# Patient Record
Sex: MALE | Race: WHITE | NOT HISPANIC OR LATINO | URBAN - METROPOLITAN AREA
[De-identification: names, ages, dates, MRNs, and addresses within clinical notes are randomized per-mention and may not be internally consistent; named-entity substitution may affect disease eponyms.]

---

## 2017-08-20 ENCOUNTER — INPATIENT (INPATIENT)
Facility: HOSPITAL | Age: 82
LOS: 11 days | Discharge: HOME | DRG: 813 | End: 2017-09-01
Attending: STUDENT IN AN ORGANIZED HEALTH CARE EDUCATION/TRAINING PROGRAM | Admitting: INTERNAL MEDICINE
Payer: MEDICARE

## 2017-08-20 ENCOUNTER — EMERGENCY (EMERGENCY)
Facility: HOSPITAL | Age: 82
LOS: 1 days | Discharge: ANOTHER HOSPITAL | End: 2017-08-20
Attending: EMERGENCY MEDICINE
Payer: MEDICARE

## 2017-08-20 VITALS
HEIGHT: 64 IN | SYSTOLIC BLOOD PRESSURE: 149 MMHG | HEART RATE: 122 BPM | WEIGHT: 139.99 LBS | RESPIRATION RATE: 18 BRPM | OXYGEN SATURATION: 97 % | TEMPERATURE: 98 F | DIASTOLIC BLOOD PRESSURE: 87 MMHG

## 2017-08-20 VITALS
HEART RATE: 112 BPM | SYSTOLIC BLOOD PRESSURE: 145 MMHG | RESPIRATION RATE: 19 BRPM | TEMPERATURE: 98 F | DIASTOLIC BLOOD PRESSURE: 69 MMHG | OXYGEN SATURATION: 97 %

## 2017-08-20 VITALS
DIASTOLIC BLOOD PRESSURE: 96 MMHG | TEMPERATURE: 99 F | OXYGEN SATURATION: 100 % | RESPIRATION RATE: 18 BRPM | HEART RATE: 89 BPM | SYSTOLIC BLOOD PRESSURE: 148 MMHG

## 2017-08-20 DIAGNOSIS — I61.9 NONTRAUMATIC INTRACEREBRAL HEMORRHAGE, UNSPECIFIED: ICD-10-CM
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
## 2017-08-20 DIAGNOSIS — I48.91 UNSPECIFIED ATRIAL FIBRILLATION: ICD-10-CM

## 2017-08-20 DIAGNOSIS — S05.31XA OCULAR LACERATION WITHOUT PROLAPSE OR LOSS OF INTRAOCULAR TISSUE, RIGHT EYE, INITIAL ENCOUNTER: ICD-10-CM

## 2017-08-20 DIAGNOSIS — S06.6X9A TRAUMATIC SUBARACHNOID HEMORRHAGE WITH LOSS OF CONSCIOUSNESS OF UNSPECIFIED DURATION, INITIAL ENCOUNTER: ICD-10-CM
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
## 2017-08-20 DIAGNOSIS — I10 ESSENTIAL (PRIMARY) HYPERTENSION: ICD-10-CM

## 2017-08-20 DIAGNOSIS — E78.5 HYPERLIPIDEMIA, UNSPECIFIED: ICD-10-CM
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
## 2017-08-20 DIAGNOSIS — E11.9 TYPE 2 DIABETES MELLITUS WITHOUT COMPLICATIONS: ICD-10-CM

## 2017-08-20 LAB
ALBUMIN SERPL ELPH-MCNC: 3.8 G/DL — SIGNIFICANT CHANGE UP (ref 3.5–5)
ALP SERPL-CCNC: 58 U/L — SIGNIFICANT CHANGE UP (ref 40–120)
ALT FLD-CCNC: 51 U/L DA — SIGNIFICANT CHANGE UP (ref 10–60)
ANION GAP SERPL CALC-SCNC: 14 MMOL/L — SIGNIFICANT CHANGE UP (ref 5–17)
ANISOCYTOSIS BLD QL: SLIGHT — SIGNIFICANT CHANGE UP
APTT BLD: 30.6 SEC — SIGNIFICANT CHANGE UP (ref 27.5–37.4)
APTT BLD: 32.1 SEC — SIGNIFICANT CHANGE UP (ref 27.5–37.4)
AST SERPL-CCNC: 75 U/L — HIGH (ref 10–40)
BASOPHILS # BLD AUTO: 0.1 K/UL — SIGNIFICANT CHANGE UP (ref 0–0.2)
BASOPHILS # BLD AUTO: 0.1 K/UL — SIGNIFICANT CHANGE UP (ref 0–0.2)
BASOPHILS NFR BLD AUTO: 0.6 % — SIGNIFICANT CHANGE UP (ref 0–2)
BASOPHILS NFR BLD AUTO: 1.2 % — SIGNIFICANT CHANGE UP (ref 0–2)
BILIRUB SERPL-MCNC: 1.9 MG/DL — HIGH (ref 0.2–1.2)
BLD GP AB SCN SERPL QL: NEGATIVE — SIGNIFICANT CHANGE UP
BUN SERPL-MCNC: 19 MG/DL — HIGH (ref 7–18)
CALCIUM SERPL-MCNC: 8.8 MG/DL — SIGNIFICANT CHANGE UP (ref 8.4–10.5)
CHLORIDE SERPL-SCNC: 110 MMOL/L — HIGH (ref 96–108)
CK MB BLD-MCNC: 1 % — SIGNIFICANT CHANGE UP (ref 0–3.5)
CK MB BLD-MCNC: 2.6 % — SIGNIFICANT CHANGE UP (ref 0–3.5)
CK MB CFR SERPL CALC: 1.2 NG/ML — SIGNIFICANT CHANGE UP (ref 0–3.6)
CK MB CFR SERPL CALC: 2.4 NG/ML — SIGNIFICANT CHANGE UP (ref 0–6.7)
CK SERPL-CCNC: 117 U/L — SIGNIFICANT CHANGE UP (ref 35–232)
CK SERPL-CCNC: 94 U/L — SIGNIFICANT CHANGE UP (ref 30–200)
CO2 SERPL-SCNC: 21 MMOL/L — LOW (ref 22–31)
CREAT SERPL-MCNC: 1 MG/DL — SIGNIFICANT CHANGE UP (ref 0.5–1.3)
EOSINOPHIL # BLD AUTO: 0.1 K/UL — SIGNIFICANT CHANGE UP (ref 0–0.5)
EOSINOPHIL # BLD AUTO: 0.3 K/UL — SIGNIFICANT CHANGE UP (ref 0–0.5)
EOSINOPHIL NFR BLD AUTO: 0.7 % — SIGNIFICANT CHANGE UP (ref 0–6)
EOSINOPHIL NFR BLD AUTO: 3.9 % — SIGNIFICANT CHANGE UP (ref 0–6)
GAS PNL BLDV: SIGNIFICANT CHANGE UP
GLUCOSE SERPL-MCNC: 207 MG/DL — HIGH (ref 70–99)
HCT VFR BLD CALC: 40.5 % — SIGNIFICANT CHANGE UP (ref 39–50)
HCT VFR BLD CALC: 42 % — SIGNIFICANT CHANGE UP (ref 39–50)
HGB BLD-MCNC: 13.9 G/DL — SIGNIFICANT CHANGE UP (ref 13–17)
HGB BLD-MCNC: 14.4 G/DL — SIGNIFICANT CHANGE UP (ref 13–17)
INR BLD: 1.42 RATIO — HIGH (ref 0.88–1.16)
INR BLD: 1.84 RATIO — HIGH (ref 0.88–1.16)
INR BLD: 1.98 RATIO — HIGH (ref 0.88–1.16)
LYMPHOCYTES # BLD AUTO: 1.2 K/UL — SIGNIFICANT CHANGE UP (ref 1–3.3)
LYMPHOCYTES # BLD AUTO: 13 % — SIGNIFICANT CHANGE UP (ref 13–44)
LYMPHOCYTES # BLD AUTO: 2.3 K/UL — SIGNIFICANT CHANGE UP (ref 1–3.3)
LYMPHOCYTES # BLD AUTO: 29.4 % — SIGNIFICANT CHANGE UP (ref 13–44)
MACROCYTES BLD QL: SLIGHT — SIGNIFICANT CHANGE UP
MCHC RBC-ENTMCNC: 34.1 GM/DL — SIGNIFICANT CHANGE UP (ref 32–36)
MCHC RBC-ENTMCNC: 34.3 GM/DL — SIGNIFICANT CHANGE UP (ref 32–36)
MCHC RBC-ENTMCNC: 36 PG — HIGH (ref 27–34)
MCHC RBC-ENTMCNC: 36.4 PG — HIGH (ref 27–34)
MCV RBC AUTO: 105.4 FL — HIGH (ref 80–100)
MCV RBC AUTO: 106 FL — HIGH (ref 80–100)
MONOCYTES # BLD AUTO: 0.6 K/UL — SIGNIFICANT CHANGE UP (ref 0–0.9)
MONOCYTES # BLD AUTO: 0.8 K/UL — SIGNIFICANT CHANGE UP (ref 0–0.9)
MONOCYTES NFR BLD AUTO: 7.3 % — SIGNIFICANT CHANGE UP (ref 2–14)
MONOCYTES NFR BLD AUTO: 8.5 % — SIGNIFICANT CHANGE UP (ref 2–14)
NEUTROPHILS # BLD AUTO: 4.5 K/UL — SIGNIFICANT CHANGE UP (ref 1.8–7.4)
NEUTROPHILS # BLD AUTO: 7.4 K/UL — SIGNIFICANT CHANGE UP (ref 1.8–7.4)
NEUTROPHILS NFR BLD AUTO: 58.2 % — SIGNIFICANT CHANGE UP (ref 43–77)
NEUTROPHILS NFR BLD AUTO: 77.2 % — HIGH (ref 43–77)
NT-PROBNP SERPL-SCNC: 1152 PG/ML — HIGH (ref 0–450)
PLAT MORPH BLD: NORMAL — SIGNIFICANT CHANGE UP
PLATELET # BLD AUTO: 148 K/UL — LOW (ref 150–400)
PLATELET # BLD AUTO: 156 K/UL — SIGNIFICANT CHANGE UP (ref 150–400)
POTASSIUM SERPL-MCNC: 4 MMOL/L — SIGNIFICANT CHANGE UP (ref 3.5–5.3)
POTASSIUM SERPL-SCNC: 4 MMOL/L — SIGNIFICANT CHANGE UP (ref 3.5–5.3)
PROT SERPL-MCNC: 7.1 G/DL — SIGNIFICANT CHANGE UP (ref 6–8.3)
PROTHROM AB SERPL-ACNC: 15.6 SEC — HIGH (ref 9.8–12.7)
PROTHROM AB SERPL-ACNC: 20.3 SEC — HIGH (ref 9.8–12.7)
PROTHROM AB SERPL-ACNC: 21.9 SEC — HIGH (ref 9.8–12.7)
RBC # BLD: 3.81 M/UL — LOW (ref 4.2–5.8)
RBC # BLD: 3.99 M/UL — LOW (ref 4.2–5.8)
RBC # FLD: 12.8 % — SIGNIFICANT CHANGE UP (ref 10.3–14.5)
RBC # FLD: 13 % — SIGNIFICANT CHANGE UP (ref 10.3–14.5)
RBC BLD AUTO: ABNORMAL
RH IG SCN BLD-IMP: POSITIVE — SIGNIFICANT CHANGE UP
SODIUM SERPL-SCNC: 145 MMOL/L — SIGNIFICANT CHANGE UP (ref 135–145)
TROPONIN I SERPL-MCNC: 0.02 NG/ML — SIGNIFICANT CHANGE UP (ref 0–0.04)
TROPONIN T SERPL-MCNC: <0.01 NG/ML — SIGNIFICANT CHANGE UP (ref 0–0.06)
WBC # BLD: 7.7 K/UL — SIGNIFICANT CHANGE UP (ref 3.8–10.5)
WBC # BLD: 9.6 K/UL — SIGNIFICANT CHANGE UP (ref 3.8–10.5)
WBC # FLD AUTO: 7.7 K/UL — SIGNIFICANT CHANGE UP (ref 3.8–10.5)
WBC # FLD AUTO: 9.6 K/UL — SIGNIFICANT CHANGE UP (ref 3.8–10.5)

## 2017-08-20 PROCEDURE — 70450 CT HEAD/BRAIN W/O DYE: CPT | Mod: 26

## 2017-08-20 PROCEDURE — 72125 CT NECK SPINE W/O DYE: CPT

## 2017-08-20 PROCEDURE — 85027 COMPLETE CBC AUTOMATED: CPT

## 2017-08-20 PROCEDURE — 70486 CT MAXILLOFACIAL W/O DYE: CPT | Mod: 26

## 2017-08-20 PROCEDURE — 90714 TD VACC NO PRESV 7 YRS+ IM: CPT

## 2017-08-20 PROCEDURE — 85730 THROMBOPLASTIN TIME PARTIAL: CPT

## 2017-08-20 PROCEDURE — 82553 CREATINE MB FRACTION: CPT

## 2017-08-20 PROCEDURE — 99291 CRITICAL CARE FIRST HOUR: CPT

## 2017-08-20 PROCEDURE — 99284 EMERGENCY DEPT VISIT MOD MDM: CPT | Mod: 25,GC

## 2017-08-20 PROCEDURE — 71250 CT THORAX DX C-: CPT

## 2017-08-20 PROCEDURE — 80053 COMPREHEN METABOLIC PANEL: CPT

## 2017-08-20 PROCEDURE — 70450 CT HEAD/BRAIN W/O DYE: CPT | Mod: 26,77

## 2017-08-20 PROCEDURE — 96374 THER/PROPH/DIAG INJ IV PUSH: CPT

## 2017-08-20 PROCEDURE — 36415 COLL VENOUS BLD VENIPUNCTURE: CPT

## 2017-08-20 PROCEDURE — 71010: CPT | Mod: 26

## 2017-08-20 PROCEDURE — 71250 CT THORAX DX C-: CPT | Mod: 26

## 2017-08-20 PROCEDURE — 83880 ASSAY OF NATRIURETIC PEPTIDE: CPT

## 2017-08-20 PROCEDURE — 99223 1ST HOSP IP/OBS HIGH 75: CPT

## 2017-08-20 PROCEDURE — 72125 CT NECK SPINE W/O DYE: CPT | Mod: 26

## 2017-08-20 PROCEDURE — 93010 ELECTROCARDIOGRAM REPORT: CPT

## 2017-08-20 PROCEDURE — 82550 ASSAY OF CK (CPK): CPT

## 2017-08-20 PROCEDURE — 85610 PROTHROMBIN TIME: CPT

## 2017-08-20 PROCEDURE — 70450 CT HEAD/BRAIN W/O DYE: CPT

## 2017-08-20 PROCEDURE — 84484 ASSAY OF TROPONIN QUANT: CPT

## 2017-08-20 PROCEDURE — 90471 IMMUNIZATION ADMIN: CPT

## 2017-08-20 PROCEDURE — 93005 ELECTROCARDIOGRAM TRACING: CPT

## 2017-08-20 RX ORDER — SODIUM CHLORIDE 9 MG/ML
1000 INJECTION, SOLUTION INTRAVENOUS
Qty: 0 | Refills: 0 | Status: DISCONTINUED | OUTPATIENT
Start: 2017-08-20 | End: 2017-08-21

## 2017-08-20 RX ORDER — TETANUS AND DIPHTHERIA TOXOIDS ADSORBED 2; 2 [LF]/.5ML; [LF]/.5ML
0.5 INJECTION INTRAMUSCULAR ONCE
Qty: 0 | Refills: 0 | Status: COMPLETED | OUTPATIENT
Start: 2017-08-20 | End: 2017-08-20

## 2017-08-20 RX ORDER — INSULIN LISPRO 100/ML
VIAL (ML) SUBCUTANEOUS EVERY 6 HOURS
Qty: 0 | Refills: 0 | Status: DISCONTINUED | OUTPATIENT
Start: 2017-08-20 | End: 2017-08-21

## 2017-08-20 RX ORDER — PROTHROMBIN COMPLEX CONCENTRATE (HUMAN) 25.5; 16.5; 24; 22; 22; 26 [IU]/ML; [IU]/ML; [IU]/ML; [IU]/ML; [IU]/ML; [IU]/ML
1500 POWDER, FOR SOLUTION INTRAVENOUS ONCE
Qty: 1500 | Refills: 0 | Status: COMPLETED | OUTPATIENT
Start: 2017-08-20 | End: 2017-08-20

## 2017-08-20 RX ORDER — DEXTROSE 50 % IN WATER 50 %
25 SYRINGE (ML) INTRAVENOUS ONCE
Qty: 0 | Refills: 0 | Status: DISCONTINUED | OUTPATIENT
Start: 2017-08-20 | End: 2017-09-01

## 2017-08-20 RX ORDER — SODIUM CHLORIDE 9 MG/ML
1000 INJECTION, SOLUTION INTRAVENOUS
Qty: 0 | Refills: 0 | Status: DISCONTINUED | OUTPATIENT
Start: 2017-08-20 | End: 2017-09-01

## 2017-08-20 RX ORDER — ACETAMINOPHEN 500 MG
1000 TABLET ORAL ONCE
Qty: 0 | Refills: 0 | Status: COMPLETED | OUTPATIENT
Start: 2017-08-20 | End: 2017-08-20

## 2017-08-20 RX ORDER — DILTIAZEM HCL 120 MG
180 CAPSULE, EXT RELEASE 24 HR ORAL DAILY
Qty: 0 | Refills: 0 | Status: DISCONTINUED | OUTPATIENT
Start: 2017-08-20 | End: 2017-09-01

## 2017-08-20 RX ORDER — ACETAMINOPHEN 500 MG
650 TABLET ORAL ONCE
Qty: 0 | Refills: 0 | Status: COMPLETED | OUTPATIENT
Start: 2017-08-20 | End: 2017-08-20

## 2017-08-20 RX ORDER — DEXTROSE 50 % IN WATER 50 %
1 SYRINGE (ML) INTRAVENOUS ONCE
Qty: 0 | Refills: 0 | Status: DISCONTINUED | OUTPATIENT
Start: 2017-08-20 | End: 2017-09-01

## 2017-08-20 RX ORDER — PHYTONADIONE (VIT K1) 5 MG
5 TABLET ORAL ONCE
Qty: 0 | Refills: 0 | Status: DISCONTINUED | OUTPATIENT
Start: 2017-08-20 | End: 2017-08-20

## 2017-08-20 RX ORDER — GLUCAGON INJECTION, SOLUTION 0.5 MG/.1ML
1 INJECTION, SOLUTION SUBCUTANEOUS ONCE
Qty: 0 | Refills: 0 | Status: DISCONTINUED | OUTPATIENT
Start: 2017-08-20 | End: 2017-09-01

## 2017-08-20 RX ORDER — DEXTROSE 50 % IN WATER 50 %
12.5 SYRINGE (ML) INTRAVENOUS ONCE
Qty: 0 | Refills: 0 | Status: DISCONTINUED | OUTPATIENT
Start: 2017-08-20 | End: 2017-09-01

## 2017-08-20 RX ORDER — PHYTONADIONE (VIT K1) 5 MG
5 TABLET ORAL ONCE
Qty: 0 | Refills: 0 | Status: COMPLETED | OUTPATIENT
Start: 2017-08-20 | End: 2017-08-20

## 2017-08-20 RX ORDER — SODIUM CHLORIDE 9 MG/ML
3 INJECTION INTRAMUSCULAR; INTRAVENOUS; SUBCUTANEOUS ONCE
Qty: 0 | Refills: 0 | Status: COMPLETED | OUTPATIENT
Start: 2017-08-20 | End: 2017-08-20

## 2017-08-20 RX ORDER — METOPROLOL TARTRATE 50 MG
100 TABLET ORAL
Qty: 0 | Refills: 0 | Status: DISCONTINUED | OUTPATIENT
Start: 2017-08-20 | End: 2017-09-01

## 2017-08-20 RX ORDER — SODIUM CHLORIDE 9 MG/ML
500 INJECTION INTRAMUSCULAR; INTRAVENOUS; SUBCUTANEOUS ONCE
Qty: 0 | Refills: 0 | Status: COMPLETED | OUTPATIENT
Start: 2017-08-20 | End: 2017-08-20

## 2017-08-20 RX ORDER — PROTHROMBIN COMPLEX CONCENTRATE (HUMAN) 25.5; 16.5; 24; 22; 22; 26 [IU]/ML; [IU]/ML; [IU]/ML; [IU]/ML; [IU]/ML; [IU]/ML
1500 POWDER, FOR SOLUTION INTRAVENOUS ONCE
Qty: 1500 | Refills: 0 | Status: DISCONTINUED | OUTPATIENT
Start: 2017-08-20 | End: 2017-08-20

## 2017-08-20 RX ORDER — DIGOXIN 250 MCG
0.12 TABLET ORAL DAILY
Qty: 0 | Refills: 0 | Status: DISCONTINUED | OUTPATIENT
Start: 2017-08-20 | End: 2017-09-01

## 2017-08-20 RX ADMIN — Medication 650 MILLIGRAM(S): at 10:34

## 2017-08-20 RX ADMIN — SODIUM CHLORIDE 3 MILLILITER(S): 9 INJECTION INTRAMUSCULAR; INTRAVENOUS; SUBCUTANEOUS at 10:05

## 2017-08-20 RX ADMIN — Medication 1000 MILLIGRAM(S): at 19:41

## 2017-08-20 RX ADMIN — PROTHROMBIN COMPLEX CONCENTRATE (HUMAN) 400 INTERNATIONAL UNIT(S): 25.5; 16.5; 24; 22; 22; 26 POWDER, FOR SOLUTION INTRAVENOUS at 20:14

## 2017-08-20 RX ADMIN — Medication 101 MILLIGRAM(S): at 20:13

## 2017-08-20 RX ADMIN — TETANUS AND DIPHTHERIA TOXOIDS ADSORBED 0.5 MILLILITER(S): 2; 2 INJECTION INTRAMUSCULAR at 10:30

## 2017-08-20 RX ADMIN — SODIUM CHLORIDE 500 MILLILITER(S): 9 INJECTION INTRAMUSCULAR; INTRAVENOUS; SUBCUTANEOUS at 17:10

## 2017-08-20 RX ADMIN — Medication 400 MILLIGRAM(S): at 18:15

## 2017-08-20 NOTE — ED PROVIDER NOTE - CARE PLAN
Principal Discharge DX:	Fall Principal Discharge DX:	Intracerebral bleed due to trauma, right, with LOC of 30 min or less, initial encounter  Secondary Diagnosis:	Chronic atrial fibrillation  Secondary Diagnosis:	Coagulopathy

## 2017-08-20 NOTE — ED ADULT NURSE NOTE - OBJECTIVE STATEMENT
BIB EMS alert and verbally responsive S/P mech fall this morning  LOC noted sustained  abrasion and bruises to R eye face. Pt  with PMH Afib {taking Coumadin} Arthritis. BIB EMS alert and verbally responsive S/P unwitnessed fall this morning  LOC noted sustained  abrasion and bruises to R eye face. Pt  with PMH Afib {taking Coumadin} Arthritis.

## 2017-08-20 NOTE — ED PROVIDER NOTE - CARE PLAN
Principal Discharge DX:	Rapid atrial fibrillation  Secondary Diagnosis:	Cerebral hemorrhage  Secondary Diagnosis:	Laceration of eye

## 2017-08-20 NOTE — ED PROVIDER NOTE - PMH
Arthritis, shoulder region  Right  Atrial fibrillation    BPH (benign prostatic hypertrophy)    Congenital heart defect    Diabetes mellitus    HTN - Hypertension    Hyperlipidemia    Spinal stenosis of lumbar region    Torn rotator cuff  Right

## 2017-08-20 NOTE — ED ADULT NURSE REASSESSMENT NOTE - NS ED NURSE REASSESS COMMENT FT1
Remains alert &oriented x3 NAD, CT completed. stiches applied to laceration over Rt lateral eye Pt candidate to transfer to [facility] hand off report given to Hussein avalos RN

## 2017-08-20 NOTE — ED ADULT TRIAGE NOTE - CHIEF COMPLAINT QUOTE
Trip and fall in am, loss of consciousness per spouse per ems, on coumadin, lac to scalp, noted with dressing bleeding actively noted.

## 2017-08-20 NOTE — ED PROVIDER NOTE - CRITICAL CARE PROVIDED
additional history taking/telephone consultation with the patient's family/consultation with other physicians/documentation/interpretation of diagnostic studies/consult w/ pt's family directly relating to pts condition/direct patient care (not related to procedure)

## 2017-08-20 NOTE — H&P ADULT - PMH
Arthritis, shoulder region  Right  Atrial fibrillation    BPH (benign prostatic hypertrophy)    Congenital heart defect    Diabetes mellitus    HTN - Hypertension    Hyperlipidemia    Spinal stenosis of lumbar region    Torn rotator cuff  Right Arthritis, shoulder region  Right  Atrial fibrillation    BPH (benign prostatic hypertrophy)    Congenital heart defect    Diabetes mellitus    HTN - Hypertension    Hyperlipidemia    Nonrheumatic aortic valve stenosis    Spinal stenosis of lumbar region    Torn rotator cuff  Right

## 2017-08-20 NOTE — CONSULT NOTE ADULT - SUBJECTIVE AND OBJECTIVE BOX
LEVEL   TRAUMA ACTIVATION    85y Male who fell while walking out the door to get some mashed potatoes.  He had a syncopal episode after feeling dizzy.  He woke up on the floor.  He is currently on Coumadin for a fib.  he states that he has a balance problem and walks with a cane.  He is complaining of a headache now and nothing else.         Primary Survey  A - intact   B - clear to auscultation and equal b/l   C -  148/96  HR 89   D - GCS 15  E - Exposure deferred.  Patient in hallway      Secondary survey  GEN: NAD, alert and oriented to person place and time, responses appropriate  HEENT: normocephalic, ecchymosis to the R orbit and R temple with edema at the sup/lat orbit, laceration at lateral eye, sutured  CV: s1, s2, irregularly regular  PULM/CHEST: CTA B/L, no crepitus, no obvious signs of trauma, tender to R chest   ABD: soft, nontender, nondistended, no hematomas, well healed appendectomy scar  : deferred  EXT:warm, no gross deformities, soft compartments,motor 5/5 in all extremities        PMH  Atrial fibrillation  Spinal stenosis of lumbar region  Torn rotator cuff  Arthritis, shoulder region  Arthropathy associated with bacterial disease, shoulder region  Diabetes mellitus  Congenital heart defect  Hyperlipidemia  HTN - Hypertension  BPH (benign prostatic hypertrophy)      PSH  Hx of appendectomy  S/P TURP (status post transurethral resection of prostate)      MEDS  MEDICATIONS  (STANDING):      MEDICATIONS  (PRN):      ALLERGIES  Allergies    No Known Allergies    Intolerances        SOCIAL Hx    LABS                        13.9   9.6   )-----------( 156      ( 20 Aug 2017 15:10 )             40.5     08-20    144  |  106  |  18  ----------------------------<  162<H>  4.1   |  23  |  0.82    Ca    9.1      20 Aug 2017 15:10    TPro  6.5  /  Alb  4.1  /  TBili  2.0<H>  /  DBili  x   /  AST  58<H>  /  ALT  38  /  AlkPhos  51  08-20    PT/INR - ( 20 Aug 2017 15:10 )   PT: 21.9 sec;   INR: 1.98 ratio         PTT - ( 20 Aug 2017 15:10 )  PTT:30.6 sec          IMAGING  < from: CT Maxillofacial No Cont (08.20.17 @ 16:02) >  IMPRESSION:    1.  Head CT: Stable small right frontal hemorrhage.  2.  Maxillofacial CT: Right periorbital preseptal soft tissue hematoma   without associated orbital fracture. Age-indeterminate anterior bilateral   nasal fractures.      < end of copied text >  < from: CT Head No Cont (08.20.17 @ 15:57) >  CT brain:    A 6 mm parenchymal hemorrhage in the right superior frontal gyrus is   stable in size compared to the prior study, most likely reflecting a   hemorrhagic contusion given the history of trauma. Serial imaging   follow-up to resolution is recommended to exclude an underlying   abnormality. No new acute hemorrhages are present.    There is no evidence for acute vascular territory infarct, acute  hydrocephalus, or midline shift.    Chronic white matter changes and cerebral volume loss are again noted.    There is no displaced calvarial fracture.    < end of copied text >  < from: CT Chest No Cont (08.20.17 @ 11:09) >  Nondisplaced fracture right sixth and seventh ribs. No pneumothorax,   pleural effusion or focal lung consolidation/contusion. Platelike   atelectatic streak or scar lateral right lower lung.    Additional findings as described.      < end of copied text >

## 2017-08-20 NOTE — ED PROVIDER NOTE - OBJECTIVE STATEMENT
86 y/o M pt with significant PMHx of HTN, HLD, NIDDM (last dose unknown) and was BIB EMS s/p mechanical fall today  Pt claims he left his house and questionably felt a bit dizzy,  fell and hit his head. Pt sustained a laceration to R eye (lateral aspect) and is c/o neck pain, R upper rib pain and mild HA. Pt notes (+) LOC. Pt denies any chest pain, palpitations, shortness of breath, or any other complaints. NKDA.   Last tetanus unknown. 86 y/o M pt with significant PMHx of HTN, HLD, NIDDM (last dose unknown) and was BIB EMS s/p mechanical fall today  Pt claims he left his house and questionably felt a bit dizzy,  fell and hit his head. Pt sustained a laceration to R eye (lateral aspect) and is c/o neck pain, R upper rib pain and mild HA. Pt notes (+) LOC. Pt denies any chest pain, palpitations, shortness of breath, or any other complaints. NKDA.   Last tetanus unknown.  Poor historian

## 2017-08-20 NOTE — ED PROVIDER NOTE - ATTENDING CONTRIBUTION TO CARE
Dr. Bernard (Attending Physician)  I performed a history and physical exam of the patient and discussed their management with the resident. I reviewed the resident's note and agree with the documented findings and plan of care. My medical decision making and observations are found above.

## 2017-08-20 NOTE — ED PROVIDER NOTE - PROGRESS NOTE DETAILS
pt with Rt frontal parasagittal gyrus, d/w neurosurgeon Dr. Rios, will transfer to [facility], spoke with pt's lady friend Brandi, pt has somewhat early dementia.

## 2017-08-20 NOTE — CONSULT NOTE ADULT - PROBLEM SELECTOR RECOMMENDATION 9
no acute neurosurgical intervention  q4 neuro checks  hold coumadin until outpatient followup  repeat CTH in am; if stable, patient can see Dr. Rios in office in one week

## 2017-08-20 NOTE — CONSULT NOTE ADULT - SUBJECTIVE AND OBJECTIVE BOX
HPI:  Patient is an 86 yo M w/ hx of afib on coumadin who p/w trace R frontal tSAH seen on CTH after syncopal fall this afternoon. Patient states he was walking out the door to get some mashed potatoes and suddenly felt dizzy; he awoke on the floor. Patient denies any vision change, nausea/vomiting, focal weakness, seizures, and only complains of a mild HA. He has no focal deficits on exam.    PAST MEDICAL HISTORY   Atrial fibrillation  Spinal stenosis of lumbar region  Torn rotator cuff  Arthritis, shoulder region  Arthropathy associated with bacterial disease, shoulder region  Diabetes mellitus  Congenital heart defect  Hyperlipidemia  HTN - Hypertension  BPH (benign prostatic hypertrophy)    PAST SURGICAL HISTORY   Hx of appendectomy  S/P TURP (status post transurethral resection of prostate)          SOCIAL HISTORY:   Occupation:   Marital Status:     FAMILY HISTORY:      PHYSICAL EXAM:    Constitutional: No Acute Distress     Neurological: AOx3, Following Commands, Moving all Extremities     Motor exam:          Upper extremity                         Delt     Bicep     Tricep    HG                                                 R         5/5        5/5        5/5       5/5                                               L          5/5        5/5        5/5       5/5          Lower extremity                        HF         KF        KE       DF         PF                                                  R        5/5        5/5        5/5       5/5         5/5                                               L         5/5        5/5       5/5       5/5          5/5                                                 Sensation: [x] intact to light touch  [] decreased:         LABS:                        13.9   9.6   )-----------( 156      ( 20 Aug 2017 15:10 )             40.5     08-20    144  |  106  |  18  ----------------------------<  162<H>  4.1   |  23  |  0.82    Ca    9.1      20 Aug 2017 15:10    TPro  6.5  /  Alb  4.1  /  TBili  2.0<H>  /  DBili  x   /  AST  58<H>  /  ALT  38  /  AlkPhos  51  08-20    PT/INR - ( 20 Aug 2017 15:10 )   PT: 21.9 sec;   INR: 1.98 ratio         PTT - ( 20 Aug 2017 15:10 )  PTT:30.6 sec

## 2017-08-20 NOTE — H&P ADULT - HISTORY OF PRESENT ILLNESS
LEVEL II TRAUMA ACTIVATION    85y Male who fell while walking out the door to get some mashed potatoes.  He had a syncopal episode after feeling dizzy.  He woke up on the floor.  He is currently on Coumadin for a fib.  he states that he has a balance problem and walks with a cane.  He is complaining of a headache now and nothing else.         Primary Survey  A - intact   B - clear to auscultation and equal b/l   C -  148/96  HR 89   D - GCS 15  E - Exposure deferred.  Patient in hallway      Secondary survey  GEN: NAD, alert and oriented to person place and time, responses appropriate  HEENT: normocephalic, ecchymosis to the R orbit and R temple with edema at the sup/lat orbit, laceration at lateral eye, sutured  CV: s1, s2, irregularly regular  PULM/CHEST: CTA B/L, no crepitus, no obvious signs of trauma, tender to R chest   ABD: soft, nontender, nondistended, no hematomas, well healed appendectomy scar  : deferred  EXT: warm, no gross deformities, soft compartments, motor 5/5 in all extremities      PMH  Atrial fibrillation  Spinal stenosis of lumbar region  Torn rotator cuff  Arthritis, shoulder region  Arthropathy associated with bacterial disease, shoulder region  Diabetes mellitus  Congenital heart defect  Hyperlipidemia  HTN - Hypertension  BPH (benign prostatic hypertrophy)      PSH  Hx of appendectomy  S/P TURP (status post transurethral resection of prostate)      MEDS  MEDICATIONS  (STANDING):      MEDICATIONS  (PRN):      ALLERGIES  Allergies    No Known Allergies    Intolerances        SOCIAL Hx    LABS                        13.9   9.6   )-----------( 156      ( 20 Aug 2017 15:10 )             40.5     08-20    144  |  106  |  18  ----------------------------<  162<H>  4.1   |  23  |  0.82    Ca    9.1      20 Aug 2017 15:10    TPro  6.5  /  Alb  4.1  /  TBili  2.0<H>  /  DBili  x   /  AST  58<H>  /  ALT  38  /  AlkPhos  51  08-20    PT/INR - ( 20 Aug 2017 15:10 )   PT: 21.9 sec;   INR: 1.98 ratio         PTT - ( 20 Aug 2017 15:10 )  PTT:30.6 sec          IMAGING  < from: CT Maxillofacial No Cont (08.20.17 @ 16:02) >  IMPRESSION:    1.  Head CT: Stable small right frontal hemorrhage.  2.  Maxillofacial CT: Right periorbital preseptal soft tissue hematoma   without associated orbital fracture. Age-indeterminate anterior bilateral   nasal fractures.      < end of copied text >  < from: CT Head No Cont (08.20.17 @ 15:57) >  CT brain:    A 6 mm parenchymal hemorrhage in the right superior frontal gyrus is   stable in size compared to the prior study, most likely reflecting a   hemorrhagic contusion given the history of trauma. Serial imaging   follow-up to resolution is recommended to exclude an underlying   abnormality. No new acute hemorrhages are present.    There is no evidence for acute vascular territory infarct, acute  hydrocephalus, or midline shift.    Chronic white matter changes and cerebral volume loss are again noted.    There is no displaced calvarial fracture.    < end of copied text >  < from: CT Chest No Cont (08.20.17 @ 11:09) >  Nondisplaced fracture right sixth and seventh ribs. No pneumothorax,   pleural effusion or focal lung consolidation/contusion. Platelike   atelectatic streak or scar lateral right lower lung.    Additional findings as described.      < end of copied text > 85y Male who fell while walking out the door to get some mashed potatoes.  He had a syncopal episode after feeling dizzy.  He woke up on the floor.  He is currently on Coumadin for a fib.  he states that he has a balance problem and walks with a cane.  He is complaining of a headache now and nothing else.         Primary Survey  A - intact   B - clear to auscultation and equal b/l   C -  148/96  HR 89   D - GCS 15  E - Exposure deferred.  Patient in hallway      Secondary survey  GEN: NAD, alert and oriented to person place and time, responses appropriate  HEENT: normocephalic, ecchymosis to the R orbit and R temple with edema at the sup/lat orbit, laceration at lateral eye, sutured  CV: s1, s2, irregularly regular  PULM/CHEST: CTA B/L, no crepitus, no obvious signs of trauma, tender to R chest   ABD: soft, nontender, nondistended, no hematomas, well healed appendectomy scar  : deferred  EXT: warm, no gross deformities, soft compartments, motor 5/5 in all extremities      PMH  Atrial fibrillation  Spinal stenosis of lumbar region  Torn rotator cuff  Arthritis, shoulder region  Arthropathy associated with bacterial disease, shoulder region  Diabetes mellitus  Congenital heart defect  Hyperlipidemia  HTN - Hypertension  BPH (benign prostatic hypertrophy)      PSH  Hx of appendectomy  S/P TURP (status post transurethral resection of prostate)      MEDS  MEDICATIONS  (STANDING):      MEDICATIONS  (PRN):      ALLERGIES  Allergies    No Known Allergies    Intolerances        SOCIAL Hx    LABS                        13.9   9.6   )-----------( 156      ( 20 Aug 2017 15:10 )             40.5     08-20    144  |  106  |  18  ----------------------------<  162<H>  4.1   |  23  |  0.82    Ca    9.1      20 Aug 2017 15:10    TPro  6.5  /  Alb  4.1  /  TBili  2.0<H>  /  DBili  x   /  AST  58<H>  /  ALT  38  /  AlkPhos  51  08-20    PT/INR - ( 20 Aug 2017 15:10 )   PT: 21.9 sec;   INR: 1.98 ratio         PTT - ( 20 Aug 2017 15:10 )  PTT:30.6 sec          IMAGING  < from: CT Maxillofacial No Cont (08.20.17 @ 16:02) >  IMPRESSION:    1.  Head CT: Stable small right frontal hemorrhage.  2.  Maxillofacial CT: Right periorbital preseptal soft tissue hematoma   without associated orbital fracture. Age-indeterminate anterior bilateral   nasal fractures.      < end of copied text >  < from: CT Head No Cont (08.20.17 @ 15:57) >  CT brain:    A 6 mm parenchymal hemorrhage in the right superior frontal gyrus is   stable in size compared to the prior study, most likely reflecting a   hemorrhagic contusion given the history of trauma. Serial imaging   follow-up to resolution is recommended to exclude an underlying   abnormality. No new acute hemorrhages are present.    There is no evidence for acute vascular territory infarct, acute  hydrocephalus, or midline shift.    Chronic white matter changes and cerebral volume loss are again noted.    There is no displaced calvarial fracture.    < end of copied text >  < from: CT Chest No Cont (08.20.17 @ 11:09) >  Nondisplaced fracture right sixth and seventh ribs. No pneumothorax,   pleural effusion or focal lung consolidation/contusion. Platelike   atelectatic streak or scar lateral right lower lung.    Additional findings as described.      < end of copied text > 85y Male who fell while walking out the door to get some mashed potatoes.  He had a syncopal episode after feeling dizzy.  He woke up on the floor.  He is currently on Coumadin for a fib.  he states that he has a balance problem and walks with a cane.  He is complaining of a headache now and nothing else.         Primary Survey  A - intact   B - clear to auscultation and equal b/l   C -  148/96  HR 89   D - GCS 15  E - Exposure deferred.  Patient in hallway      Secondary survey  GEN: NAD, alert and oriented to person place and time, responses appropriate  HEENT: normocephalic, ecchymosis to the R orbit and R temple with edema at the sup/lat orbit, laceration at lateral eye, sutured  CV: s1, s2, irregularly regular  PULM/CHEST: CTA B/L, no crepitus, no obvious signs of trauma, tender to R chest   ABD: soft, nontender, nondistended, no hematomas, well healed appendectomy scar  : deferred  EXT: warm, no gross deformities, soft compartments, motor 5/5 in all extremities      PMH  Severe aortic stenosis  Atrial fibrillation  Spinal stenosis of lumbar region  Torn rotator cuff  Arthritis, shoulder region  Arthropathy associated with bacterial disease, shoulder region  Diabetes mellitus  Congenital heart defect  Hyperlipidemia  HTN - Hypertension  BPH (benign prostatic hypertrophy)      PSH  Hx of appendectomy  S/P TURP (status post transurethral resection of prostate)      MEDS  MEDICATIONS  (STANDING):      MEDICATIONS  (PRN):      ALLERGIES  Allergies    No Known Allergies    Intolerances        SOCIAL Hx    LABS                        13.9   9.6   )-----------( 156      ( 20 Aug 2017 15:10 )             40.5     08-20    144  |  106  |  18  ----------------------------<  162<H>  4.1   |  23  |  0.82    Ca    9.1      20 Aug 2017 15:10    TPro  6.5  /  Alb  4.1  /  TBili  2.0<H>  /  DBili  x   /  AST  58<H>  /  ALT  38  /  AlkPhos  51  08-20    PT/INR - ( 20 Aug 2017 15:10 )   PT: 21.9 sec;   INR: 1.98 ratio         PTT - ( 20 Aug 2017 15:10 )  PTT:30.6 sec          IMAGING  < from: CT Maxillofacial No Cont (08.20.17 @ 16:02) >  IMPRESSION:    1.  Head CT: Stable small right frontal hemorrhage.  2.  Maxillofacial CT: Right periorbital preseptal soft tissue hematoma   without associated orbital fracture. Age-indeterminate anterior bilateral   nasal fractures.      < end of copied text >  < from: CT Head No Cont (08.20.17 @ 15:57) >  CT brain:    A 6 mm parenchymal hemorrhage in the right superior frontal gyrus is   stable in size compared to the prior study, most likely reflecting a   hemorrhagic contusion given the history of trauma. Serial imaging   follow-up to resolution is recommended to exclude an underlying   abnormality. No new acute hemorrhages are present.    There is no evidence for acute vascular territory infarct, acute  hydrocephalus, or midline shift.    Chronic white matter changes and cerebral volume loss are again noted.    There is no displaced calvarial fracture.    < end of copied text >  < from: CT Chest No Cont (08.20.17 @ 11:09) >  Nondisplaced fracture right sixth and seventh ribs. No pneumothorax,   pleural effusion or focal lung consolidation/contusion. Platelike   atelectatic streak or scar lateral right lower lung.    Additional findings as described.      < end of copied text >

## 2017-08-20 NOTE — ED PROVIDER NOTE - OBJECTIVE STATEMENT
84yo male transfer from OSH after fall. Patient was walking in driveway, syncopized, and fell forward hitting head. +LOC. Patient on coumadin for afib. Patient found to have ight sixth and seventh rib fractures and frontal parenchymal hemorrhage. Denies chest pain, shortness of breath, nausea, vomiting, weakness, back pain, neck pain.

## 2017-08-20 NOTE — H&P ADULT - ATTENDING COMMENTS
Seen as a trauma consult  1) traumatic subarachnoid hemorrhage  - reverse warfarin-induced coagulopathy with KCentra and Vit K  - neurosurgery consult  - repeat head CT in AM    2) multiple right rib fractures  - pain well controlled, no respiratory embarrassment  - multimodal pain control  - PT evaluation in AM Seen as a trauma consult on 8/20/17  1) traumatic subarachnoid hemorrhage  - reverse warfarin-induced coagulopathy with KCentra and Vit K  - neurosurgery consult  - repeat head CT in AM    2) multiple right rib fractures  - pain well controlled, no respiratory embarrassment  - multimodal pain control  - PT evaluation in AM

## 2017-08-20 NOTE — ED PROVIDER NOTE - CONDUCTED A DETAILED DISCUSSION WITH PATIENT AND/OR GUARDIAN REGARDING, MDM
lab results/need to admit/return to ED if symptoms worsen, persist or questions arise/need for outpatient follow-up/radiology results

## 2017-08-20 NOTE — ED PROVIDER NOTE - MUSCULOSKELETAL, MLM
Spine appears normal, range of motion is not limited, no muscle or joint tenderness, mid neck- sl tenderness to palp.

## 2017-08-20 NOTE — ED PROVIDER NOTE - MEDICAL DECISION MAKING DETAILS
Dr. Bernard (Attending Physician)  Pt. with ho afib on coumadin pw syncope today presented to [facility] and found to have ICH was tachycardic to 130s in afib with RVR.  Given dilt IV and PO with resolution.  Will repeat CT head. If enlarging will reverse coagulopathy.  CT orbits to eval for fx.

## 2017-08-20 NOTE — ED PROVIDER NOTE - MEDICAL DECISION MAKING DETAILS
86 y/o M pt s/p mechanical fall. Pt in rapid AFib with syncopy. Will get labs, slow down HR, CT head , CT neck and admission.

## 2017-08-20 NOTE — ED ADULT NURSE NOTE - OBJECTIVE STATEMENT
85y male pt BIBA transferred from [facility] for head bleed and right rib fracture. Pt states that he felt dizzy and passed out this morning, unwitnessed, on Coumadin for A-fib, went to [facility], had CTs done showing findings above and also had rapid heart rate. Arrived with right AC 18g IV in place, received Cardizem and Tylenol prior to arrival, rate controlled, on CM with A-fib at rate between 80s to 90s. c/o mild headache, laceration above right eye stitched PTA.
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
## 2017-08-21 DIAGNOSIS — E11.65 TYPE 2 DIABETES MELLITUS WITH HYPERGLYCEMIA: ICD-10-CM

## 2017-08-21 DIAGNOSIS — I48.91 UNSPECIFIED ATRIAL FIBRILLATION: ICD-10-CM

## 2017-08-21 DIAGNOSIS — I10 ESSENTIAL (PRIMARY) HYPERTENSION: ICD-10-CM

## 2017-08-21 DIAGNOSIS — E03.9 HYPOTHYROIDISM, UNSPECIFIED: ICD-10-CM

## 2017-08-21 DIAGNOSIS — E78.5 HYPERLIPIDEMIA, UNSPECIFIED: ICD-10-CM

## 2017-08-21 LAB
ANION GAP SERPL CALC-SCNC: 13 MMOL/L — SIGNIFICANT CHANGE UP (ref 5–17)
APTT BLD: 28.7 SEC — SIGNIFICANT CHANGE UP (ref 27.5–37.4)
BUN SERPL-MCNC: 11 MG/DL — SIGNIFICANT CHANGE UP (ref 7–23)
CALCIUM SERPL-MCNC: 8.7 MG/DL — SIGNIFICANT CHANGE UP (ref 8.4–10.5)
CHLORIDE SERPL-SCNC: 106 MMOL/L — SIGNIFICANT CHANGE UP (ref 96–108)
CO2 SERPL-SCNC: 23 MMOL/L — SIGNIFICANT CHANGE UP (ref 22–31)
CREAT SERPL-MCNC: 0.78 MG/DL — SIGNIFICANT CHANGE UP (ref 0.5–1.3)
GLUCOSE SERPL-MCNC: 144 MG/DL — HIGH (ref 70–99)
HBA1C BLD-MCNC: 7.4 % — HIGH (ref 4–5.6)
HCT VFR BLD CALC: 39.9 % — SIGNIFICANT CHANGE UP (ref 39–50)
HGB BLD-MCNC: 13.6 G/DL — SIGNIFICANT CHANGE UP (ref 13–17)
INR BLD: 1.36 RATIO — HIGH (ref 0.88–1.16)
MAGNESIUM SERPL-MCNC: 1.7 MG/DL — SIGNIFICANT CHANGE UP (ref 1.6–2.6)
MCHC RBC-ENTMCNC: 34.2 GM/DL — SIGNIFICANT CHANGE UP (ref 32–36)
MCHC RBC-ENTMCNC: 35.9 PG — HIGH (ref 27–34)
MCV RBC AUTO: 105 FL — HIGH (ref 80–100)
PHOSPHATE SERPL-MCNC: 2.7 MG/DL — SIGNIFICANT CHANGE UP (ref 2.5–4.5)
PLATELET # BLD AUTO: 146 K/UL — LOW (ref 150–400)
POTASSIUM SERPL-MCNC: 4 MMOL/L — SIGNIFICANT CHANGE UP (ref 3.5–5.3)
POTASSIUM SERPL-SCNC: 4 MMOL/L — SIGNIFICANT CHANGE UP (ref 3.5–5.3)
PROTHROM AB SERPL-ACNC: 14.9 SEC — HIGH (ref 9.8–12.7)
RBC # BLD: 3.8 M/UL — LOW (ref 4.2–5.8)
RBC # FLD: 13.1 % — SIGNIFICANT CHANGE UP (ref 10.3–14.5)
SODIUM SERPL-SCNC: 142 MMOL/L — SIGNIFICANT CHANGE UP (ref 135–145)
WBC # BLD: 7 K/UL — SIGNIFICANT CHANGE UP (ref 3.8–10.5)
WBC # FLD AUTO: 7 K/UL — SIGNIFICANT CHANGE UP (ref 3.8–10.5)

## 2017-08-21 PROCEDURE — 70450 CT HEAD/BRAIN W/O DYE: CPT | Mod: 26

## 2017-08-21 PROCEDURE — 99223 1ST HOSP IP/OBS HIGH 75: CPT

## 2017-08-21 PROCEDURE — 99233 SBSQ HOSP IP/OBS HIGH 50: CPT

## 2017-08-21 PROCEDURE — 93010 ELECTROCARDIOGRAM REPORT: CPT

## 2017-08-21 PROCEDURE — 99222 1ST HOSP IP/OBS MODERATE 55: CPT

## 2017-08-21 RX ORDER — INSULIN LISPRO 100/ML
VIAL (ML) SUBCUTANEOUS AT BEDTIME
Qty: 0 | Refills: 0 | Status: DISCONTINUED | OUTPATIENT
Start: 2017-08-21 | End: 2017-09-01

## 2017-08-21 RX ORDER — LEVOTHYROXINE SODIUM 125 MCG
50 TABLET ORAL ONCE
Qty: 0 | Refills: 0 | Status: COMPLETED | OUTPATIENT
Start: 2017-08-21 | End: 2017-08-21

## 2017-08-21 RX ORDER — MAGNESIUM SULFATE 500 MG/ML
2 VIAL (ML) INJECTION ONCE
Qty: 0 | Refills: 0 | Status: COMPLETED | OUTPATIENT
Start: 2017-08-21 | End: 2017-08-21

## 2017-08-21 RX ORDER — ATORVASTATIN CALCIUM 80 MG/1
10 TABLET, FILM COATED ORAL AT BEDTIME
Qty: 0 | Refills: 0 | Status: DISCONTINUED | OUTPATIENT
Start: 2017-08-21 | End: 2017-09-01

## 2017-08-21 RX ORDER — LIDOCAINE 4 G/100G
1 CREAM TOPICAL DAILY
Qty: 0 | Refills: 0 | Status: DISCONTINUED | OUTPATIENT
Start: 2017-08-21 | End: 2017-09-01

## 2017-08-21 RX ORDER — ACETAMINOPHEN 500 MG
650 TABLET ORAL EVERY 6 HOURS
Qty: 0 | Refills: 0 | Status: DISCONTINUED | OUTPATIENT
Start: 2017-08-21 | End: 2017-09-01

## 2017-08-21 RX ORDER — LEVOTHYROXINE SODIUM 125 MCG
50 TABLET ORAL DAILY
Qty: 0 | Refills: 0 | Status: DISCONTINUED | OUTPATIENT
Start: 2017-08-21 | End: 2017-09-01

## 2017-08-21 RX ORDER — INSULIN LISPRO 100/ML
VIAL (ML) SUBCUTANEOUS
Qty: 0 | Refills: 0 | Status: DISCONTINUED | OUTPATIENT
Start: 2017-08-21 | End: 2017-09-01

## 2017-08-21 RX ORDER — TAMSULOSIN HYDROCHLORIDE 0.4 MG/1
0.4 CAPSULE ORAL
Qty: 0 | Refills: 0 | Status: DISCONTINUED | OUTPATIENT
Start: 2017-08-21 | End: 2017-09-01

## 2017-08-21 RX ORDER — TAMSULOSIN HYDROCHLORIDE 0.4 MG/1
0.4 CAPSULE ORAL ONCE
Qty: 0 | Refills: 0 | Status: COMPLETED | OUTPATIENT
Start: 2017-08-21 | End: 2017-08-21

## 2017-08-21 RX ADMIN — Medication 100 MILLIGRAM(S): at 05:21

## 2017-08-21 RX ADMIN — Medication 50 MICROGRAM(S): at 12:55

## 2017-08-21 RX ADMIN — Medication 50 GRAM(S): at 10:52

## 2017-08-21 RX ADMIN — Medication 100 MILLIGRAM(S): at 17:21

## 2017-08-21 RX ADMIN — ATORVASTATIN CALCIUM 10 MILLIGRAM(S): 80 TABLET, FILM COATED ORAL at 21:34

## 2017-08-21 RX ADMIN — TAMSULOSIN HYDROCHLORIDE 0.4 MILLIGRAM(S): 0.4 CAPSULE ORAL at 12:54

## 2017-08-21 RX ADMIN — Medication 180 MILLIGRAM(S): at 05:21

## 2017-08-21 RX ADMIN — Medication 650 MILLIGRAM(S): at 22:44

## 2017-08-21 RX ADMIN — Medication 650 MILLIGRAM(S): at 10:52

## 2017-08-21 RX ADMIN — Medication 0.12 MILLIGRAM(S): at 05:21

## 2017-08-21 RX ADMIN — LIDOCAINE 1 PATCH: 4 CREAM TOPICAL at 12:54

## 2017-08-21 RX ADMIN — Medication 50 MICROGRAM(S): at 12:54

## 2017-08-21 NOTE — PHYSICAL THERAPY INITIAL EVALUATION ADULT - ADDITIONAL COMMENTS
CT maxillofacial:  Maxillofacial CT: Right periorbital preseptal soft tissue hematoma without associated orbital fracture. Age-indeterminate anterior bilateral nasal fractures.

## 2017-08-21 NOTE — PHYSICAL THERAPY INITIAL EVALUATION ADULT - GAIT DEVIATIONS NOTED, PT EVAL
decreased step length/decreased stride length/decreased venice/decreased weight-shifting ability/unsteady gait, losing balance, corrected with mod assist

## 2017-08-21 NOTE — PROGRESS NOTE ADULT - ASSESSMENT
Patient is an 85 year old man who was transferred from [facility] with lacerations to the face, rib fractures and ICH.    -Incentive spirometry (currently pulling 1500) 10x per hour while awake  -multimodal pain control  -PT consult to see today  -f/u NSx  -CT scan in am for stability  -Hold Coumadin and chemical DVT ppx for now  -ambulation with assistance and SCDs  -Regular diet    Andres Mart, PGY-1 #[id number]

## 2017-08-21 NOTE — PHYSICAL THERAPY INITIAL EVALUATION ADULT - SOCIAL CONCERNS
spoke with pt's friend regarding pt's need for assist/supervision at home with all functional activities

## 2017-08-21 NOTE — PROGRESS NOTE ADULT - ATTENDING COMMENTS
Pt seen and examined today at 10am, agree with above. Pt complaining of right chest wall pain (5/10, context R 6-7 rib fractures, worse with movement). No N/V, no HA or shortness of breath.    R 6-7 rib fractures: pain control with Tylenol prn, lidocaine patch.  R SAH: repeat head CT unchanged, start diet. Chemical DVT prophylaxis tomorrow.  Chronic atrial fibrillation: continue home metoprolol, diltiazem, and digoxin. Hold coumadin for SAH until outpatient followup with Neurosurgery.  Hypothyroidism: continue Synthroid  DM: HbA1c 7.4; continue glycemic control with SSI  BPH: continue Flomax

## 2017-08-21 NOTE — PROGRESS NOTE ADULT - ASSESSMENT
Patient is 86 yo male sp fall with small R frontal tSAH.    -repeat CTH pending  -continue to hold ac

## 2017-08-21 NOTE — PROGRESS NOTE ADULT - SUBJECTIVE AND OBJECTIVE BOX
Patient seen and examined at bedside.    T(C): 36.8 (08-21-17 @ 13:35), Max: 37.2 (08-20-17 @ 14:45)  HR: 86 (08-21-17 @ 13:35) (58 - 136)  BP: 116/70 (08-21-17 @ 13:35) (116/70 - 148/96)  RR: 18 (08-21-17 @ 13:35) (16 - 20)  SpO2: 95% (08-21-17 @ 13:35) (95% - 100%)  Wt(kg): --    Exam:    AOx3, FC, PERRL, EOMI, V1-3 intact, no facial, palate annie symmetric, tongue midline, shrug 5/5  5/5 throughout, no drift  SILT  No clonus or babinski

## 2017-08-21 NOTE — PHYSICAL THERAPY INITIAL EVALUATION ADULT - PERTINENT HX OF CURRENT PROBLEM, REHAB EVAL
84yo male transfer from OSH after fall. Patient was walking in driveway, syncopized, and fell forward hitting head. +LOC. Patient on coumadin for afib. Patient found to have ight sixth and seventh rib fractures and frontal parenchymal hemorrhage. No acute neurosurgical intervention

## 2017-08-21 NOTE — CONSULT NOTE ADULT - SUBJECTIVE AND OBJECTIVE BOX
PCP: Prashanth Prakash  HPI:  85m pmh htn hl afib on coumadin dm2 bph arthritis who p/w fall. Pt had left the house earlier in the day to get the paper and went back out again after returning home because he realized he needed more food. Pt states he felt "dizzy" and then apparently fainted. Pt is unsure if he lost consciousness but cannot remember the incident. Per pt, he does not fall but per his lady friend, he had a similar episode roughly 1 yr ago, went to see his doctor and was told it was a "heart problem", but they cannot articulate any further beyond that. Pt denies cp/sob/f/c/n/v/diarrhea. Was prescribed a new medication for urinary frequency recently but has not actually taken it. Pt was found to have a R frontal SAH, reversed w/ vitamin K and K Centra. Pt currently without symptoms.       PAST MEDICAL & SURGICAL HISTORY:  Nonrheumatic aortic valve stenosis  Atrial fibrillation  Spinal stenosis of lumbar region  Torn rotator cuff: Right  Arthritis, shoulder region: Right  Diabetes mellitus  Congenital heart defect  Hyperlipidemia  HTN - Hypertension  BPH (benign prostatic hypertrophy)  Hx of appendectomy: age 17  S/P TURP (status post transurethral resection of prostate): 2008    Social hx: neg tobacco, occasional wine drinker, neg drugs  Fam hx : no pertinent fam hx     Review of Systems:   CONSTITUTIONAL: No fever, weight loss  EYES: No eye pain, visual disturbances  ENMT:  No difficulty hearing  NECK: No pain or stiffness  RESPIRATORY: No cough, wheezing, chills or hemoptysis; No shortness of breath  CARDIOVASCULAR: No chest pain, palpitations. + dizziness, neg leg swelling   GASTROINTESTINAL: No abdominal or epigastric pain. No nausea, vomiting, or hematemesis; No diarrhea. +constipation. No melena or hematochezia.  GENITOURINARY: No dysuria, frequency, hematuria, or incontinence  NEUROLOGICAL: No headaches, loss of strength  SKIN: No itching, burning, rashes, or lesions   LYMPH NODES: No enlarged glands  ENDOCRINE: No heat or cold intolerance  MUSCULOSKELETAL: No joint pain or swelling; No muscle, back, or extremity pain  PSYCHIATRIC: No depression, anxiety, mood swings, or difficulty sleeping  HEME/LYMPH: No easy bruising, or bleeding gums  ALLERY AND IMMUNOLOGIC: No hives or eczema    Allergies    No Known Allergies    Intolerances      MEDICATIONS  (STANDING):  digoxin     Tablet 0.125 milliGRAM(s) Oral daily  diltiazem    milliGRAM(s) Oral daily  metoprolol 100 milliGRAM(s) Oral two times a day  dextrose 5% + sodium chloride 0.45% 1000 milliLiter(s) (75 mL/Hr) IV Continuous <Continuous>  insulin lispro (HumaLOG) corrective regimen sliding scale   SubCutaneous every 6 hours  dextrose 5%. 1000 milliLiter(s) (50 mL/Hr) IV Continuous <Continuous>  dextrose 50% Injectable 12.5 Gram(s) IV Push once  dextrose 50% Injectable 25 Gram(s) IV Push once  dextrose 50% Injectable 25 Gram(s) IV Push once  levothyroxine 50 MICROGram(s) Oral daily  tamsulosin 0.4 milliGRAM(s) Oral before breakfast  atorvastatin 10 milliGRAM(s) Oral at bedtime  levothyroxine 50 MICROGram(s) Oral once  tamsulosin 0.4 milliGRAM(s) Oral once    MEDICATIONS  (PRN):  dextrose Gel 1 Dose(s) Oral once PRN Blood Glucose LESS THAN 70 milliGRAM(s)/deciliter  glucagon  Injectable 1 milliGRAM(s) IntraMuscular once PRN Glucose LESS THAN 70 milligrams/deciliter  acetaminophen   Tablet 650 milliGRAM(s) Oral every 6 hours PRN mild pain      Vital Signs Last 24 Hrs  T(C): 36.8 (21 Aug 2017 09:24), Max: 37.2 (20 Aug 2017 14:45)  T(F): 98.3 (21 Aug 2017 09:24), Max: 98.9 (20 Aug 2017 14:45)  HR: 58 (21 Aug 2017 09:24) (58 - 136)  BP: 132/99 (21 Aug 2017 09:24) (131/94 - 148/96)  BP(mean): --  RR: 18 (21 Aug 2017 09:24) (16 - 20)  SpO2: 95% (21 Aug 2017 09:24) (95% - 100%)  CAPILLARY BLOOD GLUCOSE  134 (21 Aug 2017 06:21)  107 (21 Aug 2017 01:01)        I&O's Summary      PHYSICAL EXAM:  GENERAL: NAD, well-developed  HEAD:  Atraumatic, Normocephalic  EYES: R orbital ecchymosis, sutures cdi, R conjunctival injection   NECK: Supple, No JVD  CHEST/LUNG: Clear to auscultation bilaterally; No wheeze  HEART: Regular rate and rhythm; No murmurs, rubs, or gallops  ABDOMEN: Soft, Nontender, Nondistended; Bowel sounds present  EXTREMITIES:  2+ Peripheral Pulses, No clubbing, cyanosis, or edema  PSYCH: AAOx3  NEUROLOGY: non-focal  SKIN: No rashes or lesions    LABS:                        13.6   7.0   )-----------( 146      ( 21 Aug 2017 07:05 )             39.9     08-21    142  |  106  |  11  ----------------------------<  144<H>  4.0   |  23  |  0.78    Ca    8.7      21 Aug 2017 07:05  Phos  2.7     08-21  Mg     1.7     08-21    TPro  6.5  /  Alb  4.1  /  TBili  2.0<H>  /  DBili  x   /  AST  58<H>  /  ALT  38  /  AlkPhos  51  08-20    PT/INR - ( 21 Aug 2017 07:05 )   PT: 14.9 sec;   INR: 1.36 ratio         PTT - ( 21 Aug 2017 07:05 )  PTT:28.7 sec  CARDIAC MARKERS ( 20 Aug 2017 15:10 )  x     / <0.01 ng/mL / 94 U/L / x     / 2.4 ng/mL  CARDIAC MARKERS ( 20 Aug 2017 10:26 )  0.018 ng/mL / x     / 117 U/L / x     / 1.2 ng/mL          RADIOLOGY & ADDITIONAL TESTS:    Imaging Personally Reviewed: ct head, stable R SAH     EKG:     Consultant(s) Notes Reviewed:      Care Discussed with Consultants/Other Providers: Surgery

## 2017-08-21 NOTE — PROGRESS NOTE ADULT - SUBJECTIVE AND OBJECTIVE BOX
ACS Progress Note    HPI85y Male who fell while walking out the door to get some mashed potatoes.  He had a syncopal episode after feeling dizzy.  He woke up on the floor.  He is currently on Coumadin for a fib.  he states that he has a balance problem and walks with a cane.  He is complaining of a headache now and nothing else.     S: Patient seen and examined. No acute events overnight. Pain well controlled with current regimen.   Denies nausea/vomiting.   Endorses passing gas and bowel movements.     O:  Vital Signs Last 24 Hrs  T(C): 36.8 (21 Aug 2017 05:21), Max: 37.2 (20 Aug 2017 14:45)  T(F): 98.3 (21 Aug 2017 05:21), Max: 98.9 (20 Aug 2017 14:45)  HR: 136 (21 Aug 2017 05:21) (80 - 136)  BP: 136/96 (21 Aug 2017 05:21) (108/74 - 149/87)  BP(mean): --  RR: 20 (21 Aug 2017 05:21) (16 - 20)  SpO2: 95% (21 Aug 2017 05:21) (95% - 100%)    I&O's Detail      MEDICATIONS  (STANDING):  digoxin     Tablet 0.125 milliGRAM(s) Oral daily  diltiazem    milliGRAM(s) Oral daily  metoprolol 100 milliGRAM(s) Oral two times a day  dextrose 5% + sodium chloride 0.45% 1000 milliLiter(s) (75 mL/Hr) IV Continuous <Continuous>  insulin lispro (HumaLOG) corrective regimen sliding scale   SubCutaneous every 6 hours  dextrose 5%. 1000 milliLiter(s) (50 mL/Hr) IV Continuous <Continuous>  dextrose 50% Injectable 12.5 Gram(s) IV Push once  dextrose 50% Injectable 25 Gram(s) IV Push once  dextrose 50% Injectable 25 Gram(s) IV Push once    MEDICATIONS  (PRN):  dextrose Gel 1 Dose(s) Oral once PRN Blood Glucose LESS THAN 70 milliGRAM(s)/deciliter  glucagon  Injectable 1 milliGRAM(s) IntraMuscular once PRN Glucose LESS THAN 70 milligrams/deciliter                            13.9   9.6   )-----------( 156      ( 20 Aug 2017 15:10 )             40.5       08-20    144  |  106  |  18  ----------------------------<  162<H>  4.1   |  23  |  0.82    Ca    9.1      20 Aug 2017 15:10    TPro  6.5  /  Alb  4.1  /  TBili  2.0<H>  /  DBili  x   /  AST  58<H>  /  ALT  38  /  AlkPhos  51  08-20      Physical Exam:  Gen: Laying in bed, NAD, alert and oriented.   Resp: Unlabored breathing  ABD: soft, nontender, nondistended, no hematomas, well healed appendectomy scar    Lines:   IV: patent, in place. ACS Progress Note    HPI85y Male who fell while walking out the door to get some mashed potatoes.  He had a syncopal episode after feeling dizzy.  He woke up on the floor.  He is currently on Coumadin for a fib.  he states that he has a balance problem and walks with a cane.  He is complaining of a headache now and nothing else.     S: Patient seen and examined. No acute events overnight. No pain.  Denies nausea/vomiting.   Endorses passing gas and bowel movements.     O:  Vital Signs Last 24 Hrs  T(C): 36.8 (21 Aug 2017 05:21), Max: 37.2 (20 Aug 2017 14:45)  T(F): 98.3 (21 Aug 2017 05:21), Max: 98.9 (20 Aug 2017 14:45)  HR: 136 (21 Aug 2017 05:21) (80 - 136)  BP: 136/96 (21 Aug 2017 05:21) (108/74 - 149/87)  BP(mean): --  RR: 20 (21 Aug 2017 05:21) (16 - 20)  SpO2: 95% (21 Aug 2017 05:21) (95% - 100%)    I&O's Detail      MEDICATIONS  (STANDING):  digoxin     Tablet 0.125 milliGRAM(s) Oral daily  diltiazem    milliGRAM(s) Oral daily  metoprolol 100 milliGRAM(s) Oral two times a day  dextrose 5% + sodium chloride 0.45% 1000 milliLiter(s) (75 mL/Hr) IV Continuous <Continuous>  insulin lispro (HumaLOG) corrective regimen sliding scale   SubCutaneous every 6 hours  dextrose 5%. 1000 milliLiter(s) (50 mL/Hr) IV Continuous <Continuous>  dextrose 50% Injectable 12.5 Gram(s) IV Push once  dextrose 50% Injectable 25 Gram(s) IV Push once  dextrose 50% Injectable 25 Gram(s) IV Push once    MEDICATIONS  (PRN):  dextrose Gel 1 Dose(s) Oral once PRN Blood Glucose LESS THAN 70 milliGRAM(s)/deciliter  glucagon  Injectable 1 milliGRAM(s) IntraMuscular once PRN Glucose LESS THAN 70 milligrams/deciliter                            13.9   9.6   )-----------( 156      ( 20 Aug 2017 15:10 )             40.5       08-20    144  |  106  |  18  ----------------------------<  162<H>  4.1   |  23  |  0.82    Ca    9.1      20 Aug 2017 15:10    TPro  6.5  /  Alb  4.1  /  TBili  2.0<H>  /  DBili  x   /  AST  58<H>  /  ALT  38  /  AlkPhos  51  08-20      Physical Exam:  Gen: Laying in bed, NAD, alert and oriented.   Resp: Unlabored breathing  ABD: soft, nontender, nondistended, no hematomas, well healed appendectomy scar    Lines:   IV: patent, in place.

## 2017-08-21 NOTE — PHYSICAL THERAPY INITIAL EVALUATION ADULT - DISCHARGE DISPOSITION, PT EVAL
rehabilitation facility/Subacute Rehab rehabilitation facility/Subacute Rehab- if pt prefers to go home, will need home PT and assist/supervision with all functional activities

## 2017-08-21 NOTE — CONSULT NOTE ADULT - PROBLEM SELECTOR RECOMMENDATION 9
R6-7 nondisplaced fractures, pain control and mgmt per surgery R6-7 nondisplaced fractures, pain control and mgmt per surgery  PT evaluation pending  awaiting callback from PCP/cardiologists office re: any prior hx of similar symptoms  check orthostatics, check digoxin level  wonder if dizziness is 2/2 tachy/bradycardia 2/2 afib/med effects

## 2017-08-22 LAB
APTT BLD: 27.3 SEC — LOW (ref 27.5–37.4)
HCT VFR BLD CALC: 40 % — SIGNIFICANT CHANGE UP (ref 39–50)
HGB BLD-MCNC: 14 G/DL — SIGNIFICANT CHANGE UP (ref 13–17)
INR BLD: 1.14 RATIO — SIGNIFICANT CHANGE UP (ref 0.88–1.16)
MCHC RBC-ENTMCNC: 34.9 GM/DL — SIGNIFICANT CHANGE UP (ref 32–36)
MCHC RBC-ENTMCNC: 36.9 PG — HIGH (ref 27–34)
MCV RBC AUTO: 106 FL — HIGH (ref 80–100)
PLATELET # BLD AUTO: 132 K/UL — LOW (ref 150–400)
PROTHROM AB SERPL-ACNC: 12.4 SEC — SIGNIFICANT CHANGE UP (ref 9.8–12.7)
RBC # BLD: 3.79 M/UL — LOW (ref 4.2–5.8)
RBC # FLD: 13.3 % — SIGNIFICANT CHANGE UP (ref 10.3–14.5)
WBC # BLD: 8.6 K/UL — SIGNIFICANT CHANGE UP (ref 3.8–10.5)
WBC # FLD AUTO: 8.6 K/UL — SIGNIFICANT CHANGE UP (ref 3.8–10.5)

## 2017-08-22 PROCEDURE — 99232 SBSQ HOSP IP/OBS MODERATE 35: CPT

## 2017-08-22 PROCEDURE — 99233 SBSQ HOSP IP/OBS HIGH 50: CPT

## 2017-08-22 RX ORDER — ENOXAPARIN SODIUM 100 MG/ML
40 INJECTION SUBCUTANEOUS DAILY
Qty: 0 | Refills: 0 | Status: DISCONTINUED | OUTPATIENT
Start: 2017-08-22 | End: 2017-08-22

## 2017-08-22 RX ORDER — OXYCODONE HYDROCHLORIDE 5 MG/1
2.5 TABLET ORAL EVERY 4 HOURS
Qty: 0 | Refills: 0 | Status: DISCONTINUED | OUTPATIENT
Start: 2017-08-22 | End: 2017-08-27

## 2017-08-22 RX ADMIN — LIDOCAINE 1 PATCH: 4 CREAM TOPICAL at 12:07

## 2017-08-22 RX ADMIN — ATORVASTATIN CALCIUM 10 MILLIGRAM(S): 80 TABLET, FILM COATED ORAL at 21:32

## 2017-08-22 RX ADMIN — Medication 50 MICROGRAM(S): at 06:30

## 2017-08-22 RX ADMIN — LIDOCAINE 1 PATCH: 4 CREAM TOPICAL at 01:00

## 2017-08-22 RX ADMIN — Medication 1: at 12:00

## 2017-08-22 RX ADMIN — Medication 650 MILLIGRAM(S): at 13:04

## 2017-08-22 RX ADMIN — Medication 0.12 MILLIGRAM(S): at 06:30

## 2017-08-22 RX ADMIN — Medication 100 MILLIGRAM(S): at 06:30

## 2017-08-22 RX ADMIN — Medication 100 MILLIGRAM(S): at 17:29

## 2017-08-22 RX ADMIN — TAMSULOSIN HYDROCHLORIDE 0.4 MILLIGRAM(S): 0.4 CAPSULE ORAL at 06:30

## 2017-08-22 RX ADMIN — Medication 1: at 07:52

## 2017-08-22 RX ADMIN — Medication 180 MILLIGRAM(S): at 06:30

## 2017-08-22 NOTE — PROGRESS NOTE ADULT - ASSESSMENT
Patient is an 85 year old man who was transferred from [facility] with lacerations to the face, L 7th rib fracture, L iliac crest and acetabular fracture, and ICH.    -Incentive spirometry (currently pulling 1500) 10x per hour while awake  -multimodal pain control  -PT consult to see today  -f/u NSx  -CT scan in am for stability  -Hold Coumadin and chemical DVT ppx for now  -ambulation with assistance and SCDs  -Regular diet    Andres Mart, PGY-1 #[id number] Patient is an 85 year old man who [facility], L 7th rib fracture, L iliac crest and acetabular fracture, and ICH.    -Incentive spirometry (currently pulling 1500) 10x per hour while awake  -multimodal pain control  -PT consult to see today  -f/u NSx  -Hold Coumadin  -ambulation with assistance and SCDs  -Regular diet    Andres Mart, PGY-1 #[id number] Patient is an 85 year old man who was transferred from [facility] with right frontal IPH and SAH, R 6-7 rib fractures:    -Incentive spirometry (currently pulling 1500) 10x per hour while awake  -multimodal pain control  -Hold Coumadin until outpatient appointment with Neurosurgery  -ambulation with assistance and SCDs  -Regular diet    Andres Mart, PGY-1 #[id number]

## 2017-08-22 NOTE — PROVIDER CONTACT NOTE (OTHER) - ASSESSMENT
Elevated HR, 130s as per telemetry monitoring. vss. manually 100 bpm. pt denies chest pain, sob or palpitations. Pt. A&Ox4. Elevated HR, 130s as per telemetry monitoring. vss. manually 100 bpm. pt denies chest pain, sob or palpitations. Pt. A&Ox4. evening dose of metoprolol given. friend at bedside with pt at this time.

## 2017-08-22 NOTE — PROGRESS NOTE ADULT - PROBLEM SELECTOR PLAN 5
blood sugars acceptable while inpatient  on metformin 500mg bid at home, hold while inpt  cont accuchecks, sliding scale insulin.

## 2017-08-22 NOTE — PROGRESS NOTE ADULT - SUBJECTIVE AND OBJECTIVE BOX
Patient is a 85y old  Male who presents with a chief complaint of     SUBJECTIVE / OVERNIGHT EVENTS: no aeon. pt notes pain in R ribs, otherwise no f/c/n/v/cp/sob.     MEDICATIONS  (STANDING):  digoxin     Tablet 0.125 milliGRAM(s) Oral daily  diltiazem    milliGRAM(s) Oral daily  metoprolol 100 milliGRAM(s) Oral two times a day  dextrose 5%. 1000 milliLiter(s) (50 mL/Hr) IV Continuous <Continuous>  dextrose 50% Injectable 12.5 Gram(s) IV Push once  dextrose 50% Injectable 25 Gram(s) IV Push once  dextrose 50% Injectable 25 Gram(s) IV Push once  levothyroxine 50 MICROGram(s) Oral daily  tamsulosin 0.4 milliGRAM(s) Oral before breakfast  atorvastatin 10 milliGRAM(s) Oral at bedtime  lidocaine   Patch 1 Patch Transdermal daily  insulin lispro (HumaLOG) corrective regimen sliding scale   SubCutaneous three times a day before meals  insulin lispro (HumaLOG) corrective regimen sliding scale   SubCutaneous at bedtime    MEDICATIONS  (PRN):  dextrose Gel 1 Dose(s) Oral once PRN Blood Glucose LESS THAN 70 milliGRAM(s)/deciliter  glucagon  Injectable 1 milliGRAM(s) IntraMuscular once PRN Glucose LESS THAN 70 milligrams/deciliter  acetaminophen   Tablet 650 milliGRAM(s) Oral every 6 hours PRN mild pain      Vital Signs Last 24 Hrs  T(C): 36.9 (22 Aug 2017 10:39), Max: 37.1 (21 Aug 2017 22:13)  T(F): 98.4 (22 Aug 2017 10:39), Max: 98.8 (21 Aug 2017 22:13)  HR: 64 (22 Aug 2017 10:39) (64 - 91)  BP: 104/67 (22 Aug 2017 10:39) (104/67 - 139/80)  BP(mean): --  RR: 18 (22 Aug 2017 10:39) (18 - 20)  SpO2: 95% (22 Aug 2017 10:39) (95% - 97%)  CAPILLARY BLOOD GLUCOSE  172 (22 Aug 2017 11:57)  163 (22 Aug 2017 07:42)  150 (21 Aug 2017 22:13)  137 (21 Aug 2017 16:27)        I&O's Summary    21 Aug 2017 07:01  -  22 Aug 2017 07:00  --------------------------------------------------------  IN: 760 mL / OUT: 900 mL / NET: -140 mL    22 Aug 2017 07:01  -  22 Aug 2017 13:53  --------------------------------------------------------  IN: 300 mL / OUT: 0 mL / NET: 300 mL        PHYSICAL EXAM:  CONSTITUIONAL: NAD, well-developed  HEAD:  Atraumatic, Normocephalic  EYES: EOMI, PERRLA, conjunctiva and sclera clear. R eye ecchymosis mildly improved   ENMT: Supple, No JVD  RESPIRATORY: Clear to auscultation bilaterally; No wheeze  CARDIOVASCULAR: irregular rate and rhythm; No murmurs, rubs, or gallops  GI: Soft, Nontender, Nondistended; Bowel sounds present  EXTREMITIES:  2+ Peripheral Pulses, No clubbing, cyanosis, or edema. TTP R ribs   PSYCH: AAOx3  NEUROLOGY: non-focal  SKIN: No rashes or lesions    LABS:                        14.0   8.6   )-----------( 132      ( 22 Aug 2017 06:43 )             40.0     08-21    142  |  106  |  11  ----------------------------<  144<H>  4.0   |  23  |  0.78    Ca    8.7      21 Aug 2017 07:05  Phos  2.7     08-21  Mg     1.7     08-21    TPro  6.5  /  Alb  4.1  /  TBili  2.0<H>  /  DBili  x   /  AST  58<H>  /  ALT  38  /  AlkPhos  51  08-20    PT/INR - ( 22 Aug 2017 06:43 )   PT: 12.4 sec;   INR: 1.14 ratio         PTT - ( 22 Aug 2017 06:43 )  PTT:27.3 sec  CARDIAC MARKERS ( 20 Aug 2017 15:10 )  x     / <0.01 ng/mL / 94 U/L / x     / 2.4 ng/mL          RADIOLOGY & ADDITIONAL TESTS:    Imaging Personally Reviewed:    Consultant(s) Notes Reviewed:  neurosurgery     Care Discussed with Consultants/Other Providers: surgery

## 2017-08-22 NOTE — PROGRESS NOTE ADULT - PROBLEM SELECTOR PLAN 1
R6-7 nondisplaced fractures, pain control and mgmt per surgery  PT evaluation pending  awaiting callback from PCP/cardiologists office re: any prior hx of similar symptoms  check orthostatics, check digoxin level  wonder if dizziness is 2/2 tachy/bradycardia 2/2 afib/med effects.

## 2017-08-22 NOTE — PROGRESS NOTE ADULT - PROBLEM SELECTOR PLAN 2
R frontal vertex parasagittal gyrus small hemorrhage, stable on repeat CTH 8/21   f/u surgery/neurosx re: when to resume AC.

## 2017-08-22 NOTE — PROVIDER CONTACT NOTE (OTHER) - ASSESSMENT
Pt is A&Ox4, emotional status noted be frustrated Pt is A&Ox4, emotional status noted to be frustrated, pt verbalizes understanding of importance and significance of this diagnostic testing, but continues to refuse stating he wants to sleep and doesn't want to be bothered, "they can do it tomorrow"

## 2017-08-22 NOTE — PROGRESS NOTE ADULT - SUBJECTIVE AND OBJECTIVE BOX
ACS Progress Note    S: Patient seen and examined. No acute events overnight. Pain well controlled with current regimen.   Denies nausea/vomiting.     O:  Vital Signs Last 24 Hrs  T(C): 36.6 (22 Aug 2017 14:17), Max: 37.1 (21 Aug 2017 22:13)  T(F): 97.9 (22 Aug 2017 14:17), Max: 98.8 (21 Aug 2017 22:13)  HR: 65 (22 Aug 2017 14:17) (64 - 91)  BP: 133/81 (22 Aug 2017 14:17) (104/67 - 139/80)  BP(mean): --  RR: 16 (22 Aug 2017 14:17) (16 - 20)  SpO2: 96% (22 Aug 2017 14:17) (95% - 97%)    I&O's Detail    21 Aug 2017 07:01  -  22 Aug 2017 07:00  --------------------------------------------------------  IN:    Oral Fluid: 760 mL  Total IN: 760 mL    OUT:    Voided: 900 mL  Total OUT: 900 mL    Total NET: -140 mL      22 Aug 2017 07:01  -  22 Aug 2017 14:46  --------------------------------------------------------  IN:    Oral Fluid: 300 mL  Total IN: 300 mL    OUT:  Total OUT: 0 mL    Total NET: 300 mL          MEDICATIONS  (STANDING):  digoxin     Tablet 0.125 milliGRAM(s) Oral daily  diltiazem    milliGRAM(s) Oral daily  metoprolol 100 milliGRAM(s) Oral two times a day  dextrose 5%. 1000 milliLiter(s) (50 mL/Hr) IV Continuous <Continuous>  dextrose 50% Injectable 12.5 Gram(s) IV Push once  dextrose 50% Injectable 25 Gram(s) IV Push once  dextrose 50% Injectable 25 Gram(s) IV Push once  levothyroxine 50 MICROGram(s) Oral daily  tamsulosin 0.4 milliGRAM(s) Oral before breakfast  atorvastatin 10 milliGRAM(s) Oral at bedtime  lidocaine   Patch 1 Patch Transdermal daily  insulin lispro (HumaLOG) corrective regimen sliding scale   SubCutaneous three times a day before meals  insulin lispro (HumaLOG) corrective regimen sliding scale   SubCutaneous at bedtime    MEDICATIONS  (PRN):  dextrose Gel 1 Dose(s) Oral once PRN Blood Glucose LESS THAN 70 milliGRAM(s)/deciliter  glucagon  Injectable 1 milliGRAM(s) IntraMuscular once PRN Glucose LESS THAN 70 milligrams/deciliter  acetaminophen   Tablet 650 milliGRAM(s) Oral every 6 hours PRN mild pain                            14.0   8.6   )-----------( 132      ( 22 Aug 2017 06:43 )             40.0       08-21    142  |  106  |  11  ----------------------------<  144<H>  4.0   |  23  |  0.78    Ca    8.7      21 Aug 2017 07:05  Phos  2.7     08-21  Mg     1.7     08-21    TPro  6.5  /  Alb  4.1  /  TBili  2.0<H>  /  DBili  x   /  AST  58<H>  /  ALT  38  /  AlkPhos  51  08-20      Physical Exam:  Gen: Laying in bed, NAD, alert and oriented.   Resp: Unlabored breathing  ABD: soft, nontender, nondistended, no hematomas, well healed appendectomy scar    Lines:   IV: patent, in place. ACS Progress Note    S: Patient seen and examined. No acute events overnight. Pain well controlled with current regimen.   Denies nausea/vomiting.     O:  Vital Signs Last 24 Hrs  T(C): 36.6 (22 Aug 2017 14:17), Max: 37.1 (21 Aug 2017 22:13)  T(F): 97.9 (22 Aug 2017 14:17), Max: 98.8 (21 Aug 2017 22:13)  HR: 65 (22 Aug 2017 14:17) (64 - 91)  BP: 133/81 (22 Aug 2017 14:17) (104/67 - 139/80)  BP(mean): --  RR: 16 (22 Aug 2017 14:17) (16 - 20)  SpO2: 96% (22 Aug 2017 14:17) (95% - 97%)    I&O's Detail    21 Aug 2017 07:01  -  22 Aug 2017 07:00  --------------------------------------------------------  IN:    Oral Fluid: 760 mL  Total IN: 760 mL    OUT:    Voided: 900 mL  Total OUT: 900 mL    Total NET: -140 mL      22 Aug 2017 07:01  -  22 Aug 2017 14:46  --------------------------------------------------------  IN:    Oral Fluid: 300 mL  Total IN: 300 mL    OUT:  Total OUT: 0 mL    Total NET: 300 mL      Physical Exam:  Gen: Laying in bed, NAD, alert and oriented.   Resp: Unlabored breathing  ABD: soft, nontender, nondistended, no hematomas, well healed appendectomy scar    Lines:   IV: patent, in place.     MEDICATIONS  (STANDING):  digoxin     Tablet 0.125 milliGRAM(s) Oral daily  diltiazem    milliGRAM(s) Oral daily  metoprolol 100 milliGRAM(s) Oral two times a day  dextrose 5%. 1000 milliLiter(s) (50 mL/Hr) IV Continuous <Continuous>  dextrose 50% Injectable 12.5 Gram(s) IV Push once  dextrose 50% Injectable 25 Gram(s) IV Push once  dextrose 50% Injectable 25 Gram(s) IV Push once  levothyroxine 50 MICROGram(s) Oral daily  tamsulosin 0.4 milliGRAM(s) Oral before breakfast  atorvastatin 10 milliGRAM(s) Oral at bedtime  lidocaine   Patch 1 Patch Transdermal daily  insulin lispro (HumaLOG) corrective regimen sliding scale   SubCutaneous three times a day before meals  insulin lispro (HumaLOG) corrective regimen sliding scale   SubCutaneous at bedtime    MEDICATIONS  (PRN):  dextrose Gel 1 Dose(s) Oral once PRN Blood Glucose LESS THAN 70 milliGRAM(s)/deciliter  glucagon  Injectable 1 milliGRAM(s) IntraMuscular once PRN Glucose LESS THAN 70 milligrams/deciliter  acetaminophen   Tablet 650 milliGRAM(s) Oral every 6 hours PRN mild pain                            14.0   8.6   )-----------( 132      ( 22 Aug 2017 06:43 )             40.0       08-21    142  |  106  |  11  ----------------------------<  144<H>  4.0   |  23  |  0.78    Ca    8.7      21 Aug 2017 07:05  Phos  2.7     08-21  Mg     1.7     08-21    TPro  6.5  /  Alb  4.1  /  TBili  2.0<H>  /  DBili  x   /  AST  58<H>  /  ALT  38  /  AlkPhos  51  08-20

## 2017-08-22 NOTE — PROGRESS NOTE ADULT - PROBLEM SELECTOR PLAN 3
cont home metoprolol tartrate 100mg bid and diltiazem 180mg extended release  coumadin resumption plan per trauma and neurosurgery  check digoxin level as above.

## 2017-08-22 NOTE — PROGRESS NOTE ADULT - ATTENDING COMMENTS
Pt seen and examined today at 11am, agree with above. Pt feeling well, pain from R 6-7 rib fractures is well-controlled with Tylenol and oxycodone prn. Tolerating diet. On home medications for atrial fibrillation (Digoxin, Diltiazem, Lopressor). Holding coumadin for IPH and SAH, will followup with Neurosurg as outpatient. Start lovenox for DVT prophylaxis. Continue Synthroid for hypothyroidism. Rehab planning. Pt seen and examined today at 11am, agree with above. Pt feeling well, pain from R 6-7 rib fractures is well-controlled with Tylenol and oxycodone prn. Tolerating diet. On home medications for atrial fibrillation (Digoxin, Diltiazem, Lopressor). Holding coumadin for IPH and SAH, will followup with Neurosurg as outpatient.  Continue Synthroid for hypothyroidism. Rehab planning.

## 2017-08-22 NOTE — CHART NOTE - NSCHARTNOTEFT_GEN_A_CORE
ATP PA spoke to Neurosurgery regarding start of lovenox DVT ppx for this patient with Stable focus of parenchymal hemorrhage in the high right frontal cortex and new Left frontal subarachnoid hemorrhage now on last Head CT from 8/21. At that time there was no need for a repeat head CT because new found bleed was so small as per neuroSx, but now that we are contemplating starting Lovenox 40 mg daily we would need a repeat head CT. D/W chief resident and CT Head ordered.

## 2017-08-23 ENCOUNTER — TRANSCRIPTION ENCOUNTER (OUTPATIENT)
Age: 82
End: 2017-08-23

## 2017-08-23 PROCEDURE — 93970 EXTREMITY STUDY: CPT | Mod: 26

## 2017-08-23 PROCEDURE — 99233 SBSQ HOSP IP/OBS HIGH 50: CPT

## 2017-08-23 PROCEDURE — 70450 CT HEAD/BRAIN W/O DYE: CPT | Mod: 26

## 2017-08-23 PROCEDURE — 99231 SBSQ HOSP IP/OBS SF/LOW 25: CPT

## 2017-08-23 RX ORDER — ATORVASTATIN CALCIUM 80 MG/1
1 TABLET, FILM COATED ORAL
Qty: 0 | Refills: 0 | COMMUNITY
Start: 2017-08-23

## 2017-08-23 RX ORDER — ACETAMINOPHEN 500 MG
2 TABLET ORAL
Qty: 0 | Refills: 0 | COMMUNITY
Start: 2017-08-23

## 2017-08-23 RX ORDER — INSULIN LISPRO 100/ML
0 VIAL (ML) SUBCUTANEOUS
Qty: 0 | Refills: 0 | COMMUNITY
Start: 2017-08-23

## 2017-08-23 RX ORDER — METOPROLOL TARTRATE 50 MG
1 TABLET ORAL
Qty: 0 | Refills: 0 | COMMUNITY

## 2017-08-23 RX ORDER — LIDOCAINE 4 G/100G
1 CREAM TOPICAL
Qty: 0 | Refills: 0 | COMMUNITY
Start: 2017-08-23

## 2017-08-23 RX ORDER — WARFARIN SODIUM 2.5 MG/1
4 TABLET ORAL
Qty: 0 | Refills: 0 | COMMUNITY

## 2017-08-23 RX ORDER — METFORMIN HYDROCHLORIDE 850 MG/1
1 TABLET ORAL
Qty: 0 | Refills: 0 | COMMUNITY

## 2017-08-23 RX ORDER — ENOXAPARIN SODIUM 100 MG/ML
40 INJECTION SUBCUTANEOUS DAILY
Qty: 0 | Refills: 0 | Status: DISCONTINUED | OUTPATIENT
Start: 2017-08-23 | End: 2017-08-30

## 2017-08-23 RX ADMIN — LIDOCAINE 1 PATCH: 4 CREAM TOPICAL at 02:06

## 2017-08-23 RX ADMIN — TAMSULOSIN HYDROCHLORIDE 0.4 MILLIGRAM(S): 0.4 CAPSULE ORAL at 05:45

## 2017-08-23 RX ADMIN — OXYCODONE HYDROCHLORIDE 2.5 MILLIGRAM(S): 5 TABLET ORAL at 02:06

## 2017-08-23 RX ADMIN — Medication 2: at 12:56

## 2017-08-23 RX ADMIN — Medication 180 MILLIGRAM(S): at 05:44

## 2017-08-23 RX ADMIN — Medication 50 MICROGRAM(S): at 05:44

## 2017-08-23 RX ADMIN — LIDOCAINE 1 PATCH: 4 CREAM TOPICAL at 12:57

## 2017-08-23 RX ADMIN — ENOXAPARIN SODIUM 40 MILLIGRAM(S): 100 INJECTION SUBCUTANEOUS at 23:40

## 2017-08-23 RX ADMIN — OXYCODONE HYDROCHLORIDE 2.5 MILLIGRAM(S): 5 TABLET ORAL at 01:09

## 2017-08-23 RX ADMIN — Medication 100 MILLIGRAM(S): at 05:44

## 2017-08-23 RX ADMIN — Medication 0.12 MILLIGRAM(S): at 05:44

## 2017-08-23 RX ADMIN — ATORVASTATIN CALCIUM 10 MILLIGRAM(S): 80 TABLET, FILM COATED ORAL at 20:28

## 2017-08-23 RX ADMIN — Medication 100 MILLIGRAM(S): at 16:45

## 2017-08-23 RX ADMIN — Medication 650 MILLIGRAM(S): at 01:09

## 2017-08-23 NOTE — PROGRESS NOTE ADULT - ATTENDING COMMENTS
Pt seen and examined today at 11am, agree with above. Pt feeling well. Had repeat head CT per Neurosurgery, which demonstrated stable right frontal intraparenchymal hemorrhage (repeat CT ordered before starting DVT prophylaxis, per NSx). Also, as pt has not received chemical DVT prophylaxis since admission, screening B/L LE venous duplex was done. Pain control for right 6-7 rib fractures with prn Tylenol and prn oxycodone. BPH: continue Flomax. A-fib: continue Diltiazem, Digoxin, Lopressor. Type 2 DM without long-term insulin use: continue SSI for now, restart metformin as outpatient. Rehab planning.

## 2017-08-23 NOTE — DISCHARGE NOTE ADULT - PROVIDER TOKENS
TOKCYNTHIA:'[id number]',CATRACHITA:'[id number]' TOKEN:'[id number]',TOKEN:'[id number]'

## 2017-08-23 NOTE — DISCHARGE NOTE ADULT - CARE PROVIDER_API CALL
Tom Rois), Neurosurgery  General  [address] [facility] [facility]  [address]  [place] [address]  Phone: [phone]  Fax: [phone]    Aleksandar Savage), Surgery; Surgical Critical Care  [address] [place] [address]  Phone: [phone]  Fax: [phone] Tom Rios), Neurosurgery  General  [address] [facility] [facility]  [address]  [place] [address]  Phone: [phone]  Fax: [phone]    Jose D Quintanilla), Cardiovascular Disease; Interventional Cardiology  [address] [place] [address]  Phone: [phone]  Fax: [phone]

## 2017-08-23 NOTE — DISCHARGE NOTE ADULT - PATIENT PORTAL LINK FT
“You can access the [facility] Patient Portal, offered by [facility], by registering with the following website: [email or web address]”

## 2017-08-23 NOTE — DISCHARGE NOTE ADULT - CARE PROVIDERS DIRECT ADDRESSES
[email or web address]

## 2017-08-23 NOTE — DISCHARGE NOTE ADULT - CARE PLAN
Principal Discharge DX:	Subarachnoid hemorrhage, postinjury with < 1 hour LOC  Goal:	Monitor for and prevention of any new bleed  Instructions for follow-up, activity and diet:	Follow-up with Dr. Rios in the office in 7-10 days  He will determine when it is safe to restart your coumadin  Secondary Diagnosis:	Rib fractures  Goal:	return to daily living activity prior to injury  Instructions for follow-up, activity and diet:	Use your incentive spirometer (breathing toy/machine) hourly while awake, return to daily activities as tolerated, and participate in physical therapy at rehab to regain strength and allow for full recovery. Principal Discharge DX:	Subarachnoid hemorrhage, postinjury with < 1 hour LOC  Goal:	Monitor for and prevention of any new bleed  Instructions for follow-up, activity and diet:	Follow-up with Dr. Rios in the office in 7-10 days  Will need repeat head CT in 2 weeks in preparation for TAVR  Secondary Diagnosis:	Rib fractures  Goal:	return to daily living activity prior to injury  Instructions for follow-up, activity and diet:	Use your incentive spirometer (breathing toy/machine) hourly while awake, return to daily activities as tolerated, and participate in physical therapy at rehab to regain strength and allow for full recovery.  Secondary Diagnosis:	Chronic atrial fibrillation  Goal:	maintain rates  Instructions for follow-up, activity and diet:	Please check INR daily and dose coumadin Principal Discharge DX:	Subarachnoid hemorrhage, postinjury with < 1 hour LOC  Goal:	Monitor for and prevention of any new bleed  Instructions for follow-up, activity and diet:	Follow-up with Dr. Rios in the office in 7-10 days  Will need repeat head CT in 2 weeks in preparation for TAVR  Secondary Diagnosis:	Rib fractures  Goal:	return to daily living activity prior to injury  Instructions for follow-up, activity and diet:	Use your incentive spirometer (breathing toy/machine) hourly while awake, return to daily activities as tolerated, and participate in physical therapy at rehab to regain strength and allow for full recovery.  Secondary Diagnosis:	Chronic atrial fibrillation  Goal:	maintain rates  Instructions for follow-up, activity and diet:	Please check INR daily and dose coumadin  Secondary Diagnosis:	Aortic stenosis, severe  Goal:	symptom management / eventual procedure  Instructions for follow-up, activity and diet:	Follow up with Dr Quintanilla when you are discharged from rehab

## 2017-08-23 NOTE — DISCHARGE NOTE ADULT - INSTRUCTIONS
Do not taken any medications that can thin the blood until you have followed up with Neurosurgery. These include aspirin, ibuprofen (Motrin, Advil), naprosyn (Aleve), coumadin. If beginning any new medications, including over-the-counter medications, consult with your pharmacist regarding whether they contain any medications which thin the blood. If unable to tolerate diet, nausea, vomiting, fever above 100.3, chills, or an increase in pain, notify provider or return to ER. If chest pain/shortness of breath call 911.

## 2017-08-23 NOTE — DISCHARGE NOTE ADULT - MEDICATION SUMMARY - MEDICATIONS TO TAKE
I will START or STAY ON the medications listed below when I get home from the hospital:    acetaminophen 325 mg oral tablet  -- 2 tab(s) by mouth every 6 hours, As needed, mild pain  -- Indication: For moderate pain    tamsulosin 0.4 mg oral capsule  -- 1 cap(s) by mouth once a day  -- Indication: For prostate    dilTIAZem 12 hour extended release  -- 180 milligram(s) by mouth once a day   -- Indication: For Heart    digoxin 125 mcg (0.125 mg) oral tablet  -- 1 tab(s) by mouth once a day  -- Indication: For Heart    insulin lispro 100 units/mL subcutaneous solution  --  subcutaneous 3 times a day (before meals); 1 Unit(s) if Glucose 151 - 200  2 Unit(s) if Glucose 201 - 250  3 Unit(s) if Glucose 251 - 300  4 Unit(s) if Glucose 301 - 350  5 Unit(s) if Glucose 351 - 400  6 Unit(s) if Glucose Greater Than 400  -- Indication: For DM    insulin lispro 100 units/mL subcutaneous solution  --  subcutaneous once a day (at bedtime); 0 Unit(s) if Glucose 0 - 250  1 Unit(s) if Glucose 251 - 300  2 Unit(s) if Glucose 301 - 350  3 Unit(s) if Glucose 351 - 400  4 Unit(s) if Glucose Greater Than 400  -- Indication: For DM    cholestyramine 4 g/5 g oral powder for reconstitution  -- 0.33 scoop(s) by mouth 3 times a week, after lunch  -- Indication: For Hyperlipidemia    lovastatin 40 mg oral tablet  -- 1 tab(s) by mouth once a day  -- Indication: For Hyperlipidemia    atorvastatin 10 mg oral tablet  -- 1 tab(s) by mouth once a day (at bedtime)  -- Indication: For Hyperlipidemia    lidocaine 5% topical film  -- Apply on skin to affected area once a day, leave on for 12 hours, then remove for 12 hours before applying new patch  -- Indication: For mild pain    levothyroxine 50 mcg (0.05 mg) oral capsule  -- 1 cap(s) by mouth once a day, before breakfast  -- Indication: For Hypothyroid I will START or STAY ON the medications listed below when I get home from the hospital:    acetaminophen 325 mg oral tablet  -- 2 tab(s) by mouth every 6 hours, As needed, mild pain  -- Indication: For moderate pain    tamsulosin 0.4 mg oral capsule  -- 1 cap(s) by mouth once a day  -- Indication: For prostate    dilTIAZem 12 hour extended release  -- 180 milligram(s) by mouth once a day   -- Indication: For Heart    digoxin 125 mcg (0.125 mg) oral tablet  -- 1 tab(s) by mouth once a day  -- Indication: For Heart    warfarin 4 mg oral tablet  -- 4 milligram(s) by mouth Monday through Friday except Tuesday and Friday  -- Indication: For Atrial fibrillation    insulin lispro 100 units/mL subcutaneous solution  --  subcutaneous 3 times a day (before meals); 1 Unit(s) if Glucose 151 - 200  2 Unit(s) if Glucose 201 - 250  3 Unit(s) if Glucose 251 - 300  4 Unit(s) if Glucose 301 - 350  5 Unit(s) if Glucose 351 - 400  6 Unit(s) if Glucose Greater Than 400  -- Indication: For DM    insulin lispro 100 units/mL subcutaneous solution  --  subcutaneous once a day (at bedtime); 0 Unit(s) if Glucose 0 - 250  1 Unit(s) if Glucose 251 - 300  2 Unit(s) if Glucose 301 - 350  3 Unit(s) if Glucose 351 - 400  4 Unit(s) if Glucose Greater Than 400  -- Indication: For DM    cholestyramine 4 g/5 g oral powder for reconstitution  -- 0.33 scoop(s) by mouth 3 times a week, after lunch  -- Indication: For Hyperlipidemia    lovastatin 40 mg oral tablet  -- 1 tab(s) by mouth once a day  -- Indication: For Hyperlipidemia    atorvastatin 10 mg oral tablet  -- 1 tab(s) by mouth once a day (at bedtime)  -- Indication: For Hyperlipidemia    metoprolol tartrate 100 mg oral tablet  -- 1 tab(s) by mouth 2 times a day  -- Indication: For Hypertension    lidocaine 5% topical film  -- Apply on skin to affected area once a day, leave on for 12 hours, then remove for 12 hours before applying new patch  -- Indication: For mild pain    docusate sodium 100 mg oral capsule  -- 1 cap(s) by mouth 3 times a day  -- Indication: For constipation    levothyroxine 50 mcg (0.05 mg) oral capsule  -- 1 cap(s) by mouth once a day, before breakfast  -- Indication: For Hypothyroid I will START or STAY ON the medications listed below when I get home from the hospital:    acetaminophen 325 mg oral tablet  -- 2 tab(s) by mouth every 6 hours, As needed, mild pain  -- Indication: For moderate pain    tamsulosin 0.4 mg oral capsule  -- 1 cap(s) by mouth once a day  -- Indication: For prostate    dilTIAZem 12 hour extended release  -- 180 milligram(s) by mouth once a day   -- Indication: For Heart    digoxin 125 mcg (0.125 mg) oral tablet  -- 1 tab(s) by mouth once a day  -- Indication: For Heart    warfarin 4 mg oral tablet  -- 4 milligram(s) by mouth once a day - monitor INR level - Goal 2-3  -- Indication: For Atrial fibrillation    insulin lispro 100 units/mL subcutaneous solution  --  subcutaneous 3 times a day (before meals); 1 Unit(s) if Glucose 151 - 200  2 Unit(s) if Glucose 201 - 250  3 Unit(s) if Glucose 251 - 300  4 Unit(s) if Glucose 301 - 350  5 Unit(s) if Glucose 351 - 400  6 Unit(s) if Glucose Greater Than 400  -- Indication: For DM    insulin lispro 100 units/mL subcutaneous solution  --  subcutaneous once a day (at bedtime); 0 Unit(s) if Glucose 0 - 250  1 Unit(s) if Glucose 251 - 300  2 Unit(s) if Glucose 301 - 350  3 Unit(s) if Glucose 351 - 400  4 Unit(s) if Glucose Greater Than 400  -- Indication: For DM    cholestyramine 4 g/5 g oral powder for reconstitution  -- 0.33 scoop(s) by mouth 3 times a week, after lunch  -- Indication: For Hyperlipidemia    lovastatin 40 mg oral tablet  -- 1 tab(s) by mouth once a day  -- Indication: For Hyperlipidemia    atorvastatin 10 mg oral tablet  -- 1 tab(s) by mouth once a day (at bedtime)  -- Indication: For Hyperlipidemia    metoprolol tartrate 100 mg oral tablet  -- 1 tab(s) by mouth 2 times a day  -- Indication: For Hypertension    lidocaine 5% topical film  -- Apply on skin to affected area once a day, leave on for 12 hours, then remove for 12 hours before applying new patch  -- Indication: For mild pain    docusate sodium 100 mg oral capsule  -- 1 cap(s) by mouth 3 times a day  -- Indication: For constipation    levothyroxine 50 mcg (0.05 mg) oral capsule  -- 1 cap(s) by mouth once a day, before breakfast  -- Indication: For Hypothyroid

## 2017-08-23 NOTE — DISCHARGE NOTE ADULT - HOSPITAL COURSE
Mr. Painter is a 84 y/o man that ambulates w/a cane and has a history of atrial fibrillation on Coumadin that presented to ED 8/20/17 after transfer from [facility] due to mechanical fall. Patient syncopized, hit the front of his head, and lost consciousness. He had lacerations on face. Initial EKG and repeat EKG showed ST & T wave abnormality, consistent w/ lateral ischemia or digitalis effect, and minimal LVH. CXR showed no lung consolidations. CT chest showed nondisplaced R 6, 7 rib fractures and no interthoracic injuries. CT spine showed significant degenerative changes throughout the cervical spine but no fracture or subluxation appreciated. CT head showed small hemorrhage in a right frontal vertex parasagittal gyrus. Neurosurgery found patient to be neurologically intact, coumadin was held and repeat head CT 8/23 showed no new bleed.   Based on the stability of his intracranial bleed, patient was discharged to rehab on 8/23/17 and instructed to follow up with neurologist Dr. Rios within 1 week. Mr. Painter is a 86 y/o man that ambulates w/a cane and has a history of atrial fibrillation on Coumadin that presented to ED 8/20/17 after transfer from [facility] due to mechanical fall. Patient syncopized, hit the front of his head, and lost consciousness. He had lacerations on face. Initial EKG and repeat EKG showed ST & T wave abnormality, consistent w/ lateral ischemia or digitalis effect, and minimal LVH. CXR showed no lung consolidations. CT chest showed nondisplaced R 6, 7 rib fractures and no interthoracic injuries. CT spine showed significant degenerative changes throughout the cervical spine but no fracture or subluxation appreciated. CT head showed small hemorrhage in a right frontal vertex parasagittal gyrus. Neurosurgery found patient to be neurologically intact, coumadin was held and repeat head CT 8/23 showed no new bleed.   Based on the stability of his intracranial bleed, patient was discharged to rehab on 8/23/17 and instructed to follow up with neurologist Dr. Rios within 1 week. Mr. Painter is a 84 y/o man that ambulates w/a cane and has a history of atrial fibrillation on Coumadin that presented to ED 8/20/17 after transfer from [facility] due to mechanical fall. Patient syncopized, hit the front of his head, and lost consciousness. He had lacerations on face. Initial EKG and repeat EKG showed ST & T wave abnormality, consistent w/ lateral ischemia or digitalis effect, and minimal LVH. CXR showed no lung consolidations. CT chest showed nondisplaced R 6, 7 rib fractures and no interthoracic injuries. CT spine showed significant degenerative changes throughout the cervical spine but no fracture or subluxation appreciated. CT head showed small hemorrhage in a right frontal vertex parasagittal gyrus. Neurosurgery found patient to be neurologically intact, coumadin was held and repeat head CT 8/23 showed no new bleed. After he was cleared by neurosurgery regarding anticoagulation, he was resumed on coumadin. Given the nature of his syncope, cardiology was consulted for aortic stenosis management. Repeat TTE showed Calcified trileaflet aortic valve with decreased opening. Peak transaortic valve gradient equals 31 mm Hg, mean transaortic valve gradient equals 17 mm Hg, estimated aortic valve area equals 0.8 sqcm (by continuity equation), aortic valve velocity time integral equals 58 cm, consistent with severe aortic stenosis. EP was also consulted for sinus pause as possible etiology of his syncope. Given his multiple comorbidities, it was recommended to have TAVR. LHC showed moderate diffuse disease. As he has been deconditioned from the prolonged hospitalization, it was thought to be beneficial for him to go to rehab first and then TAVR as outpatient. He has remained stable to be discharge to rehab where his medication will be continued with monitoring of INR and to follow up with CT surgery/cardiology for TAVR as outpatient.

## 2017-08-23 NOTE — DISCHARGE NOTE ADULT - CONDITIONS AT DISCHARGE
Patient safety maintained. Patient VSS. Patient Iv removed with no signs/symptoms of redness/swelling.

## 2017-08-23 NOTE — DISCHARGE NOTE ADULT - MEDICATION SUMMARY - MEDICATIONS TO STOP TAKING
I will STOP taking the medications listed below when I get home from the hospital:    metFORMIN 500 mg oral tablet  -- 1 tab(s) by mouth 2 times a day, before meals    warfarin 4 mg oral tablet  -- 4 milligram(s) by mouth Monday through Friday except Tuesday and Friday I will STOP taking the medications listed below when I get home from the hospital:    metFORMIN 500 mg oral tablet  -- 1 tab(s) by mouth 2 times a day, before meals I will STOP taking the medications listed below when I get home from the hospital:  None I will STOP taking the medications listed below when I get home from the hospital:    warfarin 4 mg oral tablet  -- 4 milligram(s) by mouth Monday through Friday except Tuesday and Friday

## 2017-08-23 NOTE — DISCHARGE NOTE ADULT - REASON FOR ADMISSION
Fall with SDH and R rib fractures Fall with right frontal intraparenchymal hemorrhage and R 6-7 rib fractures

## 2017-08-23 NOTE — PROGRESS NOTE ADULT - SUBJECTIVE AND OBJECTIVE BOX
INTERVAL HPI/OVERNIGHT EVENTS:    POST OPERATIVE DAY #:     MEDICATIONS  (STANDING):  digoxin     Tablet 0.125 milliGRAM(s) Oral daily  diltiazem    milliGRAM(s) Oral daily  metoprolol 100 milliGRAM(s) Oral two times a day  dextrose 5%. 1000 milliLiter(s) (50 mL/Hr) IV Continuous <Continuous>  dextrose 50% Injectable 12.5 Gram(s) IV Push once  dextrose 50% Injectable 25 Gram(s) IV Push once  dextrose 50% Injectable 25 Gram(s) IV Push once  levothyroxine 50 MICROGram(s) Oral daily  tamsulosin 0.4 milliGRAM(s) Oral before breakfast  atorvastatin 10 milliGRAM(s) Oral at bedtime  lidocaine   Patch 1 Patch Transdermal daily  insulin lispro (HumaLOG) corrective regimen sliding scale   SubCutaneous three times a day before meals  insulin lispro (HumaLOG) corrective regimen sliding scale   SubCutaneous at bedtime    MEDICATIONS  (PRN):  dextrose Gel 1 Dose(s) Oral once PRN Blood Glucose LESS THAN 70 milliGRAM(s)/deciliter  glucagon  Injectable 1 milliGRAM(s) IntraMuscular once PRN Glucose LESS THAN 70 milligrams/deciliter  acetaminophen   Tablet 650 milliGRAM(s) Oral every 6 hours PRN mild pain  oxyCODONE    IR 2.5 milliGRAM(s) Oral every 4 hours PRN Severe Pain (7 - 10)      Vital Signs Last 24 Hrs  T(C): 36.8 (23 Aug 2017 10:22), Max: 36.8 (23 Aug 2017 10:22)  T(F): 98.2 (23 Aug 2017 10:22), Max: 98.2 (23 Aug 2017 10:22)  HR: 78 (23 Aug 2017 10:22) (65 - 100)  BP: 118/71 (23 Aug 2017 10:22) (110/62 - 144/78)  BP(mean): --  RR: 18 (23 Aug 2017 10:22) (16 - 18)  SpO2: 96% (23 Aug 2017 10:22) (96% - 98%)    PHYSICAL EXAM:      Constitutional:    Eyes:    ENMT:    Neck:    Breasts:    Back:    Respiratory:    Cardiovascular:    Gastrointestinal:    Genitourinary:    Rectal:    Extremities:    Vascular:    Neurological:    Skin:    Lymph Nodes:    Musculoskeletal:    Psychiatric:        I&O's Detail    22 Aug 2017 07:01  -  23 Aug 2017 07:00  --------------------------------------------------------  IN:    Oral Fluid: 1700 mL  Total IN: 1700 mL    OUT:    Voided: 450 mL  Total OUT: 450 mL    Total NET: 1250 mL      23 Aug 2017 07:01  -  23 Aug 2017 13:42  --------------------------------------------------------  IN:    Oral Fluid: 300 mL  Total IN: 300 mL    OUT:    Voided: 280 mL  Total OUT: 280 mL    Total NET: 20 mL          LABS:                        14.0   8.6   )-----------( 132      ( 22 Aug 2017 06:43 )             40.0           PT/INR - ( 22 Aug 2017 06:43 )   PT: 12.4 sec;   INR: 1.14 ratio         PTT - ( 22 Aug 2017 06:43 )  PTT:27.3 sec

## 2017-08-23 NOTE — DISCHARGE NOTE ADULT - OTHER SIGNIFICANT FINDINGS
Incidental findings include extensive coronary artery calcifications including mitral valve annulus, aortic root calcification and aorta vascular calcifications are noted and a small hiatal hernia.

## 2017-08-23 NOTE — PROGRESS NOTE ADULT - PROBLEM SELECTOR PLAN 1
R6-7 nondisplaced fractures, pain control and mgmt per surgery  awaiting callback from PCP/cardiologists office re: any prior hx of similar symptoms  check orthostatics, check digoxin level  awaiting LE doppler per surgery team

## 2017-08-23 NOTE — DISCHARGE NOTE ADULT - PLAN OF CARE
Monitor for and prevention of any new bleed Follow-up with Dr. Rios in the office in 7-10 days  He will determine when it is safe to restart your coumadin return to daily living activity prior to injury Use your incentive spirometer (breathing toy/machine) hourly while awake, return to daily activities as tolerated, and participate in physical therapy at rehab to regain strength and allow for full recovery. maintain rates Please check INR daily and dose coumadin Follow-up with Dr. Rios in the office in 7-10 days  Will need repeat head CT in 2 weeks in preparation for TAVR symptom management / eventual procedure Follow up with Dr Quintanilla when you are discharged from rehab

## 2017-08-23 NOTE — PROGRESS NOTE ADULT - SUBJECTIVE AND OBJECTIVE BOX
Patient is a 85y old  Male who presents with a chief complaint of fall    SUBJECTIVE / OVERNIGHT EVENTS: no aeon, pt notes pain is still present but mildly improved w/ meds. no cp/sob/dizziness/lightheadedness     MEDICATIONS  (STANDING):  digoxin     Tablet 0.125 milliGRAM(s) Oral daily  diltiazem    milliGRAM(s) Oral daily  metoprolol 100 milliGRAM(s) Oral two times a day  dextrose 5%. 1000 milliLiter(s) (50 mL/Hr) IV Continuous <Continuous>  dextrose 50% Injectable 12.5 Gram(s) IV Push once  dextrose 50% Injectable 25 Gram(s) IV Push once  dextrose 50% Injectable 25 Gram(s) IV Push once  levothyroxine 50 MICROGram(s) Oral daily  tamsulosin 0.4 milliGRAM(s) Oral before breakfast  atorvastatin 10 milliGRAM(s) Oral at bedtime  lidocaine   Patch 1 Patch Transdermal daily  insulin lispro (HumaLOG) corrective regimen sliding scale   SubCutaneous three times a day before meals  insulin lispro (HumaLOG) corrective regimen sliding scale   SubCutaneous at bedtime    MEDICATIONS  (PRN):  dextrose Gel 1 Dose(s) Oral once PRN Blood Glucose LESS THAN 70 milliGRAM(s)/deciliter  glucagon  Injectable 1 milliGRAM(s) IntraMuscular once PRN Glucose LESS THAN 70 milligrams/deciliter  acetaminophen   Tablet 650 milliGRAM(s) Oral every 6 hours PRN mild pain  oxyCODONE    IR 2.5 milliGRAM(s) Oral every 4 hours PRN Severe Pain (7 - 10)      Vital Signs Last 24 Hrs  T(C): 36.6 (23 Aug 2017 13:49), Max: 36.8 (23 Aug 2017 10:22)  T(F): 97.9 (23 Aug 2017 13:49), Max: 98.2 (23 Aug 2017 10:22)  HR: 75 (23 Aug 2017 13:49) (65 - 100)  BP: 136/75 (23 Aug 2017 13:49) (110/62 - 144/78)  BP(mean): --  RR: 18 (23 Aug 2017 13:49) (16 - 18)  SpO2: 97% (23 Aug 2017 13:49) (96% - 98%)  CAPILLARY BLOOD GLUCOSE  202 (23 Aug 2017 11:41)  125 (23 Aug 2017 07:25)  139 (22 Aug 2017 20:58)  93 (22 Aug 2017 17:00)        I&O's Summary    22 Aug 2017 07:01  -  23 Aug 2017 07:00  --------------------------------------------------------  IN: 1700 mL / OUT: 450 mL / NET: 1250 mL    23 Aug 2017 07:01  -  23 Aug 2017 14:08  --------------------------------------------------------  IN: 580 mL / OUT: 280 mL / NET: 300 mL      PHYSICAL EXAM:  CONSTITUIONAL: NAD, well-developed  HEAD:  Atraumatic, Normocephalic  EYES: EOMI, PERRLA, conjunctiva and sclera clear. R eye ecchymosis mildly improved   ENMT: Supple, No JVD  RESPIRATORY: Clear to auscultation bilaterally; No wheeze  CARDIOVASCULAR: irregular rate and rhythm; No murmurs, rubs, or gallops  GI: Soft, Nontender, Nondistended; Bowel sounds present  EXTREMITIES:  2+ Peripheral Pulses, No clubbing, cyanosis, or edema. TTP R ribs   PSYCH: AAOx3  NEUROLOGY: non-focal  SKIN: No rashes or lesions    LABS:                        14.0   8.6   )-----------( 132      ( 22 Aug 2017 06:43 )             40.0           PT/INR - ( 22 Aug 2017 06:43 )   PT: 12.4 sec;   INR: 1.14 ratio         PTT - ( 22 Aug 2017 06:43 )  PTT:27.3 sec          RADIOLOGY & ADDITIONAL TESTS:    Imaging Personally Reviewed:    Consultant(s) Notes Reviewed:      Care Discussed with Consultants/Other Providers:

## 2017-08-23 NOTE — DISCHARGE NOTE ADULT - ADDITIONAL INSTRUCTIONS
You are being discharged to rehab.  Neurosurgery has cleared you to resume your coumadin - INR CHECKS DAILY UNTIL THERAPEUTIC RANGE (2 - 3)   Make appointments to follow up with your physicians when you are discharged from rehab - including Dr Quintanilla to continue work-up for TAVR   bring all discharge paperwork including discharge medication list to follow up appointments

## 2017-08-23 NOTE — PROGRESS NOTE ADULT - SUBJECTIVE AND OBJECTIVE BOX
ACS Progress Note    24hr events: refusing head CT yesterday evening, continued periods with HR elevated 100-130 asymptomatic    S: Patient seen and examined. No acute events overnight. Pain well controlled with current regimen.   Denies nausea/vomiting.     O:  Vital Signs Last 24 Hrs  T(C): 36.7 (23 Aug 2017 05:42), Max: 36.9 (22 Aug 2017 10:39)  T(F): 98.1 (23 Aug 2017 05:42), Max: 98.4 (22 Aug 2017 10:39)  HR: 80 (23 Aug 2017 05:42) (64 - 100)  BP: 122/83 (23 Aug 2017 05:42) (104/67 - 144/78)  BP(mean): --  RR: 18 (23 Aug 2017 05:42) (16 - 20)  SpO2: 96% (23 Aug 2017 05:42) (95% - 98%)    I&O's Detail    21 Aug 2017 07:01  -  22 Aug 2017 07:00  --------------------------------------------------------  IN:    Oral Fluid: 760 mL  Total IN: 760 mL    OUT:    Voided: 900 mL  Total OUT: 900 mL    Total NET: -140 mL      22 Aug 2017 07:01  -  23 Aug 2017 05:47  --------------------------------------------------------  IN:    Oral Fluid: 1340 mL  Total IN: 1340 mL    OUT:    Voided: 150 mL  Total OUT: 150 mL    Total NET: 1190 mL          MEDICATIONS  (STANDING):  digoxin     Tablet 0.125 milliGRAM(s) Oral daily  diltiazem    milliGRAM(s) Oral daily  metoprolol 100 milliGRAM(s) Oral two times a day  dextrose 5%. 1000 milliLiter(s) (50 mL/Hr) IV Continuous <Continuous>  dextrose 50% Injectable 12.5 Gram(s) IV Push once  dextrose 50% Injectable 25 Gram(s) IV Push once  dextrose 50% Injectable 25 Gram(s) IV Push once  levothyroxine 50 MICROGram(s) Oral daily  tamsulosin 0.4 milliGRAM(s) Oral before breakfast  atorvastatin 10 milliGRAM(s) Oral at bedtime  lidocaine   Patch 1 Patch Transdermal daily  insulin lispro (HumaLOG) corrective regimen sliding scale   SubCutaneous three times a day before meals  insulin lispro (HumaLOG) corrective regimen sliding scale   SubCutaneous at bedtime    MEDICATIONS  (PRN):  dextrose Gel 1 Dose(s) Oral once PRN Blood Glucose LESS THAN 70 milliGRAM(s)/deciliter  glucagon  Injectable 1 milliGRAM(s) IntraMuscular once PRN Glucose LESS THAN 70 milligrams/deciliter  acetaminophen   Tablet 650 milliGRAM(s) Oral every 6 hours PRN mild pain  oxyCODONE    IR 2.5 milliGRAM(s) Oral every 4 hours PRN Severe Pain (7 - 10)                            14.0   8.6   )-----------( 132      ( 22 Aug 2017 06:43 )             40.0       08-21    142  |  106  |  11  ----------------------------<  144<H>  4.0   |  23  |  0.78    Ca    8.7      21 Aug 2017 07:05  Phos  2.7     08-21  Mg     1.7     08-21        Physical Exam:  Gen: Laying in bed, NAD, alert and oriented.   Resp: Unlabored breathing, IS >1L  ABD: soft, nontender, nondistended, no hematomas, well healed appendectomy scar    Lines:   IV: patent, in place.

## 2017-08-23 NOTE — PROGRESS NOTE ADULT - ASSESSMENT
Patient is an 85 year old man who was transferred from [facility] with right frontal IPH and SAH, R 6-7 rib fractures:    -Incentive spirometry (currently pulling 1500) 10x per hour while awake  -multimodal pain control  -home digoxin, diltiazem, lopressor, synthroid  -Hold Coumadin until outpatient appointment with Neurosurgery  -ambulation with assistance and SCDs  -Regular diet  -Rehab planning    Andres Mart, PGY-1 #[id number]

## 2017-08-24 DIAGNOSIS — I45.5 OTHER SPECIFIED HEART BLOCK: ICD-10-CM

## 2017-08-24 PROCEDURE — 99223 1ST HOSP IP/OBS HIGH 75: CPT | Mod: GC

## 2017-08-24 PROCEDURE — 99233 SBSQ HOSP IP/OBS HIGH 50: CPT

## 2017-08-24 RX ORDER — DOCUSATE SODIUM 100 MG
100 CAPSULE ORAL
Qty: 0 | Refills: 0 | Status: DISCONTINUED | OUTPATIENT
Start: 2017-08-24 | End: 2017-08-25

## 2017-08-24 RX ADMIN — TAMSULOSIN HYDROCHLORIDE 0.4 MILLIGRAM(S): 0.4 CAPSULE ORAL at 04:15

## 2017-08-24 RX ADMIN — ENOXAPARIN SODIUM 40 MILLIGRAM(S): 100 INJECTION SUBCUTANEOUS at 12:36

## 2017-08-24 RX ADMIN — LIDOCAINE 1 PATCH: 4 CREAM TOPICAL at 12:37

## 2017-08-24 RX ADMIN — LIDOCAINE 1 PATCH: 4 CREAM TOPICAL at 01:00

## 2017-08-24 RX ADMIN — ATORVASTATIN CALCIUM 10 MILLIGRAM(S): 80 TABLET, FILM COATED ORAL at 21:11

## 2017-08-24 RX ADMIN — Medication 0.12 MILLIGRAM(S): at 04:16

## 2017-08-24 RX ADMIN — Medication 100 MILLIGRAM(S): at 17:11

## 2017-08-24 RX ADMIN — Medication 100 MILLIGRAM(S): at 04:15

## 2017-08-24 RX ADMIN — Medication 180 MILLIGRAM(S): at 04:15

## 2017-08-24 RX ADMIN — Medication 1: at 17:12

## 2017-08-24 RX ADMIN — Medication 50 MICROGRAM(S): at 04:15

## 2017-08-24 RX ADMIN — Medication 100 MILLIGRAM(S): at 21:54

## 2017-08-24 RX ADMIN — Medication 1: at 12:35

## 2017-08-24 NOTE — PROGRESS NOTE ADULT - PROBLEM SELECTOR PLAN 3
cont home metoprolol tartrate 100mg bid and diltiazem 180mg extended release  coumadin resumption plan per trauma and neurosurgery  check digoxin level as above. R frontal vertex parasagittal gyrus small hemorrhage, stable on repeat CTH 8/21   f/u surgery/neurosx re: when to resume AC.

## 2017-08-24 NOTE — PROGRESS NOTE ADULT - PROBLEM SELECTOR PLAN 6
on lovastatin 40mg outpt, cont statin. blood sugars acceptable while inpatient  on metformin 500mg bid at home, hold while inpt  cont accuchecks, sliding scale insulin.

## 2017-08-24 NOTE — PROGRESS NOTE ADULT - ASSESSMENT
Patient is an 85 year old man who was transferred from [facility] with right frontal IPH and SAH, R 6-7 rib fractures:    -Incentive spirometry (currently pulling 1500) 10x per hour while awake  -multimodal pain control  -home digoxin, diltiazem, lopressor, synthroid  -Hold Coumadin until outpatient appointment with Neurosurgery  -ambulation with assistance and SCDs, received lovenox after stable head CT  -Regular diet  -Rehab planning    Andres Mart, PGY-1 #[id number]

## 2017-08-24 NOTE — CONSULT NOTE ADULT - SUBJECTIVE AND OBJECTIVE BOX
Date of Admission: 8/20/17     Patient is a 85y old  Male who presents with a chief complaint of Fall with right frontal intraparenchymal hemorrhage and R 6-7 rib fractures (23 Aug 2017 14:43)      HISTORY OF PRESENT ILLNESS:      86 yo gentleman w/PMHx Afib on Coumadin, DM, HTN, HLD, hypothyroidism p/w fall possible syncope found to have R. subarachnoid hemorrhage and R. Rib 6-7 fractures EP consulted for 2.5 sec pause.         Allergies    No Known Allergies    Intolerances      MEDICATIONS:  digoxin     Tablet 0.125 milliGRAM(s) Oral daily  diltiazem    milliGRAM(s) Oral daily  metoprolol 100 milliGRAM(s) Oral two times a day  tamsulosin 0.4 milliGRAM(s) Oral before breakfast  enoxaparin Injectable 40 milliGRAM(s) SubCutaneous daily        acetaminophen   Tablet 650 milliGRAM(s) Oral every 6 hours PRN  oxyCODONE    IR 2.5 milliGRAM(s) Oral every 4 hours PRN      dextrose Gel 1 Dose(s) Oral once PRN  dextrose 50% Injectable 12.5 Gram(s) IV Push once  dextrose 50% Injectable 25 Gram(s) IV Push once  dextrose 50% Injectable 25 Gram(s) IV Push once  glucagon  Injectable 1 milliGRAM(s) IntraMuscular once PRN  levothyroxine 50 MICROGram(s) Oral daily  atorvastatin 10 milliGRAM(s) Oral at bedtime  insulin lispro (HumaLOG) corrective regimen sliding scale   SubCutaneous three times a day before meals  insulin lispro (HumaLOG) corrective regimen sliding scale   SubCutaneous at bedtime    dextrose 5%. 1000 milliLiter(s) IV Continuous <Continuous>  lidocaine   Patch 1 Patch Transdermal daily      PAST MEDICAL & SURGICAL HISTORY:  Nonrheumatic aortic valve stenosis  Atrial fibrillation  Spinal stenosis of lumbar region  Torn rotator cuff: Right  Arthritis, shoulder region: Right  Diabetes mellitus  Congenital heart defect  Hyperlipidemia  HTN - Hypertension  BPH (benign prostatic hypertrophy)  Hx of appendectomy: age 17  S/P TURP (status post transurethral resection of prostate): 2008      FAMILY HISTORY: Noncontributory     SOCIAL HISTORY:    [ ] Non-smoker    REVIEW OF SYSTEMS:  See HPI. Otherwise, 10 point ROS done and otherwise negative.    PHYSICAL EXAM:  T(C): 37 (08-24-17 @ 09:01), Max: 37.2 (08-23-17 @ 23:41)  HR: 78 (08-24-17 @ 09:01) (69 - 95)  BP: 111/68 (08-24-17 @ 09:01) (111/68 - 152/83)  RR: 16 (08-24-17 @ 09:01) (16 - 18)  SpO2: 96% (08-24-17 @ 09:01) (94% - 97%)  Wt(kg): --  I&O's Summary    23 Aug 2017 07:01  -  24 Aug 2017 07:00  --------------------------------------------------------  IN: 1620 mL / OUT: 280 mL / NET: 1340 mL        Appearance: Normal	  HEENT:   Normal oral mucosa, PERRL, EOMI	  Lymphatic: No lymphadenopathy  Cardiovascular: Normal S1 S2, No JVD, No murmurs, No edema  Respiratory: Lungs clear to auscultation	  Psychiatry: A & O x 3, Mood & affect appropriate  Gastrointestinal:  Soft, Non-tender, + BS	  Skin: No rashes, No ecchymoses, No cyanosis	  Neurologic: Non-focal  Extremities: Normal range of motion, No clubbing, cyanosis or edema  Vascular: Peripheral pulses palpable 2+ bilaterally      TELEMETRY: 	  AF  with 2.5 sec pause.     ECG:  	AF 	    PREVIOUS DIAGNOSTIC TESTING:    [ ] Echocardiogram: Transesophageal Echocardiogram w/o TTE (10.04.12 @ 16:25) >  CONCLUSIONS:  1. Calcified trileaflet aortic valve with decreased  opening. Aortic valve by planimetry measures about 1.0 sqcm  consistent with severe aortic stenosis.  2. Mild atheroma noted in aortic arch/descending aorta.  3. Left atrial enlargement. No left atrial or left atrial  appendage thrombus.  4. Normal left ventricular internal dimensions and wall  thicknesses.  5. Normal left ventricular systolic function. No segmental  wall motion abnormalities.  6. Normal right ventricular size and function.  	  ASSESSMENT/PLAN: 	    #Afib on AC   -   Metoprolol 100mg BID, Diltiazem 180mg qd and Digoxin 0.125mg qd Date of Admission: 8/20/17     Patient is a 85y old  Male who presents with a chief complaint of Fall with right frontal intraparenchymal hemorrhage and R 6-7 rib fractures (23 Aug 2017 14:43)      HISTORY OF PRESENT ILLNESS:      84 yo gentleman w/PMHx Afib on Coumadin, DM, HTN, HLD, hypothyroidism p/w fall possible syncope found to have R. subarachnoid hemorrhage and R. Rib 6-7 fractures EP consulted for 2.5 sec pause. Patient denies any symptoms during pause. No lightheadedness, SOB, nausea/vomiting, or CP. Reports hx of syncope in his 70s. No new meds.         Allergies    No Known Allergies    Intolerances      MEDICATIONS:  digoxin     Tablet 0.125 milliGRAM(s) Oral daily  diltiazem    milliGRAM(s) Oral daily  metoprolol 100 milliGRAM(s) Oral two times a day  tamsulosin 0.4 milliGRAM(s) Oral before breakfast  enoxaparin Injectable 40 milliGRAM(s) SubCutaneous daily        acetaminophen   Tablet 650 milliGRAM(s) Oral every 6 hours PRN  oxyCODONE    IR 2.5 milliGRAM(s) Oral every 4 hours PRN      dextrose Gel 1 Dose(s) Oral once PRN  dextrose 50% Injectable 12.5 Gram(s) IV Push once  dextrose 50% Injectable 25 Gram(s) IV Push once  dextrose 50% Injectable 25 Gram(s) IV Push once  glucagon  Injectable 1 milliGRAM(s) IntraMuscular once PRN  levothyroxine 50 MICROGram(s) Oral daily  atorvastatin 10 milliGRAM(s) Oral at bedtime  insulin lispro (HumaLOG) corrective regimen sliding scale   SubCutaneous three times a day before meals  insulin lispro (HumaLOG) corrective regimen sliding scale   SubCutaneous at bedtime    dextrose 5%. 1000 milliLiter(s) IV Continuous <Continuous>  lidocaine   Patch 1 Patch Transdermal daily      PAST MEDICAL & SURGICAL HISTORY:  Nonrheumatic aortic valve stenosis  Atrial fibrillation  Spinal stenosis of lumbar region  Torn rotator cuff: Right  Arthritis, shoulder region: Right  Diabetes mellitus  Congenital heart defect  Hyperlipidemia  HTN - Hypertension  BPH (benign prostatic hypertrophy)  Hx of appendectomy: age 17  S/P TURP (status post transurethral resection of prostate): 2008      FAMILY HISTORY: Noncontributory     SOCIAL HISTORY:    [ ] Non-smoker    REVIEW OF SYSTEMS:  See HPI. Otherwise, 10 point ROS done and otherwise negative.    PHYSICAL EXAM:  T(C): 37 (08-24-17 @ 09:01), Max: 37.2 (08-23-17 @ 23:41)  HR: 78 (08-24-17 @ 09:01) (69 - 95)  BP: 111/68 (08-24-17 @ 09:01) (111/68 - 152/83)  RR: 16 (08-24-17 @ 09:01) (16 - 18)  SpO2: 96% (08-24-17 @ 09:01) (94% - 97%)  Wt(kg): --  I&O's Summary    23 Aug 2017 07:01  -  24 Aug 2017 07:00  --------------------------------------------------------  IN: 1620 mL / OUT: 280 mL / NET: 1340 mL        Appearance: Normal	  HEENT:   Normal oral mucosa, PERRL, EOMI	  Lymphatic: No lymphadenopathy  Cardiovascular: Normal S1 S2, No JVD, No murmurs, No edema  Respiratory: Lungs clear to auscultation	  Psychiatry: A & O x 3, Mood & affect appropriate  Gastrointestinal:  Soft, Non-tender, + BS	  Skin: No rashes, No ecchymoses, No cyanosis	  Neurologic: Non-focal  Extremities: Normal range of motion, No clubbing, cyanosis or edema  Vascular: Peripheral pulses palpable 2+ bilaterally      TELEMETRY: 	  AF  with 2.5 sec pause.     ECG:  	AF 	    PREVIOUS DIAGNOSTIC TESTING:    [ ] Echocardiogram: Transesophageal Echocardiogram w/o TTE (10.04.12 @ 16:25) >  CONCLUSIONS:  1. Calcified trileaflet aortic valve with decreased  opening. Aortic valve by planimetry measures about 1.0 sqcm  consistent with severe aortic stenosis.  2. Mild atheroma noted in aortic arch/descending aorta.  3. Left atrial enlargement. No left atrial or left atrial  appendage thrombus.  4. Normal left ventricular internal dimensions and wall  thicknesses.  5. Normal left ventricular systolic function. No segmental  wall motion abnormalities.  6. Normal right ventricular size and function.  	  ASSESSMENT/PLAN: 	    #Afib on AC   -   Metoprolol 100mg BID, Diltiazem 180mg qd and Digoxin 0.125mg qd Date of Admission: 8/20/17     Cardiologist Dr. Prakash    Patient is a 85y old  Male who presents with a chief complaint of Fall with right frontal intraparenchymal hemorrhage and R 6-7 rib fractures (23 Aug 2017 14:43)      HISTORY OF PRESENT ILLNESS:      84 yo gentleman w/PMHx AS (ROMULO 1.0), Afib on Coumadin, DM, HTN, HLD, hypothyroidism p/w fall possible syncope found to have R. subarachnoid hemorrhage and R. Rib 6-7 fractures EP consulted for 2.5 sec pause. Patient denies any symptoms during pause. No lightheadedness, SOB, nausea/vomiting, or CP. Reports hx of syncope in his 70s while skiing. No new medication changes. He otherwise reports that he feels well and is eager to be discharged.       Allergies    No Known Allergies    Intolerances      MEDICATIONS:  digoxin     Tablet 0.125 milliGRAM(s) Oral daily  diltiazem    milliGRAM(s) Oral daily  metoprolol 100 milliGRAM(s) Oral two times a day  tamsulosin 0.4 milliGRAM(s) Oral before breakfast  enoxaparin Injectable 40 milliGRAM(s) SubCutaneous daily        acetaminophen   Tablet 650 milliGRAM(s) Oral every 6 hours PRN  oxyCODONE    IR 2.5 milliGRAM(s) Oral every 4 hours PRN      dextrose Gel 1 Dose(s) Oral once PRN  dextrose 50% Injectable 12.5 Gram(s) IV Push once  dextrose 50% Injectable 25 Gram(s) IV Push once  dextrose 50% Injectable 25 Gram(s) IV Push once  glucagon  Injectable 1 milliGRAM(s) IntraMuscular once PRN  levothyroxine 50 MICROGram(s) Oral daily  atorvastatin 10 milliGRAM(s) Oral at bedtime  insulin lispro (HumaLOG) corrective regimen sliding scale   SubCutaneous three times a day before meals  insulin lispro (HumaLOG) corrective regimen sliding scale   SubCutaneous at bedtime    dextrose 5%. 1000 milliLiter(s) IV Continuous <Continuous>  lidocaine   Patch 1 Patch Transdermal daily      PAST MEDICAL & SURGICAL HISTORY:  Nonrheumatic aortic valve stenosis  Atrial fibrillation  Spinal stenosis of lumbar region  Torn rotator cuff: Right  Arthritis, shoulder region: Right  Diabetes mellitus  Congenital heart defect  Hyperlipidemia  HTN - Hypertension  BPH (benign prostatic hypertrophy)  Hx of appendectomy: age 17  S/P TURP (status post transurethral resection of prostate): 2008      FAMILY HISTORY: Noncontributory     SOCIAL HISTORY:    [ ] Non-smoker    REVIEW OF SYSTEMS:  See HPI. Otherwise, 10 point ROS done and otherwise negative.    PHYSICAL EXAM:  T(C): 37 (08-24-17 @ 09:01), Max: 37.2 (08-23-17 @ 23:41)  HR: 78 (08-24-17 @ 09:01) (69 - 95)  BP: 111/68 (08-24-17 @ 09:01) (111/68 - 152/83)  RR: 16 (08-24-17 @ 09:01) (16 - 18)  SpO2: 96% (08-24-17 @ 09:01) (94% - 97%)  Wt(kg): --  I&O's Summary    23 Aug 2017 07:01  -  24 Aug 2017 07:00  --------------------------------------------------------  IN: 1620 mL / OUT: 280 mL / NET: 1340 mL        Appearance: Pleasant, sitting up at bedside, in NAD   HEENT:   Significant ecchymoses over R. eye, MMM OP clear 	  Lymphatic: No lymphadenopathy  Cardiovascular: Irregular, 1 S2, No JVD, Harsh crescendo/decrescendo murmur No edema  Respiratory: Lungs clear to auscultation	  Psychiatry: Mood & affect appropriate  Gastrointestinal:  Soft, Non-tender, + BS	  Skin: No rashes, No ecchymoses, No cyanosis	  Neurologic: Non-focal  Extremities: Normal range of motion, No clubbing, cyanosis or edema  Vascular: Peripheral pulses palpable 2+ bilaterally      TELEMETRY: 	  AF  with 2.5 sec pause.     ECG:  	AF 	    PREVIOUS DIAGNOSTIC TESTING:    [ ] Echocardiogram: Transesophageal Echocardiogram w/o TTE (10.04.12 @ 16:25) >  CONCLUSIONS:  1. Calcified trileaflet aortic valve with decreased  opening. Aortic valve by planimetry measures about 1.0 sqcm  consistent with severe aortic stenosis.  2. Mild atheroma noted in aortic arch/descending aorta.  3. Left atrial enlargement. No left atrial or left atrial  appendage thrombus.  4. Normal left ventricular internal dimensions and wall  thicknesses.  5. Normal left ventricular systolic function. No segmental  wall motion abnormalities.  6. Normal right ventricular size and function.  	  ASSESSMENT/PLAN: 	    #Afib previously on AC; now held per neurosx in setting of SAH. 2.5 sec pause in Afib (Abnl if pause >5 secs)  -  AC held per neurosx  - On Metoprolol 100mg BID, Diltiazem 180mg qd and Digoxin 0.125mg qd; Likely will require decreased doses for rate control. Will d/w Dr. Arevalo and f/u primary team     [id number] Date of Admission: 8/20/17     Cardiologist Dr. Prakash    Patient is a 85y old  Male who presents with a chief complaint of Fall with right frontal intraparenchymal hemorrhage and R 6-7 rib fractures (23 Aug 2017 14:43)      HISTORY OF PRESENT ILLNESS:      84 yo gentleman w/PMHx AS (ROMULO 1.0), Afib on Coumadin, DM, HTN, HLD, hypothyroidism p/w fall possible syncope found to have R. subarachnoid hemorrhage and R. Rib 6-7 fractures EP consulted for 2.5 sec pause. Patient denies any symptoms during pause. No lightheadedness, SOB, nausea/vomiting, or CP. Reports hx of syncope in his 70s while skiing. No new medication changes. He otherwise reports that he feels well and is eager to be discharged.       Allergies    No Known Allergies    Intolerances      MEDICATIONS:  digoxin     Tablet 0.125 milliGRAM(s) Oral daily  diltiazem    milliGRAM(s) Oral daily  metoprolol 100 milliGRAM(s) Oral two times a day  tamsulosin 0.4 milliGRAM(s) Oral before breakfast  enoxaparin Injectable 40 milliGRAM(s) SubCutaneous daily        acetaminophen   Tablet 650 milliGRAM(s) Oral every 6 hours PRN  oxyCODONE    IR 2.5 milliGRAM(s) Oral every 4 hours PRN      dextrose Gel 1 Dose(s) Oral once PRN  dextrose 50% Injectable 12.5 Gram(s) IV Push once  dextrose 50% Injectable 25 Gram(s) IV Push once  dextrose 50% Injectable 25 Gram(s) IV Push once  glucagon  Injectable 1 milliGRAM(s) IntraMuscular once PRN  levothyroxine 50 MICROGram(s) Oral daily  atorvastatin 10 milliGRAM(s) Oral at bedtime  insulin lispro (HumaLOG) corrective regimen sliding scale   SubCutaneous three times a day before meals  insulin lispro (HumaLOG) corrective regimen sliding scale   SubCutaneous at bedtime    dextrose 5%. 1000 milliLiter(s) IV Continuous <Continuous>  lidocaine   Patch 1 Patch Transdermal daily      PAST MEDICAL & SURGICAL HISTORY:  Nonrheumatic aortic valve stenosis  Atrial fibrillation  Spinal stenosis of lumbar region  Torn rotator cuff: Right  Arthritis, shoulder region: Right  Diabetes mellitus  Congenital heart defect  Hyperlipidemia  HTN - Hypertension  BPH (benign prostatic hypertrophy)  Hx of appendectomy: age 17  S/P TURP (status post transurethral resection of prostate): 2008      FAMILY HISTORY: Noncontributory     SOCIAL HISTORY:    [ ] Non-smoker    REVIEW OF SYSTEMS:  See HPI. Otherwise, 10 point ROS done and otherwise negative.    PHYSICAL EXAM:  T(C): 37 (08-24-17 @ 09:01), Max: 37.2 (08-23-17 @ 23:41)  HR: 78 (08-24-17 @ 09:01) (69 - 95)  BP: 111/68 (08-24-17 @ 09:01) (111/68 - 152/83)  RR: 16 (08-24-17 @ 09:01) (16 - 18)  SpO2: 96% (08-24-17 @ 09:01) (94% - 97%)  Wt(kg): --  I&O's Summary    23 Aug 2017 07:01  -  24 Aug 2017 07:00  --------------------------------------------------------  IN: 1620 mL / OUT: 280 mL / NET: 1340 mL        Appearance: Pleasant, sitting up at bedside, in NAD   HEENT:   Significant ecchymoses over R. eye, MMM OP clear 	  Lymphatic: No lymphadenopathy  Cardiovascular: Irregular, 1 S2, No JVD, Harsh crescendo/decrescendo murmur No edema  Respiratory: Lungs clear to auscultation	  Psychiatry: Mood & affect appropriate  Gastrointestinal:  Soft, Non-tender, + BS	  Skin: No rashes, No ecchymoses, No cyanosis	  Neurologic: Non-focal  Extremities: Normal range of motion, No clubbing, cyanosis or edema  Vascular: Peripheral pulses palpable 2+ bilaterally      TELEMETRY: 	  AF  with 2.5 sec pause.     ECG:  	AF 	    PREVIOUS DIAGNOSTIC TESTING:    [ ] Echocardiogram: Transesophageal Echocardiogram w/o TTE (10.04.12 @ 16:25) >  CONCLUSIONS:  1. Calcified trileaflet aortic valve with decreased  opening. Aortic valve by planimetry measures about 1.0 sqcm  consistent with severe aortic stenosis.  2. Mild atheroma noted in aortic arch/descending aorta.  3. Left atrial enlargement. No left atrial or left atrial  appendage thrombus.  4. Normal left ventricular internal dimensions and wall  thicknesses.  5. Normal left ventricular systolic function. No segmental  wall motion abnormalities.  6. Normal right ventricular size and function.  	  ASSESSMENT/PLAN: 	    #Afib previously on AC; now held per neurosx in setting of SAH. 2.5 sec pause in Afib (Abnl if pause >5 secs)  -  AC held per neurosx  - On Metoprolol 100mg BID, Diltiazem 180mg qd and Digoxin 0.125mg qd    #Syncope: Pt unable to recall the event and reports presyncopal lightheadedness while standing prior to waking up on the floor. History more concerning for true syncopal episode rather than a mechanical fall. In setting of severe AS with syncope, recommend gen cards consult for further evaluation prior to discharge.       Case d/w Dr. Arevalo and primary team      - d/w Dr. Arevalo and f/u primary team     [id number]

## 2017-08-24 NOTE — PROGRESS NOTE ADULT - PROBLEM SELECTOR PLAN 5
blood sugars acceptable while inpatient  on metformin 500mg bid at home, hold while inpt  cont accuchecks, sliding scale insulin. cont home metoprolol tartrate 100mg bid and diltiazem 180mg extended release.

## 2017-08-24 NOTE — PROGRESS NOTE ADULT - SUBJECTIVE AND OBJECTIVE BOX
Patient is a 85y old  Male who presents with a chief complaint of Fall with right frontal intraparenchymal hemorrhage and R 6-7 rib fractures (23 Aug 2017 14:43)      SUBJECTIVE / OVERNIGHT EVENTS:  2.5sec sinus pause while sleeping, asymptomatic. pt denies cp/sob, still notes some     MEDICATIONS  (STANDING):  digoxin     Tablet 0.125 milliGRAM(s) Oral daily  diltiazem    milliGRAM(s) Oral daily  metoprolol 100 milliGRAM(s) Oral two times a day  dextrose 5%. 1000 milliLiter(s) (50 mL/Hr) IV Continuous <Continuous>  dextrose 50% Injectable 12.5 Gram(s) IV Push once  dextrose 50% Injectable 25 Gram(s) IV Push once  dextrose 50% Injectable 25 Gram(s) IV Push once  levothyroxine 50 MICROGram(s) Oral daily  tamsulosin 0.4 milliGRAM(s) Oral before breakfast  atorvastatin 10 milliGRAM(s) Oral at bedtime  lidocaine   Patch 1 Patch Transdermal daily  insulin lispro (HumaLOG) corrective regimen sliding scale   SubCutaneous three times a day before meals  insulin lispro (HumaLOG) corrective regimen sliding scale   SubCutaneous at bedtime  enoxaparin Injectable 40 milliGRAM(s) SubCutaneous daily    MEDICATIONS  (PRN):  dextrose Gel 1 Dose(s) Oral once PRN Blood Glucose LESS THAN 70 milliGRAM(s)/deciliter  glucagon  Injectable 1 milliGRAM(s) IntraMuscular once PRN Glucose LESS THAN 70 milligrams/deciliter  acetaminophen   Tablet 650 milliGRAM(s) Oral every 6 hours PRN mild pain  oxyCODONE    IR 2.5 milliGRAM(s) Oral every 4 hours PRN Severe Pain (7 - 10)      Vital Signs Last 24 Hrs  T(C): 36.7 (24 Aug 2017 13:45), Max: 37.2 (23 Aug 2017 23:41)  T(F): 98 (24 Aug 2017 13:45), Max: 99 (23 Aug 2017 23:41)  HR: 78 (24 Aug 2017 13:45) (69 - 95)  BP: 131/79 (24 Aug 2017 13:45) (111/68 - 152/83)  BP(mean): --  RR: 18 (24 Aug 2017 13:45) (16 - 18)  SpO2: 98% (24 Aug 2017 13:45) (94% - 98%)  CAPILLARY BLOOD GLUCOSE  180 (24 Aug 2017 11:36)  139 (24 Aug 2017 07:48)  147 (23 Aug 2017 20:27)  135 (23 Aug 2017 16:38)        I&O's Summary    23 Aug 2017 07:01  -  24 Aug 2017 07:00  --------------------------------------------------------  IN: 1620 mL / OUT: 280 mL / NET: 1340 mL    24 Aug 2017 07:01  -  24 Aug 2017 14:17  --------------------------------------------------------  IN: 600 mL / OUT: 0 mL / NET: 600 mL        PHYSICAL EXAM:  CONSTITUIONAL: NAD, well-developed  HEAD:  Atraumatic, Normocephalic  EYES: EOMI, PERRLA, conjunctiva and sclera clear  ENMT: Supple, No JVD  RESPIRATORY: Clear to auscultation bilaterally; No wheeze  CARDIOVASCULAR: Regular rate and rhythm; No murmurs, rubs, or gallops  GI: Soft, Nontender, Nondistended; Bowel sounds present  EXTREMITIES:  2+ Peripheral Pulses, No clubbing, cyanosis, or edema  PSYCH: AAOx3  NEUROLOGY: non-focal  SKIN: No rashes or lesions    LABS:                    RADIOLOGY & ADDITIONAL TESTS:    Imaging Personally Reviewed:    Consultant(s) Notes Reviewed:      Care Discussed with Consultants/Other Providers: Patient is a 85y old  Male who presents with a chief complaint of Fall with right frontal intraparenchymal hemorrhage and R 6-7 rib fractures (23 Aug 2017 14:43)      SUBJECTIVE / OVERNIGHT EVENTS:  2.5sec sinus pause while sleeping, asymptomatic. pt denies cp/sob, still notes some rib pain      MEDICATIONS  (STANDING):  digoxin     Tablet 0.125 milliGRAM(s) Oral daily  diltiazem    milliGRAM(s) Oral daily  metoprolol 100 milliGRAM(s) Oral two times a day  dextrose 5%. 1000 milliLiter(s) (50 mL/Hr) IV Continuous <Continuous>  dextrose 50% Injectable 12.5 Gram(s) IV Push once  dextrose 50% Injectable 25 Gram(s) IV Push once  dextrose 50% Injectable 25 Gram(s) IV Push once  levothyroxine 50 MICROGram(s) Oral daily  tamsulosin 0.4 milliGRAM(s) Oral before breakfast  atorvastatin 10 milliGRAM(s) Oral at bedtime  lidocaine   Patch 1 Patch Transdermal daily  insulin lispro (HumaLOG) corrective regimen sliding scale   SubCutaneous three times a day before meals  insulin lispro (HumaLOG) corrective regimen sliding scale   SubCutaneous at bedtime  enoxaparin Injectable 40 milliGRAM(s) SubCutaneous daily    MEDICATIONS  (PRN):  dextrose Gel 1 Dose(s) Oral once PRN Blood Glucose LESS THAN 70 milliGRAM(s)/deciliter  glucagon  Injectable 1 milliGRAM(s) IntraMuscular once PRN Glucose LESS THAN 70 milligrams/deciliter  acetaminophen   Tablet 650 milliGRAM(s) Oral every 6 hours PRN mild pain  oxyCODONE    IR 2.5 milliGRAM(s) Oral every 4 hours PRN Severe Pain (7 - 10)      Vital Signs Last 24 Hrs  T(C): 36.7 (24 Aug 2017 13:45), Max: 37.2 (23 Aug 2017 23:41)  T(F): 98 (24 Aug 2017 13:45), Max: 99 (23 Aug 2017 23:41)  HR: 78 (24 Aug 2017 13:45) (69 - 95)  BP: 131/79 (24 Aug 2017 13:45) (111/68 - 152/83)  BP(mean): --  RR: 18 (24 Aug 2017 13:45) (16 - 18)  SpO2: 98% (24 Aug 2017 13:45) (94% - 98%)  CAPILLARY BLOOD GLUCOSE  180 (24 Aug 2017 11:36)  139 (24 Aug 2017 07:48)  147 (23 Aug 2017 20:27)  135 (23 Aug 2017 16:38)        I&O's Summary    23 Aug 2017 07:01  -  24 Aug 2017 07:00  --------------------------------------------------------  IN: 1620 mL / OUT: 280 mL / NET: 1340 mL    24 Aug 2017 07:01  -  24 Aug 2017 14:17  --------------------------------------------------------  IN: 600 mL / OUT: 0 mL / NET: 600 mL      PHYSICAL EXAM:  CONSTITUIONAL: NAD, well-developed  HEAD:  Atraumatic, Normocephalic  EYES: EOMI, PERRLA, conjunctiva and sclera clear R eye ecchymosis   ENMT: Supple, No JVD  RESPIRATORY: Clear to auscultation bilaterally; No wheeze  CARDIOVASCULAR: Regular rate and rhythm s1s2  GI: Soft, Nontender, Nondistended; Bowel sounds present  EXTREMITIES:  2+ Peripheral Pulses, No clubbing, cyanosis, or edema  PSYCH: AAOx3  NEUROLOGY: non-focal  SKIN: No rashes or lesions      LABS:                    RADIOLOGY & ADDITIONAL TESTS:    Imaging Personally Reviewed:    Consultant(s) Notes Reviewed:      Care Discussed with Consultants/Other Providers:

## 2017-08-24 NOTE — PROGRESS NOTE ADULT - PROBLEM SELECTOR PLAN 2
R frontal vertex parasagittal gyrus small hemorrhage, stable on repeat CTH 8/21   f/u surgery/neurosx re: when to resume AC. apprec EP eval  have tried reaching out to Dr Duque office to obtain collateral info, left msg again today, no callback yet. cards c/s per EP, check inpt TTE while waiting

## 2017-08-24 NOTE — PROGRESS NOTE ADULT - PROBLEM SELECTOR PLAN 1
R6-7 nondisplaced fractures, pain control and mgmt per surgery  awaiting callback from PCP/cardiologists office re: any prior hx of similar symptoms  check orthostatics, check digoxin level  awaiting LE doppler per surgery team R6-7 nondisplaced fractures, pain control and mgmt per surgery  awaiting callback from PCP/cardiologists office re: any prior hx of similar symptoms  check orthostatics  LE dopplers negative for DVT

## 2017-08-24 NOTE — CONSULT NOTE ADULT - SUBJECTIVE AND OBJECTIVE BOX
HISTORY OF PRESENT ILLNESS:    85 year old man with history AS (last ROMULO 1.0 in 2012), Afib on Coumadin, DM, HTN, HLD, hypothyroidism p/w fall possible syncope found to have R subarachnoid hemorrhage and R Rib  fractures with 2.5 sec pause while in NSR. Additionally, he says during his fall he was walking up two steps, then suddenly felt lightheaded and passed out. No lightheadedness, SOB, nausea/vomiting, or CP. Reports hx of syncope in his 70s while skiing. No new medication changes. Reports multiple episodes of lightheadedness in the past, but difficult recounting actual episodes. Currently without CP or SOB at rest.      Allergies    No Known Allergies    Intolerances    	    MEDICATIONS:  digoxin     Tablet 0.125 milliGRAM(s) Oral daily  diltiazem    milliGRAM(s) Oral daily  metoprolol 100 milliGRAM(s) Oral two times a day  tamsulosin 0.4 milliGRAM(s) Oral before breakfast  enoxaparin Injectable 40 milliGRAM(s) SubCutaneous daily  acetaminophen   Tablet 650 milliGRAM(s) Oral every 6 hours PRN  oxyCODONE    IR 2.5 milliGRAM(s) Oral every 4 hours PRN  levothyroxine 50 MICROGram(s) Oral daily  atorvastatin 10 milliGRAM(s) Oral at bedtime  insulin lispro (HumaLOG) corrective regimen sliding scale   SubCutaneous three times a day before meals  insulin lispro (HumaLOG) corrective regimen sliding scale   SubCutaneous at bedtime        PAST MEDICAL & SURGICAL HISTORY:  Nonrheumatic aortic valve stenosis  Atrial fibrillation  Spinal stenosis of lumbar region  Torn rotator cuff: Right  Arthritis, shoulder region: Right  Diabetes mellitus  Congenital heart defect  Hyperlipidemia  HTN - Hypertension  BPH (benign prostatic hypertrophy)  Hx of appendectomy: age 17  S/P TURP (status post transurethral resection of prostate): 2008      FAMILY HISTORY: Non-contributory      SOCIAL HISTORY:   [ ] Non-smoker      REVIEW OF SYSTEMS:  Constitutional: No fevers  HEENT: No blurred vision, No gingival bleeding  Respiratory: as above   CV: as above    GI: No nausea/vomiting  Extremities: No lower extremity swelling  Neurologic: No dizziness  Skin: No rash  MSK: No joint pain  Heme: No easy bruising    PHYSICAL EXAM:  T(C): 36.7 (08-24-17 @ 13:45), Max: 37.2 (08-23-17 @ 23:41)  HR: 78 (08-24-17 @ 13:45) (69 - 95)  BP: 131/79 (08-24-17 @ 13:45) (111/68 - 152/83)  RR: 18 (08-24-17 @ 13:45) (16 - 18)  SpO2: 98% (08-24-17 @ 13:45) (94% - 98%)  Wt(kg): --  I&O's Summary    23 Aug 2017 07:01  -  24 Aug 2017 07:00  --------------------------------------------------------  IN: 1620 mL / OUT: 280 mL / NET: 1340 mL    24 Aug 2017 07:01  -  24 Aug 2017 15:35  --------------------------------------------------------  IN: 600 mL / OUT: 0 mL / NET: 600 mL        Appearance: No Acute Distress	  HEENT: Ecchymosis above R eye  Cardiovascular: Normal S1 S2, RRR, III/VI harsh JC  Respiratory: Lungs clear to auscultation bilaterally  Gastrointestinal: Soft, Non-tender  Skin: No ecchymoses, No cyanosis  Neurologic: Non-focal  Extremities: No clubbing, cyanosis or edema  Vascular: Peripheral pulses palpable 2+ bilaterally  Psychiatry: A & O x 3, Mood & affect appropriate    LABS:	 	              TELEMETRY: NSR  ECG:      DIAGNOSTIC TESTING:    [ ] Echocardiogram: Pending

## 2017-08-24 NOTE — PROGRESS NOTE ADULT - SUBJECTIVE AND OBJECTIVE BOX
ACS Progress Note    S: Patient seen and examined. No acute events overnight. Pain well controlled with current regimen.   Denies nausea/vomiting. Less resistance to discharge to rehab than yesterday    O:  Vital Signs Last 24 Hrs  T(C): 36.4 (24 Aug 2017 04:16), Max: 37.2 (23 Aug 2017 23:41)  T(F): 97.5 (24 Aug 2017 04:16), Max: 99 (23 Aug 2017 23:41)  HR: 94 (24 Aug 2017 04:16) (74 - 95)  BP: 137/76 (24 Aug 2017 04:16) (118/71 - 152/83)  BP(mean): --  RR: 18 (24 Aug 2017 04:16) (18 - 18)  SpO2: 95% (24 Aug 2017 04:16) (94% - 97%)    I&O's Detail    23 Aug 2017 07:01  -  24 Aug 2017 07:00  --------------------------------------------------------  IN:    Oral Fluid: 1620 mL  Total IN: 1620 mL    OUT:    Voided: 280 mL  Total OUT: 280 mL    Total NET: 1340 mL      MEDICATIONS  (STANDING):  digoxin     Tablet 0.125 milliGRAM(s) Oral daily  diltiazem    milliGRAM(s) Oral daily  metoprolol 100 milliGRAM(s) Oral two times a day  dextrose 5%. 1000 milliLiter(s) (50 mL/Hr) IV Continuous <Continuous>  dextrose 50% Injectable 12.5 Gram(s) IV Push once  dextrose 50% Injectable 25 Gram(s) IV Push once  dextrose 50% Injectable 25 Gram(s) IV Push once  levothyroxine 50 MICROGram(s) Oral daily  tamsulosin 0.4 milliGRAM(s) Oral before breakfast  atorvastatin 10 milliGRAM(s) Oral at bedtime  lidocaine   Patch 1 Patch Transdermal daily  insulin lispro (HumaLOG) corrective regimen sliding scale   SubCutaneous three times a day before meals  insulin lispro (HumaLOG) corrective regimen sliding scale   SubCutaneous at bedtime  enoxaparin Injectable 40 milliGRAM(s) SubCutaneous daily    MEDICATIONS  (PRN):  dextrose Gel 1 Dose(s) Oral once PRN Blood Glucose LESS THAN 70 milliGRAM(s)/deciliter  glucagon  Injectable 1 milliGRAM(s) IntraMuscular once PRN Glucose LESS THAN 70 milligrams/deciliter  acetaminophen   Tablet 650 milliGRAM(s) Oral every 6 hours PRN mild pain  oxyCODONE    IR 2.5 milliGRAM(s) Oral every 4 hours PRN Severe Pain (7 - 10)                  Physical Exam:  Gen: Laying in bed, NAD, alert and oriented.   HEENT: R temporal echymosis and lac  Resp: Unlabored breathing, IS >1L  ABD: soft, nontender, nondistended, no hematomas, well healed appendectomy scar    Lines:   IV: patent, in place. ACS Progress Note    S: Patient seen and examined. No acute events overnight. Pain well controlled with current regimen.   Denies nausea/vomiting.     O:  Vital Signs Last 24 Hrs  T(C): 36.4 (24 Aug 2017 04:16), Max: 37.2 (23 Aug 2017 23:41)  T(F): 97.5 (24 Aug 2017 04:16), Max: 99 (23 Aug 2017 23:41)  HR: 94 (24 Aug 2017 04:16) (74 - 95)  BP: 137/76 (24 Aug 2017 04:16) (118/71 - 152/83)  BP(mean): --  RR: 18 (24 Aug 2017 04:16) (18 - 18)  SpO2: 95% (24 Aug 2017 04:16) (94% - 97%)    I&O's Detail    23 Aug 2017 07:01  -  24 Aug 2017 07:00  --------------------------------------------------------  IN:    Oral Fluid: 1620 mL  Total IN: 1620 mL    OUT:    Voided: 280 mL  Total OUT: 280 mL    Total NET: 1340 mL      MEDICATIONS  (STANDING):  digoxin     Tablet 0.125 milliGRAM(s) Oral daily  diltiazem    milliGRAM(s) Oral daily  metoprolol 100 milliGRAM(s) Oral two times a day  dextrose 5%. 1000 milliLiter(s) (50 mL/Hr) IV Continuous <Continuous>  dextrose 50% Injectable 12.5 Gram(s) IV Push once  dextrose 50% Injectable 25 Gram(s) IV Push once  dextrose 50% Injectable 25 Gram(s) IV Push once  levothyroxine 50 MICROGram(s) Oral daily  tamsulosin 0.4 milliGRAM(s) Oral before breakfast  atorvastatin 10 milliGRAM(s) Oral at bedtime  lidocaine   Patch 1 Patch Transdermal daily  insulin lispro (HumaLOG) corrective regimen sliding scale   SubCutaneous three times a day before meals  insulin lispro (HumaLOG) corrective regimen sliding scale   SubCutaneous at bedtime  enoxaparin Injectable 40 milliGRAM(s) SubCutaneous daily    MEDICATIONS  (PRN):  dextrose Gel 1 Dose(s) Oral once PRN Blood Glucose LESS THAN 70 milliGRAM(s)/deciliter  glucagon  Injectable 1 milliGRAM(s) IntraMuscular once PRN Glucose LESS THAN 70 milligrams/deciliter  acetaminophen   Tablet 650 milliGRAM(s) Oral every 6 hours PRN mild pain  oxyCODONE    IR 2.5 milliGRAM(s) Oral every 4 hours PRN Severe Pain (7 - 10)        Physical Exam:  Gen: Laying in bed, NAD,   Neuro: alert and oriented  HEENT: R temporal echymosis and lac  CV: Irreg irreg  Resp: Unlabored breathing, IS >1L  ABD: soft, nontender, nondistended, no hematomas, well healed appendectomy scar  Psych: normal affect    Lines:   IV: patent, in place.

## 2017-08-24 NOTE — PROGRESS NOTE ADULT - PROBLEM SELECTOR PLAN 4
cont home metoprolol tartrate 100mg bid and diltiazem 180mg extended release. cont home metoprolol tartrate 100mg bid and diltiazem 180mg extended release  coumadin resumption plan per trauma and neurosurgery

## 2017-08-24 NOTE — PROGRESS NOTE ADULT - ATTENDING COMMENTS
Pt seen and examined today at 10am, agree with above. Pt says that his right chest wall pain related to his R 6-7 rib fractures is improved, still aggravated by movement, no shortness of breath. Tolerating po. Appreciate EP recs regarding syncopal workup, will f/u with Cards. R frontal intraparenchymal hemorrhage stable on yesterday's CT, DVT prophylaxis started. Screening LE duplex (as pt was not on chemical DVT prophylaxis for several days after admission due to concerns regarding his TBI) negative for DVT. Afib: rate-controlled with occasional sinus bradycardia or very short pauses, on digoxin, metoprolol, and diltiazem. Continue at current doses for now. Holding A/C in setting of TBI. DM 2 without long-standing insulin use: fsg overall well-controlled, but HbA1c 7.4, so will need outpatient medication adjustment.

## 2017-08-24 NOTE — PROGRESS NOTE ADULT - PROBLEM SELECTOR PROBLEM 6
Hyperlipidemia Type 2 diabetes mellitus with hyperglycemia, without long-term current use of insulin

## 2017-08-25 DIAGNOSIS — I35.0 NONRHEUMATIC AORTIC (VALVE) STENOSIS: ICD-10-CM

## 2017-08-25 LAB
ANION GAP SERPL CALC-SCNC: 13 MMOL/L — SIGNIFICANT CHANGE UP (ref 5–17)
APTT BLD: 29 SEC — SIGNIFICANT CHANGE UP (ref 27.5–37.4)
BUN SERPL-MCNC: 18 MG/DL — SIGNIFICANT CHANGE UP (ref 7–23)
CALCIUM SERPL-MCNC: 9.6 MG/DL — SIGNIFICANT CHANGE UP (ref 8.4–10.5)
CHLORIDE SERPL-SCNC: 106 MMOL/L — SIGNIFICANT CHANGE UP (ref 96–108)
CO2 SERPL-SCNC: 25 MMOL/L — SIGNIFICANT CHANGE UP (ref 22–31)
CREAT SERPL-MCNC: 0.99 MG/DL — SIGNIFICANT CHANGE UP (ref 0.5–1.3)
GLUCOSE SERPL-MCNC: 152 MG/DL — HIGH (ref 70–99)
HCT VFR BLD CALC: 40 % — SIGNIFICANT CHANGE UP (ref 39–50)
HGB BLD-MCNC: 13.9 G/DL — SIGNIFICANT CHANGE UP (ref 13–17)
INR BLD: 1.13 RATIO — SIGNIFICANT CHANGE UP (ref 0.88–1.16)
MAGNESIUM SERPL-MCNC: 2 MG/DL — SIGNIFICANT CHANGE UP (ref 1.6–2.6)
MCHC RBC-ENTMCNC: 34.7 GM/DL — SIGNIFICANT CHANGE UP (ref 32–36)
MCHC RBC-ENTMCNC: 36.8 PG — HIGH (ref 27–34)
MCV RBC AUTO: 106 FL — HIGH (ref 80–100)
PHOSPHATE SERPL-MCNC: 3.6 MG/DL — SIGNIFICANT CHANGE UP (ref 2.5–4.5)
PLATELET # BLD AUTO: 181 K/UL — SIGNIFICANT CHANGE UP (ref 150–400)
POTASSIUM SERPL-MCNC: 4.2 MMOL/L — SIGNIFICANT CHANGE UP (ref 3.5–5.3)
POTASSIUM SERPL-SCNC: 4.2 MMOL/L — SIGNIFICANT CHANGE UP (ref 3.5–5.3)
PROTHROM AB SERPL-ACNC: 12.3 SEC — SIGNIFICANT CHANGE UP (ref 9.8–12.7)
RBC # BLD: 3.78 M/UL — LOW (ref 4.2–5.8)
RBC # FLD: 13 % — SIGNIFICANT CHANGE UP (ref 10.3–14.5)
SODIUM SERPL-SCNC: 144 MMOL/L — SIGNIFICANT CHANGE UP (ref 135–145)
WBC # BLD: 6 K/UL — SIGNIFICANT CHANGE UP (ref 3.8–10.5)
WBC # FLD AUTO: 6 K/UL — SIGNIFICANT CHANGE UP (ref 3.8–10.5)

## 2017-08-25 PROCEDURE — 99232 SBSQ HOSP IP/OBS MODERATE 35: CPT

## 2017-08-25 PROCEDURE — 99232 SBSQ HOSP IP/OBS MODERATE 35: CPT | Mod: GC

## 2017-08-25 PROCEDURE — 99233 SBSQ HOSP IP/OBS HIGH 50: CPT | Mod: GC

## 2017-08-25 PROCEDURE — 99233 SBSQ HOSP IP/OBS HIGH 50: CPT

## 2017-08-25 PROCEDURE — 93880 EXTRACRANIAL BILAT STUDY: CPT | Mod: 26

## 2017-08-25 PROCEDURE — 93306 TTE W/DOPPLER COMPLETE: CPT | Mod: 26

## 2017-08-25 RX ORDER — SENNA PLUS 8.6 MG/1
1 TABLET ORAL AT BEDTIME
Qty: 0 | Refills: 0 | Status: DISCONTINUED | OUTPATIENT
Start: 2017-08-25 | End: 2017-08-25

## 2017-08-25 RX ORDER — POLYETHYLENE GLYCOL 3350 17 G/17G
17 POWDER, FOR SOLUTION ORAL DAILY
Qty: 0 | Refills: 0 | Status: DISCONTINUED | OUTPATIENT
Start: 2017-08-25 | End: 2017-09-01

## 2017-08-25 RX ORDER — DOCUSATE SODIUM 100 MG
100 CAPSULE ORAL THREE TIMES A DAY
Qty: 0 | Refills: 0 | Status: DISCONTINUED | OUTPATIENT
Start: 2017-08-25 | End: 2017-09-01

## 2017-08-25 RX ORDER — SENNA PLUS 8.6 MG/1
2 TABLET ORAL AT BEDTIME
Qty: 0 | Refills: 0 | Status: DISCONTINUED | OUTPATIENT
Start: 2017-08-25 | End: 2017-09-01

## 2017-08-25 RX ADMIN — ATORVASTATIN CALCIUM 10 MILLIGRAM(S): 80 TABLET, FILM COATED ORAL at 21:33

## 2017-08-25 RX ADMIN — Medication 0.12 MILLIGRAM(S): at 05:05

## 2017-08-25 RX ADMIN — Medication 100 MILLIGRAM(S): at 18:08

## 2017-08-25 RX ADMIN — Medication 180 MILLIGRAM(S): at 05:05

## 2017-08-25 RX ADMIN — LIDOCAINE 1 PATCH: 4 CREAM TOPICAL at 00:30

## 2017-08-25 RX ADMIN — Medication 50 MICROGRAM(S): at 05:05

## 2017-08-25 RX ADMIN — POLYETHYLENE GLYCOL 3350 17 GRAM(S): 17 POWDER, FOR SOLUTION ORAL at 11:12

## 2017-08-25 RX ADMIN — Medication 100 MILLIGRAM(S): at 16:12

## 2017-08-25 RX ADMIN — TAMSULOSIN HYDROCHLORIDE 0.4 MILLIGRAM(S): 0.4 CAPSULE ORAL at 05:05

## 2017-08-25 RX ADMIN — LIDOCAINE 1 PATCH: 4 CREAM TOPICAL at 11:12

## 2017-08-25 RX ADMIN — SENNA PLUS 2 TABLET(S): 8.6 TABLET ORAL at 21:33

## 2017-08-25 RX ADMIN — Medication 100 MILLIGRAM(S): at 05:05

## 2017-08-25 RX ADMIN — Medication 100 MILLIGRAM(S): at 21:33

## 2017-08-25 RX ADMIN — ENOXAPARIN SODIUM 40 MILLIGRAM(S): 100 INJECTION SUBCUTANEOUS at 11:12

## 2017-08-25 NOTE — PROGRESS NOTE ADULT - ATTENDING COMMENTS
Pt seen and examined today at 10am, agree with above. Pt feeling well, says that his right chest wall pain (related to known R 6-7 rib fractures) is improved today, but gets worse with getting out of bed. I reminded pt again that he can ask for pain medication to help with this. TTE done to re-assess AS shows decreased valve area of 0.8 from 1 in 2012. Will follow-up Cardiology recommendations regarding management. Continue telemetry. Hypothyroidism: continue Synthroid. R frontal IPH: stable on last CT.

## 2017-08-25 NOTE — PROGRESS NOTE ADULT - SUBJECTIVE AND OBJECTIVE BOX
ACS Progress Note    S: Patient seen and examined. No acute events overnight. Pain well controlled with current regimen. Pt with brief (<30 sec) episodes of wide complex tachycardia yesterday & overnight, as well as occasional bradycardia. Cardiology consulted and workup underway.    O:  Vital Signs Last 24 Hrs  T(C): 36.6 (25 Aug 2017 05:01), Max: 37.1 (25 Aug 2017 02:06)  T(F): 97.8 (25 Aug 2017 05:01), Max: 98.7 (25 Aug 2017 02:06)  HR: 79 (25 Aug 2017 05:01) (53 - 80)  BP: 153/89 (25 Aug 2017 05:01) (111/68 - 153/89)  BP(mean): --  RR: 17 (25 Aug 2017 05:01) (16 - 18)  SpO2: 97% (25 Aug 2017 05:01) (94% - 98%)    I&O's Detail    24 Aug 2017 07:01  -  25 Aug 2017 07:00  --------------------------------------------------------  IN:    Oral Fluid: 1320 mL  Total IN: 1320 mL    OUT:  Total OUT: 0 mL    Total NET: 1320 mL          MEDICATIONS  (STANDING):  digoxin     Tablet 0.125 milliGRAM(s) Oral daily  diltiazem    milliGRAM(s) Oral daily  metoprolol 100 milliGRAM(s) Oral two times a day  dextrose 5%. 1000 milliLiter(s) (50 mL/Hr) IV Continuous <Continuous>  dextrose 50% Injectable 12.5 Gram(s) IV Push once  dextrose 50% Injectable 25 Gram(s) IV Push once  dextrose 50% Injectable 25 Gram(s) IV Push once  levothyroxine 50 MICROGram(s) Oral daily  tamsulosin 0.4 milliGRAM(s) Oral before breakfast  atorvastatin 10 milliGRAM(s) Oral at bedtime  lidocaine   Patch 1 Patch Transdermal daily  insulin lispro (HumaLOG) corrective regimen sliding scale   SubCutaneous three times a day before meals  insulin lispro (HumaLOG) corrective regimen sliding scale   SubCutaneous at bedtime  enoxaparin Injectable 40 milliGRAM(s) SubCutaneous daily  docusate sodium 100 milliGRAM(s) Oral two times a day    MEDICATIONS  (PRN):  dextrose Gel 1 Dose(s) Oral once PRN Blood Glucose LESS THAN 70 milliGRAM(s)/deciliter  glucagon  Injectable 1 milliGRAM(s) IntraMuscular once PRN Glucose LESS THAN 70 milligrams/deciliter  acetaminophen   Tablet 650 milliGRAM(s) Oral every 6 hours PRN mild pain  oxyCODONE    IR 2.5 milliGRAM(s) Oral every 4 hours PRN Severe Pain (7 - 10)                            13.9   6.0   )-----------( 181      ( 25 Aug 2017 05:05 )             40.0       08-25    144  |  106  |  18  ----------------------------<  152<H>  4.2   |  25  |  0.99    Ca    9.6      25 Aug 2017 05:05  Phos  3.6     08-25  Mg     2.0     08-25        Physical Exam:  Gen: Laying in bed, NAD,   Neuro: alert and oriented  HEENT: R temporal ecchymosis and lac  Resp: Unlabored breathing, IS >1L  ABD: soft, nontender, nondistended, no hematomas, well healed appendectomy scar  Psych: normal affect

## 2017-08-25 NOTE — PROGRESS NOTE ADULT - SUBJECTIVE AND OBJECTIVE BOX
Interval Events:    No episodes of lightheaded or dizziness. 2.5 second pause, in AF on tele.     ROS: Denies HA/dizziness/CP/SOB/PND/orthopnea/LE edema/abd pain    MEDICATIONS:  digoxin     Tablet 0.125 milliGRAM(s) Oral daily  diltiazem    milliGRAM(s) Oral daily  metoprolol 100 milliGRAM(s) Oral two times a day  tamsulosin 0.4 milliGRAM(s) Oral before breakfast  enoxaparin Injectable 40 milliGRAM(s) SubCutaneous daily  acetaminophen   Tablet 650 milliGRAM(s) Oral every 6 hours PRN  oxyCODONE    IR 2.5 milliGRAM(s) Oral every 4 hours PRN  docusate sodium 100 milliGRAM(s) Oral two times a day  senna 1 Tablet(s) Oral at bedtime  polyethylene glycol 3350 17 Gram(s) Oral daily PRN  senna 2 Tablet(s) Oral at bedtime  docusate sodium 100 milliGRAM(s) Oral three times a day  dextrose Gel 1 Dose(s) Oral once PRN  dextrose 50% Injectable 12.5 Gram(s) IV Push once  dextrose 50% Injectable 25 Gram(s) IV Push once  dextrose 50% Injectable 25 Gram(s) IV Push once  glucagon  Injectable 1 milliGRAM(s) IntraMuscular once PRN  levothyroxine 50 MICROGram(s) Oral daily  atorvastatin 10 milliGRAM(s) Oral at bedtime  insulin lispro (HumaLOG) corrective regimen sliding scale   SubCutaneous three times a day before meals  insulin lispro (HumaLOG) corrective regimen sliding scale   SubCutaneous at bedtime  dextrose 5%. 1000 milliLiter(s) IV Continuous <Continuous>  lidocaine   Patch 1 Patch Transdermal daily      PHYSICAL EXAM:  T(C): 36.6 (08-25-17 @ 10:59), Max: 37.1 (08-25-17 @ 02:06)  HR: 69 (08-25-17 @ 10:59) (53 - 80)  BP: 121/67 (08-25-17 @ 10:59) (121/67 - 153/89)  RR: 18 (08-25-17 @ 10:59) (16 - 18)  SpO2: 97% (08-25-17 @ 10:59) (94% - 98%)  Wt(kg): --  I&O's Summary    24 Aug 2017 07:01  -  25 Aug 2017 07:00  --------------------------------------------------------  IN: 1320 mL / OUT: 0 mL / NET: 1320 mL        Appearance: No Acute Distress  HEENT: R eyebrow ecchymosis  Cardiovascular: Normal S1 S2, RRR, III/VI coarse JC RUSB  Respiratory: Clear to auscultation bilaterally  Gastrointestinal: Soft, Non-tender	  Skin: No cyanosis	  Neurologic: Non-focal  Extremities: No clubbing, cyanosis or edema  Psychiatry: A & O x 3, Mood & affect appropriate    LABS:	 	    CBC Full  -  ( 25 Aug 2017 05:05 )  WBC Count : 6.0 K/uL  Hemoglobin : 13.9 g/dL  Hematocrit : 40.0 %  Platelet Count - Automated : 181 K/uL  Mean Cell Volume : 106.0 fl  Mean Cell Hemoglobin : 36.8 pg  Mean Cell Hemoglobin Concentration : 34.7 gm/dL    08-25    144  |  106  |  18  ----------------------------<  152<H>  4.2   |  25  |  0.99    Ca    9.6      25 Aug 2017 05:05  Phos  3.6     08-25  Mg     2.0     08-25      TELEMETRY: as above   ECG:      DIAGNOSTIC TESTING:    [ ] Echocardiogram:  [ ] Catheterization:

## 2017-08-25 NOTE — PROGRESS NOTE ADULT - PROBLEM SELECTOR PLAN 2
-apprec EP eval and cards eval  -check inpt TTE while waiting  -may need further interventions pending TTE, will f/u EP/cards

## 2017-08-25 NOTE — PROGRESS NOTE ADULT - SUBJECTIVE AND OBJECTIVE BOX
Date of Admission:    24H hour events:   No acute complaints. Eager to go home when stable.       MEDICATIONS:  digoxin     Tablet 0.125 milliGRAM(s) Oral daily  diltiazem    milliGRAM(s) Oral daily  metoprolol 100 milliGRAM(s) Oral two times a day  tamsulosin 0.4 milliGRAM(s) Oral before breakfast  enoxaparin Injectable 40 milliGRAM(s) SubCutaneous daily        acetaminophen   Tablet 650 milliGRAM(s) Oral every 6 hours PRN  oxyCODONE    IR 2.5 milliGRAM(s) Oral every 4 hours PRN    polyethylene glycol 3350 17 Gram(s) Oral daily PRN  senna 2 Tablet(s) Oral at bedtime  docusate sodium 100 milliGRAM(s) Oral three times a day    dextrose Gel 1 Dose(s) Oral once PRN  dextrose 50% Injectable 12.5 Gram(s) IV Push once  dextrose 50% Injectable 25 Gram(s) IV Push once  dextrose 50% Injectable 25 Gram(s) IV Push once  glucagon  Injectable 1 milliGRAM(s) IntraMuscular once PRN  levothyroxine 50 MICROGram(s) Oral daily  atorvastatin 10 milliGRAM(s) Oral at bedtime  insulin lispro (HumaLOG) corrective regimen sliding scale   SubCutaneous three times a day before meals  insulin lispro (HumaLOG) corrective regimen sliding scale   SubCutaneous at bedtime    dextrose 5%. 1000 milliLiter(s) IV Continuous <Continuous>  lidocaine   Patch 1 Patch Transdermal daily      REVIEW OF SYSTEMS:  Complete 10point ROS negative.    PHYSICAL EXAM:  T(C): 37.1 (08-25-17 @ 18:10), Max: 37.1 (08-25-17 @ 02:06)  HR: 95 (08-25-17 @ 18:10) (53 - 95)  BP: 117/- (08-25-17 @ 18:10) (117/- - 153/89)  RR: 18 (08-25-17 @ 18:10) (16 - 18)  SpO2: 96% (08-25-17 @ 18:10) (94% - 97%)  Wt(kg): --  I&O's Summary    24 Aug 2017 07:01  -  25 Aug 2017 07:00  --------------------------------------------------------  IN: 1320 mL / OUT: 0 mL / NET: 1320 mL    25 Aug 2017 07:01  -  25 Aug 2017 19:41  --------------------------------------------------------  IN: 800 mL / OUT: 0 mL / NET: 800 mL        Appearance: Pleasant, sitting up at bedside with his partner at bedside, NAD  HEENT:   Significant ecchymoses over R. eye, MMM OP clear 	  Lymphatic: No lymphadenopathy  Cardiovascular: Irregular, 1 S2, No JVD, Harsh crescendo/decrescendo murmur No edema  Respiratory: Lungs clear to auscultation	  Psychiatry: Mood & affect appropriate  Gastrointestinal:  Soft, Non-tender, + BS	  Skin: No rashes, No ecchymoses, No cyanosis	  Neurologic: Non-focal  Extremities: Normal range of motion, No clubbing, cyanosis or edema  Vascular: Peripheral pulses palpable 2+ bilaterally        LABS:	 	    CBC Full  -  ( 25 Aug 2017 05:05 )  WBC Count : 6.0 K/uL  Hemoglobin : 13.9 g/dL  Hematocrit : 40.0 %  Platelet Count - Automated : 181 K/uL  Mean Cell Volume : 106.0 fl  Mean Cell Hemoglobin : 36.8 pg  Mean Cell Hemoglobin Concentration : 34.7 gm/dL    08-25    144  |  106  |  18  ----------------------------<  152<H>  4.2   |  25  |  0.99    Ca    9.6      25 Aug 2017 05:05  Phos  3.6     08-25  Mg     2.0     08-25        TELEMETRY: 	 Afib , 2.5 sec pause       PREVIOUS DIAGNOSTIC TESTING:   [ ] Echocardiogram: < from: Transthoracic Echocardiogram (08.25.17 @ 16:11) >  Dimensions:    Normal Values:  LA:     4.2    2.0 - 4.0 cm  Ao:     4.0    2.0 - 3.8 cm  SEPTUM: 1.1    0.6 - 1.2 cm  PWT:    1.1 0.6 - 1.1 cm  LVIDd:  4.5    3.0 - 5.6 cm  LVIDs:  2.9    1.8 - 4.0 cm  Derived variables:  LVMI: 101 g/m2  RWT: 0.48  Fractional short: 36 %  EF (Teicholtz): 65 %  Doppler Peak Velocity (m/sec): AoV=2.8    Conclusions:  1. Calcified trileaflet aortic valve with decreased  opening. Peak transaortic valve gradient equals 31 mm Hg,  mean transaortic valve gradient equals 17 mm Hg, estimated  aortic valve area equals 0.8 sqcm (by continuity equation),  aortic valve velocity time integral equals 58 cm,  consistent with severe aortic stenosis.  2. Moderately dilated left atrium.  LA volume index = 42  cc/m2.  3. Mild concentric left ventricular hypertrophy.  4. Normal left ventricular systolic function.  5. Estimated pulmonary artery systolic pressure equals 39  mm Hg, assuming right atrial pressure equals 8 mm Hg,  consistent with borderline pulmonary pressures.      	  ASSESSMENT/PLAN: 	    #Afib previously on AC; held per neurosx in setting of SAH. ~2-3 sec pauses in afib; Pauses less likely the etiology of the syncopal episode; more likely valvular given severe AS.   -  AC held per neurosx  - On Metoprolol 100mg BID, Diltiazem 180mg qd and Digoxin 0.125mg qd  - Will f/u with gen cards regarding management of aortic valve  - Continue to monitor telemetry  - Case d/w Dr. Arevalo   .

## 2017-08-25 NOTE — PROGRESS NOTE ADULT - PROBLEM SELECTOR PLAN 1
R6-7 nondisplaced fractures, pain control and mgmt per surgery  fall/syncope could be cardiogenic in nature   LE dopplers negative for DVT

## 2017-08-25 NOTE — PROGRESS NOTE ADULT - PROBLEM SELECTOR PLAN 5
cont home metoprolol tartrate 100mg bid and diltiazem 180mg extended release  coumadin resumption plan per trauma and neurosurgery

## 2017-08-25 NOTE — PROGRESS NOTE ADULT - SUBJECTIVE AND OBJECTIVE BOX
Patient is a 85y old  Male who presents with a chief complaint of Fall with right frontal intraparenchymal hemorrhage and R 6-7 rib fractures (23 Aug 2017 14:43)      SUBJECTIVE / OVERNIGHT EVENTS: no aeon, no f/c/n/v/diarrhea. no bm x 5 days, pt really wants to have a BM     MEDICATIONS  (STANDING):  digoxin     Tablet 0.125 milliGRAM(s) Oral daily  diltiazem    milliGRAM(s) Oral daily  metoprolol 100 milliGRAM(s) Oral two times a day  dextrose 5%. 1000 milliLiter(s) (50 mL/Hr) IV Continuous <Continuous>  dextrose 50% Injectable 12.5 Gram(s) IV Push once  dextrose 50% Injectable 25 Gram(s) IV Push once  dextrose 50% Injectable 25 Gram(s) IV Push once  levothyroxine 50 MICROGram(s) Oral daily  tamsulosin 0.4 milliGRAM(s) Oral before breakfast  atorvastatin 10 milliGRAM(s) Oral at bedtime  lidocaine   Patch 1 Patch Transdermal daily  insulin lispro (HumaLOG) corrective regimen sliding scale   SubCutaneous three times a day before meals  insulin lispro (HumaLOG) corrective regimen sliding scale   SubCutaneous at bedtime  enoxaparin Injectable 40 milliGRAM(s) SubCutaneous daily  docusate sodium 100 milliGRAM(s) Oral two times a day  senna 1 Tablet(s) Oral at bedtime  senna 2 Tablet(s) Oral at bedtime  docusate sodium 100 milliGRAM(s) Oral three times a day    MEDICATIONS  (PRN):  dextrose Gel 1 Dose(s) Oral once PRN Blood Glucose LESS THAN 70 milliGRAM(s)/deciliter  glucagon  Injectable 1 milliGRAM(s) IntraMuscular once PRN Glucose LESS THAN 70 milligrams/deciliter  acetaminophen   Tablet 650 milliGRAM(s) Oral every 6 hours PRN mild pain  oxyCODONE    IR 2.5 milliGRAM(s) Oral every 4 hours PRN Severe Pain (7 - 10)  polyethylene glycol 3350 17 Gram(s) Oral daily PRN Constipation      Vital Signs Last 24 Hrs  T(C): 36.6 (25 Aug 2017 10:59), Max: 37.1 (25 Aug 2017 02:06)  T(F): 97.9 (25 Aug 2017 10:59), Max: 98.7 (25 Aug 2017 02:06)  HR: 69 (25 Aug 2017 10:59) (53 - 80)  BP: 121/67 (25 Aug 2017 10:59) (121/67 - 153/89)  BP(mean): --  RR: 18 (25 Aug 2017 10:59) (16 - 18)  SpO2: 97% (25 Aug 2017 10:59) (94% - 97%)  CAPILLARY BLOOD GLUCOSE  137 (25 Aug 2017 11:37)  135 (25 Aug 2017 07:37)  176 (24 Aug 2017 21:14)  166 (24 Aug 2017 16:10)        I&O's Summary    24 Aug 2017 07:01  -  25 Aug 2017 07:00  --------------------------------------------------------  IN: 1320 mL / OUT: 0 mL / NET: 1320 mL        PHYSICAL EXAM:  CONSTITUIONAL: NAD, well-developed  HEAD:  Atraumatic, Normocephalic  EYES: EOMI, PERRLA, conjunctiva and sclera clear. R eye laceration cdi, ecchymosis improving daily   ENMT: Supple, No JVD  RESPIRATORY: Clear to auscultation bilaterally; No wheeze  CARDIOVASCULAR: irreg rate and rhythm; no rubs/gallops   GI: Soft, Nontender, Nondistended; Bowel sounds present  EXTREMITIES:  2+ Peripheral Pulses, No clubbing, cyanosis, or edema  PSYCH: AAOx3  NEUROLOGY: non-focal  SKIN: No rashes or lesions    LABS:                        13.9   6.0   )-----------( 181      ( 25 Aug 2017 05:05 )             40.0     08-25    144  |  106  |  18  ----------------------------<  152<H>  4.2   |  25  |  0.99    Ca    9.6      25 Aug 2017 05:05  Phos  3.6     08-25  Mg     2.0     08-25      PT/INR - ( 25 Aug 2017 05:05 )   PT: 12.3 sec;   INR: 1.13 ratio         PTT - ( 25 Aug 2017 05:05 )  PTT:29.0 sec          RADIOLOGY & ADDITIONAL TESTS:    Imaging Personally Reviewed:    Consultant(s) Notes Reviewed:  cards    Care Discussed with Consultants/Other Providers: surgery

## 2017-08-25 NOTE — PROGRESS NOTE ADULT - ASSESSMENT
Patient is an 85 year old man who was transferred from [facility] with right frontal IPH and SAH, R 6-7 rib fractures:    -Incentive spirometry (currently pulling 1500) 10x per hour while awake  -multimodal pain control  -home digoxin, diltiazem, lopressor, synthroid  -Hold Coumadin until outpatient appointment with Neurosurgery  -Appreciate recs from EP and cardiology - f/u TTE  -ambulation with assistance and SCDs, received lovenox after stable head CT  -Regular diet  -Rehab planning    Simon Cuba, PGY-1  [facility] [phone]

## 2017-08-25 NOTE — PROGRESS NOTE ADULT - PROBLEM SELECTOR PLAN 3
-outpt tte 2/2017 w/ valve area 1 per verbal report from outpt cardiologist covering Dr Prakash. awaiting repeat TTE to eval any worsening of aortic stenosis, cards following  -

## 2017-08-25 NOTE — PROGRESS NOTE ADULT - ATTENDING COMMENTS
Patient interviewed, examined and chart reviewed.  Case discussed with fellow.  Agree w/ Assessment and Plan as outlined.    P: [phone]  Spectra:  [phone]  Office: [phone]

## 2017-08-26 DIAGNOSIS — I35.0 NONRHEUMATIC AORTIC (VALVE) STENOSIS: ICD-10-CM

## 2017-08-26 DIAGNOSIS — I48.91 UNSPECIFIED ATRIAL FIBRILLATION: ICD-10-CM

## 2017-08-26 LAB
ANION GAP SERPL CALC-SCNC: 12 MMOL/L — SIGNIFICANT CHANGE UP (ref 5–17)
BUN SERPL-MCNC: 20 MG/DL — SIGNIFICANT CHANGE UP (ref 7–23)
CALCIUM SERPL-MCNC: 9.6 MG/DL — SIGNIFICANT CHANGE UP (ref 8.4–10.5)
CHLORIDE SERPL-SCNC: 103 MMOL/L — SIGNIFICANT CHANGE UP (ref 96–108)
CO2 SERPL-SCNC: 26 MMOL/L — SIGNIFICANT CHANGE UP (ref 22–31)
CREAT SERPL-MCNC: 1.02 MG/DL — SIGNIFICANT CHANGE UP (ref 0.5–1.3)
GLUCOSE SERPL-MCNC: 150 MG/DL — HIGH (ref 70–99)
HCT VFR BLD CALC: 40.1 % — SIGNIFICANT CHANGE UP (ref 39–50)
HGB BLD-MCNC: 13.8 G/DL — SIGNIFICANT CHANGE UP (ref 13–17)
MAGNESIUM SERPL-MCNC: 1.9 MG/DL — SIGNIFICANT CHANGE UP (ref 1.6–2.6)
MCHC RBC-ENTMCNC: 34.4 GM/DL — SIGNIFICANT CHANGE UP (ref 32–36)
MCHC RBC-ENTMCNC: 36.3 PG — HIGH (ref 27–34)
MCV RBC AUTO: 106 FL — HIGH (ref 80–100)
PHOSPHATE SERPL-MCNC: 3.7 MG/DL — SIGNIFICANT CHANGE UP (ref 2.5–4.5)
PLATELET # BLD AUTO: 187 K/UL — SIGNIFICANT CHANGE UP (ref 150–400)
POTASSIUM SERPL-MCNC: 4.2 MMOL/L — SIGNIFICANT CHANGE UP (ref 3.5–5.3)
POTASSIUM SERPL-SCNC: 4.2 MMOL/L — SIGNIFICANT CHANGE UP (ref 3.5–5.3)
RBC # BLD: 3.79 M/UL — LOW (ref 4.2–5.8)
RBC # FLD: 13 % — SIGNIFICANT CHANGE UP (ref 10.3–14.5)
SODIUM SERPL-SCNC: 141 MMOL/L — SIGNIFICANT CHANGE UP (ref 135–145)
WBC # BLD: 6.6 K/UL — SIGNIFICANT CHANGE UP (ref 3.8–10.5)
WBC # FLD AUTO: 6.6 K/UL — SIGNIFICANT CHANGE UP (ref 3.8–10.5)

## 2017-08-26 PROCEDURE — 99223 1ST HOSP IP/OBS HIGH 75: CPT

## 2017-08-26 PROCEDURE — 99233 SBSQ HOSP IP/OBS HIGH 50: CPT | Mod: GC

## 2017-08-26 PROCEDURE — 99233 SBSQ HOSP IP/OBS HIGH 50: CPT

## 2017-08-26 PROCEDURE — 99231 SBSQ HOSP IP/OBS SF/LOW 25: CPT

## 2017-08-26 RX ADMIN — Medication 100 MILLIGRAM(S): at 21:08

## 2017-08-26 RX ADMIN — ENOXAPARIN SODIUM 40 MILLIGRAM(S): 100 INJECTION SUBCUTANEOUS at 12:33

## 2017-08-26 RX ADMIN — Medication 180 MILLIGRAM(S): at 05:21

## 2017-08-26 RX ADMIN — LIDOCAINE 1 PATCH: 4 CREAM TOPICAL at 00:00

## 2017-08-26 RX ADMIN — LIDOCAINE 1 PATCH: 4 CREAM TOPICAL at 12:33

## 2017-08-26 RX ADMIN — TAMSULOSIN HYDROCHLORIDE 0.4 MILLIGRAM(S): 0.4 CAPSULE ORAL at 05:21

## 2017-08-26 RX ADMIN — Medication 100 MILLIGRAM(S): at 05:21

## 2017-08-26 RX ADMIN — SENNA PLUS 2 TABLET(S): 8.6 TABLET ORAL at 21:08

## 2017-08-26 RX ADMIN — Medication 100 MILLIGRAM(S): at 13:50

## 2017-08-26 RX ADMIN — ATORVASTATIN CALCIUM 10 MILLIGRAM(S): 80 TABLET, FILM COATED ORAL at 21:09

## 2017-08-26 RX ADMIN — Medication 100 MILLIGRAM(S): at 17:18

## 2017-08-26 RX ADMIN — Medication 0.12 MILLIGRAM(S): at 05:21

## 2017-08-26 RX ADMIN — Medication 100 MILLIGRAM(S): at 05:22

## 2017-08-26 RX ADMIN — Medication 50 MICROGRAM(S): at 05:21

## 2017-08-26 NOTE — PROGRESS NOTE ADULT - SUBJECTIVE AND OBJECTIVE BOX
Patient is a 85y old  Male who presents with a chief complaint of Fall with right frontal intraparenchymal hemorrhage and R 6-7 rib fractures (23 Aug 2017 14:43)      SUBJECTIVE / OVERNIGHT EVENTS: no events overnight.  Patient still complainig of rib pain wiht deep inspiration.  No other complaints.         MEDICATIONS  (STANDING):  digoxin     Tablet 0.125 milliGRAM(s) Oral daily  diltiazem    milliGRAM(s) Oral daily  metoprolol 100 milliGRAM(s) Oral two times a day  dextrose 5%. 1000 milliLiter(s) (50 mL/Hr) IV Continuous <Continuous>  dextrose 50% Injectable 12.5 Gram(s) IV Push once  dextrose 50% Injectable 25 Gram(s) IV Push once  dextrose 50% Injectable 25 Gram(s) IV Push once  levothyroxine 50 MICROGram(s) Oral daily  tamsulosin 0.4 milliGRAM(s) Oral before breakfast  atorvastatin 10 milliGRAM(s) Oral at bedtime  lidocaine   Patch 1 Patch Transdermal daily  insulin lispro (HumaLOG) corrective regimen sliding scale   SubCutaneous three times a day before meals  insulin lispro (HumaLOG) corrective regimen sliding scale   SubCutaneous at bedtime  enoxaparin Injectable 40 milliGRAM(s) SubCutaneous daily  docusate sodium 100 milliGRAM(s) Oral two times a day  senna 1 Tablet(s) Oral at bedtime  senna 2 Tablet(s) Oral at bedtime  docusate sodium 100 milliGRAM(s) Oral three times a day    MEDICATIONS  (PRN):  dextrose Gel 1 Dose(s) Oral once PRN Blood Glucose LESS THAN 70 milliGRAM(s)/deciliter  glucagon  Injectable 1 milliGRAM(s) IntraMuscular once PRN Glucose LESS THAN 70 milligrams/deciliter  acetaminophen   Tablet 650 milliGRAM(s) Oral every 6 hours PRN mild pain  oxyCODONE    IR 2.5 milliGRAM(s) Oral every 4 hours PRN Severe Pain (7 - 10)  polyethylene glycol 3350 17 Gram(s) Oral daily PRN Constipation    Vital Signs Last 24 Hrs  T(C): 37.1 (26 Aug 2017 16:16), Max: 37.1 (26 Aug 2017 08:44)  T(F): 98.8 (26 Aug 2017 16:16), Max: 98.8 (26 Aug 2017 08:44)  HR: 71 (26 Aug 2017 16:16) (61 - 90)  BP: 134/77 (26 Aug 2017 16:16) (104/63 - 146/87)  BP(mean): --  RR: 18 (26 Aug 2017 16:16) (16 - 18)  SpO2: 97% (26 Aug 2017 16:16) (95% - 98%)          PHYSICAL EXAM:  CONSTITUIONAL: NAD, well-developed  HEAD:  healing wound over left eye.  EYES: EOMI, PERRLA, conjunctiva and sclera clear. R eye laceration cdi, ecchymosis improving daily   ENMT: Supple, No JVD  RESPIRATORY: Clear to auscultation bilaterally; No wheeze  CARDIOVASCULAR: irreg rate and rhythm; no rubs/gallops   GI: Soft, Nontender, Nondistended; Bowel sounds present  EXTREMITIES:  2+ Peripheral Pulses, No clubbing, cyanosis, or edema  PSYCH: AAOx3  NEUROLOGY: non-focal  SKIN: No rashes or lesions    LABS:                        13.8   6.6   )-----------( 187      ( 26 Aug 2017 06:34 )             40.1     Basic Metabolic Panel in AM (08.26.17 @ 06:34)    Sodium, Serum: 141 mmol/L    Potassium, Serum: 4.2 mmol/L    Chloride, Serum: 103 mmol/L    Carbon Dioxide, Serum: 26 mmol/L    Anion Gap, Serum: 12 mmol/L    Blood Urea Nitrogen, Serum: 20 mg/dL    Creatinine, Serum: 1.02 mg/dL    Glucose, Serum: 150 mg/dL    Calcium, Total Serum: 9.6 mg/dL    eGFR if Non : 67: Interpretative comment  The units for eGFR are ml/min/1.73m2 (normalized body surface area). The  eGFR is calculated from a serum creatinine using the CKD-EPI equation.  Other variables required for calculation are race, age and sex. Among  patients w67: ith chronic kidney disease (CKD), the eGFR is useful in  determining the stage of disease according to KDOQI CKD classification.  All eGFR results are reported numerically with the following  interpretation.          GFR                    With67:                  Without     (ml/min/1.73 m2)    Kidney Damage       Kidney Damage        >= 90                    Stage 1                     Normal        60-89                    Stage 2                     Decreased GFR        :      Stage 3                     Stage 3        15-29                    Stage 4                     Stage 4        < 15                      Stage 5                     Stage 5  Each stage of CKD assumes that the associated GFR level has been in  eff67: ect for at least 3 months. Determination of stages one and two (with  eGFR > 59 ml/min/m2) requires estimation of kidney damage for at least 3  months as defined by structural or functional abnormalities.  Limitations: All estimates of GFR will be les67: s accurate for patients at  extremes of muscle mass (including but not limited to frail elderly,  critically ill, or cancer patients), those with unusual diets, and those  with conditions associated with reduced secretion or extrarenal  elimination of67:  creatinine. The eGFR equation is not recommended for use  in patients with unstable creatinine levels. mL/min/1.73M2    eGFR if African American: 77 mL/min/1.73M2              RADIOLOGY & ADDITIONAL TESTS:    Imaging Personally Reviewed:    Consultant(s) Notes Reviewed:  EP, structural heart disease    Care Discussed with Consultants/Other Providers: Trauma surgery resident, Cardiology  (Dr. Hutchins), Neurosurgery Resident

## 2017-08-26 NOTE — CONSULT NOTE ADULT - ATTENDING COMMENTS
Patient interviewed, examined and chart reviewed.  Case discussed with fellow.  Agree w/ Assessment and Plan as outlined.    P: [phone]  Spectra:  [phone]  Office: [phone]
All above completed by me.

## 2017-08-26 NOTE — PROGRESS NOTE ADULT - PROBLEM SELECTOR PLAN 4
R frontal vertex parasagittal gyrus small hemorrhage, stable on repeat CTH 8/21   f/u surgery/neurosx re: when to resume AC.  -no acute intervention at this time

## 2017-08-26 NOTE — PROGRESS NOTE ADULT - ATTENDING COMMENTS
Agree with above.   Monitor telemetry.  Even if severe AS underlies the cause, the risk of intervention (i.e., TAVR) would be high until it is safe to proceed from a neurologic/bleeding standpoint.   Would have neurology weigh in. Agree with above.    Assessment:  85M - h.o. Hypothyroidism, HTN, HL, T2DM, AF, Severe AS (ROMULO by continuityu 0.8 sq cm) - p/w fall/ syncope found to have R subarachnoid hemorrhage and R Rib fractures with 2.5 sec pause while in AF.  Likely transaortic gradients incongruent with ROMULO by continuity either because of doppler angler or paradoxical low flow low gradient due to small cavity. EPS consulted and impression is that unlikely to be arrhythmogenic/pause related syncope.   Primary concern is whether able to undergo heparin protocol for TAVR for which neurosurgery input is necessary.     Problems  1. Syncope  2. Severe AS  3. AF with Pause    4. Intracranial Bleed    Recommendations  - Appreciate Dr. Quintanilla facilitating care  - Neurosurgery input primary on timing of intervention given heparin protocol for TAVR and bleed  - In the meantime, plan for R/LHC on Mon (NPO after [place])  - Possible BRIAN  - Monitor Tele for pauses/ectopy   - If recurrent pause, would reduce/D/C Diltiazem    Zander Cortez MD [facility]  [phone]

## 2017-08-26 NOTE — PROGRESS NOTE ADULT - ASSESSMENT
85 year old man with history of severe AS, 0.8 sq cm on in house TTE, Afib on Coumadin, DM, HTN, HLD, hypothyroidism p/w fall possible syncope found to have R subarachnoid hemorrhage and R Rib fractures with 2.5 sec pause while in NSR.     # Syncope in setting of severe AS  - Repeat TTE with progression of severe AS, given history of walking home and then syncope, this may be exertional syncope due to his severe AS  - Discussed case w/ EPS attending who at current feels that AFib is less likely the cause of his syncope; will need to discuss case with structural heart team  - Will discuss with neurosurgery capacity for full anticoagulation and/or DAPT    # AFib  - Currently off of AC due to hemorrhage  - Rates ok at current, EPS following, cont to monitor on tele    Victorino Alfaro  [facility]  [id number] until 1pm then [id number]

## 2017-08-26 NOTE — PROGRESS NOTE ADULT - PROBLEM SELECTOR PLAN 1
-likely 2/2 severe AoS.  Reviewed TTE, gradient 0.8.  Cards and structural heart disease appreciated.  -discussed case with Dr. Hutchins, Neurosurgery who spoke to attending Dr. Rios  -plan is to do TAVR w/u, possible L/R cath.    -per NSX, can get DAPT if necessary, can get hep gtt, but CANNOT GET hep bolus.   -discussed with Dr. Hutchins who will discuss with Dr. Quintanilla.  decision re: ability to cath and do TAVR will determine rest of hospital course.  -per afsaneh White arrthymogenic cause of syncope.

## 2017-08-26 NOTE — PROGRESS NOTE ADULT - ASSESSMENT
Patient is an 85 year old man who was transferred from [facility] with right frontal IPH and SAH, R 6-7 rib fractures:    -Incentive spirometry 10x per hour while awake  -multimodal pain control  -home cardiology meds  -Hold Coumadin until outpatient appointment with Neurosurgery  -Appreciate recs from EP and cardiology  -TTE with evidence of AS, dilated L atrium, elevated PA pressures  -ambulation with assistance and SCDs, received lovenox after stable head CT  -Regular diet  -Rehab planning    Simon Cuba, PGY-1  [facility] Surgery [phone]

## 2017-08-26 NOTE — CONSULT NOTE ADULT - PROBLEM SELECTOR RECOMMENDATION 3
cont home metoprolol tartrate 100mg bid and diltiazem 180mg extended release  coumadin resumption plan per trauma and neurosurgery  check digoxin level as above
If we are to consider TAVR, we will need to determine, as per the trauma and neuro teams, when it would be acceptable to treat Mr Inocencio with full dose anticoagulation (intra-op TAVR ACT of >250 seconds with heparin) and antiplatelet medications (aspirin alone would be acceptable post TAVR)

## 2017-08-26 NOTE — PROGRESS NOTE ADULT - ATTENDING COMMENTS
Pt seen and examined today at 1pm, agree with above. Pt feeling well, tolerating po. His right chest wall pain from his R 6-7 rib fractures is improved. Appreciate Cards input: will need workup for possible TAVR. Cardiology attending and Neurosurgery attending to discuss risks vs benefits of antiplatelet agents and anticoagulation in the setting of recent R frontal IPH.

## 2017-08-26 NOTE — CONSULT NOTE ADULT - SUBJECTIVE AND OBJECTIVE BOX
Patient is an 85 year old man who presents after a fall with a right frontal intraparenchymal hemorrhage and rib fractures (23 Aug 2017 14:43).       HPI:  85y Male who fell while walking out the door to get some mashed potatoes.  He had a syncopal episode after feeling dizzy.  He woke up on the floor.  He is currently on Coumadin for a fib.  he states that he has a balance problem and walks with a cane.  He is complaining of a headache now and nothing else.         Primary Survey  A - intact   B - clear to auscultation and equal b/l   C -  148/96  HR 89   D - GCS 15  E - Exposure deferred.  Patient in hallway      Secondary survey  GEN: NAD, alert and oriented to person place and time, responses appropriate  HEENT: normocephalic, ecchymosis to the R orbit and R temple with edema at the sup/lat orbit, laceration at lateral eye, sutured  CV: s1, s2, irregularly regular  PULM/CHEST: CTA B/L, no crepitus, no obvious signs of trauma, tender to R chest   ABD: soft, nontender, nondistended, no hematomas, well healed appendectomy scar  : deferred  EXT: warm, no gross deformities, soft compartments, motor 5/5 in all extremities      PMH  Severe aortic stenosis  Atrial fibrillation  Spinal stenosis of lumbar region  Torn rotator cuff  Arthritis, shoulder region  Arthropathy associated with bacterial disease, shoulder region  Diabetes mellitus  Congenital heart defect  Hyperlipidemia  HTN - Hypertension  BPH (benign prostatic hypertrophy)      PSH  Hx of appendectomy  S/P TURP (status post transurethral resection of prostate)      MEDS  MEDICATIONS  (STANDING):      MEDICATIONS  (PRN):      ALLERGIES  Allergies    No Known Allergies    Intolerances        SOCIAL Hx    LABS                        13.9   9.6   )-----------( 156      ( 20 Aug 2017 15:10 )             40.5     08-20    144  |  106  |  18  ----------------------------<  162<H>  4.1   |  23  |  0.82    Ca    9.1      20 Aug 2017 15:10    TPro  6.5  /  Alb  4.1  /  TBili  2.0<H>  /  DBili  x   /  AST  58<H>  /  ALT  38  /  AlkPhos  51  08-20    PT/INR - ( 20 Aug 2017 15:10 )   PT: 21.9 sec;   INR: 1.98 ratio         PTT - ( 20 Aug 2017 15:10 )  PTT:30.6 sec          IMAGING  < from: CT Maxillofacial No Cont (08.20.17 @ 16:02) >  IMPRESSION:    1.  Head CT: Stable small right frontal hemorrhage.  2.  Maxillofacial CT: Right periorbital preseptal soft tissue hematoma   without associated orbital fracture. Age-indeterminate anterior bilateral   nasal fractures.      < end of copied text >  < from: CT Head No Cont (08.20.17 @ 15:57) >  CT brain:    A 6 mm parenchymal hemorrhage in the right superior frontal gyrus is   stable in size compared to the prior study, most likely reflecting a   hemorrhagic contusion given the history of trauma. Serial imaging   follow-up to resolution is recommended to exclude an underlying   abnormality. No new acute hemorrhages are present.    There is no evidence for acute vascular territory infarct, acute  hydrocephalus, or midline shift.    Chronic white matter changes and cerebral volume loss are again noted.    There is no displaced calvarial fracture.    < end of copied text >  < from: CT Chest No Cont (08.20.17 @ 11:09) >  Nondisplaced fracture right sixth and seventh ribs. No pneumothorax,   pleural effusion or focal lung consolidation/contusion. Platelike   atelectatic streak or scar lateral right lower lung.    Additional findings as described.      < end of copied text > (20 Aug 2017 19:43)      PAST MEDICAL & SURGICAL HISTORY:  Nonrheumatic aortic valve stenosis  Atrial fibrillation  Spinal stenosis of lumbar region  Torn rotator cuff: Right  Arthritis, shoulder region: Right  Diabetes mellitus  Congenital heart defect  Hyperlipidemia  HTN - Hypertension  BPH (benign prostatic hypertrophy)  Hx of appendectomy: age 17  S/P TURP (status post transurethral resection of prostate): 2008      FAMILY HISTORY:      Social History:    Allergies    No Known Allergies    Intolerances        MEDICATIONS  (STANDING):  digoxin     Tablet 0.125 milliGRAM(s) Oral daily  diltiazem    milliGRAM(s) Oral daily  metoprolol 100 milliGRAM(s) Oral two times a day  dextrose 5%. 1000 milliLiter(s) (50 mL/Hr) IV Continuous <Continuous>  dextrose 50% Injectable 12.5 Gram(s) IV Push once  dextrose 50% Injectable 25 Gram(s) IV Push once  dextrose 50% Injectable 25 Gram(s) IV Push once  levothyroxine 50 MICROGram(s) Oral daily  tamsulosin 0.4 milliGRAM(s) Oral before breakfast  atorvastatin 10 milliGRAM(s) Oral at bedtime  lidocaine   Patch 1 Patch Transdermal daily  insulin lispro (HumaLOG) corrective regimen sliding scale   SubCutaneous three times a day before meals  insulin lispro (HumaLOG) corrective regimen sliding scale   SubCutaneous at bedtime  enoxaparin Injectable 40 milliGRAM(s) SubCutaneous daily  senna 2 Tablet(s) Oral at bedtime  docusate sodium 100 milliGRAM(s) Oral three times a day    MEDICATIONS  (PRN):  dextrose Gel 1 Dose(s) Oral once PRN Blood Glucose LESS THAN 70 milliGRAM(s)/deciliter  glucagon  Injectable 1 milliGRAM(s) IntraMuscular once PRN Glucose LESS THAN 70 milligrams/deciliter  acetaminophen   Tablet 650 milliGRAM(s) Oral every 6 hours PRN mild pain  oxyCODONE    IR 2.5 milliGRAM(s) Oral every 4 hours PRN Severe Pain (7 - 10)  polyethylene glycol 3350 17 Gram(s) Oral daily PRN Constipation      Previous Diagnostic Testing:    REVIEW OF SYSTEMS      General:	    Skin/Breast:  	  Ophthalmologic:  	  ENMT:	    Respiratory and Thorax:  	  Cardiovascular:	    Gastrointestinal:	    Genitourinary:	    Musculoskeletal:	    Neurological:	    Psychiatric:	    Hematology/Lymphatics:	    Endocrine:	    Allergic/Immunologic:	    Vital Signs Last 24 Hrs  T(C): 37.1 (26 Aug 2017 08:44), Max: 37.1 (25 Aug 2017 18:10)  T(F): 98.8 (26 Aug 2017 08:44), Max: 98.8 (25 Aug 2017 18:10)  HR: 90 (26 Aug 2017 08:44) (61 - 95)  BP: 104/63 (26 Aug 2017 08:44) (104/63 - 146/87)  BP(mean): --  RR: 17 (26 Aug 2017 08:44) (16 - 18)  SpO2: 96% (26 Aug 2017 08:44) (96% - 98%)    Telemetry:    PHYSICAL EXAM:      Constitutional:    Eyes:    ENMT:    Neck:    Breasts:    Back:    Respiratory:    Cardiovascular:    Gastrointestinal:    Genitourinary:    Rectal:    Extremities:    Vascular:    Neurological:    Skin:    Lymph Nodes:    Musculoskeletal:    Psychiatric:                              13.8   6.6   )-----------( 187      ( 26 Aug 2017 06:34 )             40.1   08-26    141  |  103  |  20  ----------------------------<  150<H>  4.2   |  26  |  1.02    Ca    9.6      26 Aug 2017 06:34  Phos  3.7     08-26  Mg     1.9     08-26    PT/INR - ( 25 Aug 2017 05:05 )   PT: 12.3 sec;   INR: 1.13 ratio         PTT - ( 25 Aug 2017 05:05 )  PTT:29.0 sec  Serum Pro-Brain Natriuretic Peptide: 1152 pg/mL (08-20 @ 10:26)      ECG:    ECHOCARDIOGRAM:    CARDIAC CATHETERIZATION:    CARDIAC CT:    RADIOLOGY: Patient is an 85 year old man who presents after a fall with a right frontal intraparenchymal hemorrhage and rib fractures (23 Aug 2017 14:43).       HPI:  Mr Painter has known AS that has been followed by his Cardiologist, Dr Prakash, for several years.  He reports being told it was "moderate to severe" and not yet warranting intervention at the time of his last outpatient TTE in February (EF 60%, AoV 3m/s, mean AVg 18).  He was admitted after a fall at home, while entering his building after walking to a local store.  He was observed by a neighbor to appear unsteady with his gait as he approached the building.  He remembers feeling dizzy, then woke up on the floor in the vestibule, with trauma to the right side of his face and scalp.  He reports no angina or dyspnea.  He does admit to several months of fatigue and functional decline, which he attribute to a combination of his age, severe arthritis, and spinal stenosis.  He is cognitively sharp.  He was also being treated for HTN, DM, and HLD.  He reports no known history of CVA, MI, CAD, or prior cardiac intervention.  He was noted to have a right sided subdural bleed that on serial CT scans appears stable and diminishing in size; he was on warfarin at the time of the fall but has remained off anticoagulation since admission.  He is in chronic AFIB.    PMH  Severe aortic stenosis  Atrial fibrillation  Spinal stenosis of lumbar region  Torn rotator cuff  Arthritis, shoulder region  Arthropathy associated with bacterial disease, shoulder region  Diabetes mellitus  Congenital heart defect  Hyperlipidemia  HTN - Hypertension  BPH (benign prostatic hypertrophy)      PSH  Hx of appendectomy  S/P TURP (status post transurethral resection of prostate)      ALLERGIES:  No Known Allergies    SOCIAL Hx:  non smoker    IMAGING  < from: CT Maxillofacial No Cont (08.20.17 @ 16:02) >  IMPRESSION:    1.  Head CT: Stable small right frontal hemorrhage.  2.  Maxillofacial CT: Right periorbital preseptal soft tissue hematoma   without associated orbital fracture. Age-indeterminate anterior bilateral   nasal fractures.      < end of copied text >  < from: CT Head No Cont (08.20.17 @ 15:57) >  CT brain:    A 6 mm parenchymal hemorrhage in the right superior frontal gyrus is   stable in size compared to the prior study, most likely reflecting a   hemorrhagic contusion given the history of trauma. Serial imaging   follow-up to resolution is recommended to exclude an underlying   abnormality. No new acute hemorrhages are present.    There is no evidence for acute vascular territory infarct, acute  hydrocephalus, or midline shift.    Chronic white matter changes and cerebral volume loss are again noted.    There is no displaced calvarial fracture.    < end of copied text >  < from: CT Chest No Cont (08.20.17 @ 11:09) >  Nondisplaced fracture right sixth and seventh ribs. No pneumothorax,   pleural effusion or focal lung consolidation/contusion. Platelike   atelectatic streak or scar lateral right lower lung.    Additional findings as described.      < end of copied text > (20 Aug 2017 19:43)      PAST MEDICAL & SURGICAL HISTORY:  Nonrheumatic aortic valve stenosis  Atrial fibrillation  Spinal stenosis of lumbar region  Torn rotator cuff: Right  Arthritis, shoulder region: Right  Diabetes mellitus  Congenital heart defect  Hyperlipidemia  HTN - Hypertension  BPH (benign prostatic hypertrophy)  Hx of appendectomy: age 17  S/P TURP (status post transurethral resection of prostate): 2008      MEDICATIONS  (STANDING):  digoxin     Tablet 0.125 milliGRAM(s) Oral daily  diltiazem    milliGRAM(s) Oral daily  metoprolol 100 milliGRAM(s) Oral two times a day  dextrose 5%. 1000 milliLiter(s) (50 mL/Hr) IV Continuous <Continuous>  dextrose 50% Injectable 12.5 Gram(s) IV Push once  dextrose 50% Injectable 25 Gram(s) IV Push once  dextrose 50% Injectable 25 Gram(s) IV Push once  levothyroxine 50 MICROGram(s) Oral daily  tamsulosin 0.4 milliGRAM(s) Oral before breakfast  atorvastatin 10 milliGRAM(s) Oral at bedtime  lidocaine   Patch 1 Patch Transdermal daily  insulin lispro (HumaLOG) corrective regimen sliding scale   SubCutaneous three times a day before meals  insulin lispro (HumaLOG) corrective regimen sliding scale   SubCutaneous at bedtime  enoxaparin Injectable 40 milliGRAM(s) SubCutaneous daily  senna 2 Tablet(s) Oral at bedtime  docusate sodium 100 milliGRAM(s) Oral three times a day    MEDICATIONS  (PRN):  dextrose Gel 1 Dose(s) Oral once PRN Blood Glucose LESS THAN 70 milliGRAM(s)/deciliter  glucagon  Injectable 1 milliGRAM(s) IntraMuscular once PRN Glucose LESS THAN 70 milligrams/deciliter  acetaminophen   Tablet 650 milliGRAM(s) Oral every 6 hours PRN mild pain  oxyCODONE    IR 2.5 milliGRAM(s) Oral every 4 hours PRN Severe Pain (7 - 10)  polyethylene glycol 3350 17 Gram(s) Oral daily PRN Constipation      REVIEW OF SYSTEMS      General:  fatigue	    Skin/Breast:  ecchymosis right orbit    Cardiovascular:	rare "pinch", described as "needle like" in his chest    Gastrointestinal:	  constipated    Genitourinary:	incontinence    Musculoskeletal:  arthritis pain	    Neurological:	possible syncope    Psychiatric:	no acute symptoms      Vital Signs Last 24 Hrs  T(C): 37.1 (26 Aug 2017 08:44), Max: 37.1 (25 Aug 2017 18:10)  T(F): 98.8 (26 Aug 2017 08:44), Max: 98.8 (25 Aug 2017 18:10)  HR: 90 (26 Aug 2017 08:44) (61 - 95)  BP: 104/63 (26 Aug 2017 08:44) (104/63 - 146/87)  BP(mean): --  RR: 17 (26 Aug 2017 08:44) (16 - 18)  SpO2: 96% (26 Aug 2017 08:44) (96% - 98%)    Telemetry:  AFIB 60's    PHYSICAL EXAM:      Constitutional:  frail, awake, alert, no distress    Eyes:  right orbit ecchymosis    ENMT:  anicteric    Neck:  no JVD    Respiratory:   CTA bilaterally    Cardiovascular:   irregularly irregular, II-III/VI JC at the RUSB; no edema    Gastrointestinal:  soft, NT/ND, (+) BS    Extremities:  warm and well perfused, no edema    Neurological:  alert and oriented                                  13.8   6.6   )-----------( 187      ( 26 Aug 2017 06:34 )             40.1   08-26    141  |  103  |  20  ----------------------------<  150<H>  4.2   |  26  |  1.02    Ca    9.6      26 Aug 2017 06:34  Phos  3.7     08-26  Mg     1.9     08-26    PT/INR - ( 25 Aug 2017 05:05 )   PT: 12.3 sec;   INR: 1.13 ratio         PTT - ( 25 Aug 2017 05:05 )  PTT:29.0 sec  Serum Pro-Brain Natriuretic Peptide: 1152 pg/mL (08-20 @ 10:26)      ECG:  AFIB    ECHOCARDIOGRAM:   ROMULO 0.8, mean AVg 19, AoV 2.8m/s    CARDIAC CATHETERIZATION:    CARDIAC CT:    RADIOLOGY: Patient is an 85 year old man who presents after a fall with a right frontal intraparenchymal hemorrhage and rib fractures (23 Aug 2017 14:43).       HPI:  Mr Painter has known AS that has been followed by his Cardiologist, Dr Prakash, for several years.  He reports being told it was "moderate to severe" and not yet warranting intervention at the time of his last outpatient TTE in February (EF 60%, AoV 3m/s, mean AVg 18).  He was admitted after a fall at home, while entering his building after walking to a local store.  He was observed by a neighbor to appear unsteady with his gait as he approached the building.  He remembers feeling dizzy, then woke up on the floor in the vestibule, with trauma to the right side of his face and scalp.  He reports no angina or dyspnea.  He does admit to several months of fatigue and functional decline, which he attribute to a combination of his age, severe arthritis, and spinal stenosis.  He is cognitively sharp.  He was also being treated for HTN, DM, and HLD.  He reports no known history of CVA, MI, CAD, or prior cardiac intervention.  He was noted to have a right sided subdural bleed that on serial CT scans appears stable and diminishing in size; he was on warfarin at the time of the fall but has remained off anticoagulation since admission.  He is in chronic AFIB.    PMH  Severe aortic stenosis  Atrial fibrillation  Spinal stenosis of lumbar region  Torn rotator cuff  Arthritis, shoulder region  Arthropathy associated with bacterial disease, shoulder region  Diabetes mellitus  Congenital heart defect  Hyperlipidemia  HTN - Hypertension  BPH (benign prostatic hypertrophy)      PSH  Hx of appendectomy  S/P TURP (status post transurethral resection of prostate)      ALLERGIES:  No Known Allergies    SOCIAL Hx:  non smoker    IMAGING  < from: CT Maxillofacial No Cont (08.20.17 @ 16:02) >  IMPRESSION:    1.  Head CT: Stable small right frontal hemorrhage.  2.  Maxillofacial CT: Right periorbital preseptal soft tissue hematoma   without associated orbital fracture. Age-indeterminate anterior bilateral   nasal fractures.      < end of copied text >  < from: CT Head No Cont (08.20.17 @ 15:57) >  CT brain:    A 6 mm parenchymal hemorrhage in the right superior frontal gyrus is   stable in size compared to the prior study, most likely reflecting a   hemorrhagic contusion given the history of trauma. Serial imaging   follow-up to resolution is recommended to exclude an underlying   abnormality. No new acute hemorrhages are present.    There is no evidence for acute vascular territory infarct, acute  hydrocephalus, or midline shift.    Chronic white matter changes and cerebral volume loss are again noted.    There is no displaced calvarial fracture.    < end of copied text >  < from: CT Chest No Cont (08.20.17 @ 11:09) >  Nondisplaced fracture right sixth and seventh ribs. No pneumothorax,   pleural effusion or focal lung consolidation/contusion. Platelike   atelectatic streak or scar lateral right lower lung.    Additional findings as described.      < end of copied text > (20 Aug 2017 19:43)      PAST MEDICAL & SURGICAL HISTORY:  Nonrheumatic aortic valve stenosis  Atrial fibrillation  Spinal stenosis of lumbar region  Torn rotator cuff: Right  Arthritis, shoulder region: Right  Diabetes mellitus  Congenital heart defect  Hyperlipidemia  HTN - Hypertension  BPH (benign prostatic hypertrophy)  Hx of appendectomy: age 17  S/P TURP (status post transurethral resection of prostate): 2008      MEDICATIONS  (STANDING):  digoxin     Tablet 0.125 milliGRAM(s) Oral daily  diltiazem    milliGRAM(s) Oral daily  metoprolol 100 milliGRAM(s) Oral two times a day  dextrose 5%. 1000 milliLiter(s) (50 mL/Hr) IV Continuous <Continuous>  dextrose 50% Injectable 12.5 Gram(s) IV Push once  dextrose 50% Injectable 25 Gram(s) IV Push once  dextrose 50% Injectable 25 Gram(s) IV Push once  levothyroxine 50 MICROGram(s) Oral daily  tamsulosin 0.4 milliGRAM(s) Oral before breakfast  atorvastatin 10 milliGRAM(s) Oral at bedtime  lidocaine   Patch 1 Patch Transdermal daily  insulin lispro (HumaLOG) corrective regimen sliding scale   SubCutaneous three times a day before meals  insulin lispro (HumaLOG) corrective regimen sliding scale   SubCutaneous at bedtime  enoxaparin Injectable 40 milliGRAM(s) SubCutaneous daily  senna 2 Tablet(s) Oral at bedtime  docusate sodium 100 milliGRAM(s) Oral three times a day    MEDICATIONS  (PRN):  dextrose Gel 1 Dose(s) Oral once PRN Blood Glucose LESS THAN 70 milliGRAM(s)/deciliter  glucagon  Injectable 1 milliGRAM(s) IntraMuscular once PRN Glucose LESS THAN 70 milligrams/deciliter  acetaminophen   Tablet 650 milliGRAM(s) Oral every 6 hours PRN mild pain  oxyCODONE    IR 2.5 milliGRAM(s) Oral every 4 hours PRN Severe Pain (7 - 10)  polyethylene glycol 3350 17 Gram(s) Oral daily PRN Constipation      REVIEW OF SYSTEMS      General:  fatigue	    Skin/Breast:  ecchymosis right orbit    Cardiovascular:	rare "pinch", described as "needle like" in his chest    Gastrointestinal:	  constipated    Genitourinary:	incontinence    Musculoskeletal:  arthritis pain	    Neurological:	possible syncope    Psychiatric:	no acute symptoms      Vital Signs Last 24 Hrs  T(C): 37.1 (26 Aug 2017 08:44), Max: 37.1 (25 Aug 2017 18:10)  T(F): 98.8 (26 Aug 2017 08:44), Max: 98.8 (25 Aug 2017 18:10)  HR: 90 (26 Aug 2017 08:44) (61 - 95)  BP: 104/63 (26 Aug 2017 08:44) (104/63 - 146/87)  BP(mean): --  RR: 17 (26 Aug 2017 08:44) (16 - 18)  SpO2: 96% (26 Aug 2017 08:44) (96% - 98%)    Telemetry:  AFIB 60's    PHYSICAL EXAM:      Constitutional:  frail, awake, alert, no distress    Eyes:  right orbit ecchymosis    ENMT:  anicteric    Neck:  no JVD    Respiratory:   CTA bilaterally    Cardiovascular:   irregularly irregular, II-III/VI JC at the RUSB; no edema    Gastrointestinal:  soft, NT/ND, (+) BS    Extremities:  warm and well perfused, no edema    Neurological:  alert and oriented                                  13.8   6.6   )-----------( 187      ( 26 Aug 2017 06:34 )             40.1   08-26    141  |  103  |  20  ----------------------------<  150<H>  4.2   |  26  |  1.02    Ca    9.6      26 Aug 2017 06:34  Phos  3.7     08-26  Mg     1.9     08-26    PT/INR - ( 25 Aug 2017 05:05 )   PT: 12.3 sec;   INR: 1.13 ratio         PTT - ( 25 Aug 2017 05:05 )  PTT:29.0 sec  Serum Pro-Brain Natriuretic Peptide: 1152 pg/mL (08-20 @ 10:26)      ECG:  AFIB    ECHOCARDIOGRAM:   ROMULO 0.8, mean AVg 17, AoV 2.8m/s, LVEF 65%    CARDIAC CATHETERIZATION:  not done    CARDIAC CT:  not done

## 2017-08-26 NOTE — PROVIDER CONTACT NOTE (OTHER) - ASSESSMENT
Pt is asymptomatic and has had multiple episodes of wide complexes since admission, Cardiology already on board

## 2017-08-26 NOTE — CONSULT NOTE ADULT - PROBLEM SELECTOR RECOMMENDATION 9
--R+L cath, tentatively Monday morning (while he remains off anticoagulation)  --Evaluation by one of the Heart Team cardiac surgeons on Monday

## 2017-08-26 NOTE — PROGRESS NOTE ADULT - ASSESSMENT
85m pmh htn hl afib on coumadin dm2 bph arthritis who admitted s/p fall with IPH and SAH likely 2/2 severe AS

## 2017-08-26 NOTE — PROGRESS NOTE ADULT - SUBJECTIVE AND OBJECTIVE BOX
ACS Progress Note    S: Patient seen and examined. No acute events overnight. Pain well controlled with current regimen. Patient continues to have sporadic, brief (<10 seconds) episodes of wide complex tachycardia that spontaneously revert to NSR overnight.    O:  Vital Signs Last 24 Hrs  T(C): 37.1 (26 Aug 2017 08:44), Max: 37.1 (25 Aug 2017 18:10)  T(F): 98.8 (26 Aug 2017 08:44), Max: 98.8 (25 Aug 2017 18:10)  HR: 90 (26 Aug 2017 08:44) (61 - 95)  BP: 104/63 (26 Aug 2017 08:44) (104/63 - 146/87)  BP(mean): --  RR: 17 (26 Aug 2017 08:44) (16 - 18)  SpO2: 96% (26 Aug 2017 08:44) (96% - 98%)    I&O's Detail    25 Aug 2017 07:01  -  26 Aug 2017 07:00  --------------------------------------------------------  IN:    Oral Fluid: 1280 mL  Total IN: 1280 mL    OUT:  Total OUT: 0 mL    Total NET: 1280 mL      26 Aug 2017 07:01  -  26 Aug 2017 11:24  --------------------------------------------------------  IN:    Oral Fluid: 280 mL  Total IN: 280 mL    OUT:  Total OUT: 0 mL    Total NET: 280 mL          MEDICATIONS  (STANDING):  digoxin     Tablet 0.125 milliGRAM(s) Oral daily  diltiazem    milliGRAM(s) Oral daily  metoprolol 100 milliGRAM(s) Oral two times a day  dextrose 5%. 1000 milliLiter(s) (50 mL/Hr) IV Continuous <Continuous>  dextrose 50% Injectable 12.5 Gram(s) IV Push once  dextrose 50% Injectable 25 Gram(s) IV Push once  dextrose 50% Injectable 25 Gram(s) IV Push once  levothyroxine 50 MICROGram(s) Oral daily  tamsulosin 0.4 milliGRAM(s) Oral before breakfast  atorvastatin 10 milliGRAM(s) Oral at bedtime  lidocaine   Patch 1 Patch Transdermal daily  insulin lispro (HumaLOG) corrective regimen sliding scale   SubCutaneous three times a day before meals  insulin lispro (HumaLOG) corrective regimen sliding scale   SubCutaneous at bedtime  enoxaparin Injectable 40 milliGRAM(s) SubCutaneous daily  senna 2 Tablet(s) Oral at bedtime  docusate sodium 100 milliGRAM(s) Oral three times a day    MEDICATIONS  (PRN):  dextrose Gel 1 Dose(s) Oral once PRN Blood Glucose LESS THAN 70 milliGRAM(s)/deciliter  glucagon  Injectable 1 milliGRAM(s) IntraMuscular once PRN Glucose LESS THAN 70 milligrams/deciliter  acetaminophen   Tablet 650 milliGRAM(s) Oral every 6 hours PRN mild pain  oxyCODONE    IR 2.5 milliGRAM(s) Oral every 4 hours PRN Severe Pain (7 - 10)  polyethylene glycol 3350 17 Gram(s) Oral daily PRN Constipation                            13.8   6.6   )-----------( 187      ( 26 Aug 2017 06:34 )             40.1       08-26    141  |  103  |  20  ----------------------------<  150<H>  4.2   |  26  |  1.02    Ca    9.6      26 Aug 2017 06:34  Phos  3.7     08-26  Mg     1.9     08-26        Physical Exam:  Gen: Laying in bed, NAD, alert and oriented.   Resp: Unlabored breathing  Abd: soft, NTND

## 2017-08-26 NOTE — PROGRESS NOTE ADULT - SUBJECTIVE AND OBJECTIVE BOX
Interval Events:    No chest pain, brief pause in AFib. In house TTE reveals ROMULO 0.8 sq cm by continuity equation consistent with severe AS.    ROS: Denies HA/dizziness/CP/SOB/PND/orthopnea/LE edema/abd pain    MEDICATIONS:  digoxin     Tablet 0.125 milliGRAM(s) Oral daily  diltiazem    milliGRAM(s) Oral daily  metoprolol 100 milliGRAM(s) Oral two times a day  tamsulosin 0.4 milliGRAM(s) Oral before breakfast  enoxaparin Injectable 40 milliGRAM(s) SubCutaneous daily  polyethylene glycol 3350 17 Gram(s) Oral daily PRN  senna 2 Tablet(s) Oral at bedtime  docusate sodium 100 milliGRAM(s) Oral three times a day  levothyroxine 50 MICROGram(s) Oral daily  atorvastatin 10 milliGRAM(s) Oral at bedtime  insulin lispro (HumaLOG) corrective regimen sliding scale   SubCutaneous three times a day before meals  insulin lispro (HumaLOG) corrective regimen sliding scale   SubCutaneous at bedtime  lidocaine   Patch 1 Patch Transdermal daily      PHYSICAL EXAM:  T(C): 37.1 (08-26-17 @ 08:44), Max: 37.1 (08-25-17 @ 18:10)  HR: 90 (08-26-17 @ 08:44) (61 - 95)  BP: 104/63 (08-26-17 @ 08:44) (104/63 - 146/87)  RR: 17 (08-26-17 @ 08:44) (16 - 18)  SpO2: 96% (08-26-17 @ 08:44) (96% - 98%)  Wt(kg): --  I&O's Summary    25 Aug 2017 07:01  -  26 Aug 2017 07:00  --------------------------------------------------------  IN: 1280 mL / OUT: 0 mL / NET: 1280 mL    26 Aug 2017 07:01  -  26 Aug 2017 10:42  --------------------------------------------------------  IN: 280 mL / OUT: 0 mL / NET: 280 mL        Appearance: No Acute Distress  HEENT: R orbital ecchymosis  Cardiovascular: Normal S1 S2, Irreg irreg, III/VI JC RUSB  Respiratory: Clear to auscultation bilaterally  Gastrointestinal: Soft, Non-tender	  Skin: No cyanosis	  Neurologic: Non-focal  Extremities: No clubbing, cyanosis or edema  Psychiatry: A & O x 3, Mood & affect appropriate    LABS:	 	    CBC Full  -  ( 26 Aug 2017 06:34 )  WBC Count : 6.6 K/uL  Hemoglobin : 13.8 g/dL  Hematocrit : 40.1 %  Platelet Count - Automated : 187 K/uL  Mean Cell Volume : 106.0 fl  Mean Cell Hemoglobin : 36.3 pg  Mean Cell Hemoglobin Concentration : 34.4 gm/dL      08-26    141  |  103  |  20  ----------------------------<  150<H>  4.2   |  26  |  1.02  08-25    144  |  106  |  18  ----------------------------<  152<H>  4.2   |  25  |  0.99    Ca    9.6      26 Aug 2017 06:34  Ca    9.6      25 Aug 2017 05:05  Phos  3.7     08-26  Phos  3.6     08-25  Mg     1.9     08-26  Mg     2.0     08-25      TELEMETRY: as above   ECG:      DIAGNOSTIC TESTING:    [ ] Echocardiogram:  < from: Transthoracic Echocardiogram (08.25.17 @ 16:11) >  Dimensions:    Normal Values:  LA:     4.2    2.0 - 4.0 cm  Ao:     4.0    2.0 - 3.8 cm  SEPTUM: 1.1    0.6 - 1.2 cm  PWT:    1.1 0.6 - 1.1 cm  LVIDd:  4.5    3.0 - 5.6 cm  LVIDs:  2.9    1.8 - 4.0 cm  Derived variables:  LVMI: 101 g/m2  RWT: 0.48  Fractional short: 36 %  EF (Teicholtz): 65 %  Doppler Peak Velocity (m/sec): AoV=2.8    ------------------------------------------------------------------------  Conclusions:  1. Calcified trileaflet aortic valve with decreased  opening. Peak transaortic valve gradient equals 31 mm Hg,  mean transaortic valve gradient equals 17 mm Hg, estimated  aortic valve area equals 0.8 sqcm (by continuity equation),  aortic valve velocity time integral equals 58 cm,  consistent with severe aortic stenosis.  2. Moderately dilated left atrium.  LA volume index = 42  cc/m2.  3. Mild concentric left ventricular hypertrophy.  4. Normal left ventricular systolic function.  5. Estimated pulmonary artery systolic pressure equals 39  mm Hg, assuming right atrial pressure equals 8 mm Hg,  consistent with borderline pulmonary pressures.  *** No previous Echo exam.  ------------------------------------------------------------------------  Confirmed on  8/25/2017 - 17:28:19 by Rolan Car M.D.  ------------------------------------------------------------------------    < end of copied text >

## 2017-08-27 PROCEDURE — 99233 SBSQ HOSP IP/OBS HIGH 50: CPT

## 2017-08-27 PROCEDURE — 99232 SBSQ HOSP IP/OBS MODERATE 35: CPT

## 2017-08-27 RX ADMIN — LIDOCAINE 1 PATCH: 4 CREAM TOPICAL at 00:33

## 2017-08-27 RX ADMIN — Medication 100 MILLIGRAM(S): at 05:48

## 2017-08-27 RX ADMIN — Medication 100 MILLIGRAM(S): at 16:42

## 2017-08-27 RX ADMIN — Medication 50 MICROGRAM(S): at 05:48

## 2017-08-27 RX ADMIN — Medication 1: at 16:42

## 2017-08-27 RX ADMIN — LIDOCAINE 1 PATCH: 4 CREAM TOPICAL at 12:06

## 2017-08-27 RX ADMIN — ENOXAPARIN SODIUM 40 MILLIGRAM(S): 100 INJECTION SUBCUTANEOUS at 12:06

## 2017-08-27 RX ADMIN — Medication 0.12 MILLIGRAM(S): at 05:48

## 2017-08-27 RX ADMIN — Medication 180 MILLIGRAM(S): at 05:48

## 2017-08-27 RX ADMIN — SENNA PLUS 2 TABLET(S): 8.6 TABLET ORAL at 21:31

## 2017-08-27 RX ADMIN — ATORVASTATIN CALCIUM 10 MILLIGRAM(S): 80 TABLET, FILM COATED ORAL at 21:31

## 2017-08-27 RX ADMIN — TAMSULOSIN HYDROCHLORIDE 0.4 MILLIGRAM(S): 0.4 CAPSULE ORAL at 05:58

## 2017-08-27 RX ADMIN — Medication 100 MILLIGRAM(S): at 21:32

## 2017-08-27 RX ADMIN — Medication 100 MILLIGRAM(S): at 12:07

## 2017-08-27 NOTE — PROGRESS NOTE ADULT - SUBJECTIVE AND OBJECTIVE BOX
Patient to undergo TAVR procedure. No absolute neurosurgical contraindication to full dose anticoagulation. Please do not bolus when starting heparin. May be started on ASA/plavix if stent is required. Please weigh risks/benefits of anticoagulation. Discussed with attending Dr. Rios. Patient to undergo TAVR procedure. No absolute neurosurgical contraindication to full dose anticoagulation. Please do not bolus when starting heparin. May be started on ASA/plavix if stent is required. Please weigh risks/benefits of anticoagulation. Discussed with attending Dr. Rios.     [phone]

## 2017-08-27 NOTE — PROGRESS NOTE ADULT - SUBJECTIVE AND OBJECTIVE BOX
CARDIOLOGY PROGRESS NOTE    CHIEF COMPLAINT/REASON FOR CONSULT:   Patient is a 85y old  Male who presents with a chief complaint of Fall with right frontal intraparenchymal hemorrhage and R 6-7 rib fractures (23 Aug 2017 14:43)      BRIEF SUMMARY:  85M - h.o. Hypothyroidism, HTN, HL, T2DM, AF, Severe AS (ROMULO by continuityu 0.8 sq cm) - p/w fall/ syncope found to have R subarachnoid hemorrhage and R Rib fractures with 2.5 sec pause while in AF.  Likely transaortic gradients incongruent with ROMULO by continuity either because of doppler angler or paradoxical low flow low gradient due to small cavity. EPS consulted and impression is that unlikely to be arrhythmogenic/pause related syncope.   Primary concern is whether able to undergo heparin protocol for TAVR for which neurosurgery input is necessary.   ================================================  24 HR EVENTS:  - no sig arrhythimas; pauses  - rate controlled  - vitals stable   ================================================  Allergies    No Known Allergies    Intolerances    	    MEDICATIONS:  MEDICATIONS  (STANDING):  digoxin     Tablet 0.125 milliGRAM(s) Oral daily  diltiazem    milliGRAM(s) Oral daily  metoprolol 100 milliGRAM(s) Oral two times a day  dextrose 5%. 1000 milliLiter(s) (50 mL/Hr) IV Continuous <Continuous>  dextrose 50% Injectable 12.5 Gram(s) IV Push once  dextrose 50% Injectable 25 Gram(s) IV Push once  dextrose 50% Injectable 25 Gram(s) IV Push once  levothyroxine 50 MICROGram(s) Oral daily  tamsulosin 0.4 milliGRAM(s) Oral before breakfast  atorvastatin 10 milliGRAM(s) Oral at bedtime  lidocaine   Patch 1 Patch Transdermal daily  insulin lispro (HumaLOG) corrective regimen sliding scale   SubCutaneous three times a day before meals  insulin lispro (HumaLOG) corrective regimen sliding scale   SubCutaneous at bedtime  enoxaparin Injectable 40 milliGRAM(s) SubCutaneous daily  senna 2 Tablet(s) Oral at bedtime  docusate sodium 100 milliGRAM(s) Oral three times a day      PAST MEDICAL & SURGICAL HISTORY:  Nonrheumatic aortic valve stenosis  Atrial fibrillation  Spinal stenosis of lumbar region  Torn rotator cuff: Right  Arthritis, shoulder region: Right  Diabetes mellitus  Congenital heart defect  Hyperlipidemia  HTN - Hypertension  BPH (benign prostatic hypertrophy)  Hx of appendectomy: age 17  S/P TURP (status post transurethral resection of prostate): 2008      FAMILY HISTORY:      REVIEW OF SYSTEMS:  Constitutional: No Fever, Fatigue, Weight Changes  Eyes: No recent vision changes, eye pain  ENT: No Congestion, Ear Pain, Sore Throat  Endocrine: No Excess Sweating, Temperature Intolerance  Cardiovascular: No Chest Pain, Palpitations, SOB, Pre-syncope, Syncope  Respiratory: No Cough, Congestion, Wheezing  GI: No dysuria, hematuria  MS: No Joint Pain, Swelling  Neurologic: No Headaches, Imbalance, Focal Deficits  Skin: No Rashes, Hematoma, Prurpura    PHYSICAL EXAM:  T(C): 36.4 (08-27-17 @ 09:27), Max: 37.1 (08-26-17 @ 16:16)  HR: 81 (08-27-17 @ 09:27) (71 - 93)  BP: 120/63 (08-27-17 @ 09:27) (120/63 - 134/77)  RR: 18 (08-27-17 @ 09:27) (13 - 18)  SpO2: 97% (08-27-17 @ 09:27) (95% - 98%)  Wt(kg): --  I&O's Summary    26 Aug 2017 07:01  -  27 Aug 2017 07:00  --------------------------------------------------------  IN: 830 mL / OUT: 400 mL / NET: 430 mL    27 Aug 2017 07:01  -  27 Aug 2017 13:34  --------------------------------------------------------  IN: 500 mL / OUT: 0 mL / NET: 500 mL        Appearance: Normal	  HEENT:   Normal oral mucosa, PERRL, EOMI	  Lymphatic: No lymphadenopathy  Cardiovascular: Normal S1 S2, +Irregular + JC, No edema  Respiratory: Lungs clear to auscultation, no use of accessory muscles	  Psychiatry: A & O x 3, Mood & affect appropriate  Gastrointestinal:  Soft, Non-tender, + BS	  Skin: No rashes, No ecchymoses, No cyanosis	  Neurologic: Non-focal  Extremities: Normal range of motion, No clubbing, cyanosis or edema  Vascular: Peripheral pulses palpable 2+ bilaterally, no prominent varicosities      LABS:	  Labs Reviewed 	08-27-17 @ 13:34    CBC Full  -  ( 26 Aug 2017 06:34 )  WBC Count : 6.6 K/uL  Hemoglobin : 13.8 g/dL  Hematocrit : 40.1 %  Platelet Count - Automated : 187 K/uL  Mean Cell Volume : 106.0 fl  Mean Cell Hemoglobin : 36.3 pg  Mean Cell Hemoglobin Concentration : 34.4 gm/dL  Auto Neutrophil # : x  Auto Lymphocyte # : x  Auto Monocyte # : x  Auto Eosinophil # : x  Auto Basophil # : x  Auto Neutrophil % : x  Auto Lymphocyte % : x  Auto Monocyte % : x  Auto Eosinophil % : x  Auto Basophil % : x    08-26    141  |  103  |  20  ----------------------------<  150<H>  4.2   |  26  |  1.02    Ca    9.6      26 Aug 2017 06:34  Phos  3.7     08-26  Mg     1.9     08-26          CARDIAC MARKERS:            TELEMETRY: 	    	AF 80s; no pauses overnight   =======================================================================================  =======================================================================================  Assessment:  85M - h.o. Hypothyroidism, HTN, HL, T2DM, AF, Severe AS (ROMULO by continuityu 0.8 sq cm) - p/w fall/ syncope found to have R subarachnoid hemorrhage and R Rib fractures with 2.5 sec pause while in AF.  Likely transaortic gradients incongruent with ROMULO by continuity either because of doppler angler or paradoxical low flow low gradient due to small cavity. EPS consulted and impression is that unlikely to be arrhythmogenic/pause related syncope.   Primary concern is whether able to undergo heparin protocol for TAVR for which neurosurgery input is necessary.     Problems  1. Syncope  2. Severe AS  3. AF with Pause    4. Intracranial Bleed    Recommendations  - Appreciate Dr. Quintanilla facilitating care  - Need Neurosurgery input primary on timing of intervention given heparin protocol for TAVR and bleed  - In the meantime, plan for R/LHC on Mon (NPO after Early AM Mon)  - Possible BRIAN  - CTA for TAVR   - Monitor Tele for pauses/ectopy   - If recurrent pause, would reduce/D/C Diltiazem    Zander Cortez MD [facility]  [phone] with above.   Monitor telemetry.  Even if severe AS underlies the cause, the risk of intervention (i.e., TAVR) would be high until it is safe to proceed from a neurologic/bleeding standpoint.   Would have neurology weigh in..      MEDICATIONS  (PRN):  dextrose Gel 1 Dose(s) Oral once PRN Blood Glucose LESS THAN 70 milliGRAM(s)/deciliter  glucagon  Injectable 1 milliGRAM(s) IntraMuscular once PRN Glucose LESS THAN 70 milligrams/deciliter  acetaminophen   Tablet 650 milliGRAM(s) Oral every 6 hours PRN mild pain  oxyCODONE    IR 2.5 milliGRAM(s) Oral every 4 hours PRN Severe Pain (7 - 10)  polyethylene glycol 3350 17 Gram(s) Oral daily PRN Constipation      Recommendations  -   -   -   -   -     Please call with questions.    Zander Cortez MD [facility]  [phone]

## 2017-08-27 NOTE — PROGRESS NOTE ADULT - PROBLEM SELECTOR PLAN 1
-likely 2/2 severe AoS.  Reviewed TTE, gradient 0.8.  Cards and structural heart disease appreciated.  -discussed case with Dr. Hutchins, still awaiting decision re: cath.  Will order labs in case.  -per NSX, can get DAPT if necessary, can get hep gtt, but CANNOT GET hep bolus.   -discussed with Dr. merino to ensure documentation from them as such.  decision re: ability to cath and do TAVR will determine rest of hospital course.  -per afsaneh White arrthymogenic cause of syncope.

## 2017-08-27 NOTE — PROGRESS NOTE ADULT - SUBJECTIVE AND OBJECTIVE BOX
Patient is a 85y old  Male who presents with a chief complaint of Fall with right frontal intraparenchymal hemorrhage and R 6-7 rib fractures (23 Aug 2017 14:43)      SUBJECTIVE / OVERNIGHT EVENTS: no events overnight.  Patient offers no complaints. Anxious to know if will be going for further testing tomorrow for TAVR. Had BM this AM        MEDICATIONS  (STANDING):  digoxin     Tablet 0.125 milliGRAM(s) Oral daily  diltiazem    milliGRAM(s) Oral daily  metoprolol 100 milliGRAM(s) Oral two times a day  dextrose 5%. 1000 milliLiter(s) (50 mL/Hr) IV Continuous <Continuous>  dextrose 50% Injectable 12.5 Gram(s) IV Push once  dextrose 50% Injectable 25 Gram(s) IV Push once  dextrose 50% Injectable 25 Gram(s) IV Push once  levothyroxine 50 MICROGram(s) Oral daily  tamsulosin 0.4 milliGRAM(s) Oral before breakfast  atorvastatin 10 milliGRAM(s) Oral at bedtime  lidocaine   Patch 1 Patch Transdermal daily  insulin lispro (HumaLOG) corrective regimen sliding scale   SubCutaneous three times a day before meals  insulin lispro (HumaLOG) corrective regimen sliding scale   SubCutaneous at bedtime  enoxaparin Injectable 40 milliGRAM(s) SubCutaneous daily  docusate sodium 100 milliGRAM(s) Oral two times a day  senna 1 Tablet(s) Oral at bedtime  senna 2 Tablet(s) Oral at bedtime  docusate sodium 100 milliGRAM(s) Oral three times a day    MEDICATIONS  (PRN):  dextrose Gel 1 Dose(s) Oral once PRN Blood Glucose LESS THAN 70 milliGRAM(s)/deciliter  glucagon  Injectable 1 milliGRAM(s) IntraMuscular once PRN Glucose LESS THAN 70 milligrams/deciliter  acetaminophen   Tablet 650 milliGRAM(s) Oral every 6 hours PRN mild pain  oxyCODONE    IR 2.5 milliGRAM(s) Oral every 4 hours PRN Severe Pain (7 - 10)  polyethylene glycol 3350 17 Gram(s) Oral daily PRN Constipation    Vital Signs Last 24 Hrs  T(C): 36.4 (27 Aug 2017 13:45), Max: 37.1 (26 Aug 2017 16:16)  T(F): 97.5 (27 Aug 2017 13:45), Max: 98.8 (26 Aug 2017 16:16)  HR: 79 (27 Aug 2017 13:45) (71 - 93)  BP: 140/88 (27 Aug 2017 13:45) (120/63 - 140/88)  BP(mean): --  RR: 18 (27 Aug 2017 13:45) (13 - 18)  SpO2: 96% (27 Aug 2017 13:45) (96% - 98%)        PHYSICAL EXAM:  CONSTITUIONAL: NAD, well-developed  HEAD:  healing wound over left eye.  EYES: EOMI, PERRLA, conjunctiva and sclera clear. R eye laceration cdi, ecchymosis improving daily   ENMT: Supple, No JVD  RESPIRATORY: Clear to auscultation bilaterally; No wheeze  CARDIOVASCULAR: irreg rate and rhythm; no rubs/gallops   GI: Soft, Nontender, Nondistended; Bowel sounds present  EXTREMITIES:  2+ Peripheral Pulses, No clubbing, cyanosis, or edema  PSYCH: AAOx3  NEUROLOGY: non-focal  SKIN: No rashes or lesions    LABS:           None today                RADIOLOGY & ADDITIONAL TESTS:    Imaging Personally Reviewed:    Consultant(s) Notes Reviewed:  EP, structural heart disease    Care Discussed with Consultants/Other Providers: Trauma surgery resident, Cardiology  (Dr. Hutchins), Neurosurgery Resident (Dr. Chapman

## 2017-08-28 DIAGNOSIS — I10 ESSENTIAL (PRIMARY) HYPERTENSION: ICD-10-CM
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
## 2017-08-28 DIAGNOSIS — E78.2 MIXED HYPERLIPIDEMIA: ICD-10-CM

## 2017-08-28 LAB
ANION GAP SERPL CALC-SCNC: 14 MMOL/L — SIGNIFICANT CHANGE UP (ref 5–17)
APTT BLD: 28.8 SEC — SIGNIFICANT CHANGE UP (ref 27.5–37.4)
BASOPHILS # BLD AUTO: 0.1 K/UL — SIGNIFICANT CHANGE UP (ref 0–0.2)
BASOPHILS NFR BLD AUTO: 1.4 % — SIGNIFICANT CHANGE UP (ref 0–2)
BUN SERPL-MCNC: 24 MG/DL — HIGH (ref 7–23)
CALCIUM SERPL-MCNC: 9.4 MG/DL — SIGNIFICANT CHANGE UP (ref 8.4–10.5)
CHLORIDE SERPL-SCNC: 104 MMOL/L — SIGNIFICANT CHANGE UP (ref 96–108)
CO2 SERPL-SCNC: 24 MMOL/L — SIGNIFICANT CHANGE UP (ref 22–31)
CREAT SERPL-MCNC: 0.89 MG/DL — SIGNIFICANT CHANGE UP (ref 0.5–1.3)
EOSINOPHIL # BLD AUTO: 0.3 K/UL — SIGNIFICANT CHANGE UP (ref 0–0.5)
EOSINOPHIL NFR BLD AUTO: 4.4 % — SIGNIFICANT CHANGE UP (ref 0–6)
GLUCOSE SERPL-MCNC: 134 MG/DL — HIGH (ref 70–99)
HCT VFR BLD CALC: 42.2 % — SIGNIFICANT CHANGE UP (ref 39–50)
HGB BLD-MCNC: 14.4 G/DL — SIGNIFICANT CHANGE UP (ref 13–17)
INR BLD: 1.15 RATIO — SIGNIFICANT CHANGE UP (ref 0.88–1.16)
LYMPHOCYTES # BLD AUTO: 2.2 K/UL — SIGNIFICANT CHANGE UP (ref 1–3.3)
LYMPHOCYTES # BLD AUTO: 35.3 % — SIGNIFICANT CHANGE UP (ref 13–44)
MAGNESIUM SERPL-MCNC: 2 MG/DL — SIGNIFICANT CHANGE UP (ref 1.6–2.6)
MCHC RBC-ENTMCNC: 34 GM/DL — SIGNIFICANT CHANGE UP (ref 32–36)
MCHC RBC-ENTMCNC: 35.9 PG — HIGH (ref 27–34)
MCV RBC AUTO: 106 FL — HIGH (ref 80–100)
MONOCYTES # BLD AUTO: 1 K/UL — HIGH (ref 0–0.9)
MONOCYTES NFR BLD AUTO: 16.8 % — HIGH (ref 2–14)
NEUTROPHILS # BLD AUTO: 2.6 K/UL — SIGNIFICANT CHANGE UP (ref 1.8–7.4)
NEUTROPHILS NFR BLD AUTO: 42.1 % — LOW (ref 43–77)
PLATELET # BLD AUTO: 219 K/UL — SIGNIFICANT CHANGE UP (ref 150–400)
POTASSIUM SERPL-MCNC: 4 MMOL/L — SIGNIFICANT CHANGE UP (ref 3.5–5.3)
POTASSIUM SERPL-SCNC: 4 MMOL/L — SIGNIFICANT CHANGE UP (ref 3.5–5.3)
PROTHROM AB SERPL-ACNC: 12.5 SEC — SIGNIFICANT CHANGE UP (ref 9.8–12.7)
RBC # BLD: 4 M/UL — LOW (ref 4.2–5.8)
RBC # FLD: 13 % — SIGNIFICANT CHANGE UP (ref 10.3–14.5)
SODIUM SERPL-SCNC: 142 MMOL/L — SIGNIFICANT CHANGE UP (ref 135–145)
WBC # BLD: 6.2 K/UL — SIGNIFICANT CHANGE UP (ref 3.8–10.5)
WBC # FLD AUTO: 6.2 K/UL — SIGNIFICANT CHANGE UP (ref 3.8–10.5)

## 2017-08-28 PROCEDURE — 99233 SBSQ HOSP IP/OBS HIGH 50: CPT

## 2017-08-28 PROCEDURE — 93460 R&L HRT ART/VENTRICLE ANGIO: CPT | Mod: 26,GC

## 2017-08-28 RX ADMIN — ATORVASTATIN CALCIUM 10 MILLIGRAM(S): 80 TABLET, FILM COATED ORAL at 21:51

## 2017-08-28 RX ADMIN — Medication 0.12 MILLIGRAM(S): at 05:14

## 2017-08-28 RX ADMIN — Medication 100 MILLIGRAM(S): at 05:14

## 2017-08-28 RX ADMIN — TAMSULOSIN HYDROCHLORIDE 0.4 MILLIGRAM(S): 0.4 CAPSULE ORAL at 05:14

## 2017-08-28 RX ADMIN — LIDOCAINE 1 PATCH: 4 CREAM TOPICAL at 13:52

## 2017-08-28 RX ADMIN — Medication 100 MILLIGRAM(S): at 13:52

## 2017-08-28 RX ADMIN — Medication 3: at 17:42

## 2017-08-28 RX ADMIN — Medication 50 MICROGRAM(S): at 05:14

## 2017-08-28 RX ADMIN — LIDOCAINE 1 PATCH: 4 CREAM TOPICAL at 00:06

## 2017-08-28 RX ADMIN — SENNA PLUS 2 TABLET(S): 8.6 TABLET ORAL at 21:50

## 2017-08-28 RX ADMIN — Medication 100 MILLIGRAM(S): at 21:51

## 2017-08-28 RX ADMIN — Medication 180 MILLIGRAM(S): at 05:14

## 2017-08-28 NOTE — PROGRESS NOTE ADULT - ASSESSMENT
This is a 85yr old man with PMHX of HTN, HLD, AFib on coumadin, DM 2, BPH, arthritis, who was admitted s/p fall with IPH and SAH likely 2/2 severe AS, being evaluated for TAVR

## 2017-08-28 NOTE — PROGRESS NOTE ADULT - SUBJECTIVE AND OBJECTIVE BOX
24H hour events:   Cath with CAD  Valve gradient low  No significant bradycardia    MEDICATIONS:  digoxin     Tablet 0.125 milliGRAM(s) Oral daily  diltiazem    milliGRAM(s) Oral daily  metoprolol 100 milliGRAM(s) Oral two times a day  tamsulosin 0.4 milliGRAM(s) Oral before breakfast  enoxaparin Injectable 40 milliGRAM(s) SubCutaneous daily        acetaminophen   Tablet 650 milliGRAM(s) Oral every 6 hours PRN    polyethylene glycol 3350 17 Gram(s) Oral daily PRN  senna 2 Tablet(s) Oral at bedtime  docusate sodium 100 milliGRAM(s) Oral three times a day    dextrose Gel 1 Dose(s) Oral once PRN  dextrose 50% Injectable 12.5 Gram(s) IV Push once  dextrose 50% Injectable 25 Gram(s) IV Push once  dextrose 50% Injectable 25 Gram(s) IV Push once  glucagon  Injectable 1 milliGRAM(s) IntraMuscular once PRN  levothyroxine 50 MICROGram(s) Oral daily  atorvastatin 10 milliGRAM(s) Oral at bedtime  insulin lispro (HumaLOG) corrective regimen sliding scale   SubCutaneous three times a day before meals  insulin lispro (HumaLOG) corrective regimen sliding scale   SubCutaneous at bedtime    dextrose 5%. 1000 milliLiter(s) IV Continuous <Continuous>  lidocaine   Patch 1 Patch Transdermal daily      REVIEW OF SYSTEMS:  General: no fatigue/malaise, weight loss/gain.  Skin: no rashes.  Ophthalmologic: no blurred vision, no loss of vision. 	  ENT: no sore throat, rhinorrhea, sinus congestion.  Respiratory: no SOB, cough or wheeze.  Gastrointestinal:  no N/V/D, no melena/hematemesis/hematochezia.  Genitourinary: no dysuria/hesitancy or hematuria.  Musculoskeletal: no myalgias or arthralgias.  Neurological: no changes in vision or hearing, no lightheadedness/dizziness, no syncope/near syncope	  Psychiatric: no unusual stress/anxiety.   Hematology/Lymphatics: no unusual bleeding, bruising and no lymphadenopathy.  Endocrine: no unusual thirst.   All others negative except as stated above and in HPI.    PHYSICAL EXAM:  T(C): 36.3 (08-28-17 @ 16:00), Max: 36.9 (08-28-17 @ 05:11)  HR: 88 (08-28-17 @ 16:00) (67 - 88)  BP: 100/66 (08-28-17 @ 16:00) (100/66 - 148/94)  RR: 17 (08-28-17 @ 16:00) (16 - 18)  SpO2: 98% (08-28-17 @ 16:00) (96% - 99%)  Wt(kg): --  I&O's Summary    27 Aug 2017 07:01  -  28 Aug 2017 07:00  --------------------------------------------------------  IN: 1280 mL / OUT: 1601 mL / NET: -321 mL    28 Aug 2017 07:01  -  28 Aug 2017 18:51  --------------------------------------------------------  IN: 0 mL / OUT: 350 mL / NET: -350 mL        Appearance: Normal	  HEENT:   Normal oral mucosa, PERRL, EOMI	  Lymphatic: No lymphadenopathy  Cardiovascular: Normal S1 S2, No JVD, No murmurs, No edema  Respiratory: Lungs clear to auscultation	  Psychiatry: A & O x 3, Mood & affect appropriate  Gastrointestinal:  Soft, Non-tender, + BS	  Skin: No rashes, No ecchymoses, No cyanosis	  Neurologic: Non-focal  Extremities: Normal range of motion, No clubbing, cyanosis or edema  Vascular: Peripheral pulses palpable 2+ bilaterally        LABS:	 	    CBC Full  -  ( 28 Aug 2017 06:15 )  WBC Count : 6.2 K/uL  Hemoglobin : 14.4 g/dL  Hematocrit : 42.2 %  Platelet Count - Automated : 219 K/uL  Mean Cell Volume : 106.0 fl  Mean Cell Hemoglobin : 35.9 pg  Mean Cell Hemoglobin Concentration : 34.0 gm/dL  Auto Neutrophil # : 2.6 K/uL  Auto Lymphocyte # : 2.2 K/uL  Auto Monocyte # : 1.0 K/uL  Auto Eosinophil # : 0.3 K/uL  Auto Basophil # : 0.1 K/uL  Auto Neutrophil % : 42.1 %  Auto Lymphocyte % : 35.3 %  Auto Monocyte % : 16.8 %  Auto Eosinophil % : 4.4 %  Auto Basophil % : 1.4 %    08-28    142  |  104  |  24<H>  ----------------------------<  134<H>  4.0   |  24  |  0.89    Ca    9.4      28 Aug 2017 06:15  Mg     2.0     08-28    TELEMETRY: 	    ECG:  	  RADIOLOGY:  OTHER: 	    PREVIOUS DIAGNOSTIC TESTING:    [ ] Echocardiogram:  [ ]  Catheterization:  [ ] Stress Test:  	  	  ASSESSMENT/PLAN: 	  patient with low gradient severe AS  possible outpatient TAVR  has syncope and minor bradycardia, will follow for possible pacemaker

## 2017-08-28 NOTE — PROGRESS NOTE ADULT - PROBLEM SELECTOR PLAN 4
R frontal vertex parasagittal gyrus small hemorrhage, stable on repeat CTH 8/21   f/u surgery/neurosx re: when to resume AC.  -no acute intervention at this time.

## 2017-08-28 NOTE — PROGRESS NOTE ADULT - PROBLEM SELECTOR PLAN 2
cont home metoprolol tartrate 100mg bid and diltiazem 180mg extended release  coumadin resumption plan per trauma and neurosurgery.

## 2017-08-28 NOTE — PROGRESS NOTE ADULT - PROBLEM SELECTOR PLAN 1
severe AS with gradient 0.8 on TTE. Card/structural heart disease recommends TAVR. Discussed with Dr. Sutton (card) regarding the plan especially about heparin.  - [facility] today  - CTA for TAVR protocol  - appreciated cardiology recs  - likely require heparin continuous infusion before TAVR severe AS with gradient 0.8 on TTE. Card/structural heart disease recommends TAVR. Discussed with Dr. Sutton (card) regarding the plan especially about heparin - given that he would be loaded with contrast today for C, will wait 48hrs until CTA.   - [facility] today  - CTA for TAVR protocol  - appreciated cardiology recs  - likely require heparin continuous infusion before TAVR

## 2017-08-28 NOTE — PROGRESS NOTE ADULT - PROBLEM SELECTOR PLAN 5
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  Reviewed TTE, gradient 0.8.  Cards and structural heart disease appreciated.  -per NSX, can get DAPT if necessary, can get hep gtt, but CANNOT GET hep bolus.   -per Dr. nichole, less likely arrthymogenic cause of syncope.

## 2017-08-28 NOTE — PROGRESS NOTE ADULT - SUBJECTIVE AND OBJECTIVE BOX
Cardiology Consult Progress Note    SUBJ:  R/LHC today with moderate diffuse disease including mLAD 60%, moderate LM and LCx. Neurosurgery comments that he may have DAPT as well as anticoagulation but avoid heparin bolus for the immediate future. Denies shortness of breath, chest pain, palpitations.     MEDICATIONS  (STANDING):  digoxin     Tablet 0.125 milliGRAM(s) Oral daily  diltiazem    milliGRAM(s) Oral daily  metoprolol 100 milliGRAM(s) Oral two times a day  dextrose 5%. 1000 milliLiter(s) (50 mL/Hr) IV Continuous <Continuous>  dextrose 50% Injectable 12.5 Gram(s) IV Push once  dextrose 50% Injectable 25 Gram(s) IV Push once  dextrose 50% Injectable 25 Gram(s) IV Push once  levothyroxine 50 MICROGram(s) Oral daily  tamsulosin 0.4 milliGRAM(s) Oral before breakfast  atorvastatin 10 milliGRAM(s) Oral at bedtime  lidocaine   Patch 1 Patch Transdermal daily  insulin lispro (HumaLOG) corrective regimen sliding scale   SubCutaneous three times a day before meals  insulin lispro (HumaLOG) corrective regimen sliding scale   SubCutaneous at bedtime  enoxaparin Injectable 40 milliGRAM(s) SubCutaneous daily  senna 2 Tablet(s) Oral at bedtime  docusate sodium 100 milliGRAM(s) Oral three times a day    MEDICATIONS  (PRN):  dextrose Gel 1 Dose(s) Oral once PRN Blood Glucose LESS THAN 70 milliGRAM(s)/deciliter  glucagon  Injectable 1 milliGRAM(s) IntraMuscular once PRN Glucose LESS THAN 70 milligrams/deciliter  acetaminophen   Tablet 650 milliGRAM(s) Oral every 6 hours PRN mild pain  polyethylene glycol 3350 17 Gram(s) Oral daily PRN Constipation      Vital Signs Last 24 Hrs  T(C): 36.8 (28 Aug 2017 15:00), Max: 36.9 (28 Aug 2017 05:11)  T(F): 98.2 (28 Aug 2017 15:00), Max: 98.4 (28 Aug 2017 05:11)  HR: 84 (28 Aug 2017 15:00) (67 - 84)  BP: 122/75 (28 Aug 2017 15:00) (116/85 - 148/94)  BP(mean): --  RR: 17 (28 Aug 2017 15:00) (16 - 18)  SpO2: 96% (28 Aug 2017 15:00) (96% - 99%)    REVIEW OF SYSTEMS:  As above.    PHYSICAL EXAM:  · CONSTITUTIONAL:	Well-developed, well nourished      · EYES: EOMI  · NECK: No bruits  ·RESPIRATORY:   airway patent; breath sounds equal; good air movement; respirations non-labored; clear to auscultation bilaterally  · CARDIOVASCULAR	regular rate, grade 2/6 systolic ejection murmur  . GASTROINTESTINAL:  no distention  · EXTREMITIES: No edema  · VASCULAR: 	Equal and normal pulses radial  ·NEUROLOGICAL:   alert and oriented x 3; sensation intact;   · SKIN:	brusing on face and arms  . LYMPH NODES:	No lymphadedenopathy  · MUSCULOSKELETAL:   No joint swelling	    TTE 8/25/2017  Conclusions:  1. Calcified trileaflet aortic valve with decreased  opening. Peak transaortic valve gradient equals 31 mm Hg,  mean transaortic valve gradient equals 17 mm Hg, estimated  aortic valve area equals 0.8 sqcm (by continuity equation),  aortic valve velocity time integral equals 58 cm,  consistent with severe aortic stenosis.  2. Moderately dilated left atrium.  LA volume index = 42  cc/m2.  3. Mild concentric left ventricular hypertrophy.  4. Normal left ventricular systolic function.  5. Estimated pulmonary artery systolic pressure equals 39  mm Hg, assuming right atrial pressure equals 8 mm Hg,  consistent with borderline pulmonary pressures.      LABS:  CBC Full  -  ( 28 Aug 2017 06:15 )  WBC Count : 6.2 K/uL  Hemoglobin : 14.4 g/dL  Hematocrit : 42.2 %  Platelet Count - Automated : 219 K/uL  Mean Cell Volume : 106.0 fl  Mean Cell Hemoglobin : 35.9 pg  Mean Cell Hemoglobin Concentration : 34.0 gm/dL  Auto Neutrophil # : 2.6 K/uL  Auto Lymphocyte # : 2.2 K/uL  Auto Monocyte # : 1.0 K/uL  Auto Eosinophil # : 0.3 K/uL  Auto Basophil # : 0.1 K/uL  Auto Neutrophil % : 42.1 %  Auto Lymphocyte % : 35.3 %  Auto Monocyte % : 16.8 %  Auto Eosinophil % : 4.4 %  Auto Basophil % : 1.4 %    Basic Metabolic Panel (08.28.17 @ 06:15)    Sodium, Serum: 142 mmol/L    Potassium, Serum: 4.0 mmol/L    Chloride, Serum: 104 mmol/L    Carbon Dioxide, Serum: 24 mmol/L    Anion Gap, Serum: 14 mmol/L    Blood Urea Nitrogen, Serum: 24 mg/dL    Creatinine, Serum: 0.89 mg/dL    Glucose, Serum: 134 mg/dL    Calcium, Total Serum: 9.4 mg/dL    eGFR if Non : 78: Interpretative comment      RADIOLOGY & ADDITIONAL STUDIES:  CT Head 8/23/2017  Impression: Slightly decreased density to the small right high frontal 6   mm parenchymal hemorrhage compared with the 22,017.    IMPRESSION AND PLAN:  85 year old man with history AS (lAVA 0.8, 2017), chronic Afib on Coumadin, DM, HTN, HLD, hypothyroidism p/w syncope found to have R subarachnoid hemorrhage.    1) AS   0.8 cm sq per continuity equation  Discrepancies in severity with regard to gradient and velocity below threshold for severe stenosis; may be paradoxical AS, doppler angle error - may consider low dose dobutamine vs BRIAN in the future to help elucidate this further.     TAVR ream following and appreciate their guidance.   R/LHC performed today with moderate diffuse disease +/- intervention needed in the future.   Plan for CT TAVR protocol prior to discharge.   Given his deconditioned state and recent SA hemorrhage, leaning toward completing TAVR workup this admission, discharge to rehab, and follow up for intervention.     2) Chronic AF  Asymptomatic   CHADS2-Vasc: 5+     Neurosurgery cleared for DAPT and full anticoagulation without heparin bolus.   Continue medical management with digoxin 0.125 mg qd, metoprolol tartrate 100 mg BID, and diltiazem 180 mg daily. Cardiology Consult Progress Note    SUBJ:  R/LHC today with moderate diffuse disease including mLAD 60%, moderate LM and LCx. Neurosurgery comments that he may have DAPT as well as anticoagulation but avoid heparin bolus for the immediate future. Denies shortness of breath, chest pain, palpitations.     MEDICATIONS  (STANDING):  digoxin     Tablet 0.125 milliGRAM(s) Oral daily  diltiazem    milliGRAM(s) Oral daily  metoprolol 100 milliGRAM(s) Oral two times a day  dextrose 5%. 1000 milliLiter(s) (50 mL/Hr) IV Continuous <Continuous>  dextrose 50% Injectable 12.5 Gram(s) IV Push once  dextrose 50% Injectable 25 Gram(s) IV Push once  dextrose 50% Injectable 25 Gram(s) IV Push once  levothyroxine 50 MICROGram(s) Oral daily  tamsulosin 0.4 milliGRAM(s) Oral before breakfast  atorvastatin 10 milliGRAM(s) Oral at bedtime  lidocaine   Patch 1 Patch Transdermal daily  insulin lispro (HumaLOG) corrective regimen sliding scale   SubCutaneous three times a day before meals  insulin lispro (HumaLOG) corrective regimen sliding scale   SubCutaneous at bedtime  enoxaparin Injectable 40 milliGRAM(s) SubCutaneous daily  senna 2 Tablet(s) Oral at bedtime  docusate sodium 100 milliGRAM(s) Oral three times a day    MEDICATIONS  (PRN):  dextrose Gel 1 Dose(s) Oral once PRN Blood Glucose LESS THAN 70 milliGRAM(s)/deciliter  glucagon  Injectable 1 milliGRAM(s) IntraMuscular once PRN Glucose LESS THAN 70 milligrams/deciliter  acetaminophen   Tablet 650 milliGRAM(s) Oral every 6 hours PRN mild pain  polyethylene glycol 3350 17 Gram(s) Oral daily PRN Constipation      Vital Signs Last 24 Hrs  T(C): 36.8 (28 Aug 2017 15:00), Max: 36.9 (28 Aug 2017 05:11)  T(F): 98.2 (28 Aug 2017 15:00), Max: 98.4 (28 Aug 2017 05:11)  HR: 84 (28 Aug 2017 15:00) (67 - 84)  BP: 122/75 (28 Aug 2017 15:00) (116/85 - 148/94)  BP(mean): --  RR: 17 (28 Aug 2017 15:00) (16 - 18)  SpO2: 96% (28 Aug 2017 15:00) (96% - 99%)    REVIEW OF SYSTEMS:  As above.    PHYSICAL EXAM:  · CONSTITUTIONAL:	Well-developed, well nourished      · EYES: EOMI  · NECK: No bruits  ·RESPIRATORY:   airway patent; breath sounds equal; good air movement; respirations non-labored; clear to auscultation bilaterally  · CARDIOVASCULAR	regular rate, grade 2/6 systolic ejection murmur  . GASTROINTESTINAL:  no distention  · EXTREMITIES: No edema  · VASCULAR: 	Equal and normal pulses radial  ·NEUROLOGICAL:   alert and oriented x 3; sensation intact;   · SKIN:	brusing on face and arms  . LYMPH NODES:	No lymphadedenopathy  · MUSCULOSKELETAL:   No joint swelling	    TTE 8/25/2017  Conclusions:  1. Calcified trileaflet aortic valve with decreased  opening. Peak transaortic valve gradient equals 31 mm Hg,  mean transaortic valve gradient equals 17 mm Hg, estimated  aortic valve area equals 0.8 sqcm (by continuity equation),  aortic valve velocity time integral equals 58 cm,  consistent with severe aortic stenosis.  2. Moderately dilated left atrium.  LA volume index = 42  cc/m2.  3. Mild concentric left ventricular hypertrophy.  4. Normal left ventricular systolic function.  5. Estimated pulmonary artery systolic pressure equals 39  mm Hg, assuming right atrial pressure equals 8 mm Hg,  consistent with borderline pulmonary pressures.      LABS:  CBC Full  -  ( 28 Aug 2017 06:15 )  WBC Count : 6.2 K/uL  Hemoglobin : 14.4 g/dL  Hematocrit : 42.2 %  Platelet Count - Automated : 219 K/uL  Mean Cell Volume : 106.0 fl  Mean Cell Hemoglobin : 35.9 pg  Mean Cell Hemoglobin Concentration : 34.0 gm/dL  Auto Neutrophil # : 2.6 K/uL  Auto Lymphocyte # : 2.2 K/uL  Auto Monocyte # : 1.0 K/uL  Auto Eosinophil # : 0.3 K/uL  Auto Basophil # : 0.1 K/uL  Auto Neutrophil % : 42.1 %  Auto Lymphocyte % : 35.3 %  Auto Monocyte % : 16.8 %  Auto Eosinophil % : 4.4 %  Auto Basophil % : 1.4 %    Basic Metabolic Panel (08.28.17 @ 06:15)    Sodium, Serum: 142 mmol/L    Potassium, Serum: 4.0 mmol/L    Chloride, Serum: 104 mmol/L    Carbon Dioxide, Serum: 24 mmol/L    Anion Gap, Serum: 14 mmol/L    Blood Urea Nitrogen, Serum: 24 mg/dL    Creatinine, Serum: 0.89 mg/dL    Glucose, Serum: 134 mg/dL    Calcium, Total Serum: 9.4 mg/dL    eGFR if Non : 78: Interpretative comment      RADIOLOGY & ADDITIONAL STUDIES:  CT Head 8/23/2017  Impression: Slightly decreased density to the small right high frontal 6   mm parenchymal hemorrhage compared with the 22,017.    IMPRESSION AND PLAN:  85 year old man with history AS (lAVA 0.8, 2017), chronic Afib on Coumadin, DM, HTN, HLD, hypothyroidism p/w syncope found to have R subarachnoid hemorrhage.    1) AS   0.8 cm sq per continuity equation  Discrepancies in severity with regard to gradient and velocity below threshold for severe stenosis; may be paradoxical AS, doppler angle error - may consider BRIAN in the future to help elucidate this further.     TAVR ream following and appreciate their guidance.   R/LHC performed today with moderate diffuse disease +/- intervention needed in the future.   Plan for CT TAVR protocol prior to discharge.   Given his deconditioned state and recent SA hemorrhage, leaning toward completing TAVR workup this admission, discharge to rehab, and follow up for intervention.     2) Chronic AF  Asymptomatic   CHADS2-Vasc: 5+     Neurosurgery cleared for DAPT and full anticoagulation without heparin bolus.   Continue medical management with digoxin 0.125 mg qd, metoprolol tartrate 100 mg BID, and diltiazem 180 mg daily. Cardiology Consult Progress Note    SUBJ:  R/LHC today with moderate diffuse disease including mLAD 60%, moderate LM and LCx. Neurosurgery comments that he may have DAPT as well as anticoagulation but avoid heparin bolus for the immediate future. Denies shortness of breath, chest pain, palpitations.     MEDICATIONS  (STANDING):  digoxin     Tablet 0.125 milliGRAM(s) Oral daily  diltiazem    milliGRAM(s) Oral daily  metoprolol 100 milliGRAM(s) Oral two times a day  dextrose 5%. 1000 milliLiter(s) (50 mL/Hr) IV Continuous <Continuous>  dextrose 50% Injectable 12.5 Gram(s) IV Push once  dextrose 50% Injectable 25 Gram(s) IV Push once  dextrose 50% Injectable 25 Gram(s) IV Push once  levothyroxine 50 MICROGram(s) Oral daily  tamsulosin 0.4 milliGRAM(s) Oral before breakfast  atorvastatin 10 milliGRAM(s) Oral at bedtime  lidocaine   Patch 1 Patch Transdermal daily  insulin lispro (HumaLOG) corrective regimen sliding scale   SubCutaneous three times a day before meals  insulin lispro (HumaLOG) corrective regimen sliding scale   SubCutaneous at bedtime  enoxaparin Injectable 40 milliGRAM(s) SubCutaneous daily  senna 2 Tablet(s) Oral at bedtime  docusate sodium 100 milliGRAM(s) Oral three times a day    MEDICATIONS  (PRN):  dextrose Gel 1 Dose(s) Oral once PRN Blood Glucose LESS THAN 70 milliGRAM(s)/deciliter  glucagon  Injectable 1 milliGRAM(s) IntraMuscular once PRN Glucose LESS THAN 70 milligrams/deciliter  acetaminophen   Tablet 650 milliGRAM(s) Oral every 6 hours PRN mild pain  polyethylene glycol 3350 17 Gram(s) Oral daily PRN Constipation      Vital Signs Last 24 Hrs  T(C): 36.8 (28 Aug 2017 15:00), Max: 36.9 (28 Aug 2017 05:11)  T(F): 98.2 (28 Aug 2017 15:00), Max: 98.4 (28 Aug 2017 05:11)  HR: 84 (28 Aug 2017 15:00) (67 - 84)  BP: 122/75 (28 Aug 2017 15:00) (116/85 - 148/94)  BP(mean): --  RR: 17 (28 Aug 2017 15:00) (16 - 18)  SpO2: 96% (28 Aug 2017 15:00) (96% - 99%)    REVIEW OF SYSTEMS:  As above.    PHYSICAL EXAM:  · CONSTITUTIONAL:	Well-developed, well nourished      · EYES: EOMI  · NECK: No bruits  ·RESPIRATORY:   airway patent; breath sounds equal; good air movement; respirations non-labored; clear to auscultation bilaterally  · CARDIOVASCULAR	regular rate, grade 2/6 systolic ejection murmur  . GASTROINTESTINAL:  no distention  · EXTREMITIES: No edema  · VASCULAR: 	Equal and normal pulses radial  ·NEUROLOGICAL:   alert and oriented x 3; sensation intact;   · SKIN:	brusing on face and arms  . LYMPH NODES:	No lymphadedenopathy  · MUSCULOSKELETAL:   No joint swelling	    TTE 8/25/2017  Conclusions:  1. Calcified trileaflet aortic valve with decreased  opening. Peak transaortic valve gradient equals 31 mm Hg,  mean transaortic valve gradient equals 17 mm Hg, estimated  aortic valve area equals 0.8 sqcm (by continuity equation),  aortic valve velocity time integral equals 58 cm,  consistent with severe aortic stenosis.  2. Moderately dilated left atrium.  LA volume index = 42  cc/m2.  3. Mild concentric left ventricular hypertrophy.  4. Normal left ventricular systolic function.  5. Estimated pulmonary artery systolic pressure equals 39  mm Hg, assuming right atrial pressure equals 8 mm Hg,  consistent with borderline pulmonary pressures.      LABS:  CBC Full  -  ( 28 Aug 2017 06:15 )  WBC Count : 6.2 K/uL  Hemoglobin : 14.4 g/dL  Hematocrit : 42.2 %  Platelet Count - Automated : 219 K/uL  Mean Cell Volume : 106.0 fl  Mean Cell Hemoglobin : 35.9 pg  Mean Cell Hemoglobin Concentration : 34.0 gm/dL  Auto Neutrophil # : 2.6 K/uL  Auto Lymphocyte # : 2.2 K/uL  Auto Monocyte # : 1.0 K/uL  Auto Eosinophil # : 0.3 K/uL  Auto Basophil # : 0.1 K/uL  Auto Neutrophil % : 42.1 %  Auto Lymphocyte % : 35.3 %  Auto Monocyte % : 16.8 %  Auto Eosinophil % : 4.4 %  Auto Basophil % : 1.4 %    Basic Metabolic Panel (08.28.17 @ 06:15)    Sodium, Serum: 142 mmol/L    Potassium, Serum: 4.0 mmol/L    Chloride, Serum: 104 mmol/L    Carbon Dioxide, Serum: 24 mmol/L    Anion Gap, Serum: 14 mmol/L    Blood Urea Nitrogen, Serum: 24 mg/dL    Creatinine, Serum: 0.89 mg/dL    Glucose, Serum: 134 mg/dL    Calcium, Total Serum: 9.4 mg/dL    eGFR if Non : 78: Interpretative comment      RADIOLOGY & ADDITIONAL STUDIES:  CT Head 8/23/2017  Impression: Slightly decreased density to the small right high frontal 6   mm parenchymal hemorrhage compared with the 22,017.    IMPRESSION AND PLAN:  85 year old man with history AS (lAVA 0.8, 2017), chronic Afib on Coumadin, DM, HTN, HLD, hypothyroidism p/w syncope found to have R subarachnoid hemorrhage.    1) AS   0.8 cm sq per continuity equation  Discrepancies in severity with regard to gradient and velocity below threshold for severe stenosis; may be paradoxical AS, doppler angle error - may consider calcium scoring by CT (severe AS in setting of >2000AU in men) vs BRIAN in the future to help elucidate this further.     TAVR ream following and appreciate their guidance.   R/LHC performed today with moderate diffuse disease +/- intervention needed in the future.   Plan for CT TAVR protocol prior to discharge.   Given his deconditioned state and recent SA hemorrhage, leaning toward completing TAVR workup this admission, discharge to rehab, and follow up for intervention.     2) Chronic AF  Asymptomatic   CHADS2-Vasc: 5+     Neurosurgery cleared for DAPT and full anticoagulation without heparin bolus.   Continue medical management with digoxin 0.125 mg qd, metoprolol tartrate 100 mg BID, and diltiazem 180 mg daily. Cardiology Consult Progress Note    SUBJ:  R/LHC today with moderate diffuse disease including mLAD 60%, moderate LM and LCx. Neurosurgery comments that he may have DAPT as well as anticoagulation but avoid heparin bolus for the immediate future. Denies shortness of breath, chest pain, palpitations.     MEDICATIONS  (STANDING):  digoxin     Tablet 0.125 milliGRAM(s) Oral daily  diltiazem    milliGRAM(s) Oral daily  metoprolol 100 milliGRAM(s) Oral two times a day  dextrose 5%. 1000 milliLiter(s) (50 mL/Hr) IV Continuous <Continuous>  dextrose 50% Injectable 12.5 Gram(s) IV Push once  dextrose 50% Injectable 25 Gram(s) IV Push once  dextrose 50% Injectable 25 Gram(s) IV Push once  levothyroxine 50 MICROGram(s) Oral daily  tamsulosin 0.4 milliGRAM(s) Oral before breakfast  atorvastatin 10 milliGRAM(s) Oral at bedtime  lidocaine   Patch 1 Patch Transdermal daily  insulin lispro (HumaLOG) corrective regimen sliding scale   SubCutaneous three times a day before meals  insulin lispro (HumaLOG) corrective regimen sliding scale   SubCutaneous at bedtime  enoxaparin Injectable 40 milliGRAM(s) SubCutaneous daily  senna 2 Tablet(s) Oral at bedtime  docusate sodium 100 milliGRAM(s) Oral three times a day    MEDICATIONS  (PRN):  dextrose Gel 1 Dose(s) Oral once PRN Blood Glucose LESS THAN 70 milliGRAM(s)/deciliter  glucagon  Injectable 1 milliGRAM(s) IntraMuscular once PRN Glucose LESS THAN 70 milligrams/deciliter  acetaminophen   Tablet 650 milliGRAM(s) Oral every 6 hours PRN mild pain  polyethylene glycol 3350 17 Gram(s) Oral daily PRN Constipation      Vital Signs Last 24 Hrs  T(C): 36.8 (28 Aug 2017 15:00), Max: 36.9 (28 Aug 2017 05:11)  T(F): 98.2 (28 Aug 2017 15:00), Max: 98.4 (28 Aug 2017 05:11)  HR: 84 (28 Aug 2017 15:00) (67 - 84)  BP: 122/75 (28 Aug 2017 15:00) (116/85 - 148/94)  BP(mean): --  RR: 17 (28 Aug 2017 15:00) (16 - 18)  SpO2: 96% (28 Aug 2017 15:00) (96% - 99%)    REVIEW OF SYSTEMS:  As above.    PHYSICAL EXAM:  · CONSTITUTIONAL:	Well-developed, well nourished      · EYES: EOMI  · NECK: No bruits  ·RESPIRATORY:   airway patent; breath sounds equal; good air movement; respirations non-labored; clear to auscultation bilaterally  · CARDIOVASCULAR	regular rate, grade 2/6 systolic ejection murmur  . GASTROINTESTINAL:  no distention  · EXTREMITIES: No edema  · VASCULAR: 	Equal and normal pulses radial  ·NEUROLOGICAL:   alert and oriented x 3; sensation intact;   · SKIN:	brusing on face and arms  . LYMPH NODES:	No lymphadedenopathy  · MUSCULOSKELETAL:   No joint swelling	    TTE 8/25/2017  Conclusions:  1. Calcified trileaflet aortic valve with decreased  opening. Peak transaortic valve gradient equals 31 mm Hg,  mean transaortic valve gradient equals 17 mm Hg, estimated  aortic valve area equals 0.8 sqcm (by continuity equation),  aortic valve velocity time integral equals 58 cm,  consistent with severe aortic stenosis.  2. Moderately dilated left atrium.  LA volume index = 42  cc/m2.  3. Mild concentric left ventricular hypertrophy.  4. Normal left ventricular systolic function.  5. Estimated pulmonary artery systolic pressure equals 39  mm Hg, assuming right atrial pressure equals 8 mm Hg,  consistent with borderline pulmonary pressures.      LABS:  CBC Full  -  ( 28 Aug 2017 06:15 )  WBC Count : 6.2 K/uL  Hemoglobin : 14.4 g/dL  Hematocrit : 42.2 %  Platelet Count - Automated : 219 K/uL  Mean Cell Volume : 106.0 fl  Mean Cell Hemoglobin : 35.9 pg  Mean Cell Hemoglobin Concentration : 34.0 gm/dL  Auto Neutrophil # : 2.6 K/uL  Auto Lymphocyte # : 2.2 K/uL  Auto Monocyte # : 1.0 K/uL  Auto Eosinophil # : 0.3 K/uL  Auto Basophil # : 0.1 K/uL  Auto Neutrophil % : 42.1 %  Auto Lymphocyte % : 35.3 %  Auto Monocyte % : 16.8 %  Auto Eosinophil % : 4.4 %  Auto Basophil % : 1.4 %    Basic Metabolic Panel (08.28.17 @ 06:15)    Sodium, Serum: 142 mmol/L    Potassium, Serum: 4.0 mmol/L    Chloride, Serum: 104 mmol/L    Carbon Dioxide, Serum: 24 mmol/L    Anion Gap, Serum: 14 mmol/L    Blood Urea Nitrogen, Serum: 24 mg/dL    Creatinine, Serum: 0.89 mg/dL    Glucose, Serum: 134 mg/dL    Calcium, Total Serum: 9.4 mg/dL    eGFR if Non : 78: Interpretative comment      RADIOLOGY & ADDITIONAL STUDIES:  CT Head 8/23/2017  Impression: Slightly decreased density to the small right high frontal 6   mm parenchymal hemorrhage compared with the 22,017.    IMPRESSION AND PLAN:  85 year old man with history AS (lAVA 0.8, 2017), chronic Afib on Coumadin, DM, HTN, HLD, hypothyroidism p/w syncope found to have R subarachnoid hemorrhage.    1) AS   0.8 cm sq per continuity equation  Discrepancies in severity with regard to gradient and velocity below threshold for severe stenosis; may be paradoxical AS, doppler angle error - may consider calcium scoring by CT (severe AS in setting of >2000AU in men) vs BRIAN vs low dose dobutamine ECHO in the future to help elucidate this further.     TAVR ream following and appreciate their guidance.   R/LHC performed today with moderate diffuse disease +/- intervention needed in the future.   Plan for CT TAVR protocol prior to discharge.   Given his deconditioned state and recent SA hemorrhage, leaning toward completing TAVR workup this admission, discharge to rehab, and follow up for intervention.     2) Chronic AF  Asymptomatic   CHADS2-Vasc: 5+     Neurosurgery cleared for DAPT and full anticoagulation without heparin bolus.   Continue medical management with digoxin 0.125 mg qd, metoprolol tartrate 100 mg BID, and diltiazem 180 mg daily. Cardiology Consult Progress Note    SUBJ:  R/LHC today with moderate diffuse disease including mLAD 60%, moderate LM and LCx. Neurosurgery comments that he may have DAPT as well as anticoagulation but avoid heparin bolus for the immediate future. Denies shortness of breath, chest pain, palpitations.     MEDICATIONS  (STANDING):  digoxin     Tablet 0.125 milliGRAM(s) Oral daily  diltiazem    milliGRAM(s) Oral daily  metoprolol 100 milliGRAM(s) Oral two times a day  dextrose 5%. 1000 milliLiter(s) (50 mL/Hr) IV Continuous <Continuous>  dextrose 50% Injectable 12.5 Gram(s) IV Push once  dextrose 50% Injectable 25 Gram(s) IV Push once  dextrose 50% Injectable 25 Gram(s) IV Push once  levothyroxine 50 MICROGram(s) Oral daily  tamsulosin 0.4 milliGRAM(s) Oral before breakfast  atorvastatin 10 milliGRAM(s) Oral at bedtime  lidocaine   Patch 1 Patch Transdermal daily  insulin lispro (HumaLOG) corrective regimen sliding scale   SubCutaneous three times a day before meals  insulin lispro (HumaLOG) corrective regimen sliding scale   SubCutaneous at bedtime  enoxaparin Injectable 40 milliGRAM(s) SubCutaneous daily  senna 2 Tablet(s) Oral at bedtime  docusate sodium 100 milliGRAM(s) Oral three times a day    MEDICATIONS  (PRN):  dextrose Gel 1 Dose(s) Oral once PRN Blood Glucose LESS THAN 70 milliGRAM(s)/deciliter  glucagon  Injectable 1 milliGRAM(s) IntraMuscular once PRN Glucose LESS THAN 70 milligrams/deciliter  acetaminophen   Tablet 650 milliGRAM(s) Oral every 6 hours PRN mild pain  polyethylene glycol 3350 17 Gram(s) Oral daily PRN Constipation      Vital Signs Last 24 Hrs  T(C): 36.8 (28 Aug 2017 15:00), Max: 36.9 (28 Aug 2017 05:11)  T(F): 98.2 (28 Aug 2017 15:00), Max: 98.4 (28 Aug 2017 05:11)  HR: 84 (28 Aug 2017 15:00) (67 - 84)  BP: 122/75 (28 Aug 2017 15:00) (116/85 - 148/94)  BP(mean): --  RR: 17 (28 Aug 2017 15:00) (16 - 18)  SpO2: 96% (28 Aug 2017 15:00) (96% - 99%)    REVIEW OF SYSTEMS:  As above.    PHYSICAL EXAM:  · CONSTITUTIONAL:	Well-developed, well nourished      · EYES: EOMI  · NECK: No bruits  ·RESPIRATORY:   airway patent; breath sounds equal; good air movement; respirations non-labored; clear to auscultation bilaterally  · CARDIOVASCULAR	irregularly irregular, grade 2/6 systolic ejection murmur  . GASTROINTESTINAL:  no distention  · EXTREMITIES: No edema  · VASCULAR: 	Equal and normal pulses radial  ·NEUROLOGICAL:   alert and oriented x 3; sensation intact;   · SKIN:	brusing on face and arms  . LYMPH NODES:	No lymphadedenopathy  · MUSCULOSKELETAL:   No joint swelling	    TTE 8/25/2017  Conclusions:  1. Calcified trileaflet aortic valve with decreased  opening. Peak transaortic valve gradient equals 31 mm Hg,  mean transaortic valve gradient equals 17 mm Hg, estimated  aortic valve area equals 0.8 sqcm (by continuity equation),  aortic valve velocity time integral equals 58 cm,  consistent with severe aortic stenosis.  2. Moderately dilated left atrium.  LA volume index = 42  cc/m2.  3. Mild concentric left ventricular hypertrophy.  4. Normal left ventricular systolic function.  5. Estimated pulmonary artery systolic pressure equals 39  mm Hg, assuming right atrial pressure equals 8 mm Hg,  consistent with borderline pulmonary pressures.      LABS:  CBC Full  -  ( 28 Aug 2017 06:15 )  WBC Count : 6.2 K/uL  Hemoglobin : 14.4 g/dL  Hematocrit : 42.2 %  Platelet Count - Automated : 219 K/uL  Mean Cell Volume : 106.0 fl  Mean Cell Hemoglobin : 35.9 pg  Mean Cell Hemoglobin Concentration : 34.0 gm/dL  Auto Neutrophil # : 2.6 K/uL  Auto Lymphocyte # : 2.2 K/uL  Auto Monocyte # : 1.0 K/uL  Auto Eosinophil # : 0.3 K/uL  Auto Basophil # : 0.1 K/uL  Auto Neutrophil % : 42.1 %  Auto Lymphocyte % : 35.3 %  Auto Monocyte % : 16.8 %  Auto Eosinophil % : 4.4 %  Auto Basophil % : 1.4 %    Basic Metabolic Panel (08.28.17 @ 06:15)    Sodium, Serum: 142 mmol/L    Potassium, Serum: 4.0 mmol/L    Chloride, Serum: 104 mmol/L    Carbon Dioxide, Serum: 24 mmol/L    Anion Gap, Serum: 14 mmol/L    Blood Urea Nitrogen, Serum: 24 mg/dL    Creatinine, Serum: 0.89 mg/dL    Glucose, Serum: 134 mg/dL    Calcium, Total Serum: 9.4 mg/dL    eGFR if Non : 78: Interpretative comment      RADIOLOGY & ADDITIONAL STUDIES:  CT Head 8/23/2017  Impression: Slightly decreased density to the small right high frontal 6   mm parenchymal hemorrhage compared with the 22,017.    IMPRESSION AND PLAN:  85 year old man with history AS (lAVA 0.8, 2017), chronic Afib on Coumadin, DM, HTN, HLD, hypothyroidism p/w syncope found to have R subarachnoid hemorrhage.    1) AS   0.8 cm sq per continuity equation  Discrepancies in severity with regard to gradient and velocity below threshold for severe stenosis; may be paradoxical AS, doppler angle error - may consider calcium scoring by CT (severe AS in setting of >2000AU in men) vs BRIAN vs low dose dobutamine ECHO in the future to help elucidate this further.     TAVR ream following and appreciate their guidance.   R/LHC performed today with moderate diffuse disease +/- intervention needed in the future.   Plan for CT TAVR protocol prior to discharge.   Given his deconditioned state and recent SA hemorrhage, leaning toward completing TAVR workup this admission, discharge to rehab, and follow up for intervention.     2) Chronic AF  Asymptomatic   CHADS2-Vasc: 5+     Neurosurgery cleared for DAPT and full anticoagulation without heparin bolus.   Continue medical management with digoxin 0.125 mg qd, metoprolol tartrate 100 mg BID, and diltiazem 180 mg daily.

## 2017-08-28 NOTE — PROGRESS NOTE ADULT - PROBLEM SELECTOR PROBLEM 5
Type 2 diabetes mellitus with hyperglycemia, without long-term current use of insulin Fall, subsequent encounter

## 2017-08-28 NOTE — PROGRESS NOTE ADULT - SUBJECTIVE AND OBJECTIVE BOX
Patient is a 85y old  Male who presents with a chief complaint of Fall with right frontal intraparenchymal hemorrhage and R 6-7 rib fractures (23 Aug 2017 14:43)      SUBJECTIVE / OVERNIGHT EVENTS:  No acute events overnight. Pt reports that he had some sleep but is worried about the procedure today. He was wondering if he would be placed on sleep for the procedure as he doesn't want to feel any pain. He is very anxious about the LHC and TAVR as he is "old". He asked many questions regarding the procedures. He has been able to walk back/forth to the bathroom. Denies fever/chills, chest pain, dyspnea at rest, abdominal pain, n/v/c/d.       MEDICATIONS  (STANDING):  digoxin     Tablet 0.125 milliGRAM(s) Oral daily  diltiazem    milliGRAM(s) Oral daily  metoprolol 100 milliGRAM(s) Oral two times a day  dextrose 5%. 1000 milliLiter(s) (50 mL/Hr) IV Continuous <Continuous>  dextrose 50% Injectable 12.5 Gram(s) IV Push once  dextrose 50% Injectable 25 Gram(s) IV Push once  dextrose 50% Injectable 25 Gram(s) IV Push once  levothyroxine 50 MICROGram(s) Oral daily  tamsulosin 0.4 milliGRAM(s) Oral before breakfast  atorvastatin 10 milliGRAM(s) Oral at bedtime  lidocaine   Patch 1 Patch Transdermal daily  insulin lispro (HumaLOG) corrective regimen sliding scale   SubCutaneous three times a day before meals  insulin lispro (HumaLOG) corrective regimen sliding scale   SubCutaneous at bedtime  enoxaparin Injectable 40 milliGRAM(s) SubCutaneous daily  senna 2 Tablet(s) Oral at bedtime  docusate sodium 100 milliGRAM(s) Oral three times a day    MEDICATIONS  (PRN):  dextrose Gel 1 Dose(s) Oral once PRN Blood Glucose LESS THAN 70 milliGRAM(s)/deciliter  glucagon  Injectable 1 milliGRAM(s) IntraMuscular once PRN Glucose LESS THAN 70 milligrams/deciliter  acetaminophen   Tablet 650 milliGRAM(s) Oral every 6 hours PRN mild pain  polyethylene glycol 3350 17 Gram(s) Oral daily PRN Constipation      CAPILLARY BLOOD GLUCOSE  128 (28 Aug 2017 07:40)  145 (27 Aug 2017 21:15)  173 (27 Aug 2017 16:30)        I&O's Summary    27 Aug 2017 07:01  -  28 Aug 2017 07:00  --------------------------------------------------------  IN: 1280 mL / OUT: 1601 mL / NET: -321 mL    28 Aug 2017 07:01  -  28 Aug 2017 11:37  --------------------------------------------------------  IN: 0 mL / OUT: 350 mL / NET: -350 mL        PHYSICAL EXAM:  Vital Signs Last 24 Hrs  T(C): 36.9 (28 Aug 2017 05:11), Max: 36.9 (28 Aug 2017 05:11)  T(F): 98.4 (28 Aug 2017 05:11), Max: 98.4 (28 Aug 2017 05:11)  HR: 80 (28 Aug 2017 05:11) (67 - 84)  BP: 136/83 (28 Aug 2017 05:11) (116/85 - 140/88)  BP(mean): --  RR: 16 (28 Aug 2017 05:11) (16 - 18)  SpO2: 98% (28 Aug 2017 05:11) (96% - 99%)    GENERAL: NAD, anxious elderly male, well-developed  HEAD:  healing wound over the left eye/forehead s/p fall  EYES: EOMI, PERRLA, conjunctiva and sclera clear; R eye laceration cdi, ecchymosis improving   NECK: Supple, No JVD  CHEST/LUNG: Clear to auscultation bilaterally; No wheeze  HEART: irregularly irregular; No murmurs, rubs, or gallops  ABDOMEN: Soft, Nontender, Nondistended; Bowel sounds present  EXTREMITIES:  2+ Peripheral Pulses, No clubbing, cyanosis, or edema  NEUROLOGY: AAOx3, non-focal      LABS:                        14.4   6.2   )-----------( 219      ( 28 Aug 2017 06:15 )             42.2     08-28    142  |  104  |  24<H>  ----------------------------<  134<H>  4.0   |  24  |  0.89    Ca    9.4      28 Aug 2017 06:15  Mg     2.0     08-28      PT/INR - ( 28 Aug 2017 06:15 )   PT: 12.5 sec;   INR: 1.15 ratio         PTT - ( 28 Aug 2017 06:15 )  PTT:28.8 sec          RADIOLOGY & ADDITIONAL TESTS:    Imaging Personally Reviewed:    Consultant(s) Notes Reviewed: EP, structural heart disease, neurosurgery    Care Discussed with Consultants/Other Providers:   Discussed with Dr. Sutton (cardiology) regarding the plan in detail. As Mr. Painter cannot get heparin bolus, will consider continuous infusion of heparin prior to TAVR for full anticoagulation. Will check with TAVR team Patient is a 85y old  Male who presents with a chief complaint of Fall with right frontal intraparenchymal hemorrhage and R 6-7 rib fractures (23 Aug 2017 14:43)      SUBJECTIVE / OVERNIGHT EVENTS:  No acute events overnight. Pt reports that he had some sleep but is worried about the procedure today. He was wondering if he would be placed on sleep for the procedure as he doesn't want to feel any pain. He is very anxious about the LHC and TAVR as he is "old". He asked many questions regarding the procedures. He has been able to walk back/forth to the bathroom. Denies fever/chills, chest pain, dyspnea at rest, abdominal pain, n/v/c/d.       MEDICATIONS  (STANDING):  digoxin     Tablet 0.125 milliGRAM(s) Oral daily  diltiazem    milliGRAM(s) Oral daily  metoprolol 100 milliGRAM(s) Oral two times a day  dextrose 5%. 1000 milliLiter(s) (50 mL/Hr) IV Continuous <Continuous>  dextrose 50% Injectable 12.5 Gram(s) IV Push once  dextrose 50% Injectable 25 Gram(s) IV Push once  dextrose 50% Injectable 25 Gram(s) IV Push once  levothyroxine 50 MICROGram(s) Oral daily  tamsulosin 0.4 milliGRAM(s) Oral before breakfast  atorvastatin 10 milliGRAM(s) Oral at bedtime  lidocaine   Patch 1 Patch Transdermal daily  insulin lispro (HumaLOG) corrective regimen sliding scale   SubCutaneous three times a day before meals  insulin lispro (HumaLOG) corrective regimen sliding scale   SubCutaneous at bedtime  enoxaparin Injectable 40 milliGRAM(s) SubCutaneous daily  senna 2 Tablet(s) Oral at bedtime  docusate sodium 100 milliGRAM(s) Oral three times a day    MEDICATIONS  (PRN):  dextrose Gel 1 Dose(s) Oral once PRN Blood Glucose LESS THAN 70 milliGRAM(s)/deciliter  glucagon  Injectable 1 milliGRAM(s) IntraMuscular once PRN Glucose LESS THAN 70 milligrams/deciliter  acetaminophen   Tablet 650 milliGRAM(s) Oral every 6 hours PRN mild pain  polyethylene glycol 3350 17 Gram(s) Oral daily PRN Constipation      CAPILLARY BLOOD GLUCOSE  128 (28 Aug 2017 07:40)  145 (27 Aug 2017 21:15)  173 (27 Aug 2017 16:30)        I&O's Summary    27 Aug 2017 07:01  -  28 Aug 2017 07:00  --------------------------------------------------------  IN: 1280 mL / OUT: 1601 mL / NET: -321 mL    28 Aug 2017 07:01  -  28 Aug 2017 11:37  --------------------------------------------------------  IN: 0 mL / OUT: 350 mL / NET: -350 mL        PHYSICAL EXAM:  Vital Signs Last 24 Hrs  T(C): 36.9 (28 Aug 2017 05:11), Max: 36.9 (28 Aug 2017 05:11)  T(F): 98.4 (28 Aug 2017 05:11), Max: 98.4 (28 Aug 2017 05:11)  HR: 80 (28 Aug 2017 05:11) (67 - 84)  BP: 136/83 (28 Aug 2017 05:11) (116/85 - 140/88)  BP(mean): --  RR: 16 (28 Aug 2017 05:11) (16 - 18)  SpO2: 98% (28 Aug 2017 05:11) (96% - 99%)    GENERAL: NAD, anxious elderly male, well-developed  HEAD:  healing wound over the left eye/forehead s/p fall  EYES: EOMI, PERRLA, conjunctiva and sclera clear; R eye laceration cdi, ecchymosis improving   NECK: Supple, No JVD  CHEST/LUNG: Clear to auscultation bilaterally; No wheeze  HEART: irregularly irregular; No murmurs, rubs, or gallops  ABDOMEN: Soft, Nontender, Nondistended; Bowel sounds present  EXTREMITIES:  2+ Peripheral Pulses, No clubbing, cyanosis, or edema  NEUROLOGY: AAOx3, non-focal      LABS:                        14.4   6.2   )-----------( 219      ( 28 Aug 2017 06:15 )             42.2     08-28    142  |  104  |  24<H>  ----------------------------<  134<H>  4.0   |  24  |  0.89    Ca    9.4      28 Aug 2017 06:15  Mg     2.0     08-28      PT/INR - ( 28 Aug 2017 06:15 )   PT: 12.5 sec;   INR: 1.15 ratio         PTT - ( 28 Aug 2017 06:15 )  PTT:28.8 sec      Labs personally reviewed and there has been no significant changes. CBC, BMP unremarkable.       RADIOLOGY & ADDITIONAL TESTS:    Imaging Personally Reviewed: no new imaging    Consultant(s) Notes Reviewed: EP, structural heart disease, neurosurgery    Care Discussed with Consultants/Other Providers:   Discussed with Dr. Sutton (cardiology) regarding the plan in detail. As Mr. Painter cannot get heparin bolus, will consider continuous infusion of heparin prior to TAVR for full anticoagulation. Will check with TAVR team Patient is a 85y old  Male who presents with a chief complaint of Fall with right frontal intraparenchymal hemorrhage and R 6-7 rib fractures (23 Aug 2017 14:43)      SUBJECTIVE / OVERNIGHT EVENTS:  No acute events overnight. Pt reports that he had some sleep but is worried about the procedure today. He was wondering if he would be placed on sleep for the procedure as he doesn't want to feel any pain. He is very anxious about the LHC and TAVR as he is "old". He asked many questions regarding the procedures. He has been able to walk back/forth to the bathroom. Denies fever/chills, chest pain, dyspnea at rest, abdominal pain, n/v/c/d.       MEDICATIONS  (STANDING):  digoxin     Tablet 0.125 milliGRAM(s) Oral daily  diltiazem    milliGRAM(s) Oral daily  metoprolol 100 milliGRAM(s) Oral two times a day  dextrose 5%. 1000 milliLiter(s) (50 mL/Hr) IV Continuous <Continuous>  dextrose 50% Injectable 12.5 Gram(s) IV Push once  dextrose 50% Injectable 25 Gram(s) IV Push once  dextrose 50% Injectable 25 Gram(s) IV Push once  levothyroxine 50 MICROGram(s) Oral daily  tamsulosin 0.4 milliGRAM(s) Oral before breakfast  atorvastatin 10 milliGRAM(s) Oral at bedtime  lidocaine   Patch 1 Patch Transdermal daily  insulin lispro (HumaLOG) corrective regimen sliding scale   SubCutaneous three times a day before meals  insulin lispro (HumaLOG) corrective regimen sliding scale   SubCutaneous at bedtime  enoxaparin Injectable 40 milliGRAM(s) SubCutaneous daily  senna 2 Tablet(s) Oral at bedtime  docusate sodium 100 milliGRAM(s) Oral three times a day    MEDICATIONS  (PRN):  dextrose Gel 1 Dose(s) Oral once PRN Blood Glucose LESS THAN 70 milliGRAM(s)/deciliter  glucagon  Injectable 1 milliGRAM(s) IntraMuscular once PRN Glucose LESS THAN 70 milligrams/deciliter  acetaminophen   Tablet 650 milliGRAM(s) Oral every 6 hours PRN mild pain  polyethylene glycol 3350 17 Gram(s) Oral daily PRN Constipation      CAPILLARY BLOOD GLUCOSE  128 (28 Aug 2017 07:40)  145 (27 Aug 2017 21:15)  173 (27 Aug 2017 16:30)        I&O's Summary    27 Aug 2017 07:01  -  28 Aug 2017 07:00  --------------------------------------------------------  IN: 1280 mL / OUT: 1601 mL / NET: -321 mL    28 Aug 2017 07:01  -  28 Aug 2017 11:37  --------------------------------------------------------  IN: 0 mL / OUT: 350 mL / NET: -350 mL        PHYSICAL EXAM:  Vital Signs Last 24 Hrs  T(C): 36.9 (28 Aug 2017 05:11), Max: 36.9 (28 Aug 2017 05:11)  T(F): 98.4 (28 Aug 2017 05:11), Max: 98.4 (28 Aug 2017 05:11)  HR: 80 (28 Aug 2017 05:11) (67 - 84)  BP: 136/83 (28 Aug 2017 05:11) (116/85 - 140/88)  BP(mean): --  RR: 16 (28 Aug 2017 05:11) (16 - 18)  SpO2: 98% (28 Aug 2017 05:11) (96% - 99%)    GENERAL: NAD, anxious elderly male, well-developed  HEAD:  healing wound over the left eye/forehead s/p fall  EYES: EOMI, PERRLA, conjunctiva and sclera clear; R eye laceration cdi, ecchymosis improving   NECK: Supple, No JVD  CHEST/LUNG: Clear to auscultation bilaterally; No wheeze  HEART: irregularly irregular; systolic ejection murmur  ABDOMEN: Soft, Nontender, Nondistended; Bowel sounds present  EXTREMITIES:  2+ Peripheral Pulses, No clubbing, cyanosis, or edema  NEUROLOGY: AAOx3, non-focal      LABS:                        14.4   6.2   )-----------( 219      ( 28 Aug 2017 06:15 )             42.2     08-28    142  |  104  |  24<H>  ----------------------------<  134<H>  4.0   |  24  |  0.89    Ca    9.4      28 Aug 2017 06:15  Mg     2.0     08-28      PT/INR - ( 28 Aug 2017 06:15 )   PT: 12.5 sec;   INR: 1.15 ratio         PTT - ( 28 Aug 2017 06:15 )  PTT:28.8 sec      Labs personally reviewed and there has been no significant changes. CBC, BMP unremarkable.       RADIOLOGY & ADDITIONAL TESTS:    Imaging Personally Reviewed: no new imaging    Consultant(s) Notes Reviewed: EP, structural heart disease, neurosurgery    Care Discussed with Consultants/Other Providers:   Discussed with Dr. Sutton (cardiology) regarding the plan in detail. As Mr. Painter cannot get heparin bolus, will consider continuous infusion of heparin prior to TAVR for full anticoagulation. Will check with TAVR team

## 2017-08-29 DIAGNOSIS — Z02.9 ENCOUNTER FOR ADMINISTRATIVE EXAMINATIONS, UNSPECIFIED: ICD-10-CM

## 2017-08-29 DIAGNOSIS — I35.0 NONRHEUMATIC AORTIC (VALVE) STENOSIS: ICD-10-CM

## 2017-08-29 LAB
ANION GAP SERPL CALC-SCNC: 13 MMOL/L — SIGNIFICANT CHANGE UP (ref 5–17)
BUN SERPL-MCNC: 20 MG/DL — SIGNIFICANT CHANGE UP (ref 7–23)
CALCIUM SERPL-MCNC: 9.5 MG/DL — SIGNIFICANT CHANGE UP (ref 8.4–10.5)
CHLORIDE SERPL-SCNC: 105 MMOL/L — SIGNIFICANT CHANGE UP (ref 96–108)
CO2 SERPL-SCNC: 24 MMOL/L — SIGNIFICANT CHANGE UP (ref 22–31)
CREAT SERPL-MCNC: 0.74 MG/DL — SIGNIFICANT CHANGE UP (ref 0.5–1.3)
GLUCOSE SERPL-MCNC: 138 MG/DL — HIGH (ref 70–99)
POTASSIUM SERPL-MCNC: 4 MMOL/L — SIGNIFICANT CHANGE UP (ref 3.5–5.3)
POTASSIUM SERPL-SCNC: 4 MMOL/L — SIGNIFICANT CHANGE UP (ref 3.5–5.3)
SODIUM SERPL-SCNC: 142 MMOL/L — SIGNIFICANT CHANGE UP (ref 135–145)

## 2017-08-29 PROCEDURE — 99233 SBSQ HOSP IP/OBS HIGH 50: CPT

## 2017-08-29 PROCEDURE — 99233 SBSQ HOSP IP/OBS HIGH 50: CPT | Mod: GC

## 2017-08-29 RX ORDER — WARFARIN SODIUM 2.5 MG/1
4 TABLET ORAL ONCE
Qty: 0 | Refills: 0 | Status: COMPLETED | OUTPATIENT
Start: 2017-08-29 | End: 2017-08-29

## 2017-08-29 RX ORDER — WARFARIN SODIUM 2.5 MG/1
2.5 TABLET ORAL DAILY
Qty: 0 | Refills: 0 | Status: DISCONTINUED | OUTPATIENT
Start: 2017-08-29 | End: 2017-08-29

## 2017-08-29 RX ADMIN — TAMSULOSIN HYDROCHLORIDE 0.4 MILLIGRAM(S): 0.4 CAPSULE ORAL at 06:12

## 2017-08-29 RX ADMIN — Medication 100 MILLIGRAM(S): at 21:25

## 2017-08-29 RX ADMIN — LIDOCAINE 1 PATCH: 4 CREAM TOPICAL at 12:47

## 2017-08-29 RX ADMIN — Medication 180 MILLIGRAM(S): at 06:12

## 2017-08-29 RX ADMIN — LIDOCAINE 1 PATCH: 4 CREAM TOPICAL at 02:00

## 2017-08-29 RX ADMIN — SENNA PLUS 2 TABLET(S): 8.6 TABLET ORAL at 21:25

## 2017-08-29 RX ADMIN — Medication 100 MILLIGRAM(S): at 06:12

## 2017-08-29 RX ADMIN — Medication 3: at 11:54

## 2017-08-29 RX ADMIN — ATORVASTATIN CALCIUM 10 MILLIGRAM(S): 80 TABLET, FILM COATED ORAL at 21:25

## 2017-08-29 RX ADMIN — Medication 100 MILLIGRAM(S): at 12:47

## 2017-08-29 RX ADMIN — ENOXAPARIN SODIUM 40 MILLIGRAM(S): 100 INJECTION SUBCUTANEOUS at 11:54

## 2017-08-29 RX ADMIN — Medication 0.12 MILLIGRAM(S): at 06:12

## 2017-08-29 RX ADMIN — Medication 100 MILLIGRAM(S): at 17:19

## 2017-08-29 RX ADMIN — Medication 50 MICROGRAM(S): at 06:11

## 2017-08-29 RX ADMIN — WARFARIN SODIUM 4 MILLIGRAM(S): 2.5 TABLET ORAL at 21:26

## 2017-08-29 NOTE — PROGRESS NOTE ADULT - SUBJECTIVE AND OBJECTIVE BOX
Patient is a 85y old  Male who presents with a chief complaint of Fall with right frontal intraparenchymal hemorrhage and R 6-7 rib fractures (23 Aug 2017 14:43)      SUBJECTIVE / OVERNIGHT EVENTS:  No acute events overnight. Pt tolerated LHC well yesterday, which showed moderate diffuse disease including mLAD 60%, moderate LM and LCx. Pt feels the same, denies any chest pain, sob, fever/chills, n/v/c/d, abdominal pain, lightheadedness. Pt admits that he is much weaker now but he is not ready to die yet as he has so many things to read. He agrees to the plan to discharge to rehab as he himself doesn't feel safe to be home by himself.     MEDICATIONS  (STANDING):  digoxin     Tablet 0.125 milliGRAM(s) Oral daily  diltiazem    milliGRAM(s) Oral daily  metoprolol 100 milliGRAM(s) Oral two times a day  dextrose 5%. 1000 milliLiter(s) (50 mL/Hr) IV Continuous <Continuous>  dextrose 50% Injectable 12.5 Gram(s) IV Push once  dextrose 50% Injectable 25 Gram(s) IV Push once  dextrose 50% Injectable 25 Gram(s) IV Push once  levothyroxine 50 MICROGram(s) Oral daily  tamsulosin 0.4 milliGRAM(s) Oral before breakfast  atorvastatin 10 milliGRAM(s) Oral at bedtime  lidocaine   Patch 1 Patch Transdermal daily  insulin lispro (HumaLOG) corrective regimen sliding scale   SubCutaneous three times a day before meals  insulin lispro (HumaLOG) corrective regimen sliding scale   SubCutaneous at bedtime  enoxaparin Injectable 40 milliGRAM(s) SubCutaneous daily  senna 2 Tablet(s) Oral at bedtime  docusate sodium 100 milliGRAM(s) Oral three times a day    MEDICATIONS  (PRN):  dextrose Gel 1 Dose(s) Oral once PRN Blood Glucose LESS THAN 70 milliGRAM(s)/deciliter  glucagon  Injectable 1 milliGRAM(s) IntraMuscular once PRN Glucose LESS THAN 70 milligrams/deciliter  acetaminophen   Tablet 650 milliGRAM(s) Oral every 6 hours PRN mild pain  polyethylene glycol 3350 17 Gram(s) Oral daily PRN Constipation      CAPILLARY BLOOD GLUCOSE  272 (29 Aug 2017 11:06)  124 (29 Aug 2017 07:49)  162 (28 Aug 2017 21:09)  270 (28 Aug 2017 16:44)        I&O's Summary    28 Aug 2017 07:01  -  29 Aug 2017 07:00  --------------------------------------------------------  IN: 360 mL / OUT: 950 mL / NET: -590 mL    29 Aug 2017 07:01  -  29 Aug 2017 11:49  --------------------------------------------------------  IN: 0 mL / OUT: 150 mL / NET: -150 mL        PHYSICAL EXAM:  Vital Signs Last 24 Hrs  T(C): 36.5 (29 Aug 2017 08:56), Max: 37 (29 Aug 2017 05:15)  T(F): 97.7 (29 Aug 2017 08:56), Max: 98.6 (29 Aug 2017 05:15)  HR: 89 (29 Aug 2017 08:56) (71 - 96)  BP: 103/66 (29 Aug 2017 08:56) (100/66 - 148/94)  BP(mean): --  RR: 18 (29 Aug 2017 08:56) (16 - 18)  SpO2: 97% (29 Aug 2017 08:56) (94% - 98%)      GENERAL: sitting in chair eating breakfast, in nad  HEAD:  healing wound over the left eye/forehead s/p fall  EYES: EOMI, PERRLA, conjunctiva and sclera clear; R eye laceration cdi, ecchymosis improving   NECK: Supple, No JVD  CHEST/LUNG: Clear to auscultation bilaterally; No wheeze  HEART: irregularly irregular; systolic ejection murmur  ABDOMEN: Soft, Nontender, Nondistended; Bowel sounds present  EXTREMITIES:  2+ Peripheral Pulses, No clubbing, cyanosis, or edema  NEUROLOGY: AAOx3, non-focal    LABS:                        14.4   6.2   )-----------( 219      ( 28 Aug 2017 06:15 )             42.2     08-29    142  |  105  |  20  ----------------------------<  138<H>  4.0   |  24  |  0.74    Ca    9.5      29 Aug 2017 06:56  Mg     2.0     08-28      PT/INR - ( 28 Aug 2017 06:15 )   PT: 12.5 sec;   INR: 1.15 ratio         PTT - ( 28 Aug 2017 06:15 )  PTT:28.8 sec      Personally reviewed the labs: stable creatinine function; H/H stable      RADIOLOGY & ADDITIONAL TESTS:    Consultant(s) Notes Reviewed:  EP, cardiology    Care Discussed with Consultants/Other Providers:  Discussed with Dr. Wyatt regarding the plan - due to deconditioning of the patient, recommended completion of workups for TAVR, discharge to rehab and TAVR as outpatient

## 2017-08-29 NOTE — PROGRESS NOTE ADULT - PROBLEM SELECTOR PLAN 4
R frontal vertex parasagittal gyrus small hemorrhage, stable on repeat CTH 8/21   f/u surgery/neurosx re: when to resume AC.  -no acute intervention at this time. R frontal vertex parasagittal gyrus small hemorrhage, stable on repeat CTH 8/21   -no acute intervention at this time.  - monitor mental status

## 2017-08-29 NOTE — PROGRESS NOTE ADULT - PROBLEM SELECTOR PLAN 1
TAVR eval in progress  Carotid Dopplers, TTE, Cardiac Cath, PFTs, and Frailty completed  CT Heart pending 8/30 to complete workup  Given low gradient/velocity on TTE, will review cardiac CT w/ Structural Heart Team and determine need for further cardiac imaging. Can be completed as outpt.  Recent fall w/ SDH: NSx note reviewed, discussed w/ team, pt. will require Heparin bolus during TAVR and Coumadin for Afib. Recommend repeating head CT in 2 weeks, if stable will schedule TAVR as outpt.    Please call with questions/concerns [phone] or [phone]

## 2017-08-29 NOTE — PROGRESS NOTE ADULT - SUBJECTIVE AND OBJECTIVE BOX
Interval Events:    ROS: Denies HA/dizziness/CP/SOB/PND/orthopnea/LE edema/abd pain    MEDICATIONS:  digoxin     Tablet 0.125 milliGRAM(s) Oral daily  diltiazem    milliGRAM(s) Oral daily  metoprolol 100 milliGRAM(s) Oral two times a day  tamsulosin 0.4 milliGRAM(s) Oral before breakfast  enoxaparin Injectable 40 milliGRAM(s) SubCutaneous daily        acetaminophen   Tablet 650 milliGRAM(s) Oral every 6 hours PRN    polyethylene glycol 3350 17 Gram(s) Oral daily PRN  senna 2 Tablet(s) Oral at bedtime  docusate sodium 100 milliGRAM(s) Oral three times a day    dextrose Gel 1 Dose(s) Oral once PRN  dextrose 50% Injectable 12.5 Gram(s) IV Push once  dextrose 50% Injectable 25 Gram(s) IV Push once  dextrose 50% Injectable 25 Gram(s) IV Push once  glucagon  Injectable 1 milliGRAM(s) IntraMuscular once PRN  levothyroxine 50 MICROGram(s) Oral daily  atorvastatin 10 milliGRAM(s) Oral at bedtime  insulin lispro (HumaLOG) corrective regimen sliding scale   SubCutaneous three times a day before meals  insulin lispro (HumaLOG) corrective regimen sliding scale   SubCutaneous at bedtime    dextrose 5%. 1000 milliLiter(s) IV Continuous <Continuous>  lidocaine   Patch 1 Patch Transdermal daily      PHYSICAL EXAM:  T(C): 36.5 (08-29-17 @ 08:56), Max: 37 (08-29-17 @ 05:15)  HR: 89 (08-29-17 @ 08:56) (71 - 96)  BP: 103/66 (08-29-17 @ 08:56) (100/66 - 148/94)  RR: 18 (08-29-17 @ 08:56) (16 - 18)  SpO2: 97% (08-29-17 @ 08:56) (94% - 98%)  Wt(kg): --  I&O's Summary    28 Aug 2017 07:01  -  29 Aug 2017 07:00  --------------------------------------------------------  IN: 360 mL / OUT: 950 mL / NET: -590 mL        Appearance: No Acute Distress  HEENT: No JVD  Cardiovascular: Normal S1 S2, RRR, No murmurs/rubs/gallops  Respiratory: Clear to auscultation bilaterally  Gastrointestinal: Soft, Non-tender	  Skin: No cyanosis	  Neurologic: Non-focal  Extremities: No clubbing, cyanosis or edema  Psychiatry: A & O x 3, Mood & affect appropriate    LABS:	 	    CBC Full  -  ( 28 Aug 2017 06:15 )  WBC Count : 6.2 K/uL  Hemoglobin : 14.4 g/dL  Hematocrit : 42.2 %  Platelet Count - Automated : 219 K/uL  Mean Cell Volume : 106.0 fl  Mean Cell Hemoglobin : 35.9 pg  Mean Cell Hemoglobin Concentration : 34.0 gm/dL  Auto Neutrophil # : 2.6 K/uL  Auto Lymphocyte # : 2.2 K/uL  Auto Monocyte # : 1.0 K/uL  Auto Eosinophil # : 0.3 K/uL  Auto Basophil # : 0.1 K/uL  Auto Neutrophil % : 42.1 %  Auto Lymphocyte % : 35.3 %  Auto Monocyte % : 16.8 %  Auto Eosinophil % : 4.4 %  Auto Basophil % : 1.4 %    08-29    142  |  105  |  20  ----------------------------<  138<H>  4.0   |  24  |  0.74  08-28    142  |  104  |  24<H>  ----------------------------<  134<H>  4.0   |  24  |  0.89    Ca    9.5      29 Aug 2017 06:56  Ca    9.4      28 Aug 2017 06:15  Mg     2.0     08-28        proBNP:   Lipid Profile:   HgA1c:     CARDIAC MARKERS:            TELEMETRY:   ECG:      DIAGNOSTIC TESTING:    [ ] Echocardiogram:  [ ] Catheterization: Interval Events: Per patient and nurse, no overnight events. At time of interview, patient reports no discomfort. He denies any chest pain, shortness of breath, palpitations, edema. Denies any syncopal events overnight.    ROS: Denies HA/dizziness/CP/SOB/PND/orthopnea/LE edema/abd pain    MEDICATIONS:  digoxin     Tablet 0.125 milliGRAM(s) Oral daily  diltiazem    milliGRAM(s) Oral daily  metoprolol 100 milliGRAM(s) Oral two times a day  tamsulosin 0.4 milliGRAM(s) Oral before breakfast  enoxaparin Injectable 40 milliGRAM(s) SubCutaneous daily        acetaminophen   Tablet 650 milliGRAM(s) Oral every 6 hours PRN    polyethylene glycol 3350 17 Gram(s) Oral daily PRN  senna 2 Tablet(s) Oral at bedtime  docusate sodium 100 milliGRAM(s) Oral three times a day    dextrose Gel 1 Dose(s) Oral once PRN  dextrose 50% Injectable 12.5 Gram(s) IV Push once  dextrose 50% Injectable 25 Gram(s) IV Push once  dextrose 50% Injectable 25 Gram(s) IV Push once  glucagon  Injectable 1 milliGRAM(s) IntraMuscular once PRN  levothyroxine 50 MICROGram(s) Oral daily  atorvastatin 10 milliGRAM(s) Oral at bedtime  insulin lispro (HumaLOG) corrective regimen sliding scale   SubCutaneous three times a day before meals  insulin lispro (HumaLOG) corrective regimen sliding scale   SubCutaneous at bedtime    dextrose 5%. 1000 milliLiter(s) IV Continuous <Continuous>  lidocaine   Patch 1 Patch Transdermal daily      PHYSICAL EXAM:  T(C): 36.5 (08-29-17 @ 08:56), Max: 37 (08-29-17 @ 05:15)  HR: 89 (08-29-17 @ 08:56) (71 - 96)  BP: 103/66 (08-29-17 @ 08:56) (100/66 - 148/94)  RR: 18 (08-29-17 @ 08:56) (16 - 18)  SpO2: 97% (08-29-17 @ 08:56) (94% - 98%)  Wt(kg): --  I&O's Summary    28 Aug 2017 07:01  -  29 Aug 2017 07:00  --------------------------------------------------------  IN: 360 mL / OUT: 950 mL / NET: -590 mL        Appearance: No Acute Distress  HEENT: No JVD  Cardiovascular: Normal S1 S2, irregular heart rate and pulse, No murmurs/rubs/gallops  Respiratory: Clear to auscultation bilaterally  Gastrointestinal: Soft, Non-tender	  Skin: No cyanosis	  Neurologic: Non-focal  Extremities: No clubbing, cyanosis or edema  Psychiatry: A & O x 3, Mood & affect appropriate    LABS:	 	    CBC Full  -  ( 28 Aug 2017 06:15 )  WBC Count : 6.2 K/uL  Hemoglobin : 14.4 g/dL  Hematocrit : 42.2 %  Platelet Count - Automated : 219 K/uL  Mean Cell Volume : 106.0 fl  Mean Cell Hemoglobin : 35.9 pg  Mean Cell Hemoglobin Concentration : 34.0 gm/dL  Auto Neutrophil # : 2.6 K/uL  Auto Lymphocyte # : 2.2 K/uL  Auto Monocyte # : 1.0 K/uL  Auto Eosinophil # : 0.3 K/uL  Auto Basophil # : 0.1 K/uL  Auto Neutrophil % : 42.1 %  Auto Lymphocyte % : 35.3 %  Auto Monocyte % : 16.8 %  Auto Eosinophil % : 4.4 %  Auto Basophil % : 1.4 %    08-29    142  |  105  |  20  ----------------------------<  138<H>  4.0   |  24  |  0.74  08-28    142  |  104  |  24<H>  ----------------------------<  134<H>  4.0   |  24  |  0.89    Ca    9.5      29 Aug 2017 06:56  Ca    9.4      28 Aug 2017 06:15  Mg     2.0     08-28        proBNP:   Lipid Profile:   HgA1c:     CARDIAC MARKERS:            TELEMETRY:   ECG:      DIAGNOSTIC TESTING:    [ ] Echocardiogram:  [ ] Catheterization: Interval Events: Per patient and nurse, no overnight events. At time of interview, patient reports no discomfort. He denies any chest pain, shortness of breath, palpitations, edema. Denies any syncopal events overnight.    On telemetry, patient remains in AFib. Baseline heart rate in 80s, with multiple overnight 20-30min episodes of sustained heart rate 120-130.    ROS: Denies HA/dizziness/CP/SOB/PND/orthopnea/LE edema/abd pain    MEDICATIONS:  digoxin     Tablet 0.125 milliGRAM(s) Oral daily  diltiazem    milliGRAM(s) Oral daily  metoprolol 100 milliGRAM(s) Oral two times a day  tamsulosin 0.4 milliGRAM(s) Oral before breakfast  enoxaparin Injectable 40 milliGRAM(s) SubCutaneous daily        acetaminophen   Tablet 650 milliGRAM(s) Oral every 6 hours PRN    polyethylene glycol 3350 17 Gram(s) Oral daily PRN  senna 2 Tablet(s) Oral at bedtime  docusate sodium 100 milliGRAM(s) Oral three times a day    dextrose Gel 1 Dose(s) Oral once PRN  dextrose 50% Injectable 12.5 Gram(s) IV Push once  dextrose 50% Injectable 25 Gram(s) IV Push once  dextrose 50% Injectable 25 Gram(s) IV Push once  glucagon  Injectable 1 milliGRAM(s) IntraMuscular once PRN  levothyroxine 50 MICROGram(s) Oral daily  atorvastatin 10 milliGRAM(s) Oral at bedtime  insulin lispro (HumaLOG) corrective regimen sliding scale   SubCutaneous three times a day before meals  insulin lispro (HumaLOG) corrective regimen sliding scale   SubCutaneous at bedtime    dextrose 5%. 1000 milliLiter(s) IV Continuous <Continuous>  lidocaine   Patch 1 Patch Transdermal daily      PHYSICAL EXAM:  T(C): 36.5 (08-29-17 @ 08:56), Max: 37 (08-29-17 @ 05:15)  HR: 89 (08-29-17 @ 08:56) (71 - 96)  BP: 103/66 (08-29-17 @ 08:56) (100/66 - 148/94)  RR: 18 (08-29-17 @ 08:56) (16 - 18)  SpO2: 97% (08-29-17 @ 08:56) (94% - 98%)  Wt(kg): --  I&O's Summary    28 Aug 2017 07:01  -  29 Aug 2017 07:00  --------------------------------------------------------  IN: 360 mL / OUT: 950 mL / NET: -590 mL        Appearance: No Acute Distress  HEENT: No JVD  Cardiovascular: Normal S1 S2, irregular heart rate and pulse, No murmurs/rubs/gallops  Respiratory: Clear to auscultation bilaterally  Gastrointestinal: Soft, Non-tender	  Skin: No cyanosis	  Neurologic: Non-focal  Extremities: No clubbing, cyanosis or edema  Psychiatry: A & O x 3, Mood & affect appropriate    LABS:	 	    CBC Full  -  ( 28 Aug 2017 06:15 )  WBC Count : 6.2 K/uL  Hemoglobin : 14.4 g/dL  Hematocrit : 42.2 %  Platelet Count - Automated : 219 K/uL  Mean Cell Volume : 106.0 fl  Mean Cell Hemoglobin : 35.9 pg  Mean Cell Hemoglobin Concentration : 34.0 gm/dL  Auto Neutrophil # : 2.6 K/uL  Auto Lymphocyte # : 2.2 K/uL  Auto Monocyte # : 1.0 K/uL  Auto Eosinophil # : 0.3 K/uL  Auto Basophil # : 0.1 K/uL  Auto Neutrophil % : 42.1 %  Auto Lymphocyte % : 35.3 %  Auto Monocyte % : 16.8 %  Auto Eosinophil % : 4.4 %  Auto Basophil % : 1.4 %    08-29    142  |  105  |  20  ----------------------------<  138<H>  4.0   |  24  |  0.74  08-28    142  |  104  |  24<H>  ----------------------------<  134<H>  4.0   |  24  |  0.89    Ca    9.5      29 Aug 2017 06:56  Ca    9.4      28 Aug 2017 06:15  Mg     2.0     08-28        proBNP:   Lipid Profile:   HgA1c:     CARDIAC MARKERS:            TELEMETRY:   ECG:      DIAGNOSTIC TESTING:    [ ] Echocardiogram:  [ ] Catheterization:

## 2017-08-29 NOTE — DIETITIAN INITIAL EVALUATION ADULT. - OTHER INFO
seen for length of stay. states >75 intake when he gets what he prefers. states he eats well at home and refused need for supplements. appreciated written material related to diabetes and coumadin. RITU torrez in progress. last BM today. GISELLE

## 2017-08-29 NOTE — PROGRESS NOTE ADULT - PROBLEM SELECTOR PLAN 2
[facility] 6  - cont home metoprolol tartrate 100mg bid and diltiazem 180mg extended release  - coumadin resumption cleared by trauma and neurosurgery. [facility] 6. Discussed with neurosugery NP and confirmed that it is okay to resume his full anticoagulation for afib. Will resume his coumadin  - cont home metoprolol tartrate 100mg bid and diltiazem 180mg extended release  - coumadin resumption cleared by trauma and neurosurgery.  - check INR and adjust coumadin [facility] 6. Discussed with neurosugery NP and confirmed that it is okay to resume his full anticoagulation for afib. Will resume his coumadin 4mg home dose  - cont home metoprolol tartrate 100mg bid and diltiazem 180mg extended release  - coumadin resumption cleared by trauma and neurosurgery.  - check INR and adjust coumadin

## 2017-08-29 NOTE — PROGRESS NOTE ADULT - PROBLEM SELECTOR PLAN 1
severe AS with gradient 0.8 on TTE. Card/structural heart disease recommends TAVR. Discussed with Dr. Wyatt (card) - will plan for CT TAVR protocol, rehab and then TAVR as outpatient.   - CTA for TAVR protocol tomorrow as at least 48 hrs in between the LHC and CT  - appreciated cardiology recs  - likely require heparin continuous infusion before TAVR

## 2017-08-29 NOTE — DIETITIAN INITIAL EVALUATION ADULT. - NS AS NUTRI INTERV ED CONTENT
Nutrition relationship to health/disease/reinforced CHO counting, diabetes label reading tips, and coumadin ed

## 2017-08-29 NOTE — PROGRESS NOTE ADULT - PROBLEM SELECTOR PLAN 5
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  Reviewed TTE, gradient 0.8.  Cards and structural heart disease appreciated.  -per NSX, can get DAPT if necessary, can get hep gtt, but CANNOT GET hep bolus.   -per Dr. nichole, less likely arrthymogenic cause of syncope. 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  Reviewed TTE, gradient 0.8.  Cards and structural heart disease appreciated.  -per NSX, can get DAPT if necessary, can get hep gtt, but CANNOT GET hep bolus.   -per Dr. nichole, less likely arrthymogenic cause of syncope.  - PT consult  - likely dc to rehab

## 2017-08-29 NOTE — PROGRESS NOTE ADULT - PROBLEM SELECTOR PLAN 3
-appreciate EP eval and cards eval  -continue current regimen, less likely cause of syncope  -may need loop recorder before dc

## 2017-08-29 NOTE — PROGRESS NOTE ADULT - ASSESSMENT
85 year old man w/ PMH AS (lAVA 0.8, 2017), chronic Afib on Coumadin, DM, HTN, HLD, hypothyroidism p/w syncope found to have R subarachnoid hemorrhage with no acute changes in status.    1) AS   0.8 cm sq per continuity equation  Discrepancies in severity with regard to gradient and velocity below threshold for severe stenosis; may be paradoxical AS, doppler angle error - may consider calcium scoring by CT (severe AS in setting of >2000AU in men) vs BRIAN vs low dose dobutamine ECHO in the future to help elucidate this further.     TAVR ream following and appreciate their guidance.   R/LHC performed today with moderate diffuse disease +/- intervention needed in the future.   Plan for CT TAVR protocol prior to discharge.   Given his deconditioned state and recent SA hemorrhage, leaning toward completing TAVR workup this admission, discharge to rehab, and follow up for intervention.     2) Chronic AF  Asymptomatic   CHADS2-Vasc: 5+     Neurosurgery cleared for DAPT and full anticoagulation without heparin bolus.   Continue medical management with digoxin 0.125 mg qd, metoprolol tartrate 100 mg BID, and diltiazem 180 mg daily. 85 year old man w/ PMH AS (lAVA 0.8, 2017), chronic Afib on Coumadin, DM, HTN, HLD, hypothyroidism p/w syncope found to have R subarachnoid hemorrhage with no acute changes in status.    has syncope and minor bradycardia, will follow for possible pacemaker      1) Chronic AF  Asymptomatic  CHADS2-Vasc: 5+   -continue with Lovenox 40mg subcutaneous  -continue digoxin 0.125 mg qd, metoprolol tartrate 100 mg BID, and diltiazem 180 mg daily, per cardiology      2) Aortic Stenosis  -possible outpatient TAVR

## 2017-08-30 LAB
APTT BLD: 29 SEC — SIGNIFICANT CHANGE UP (ref 27.5–37.4)
INR BLD: 1.18 RATIO — HIGH (ref 0.88–1.16)
PROTHROM AB SERPL-ACNC: 12.8 SEC — HIGH (ref 9.8–12.7)

## 2017-08-30 PROCEDURE — 99233 SBSQ HOSP IP/OBS HIGH 50: CPT

## 2017-08-30 PROCEDURE — 75574 CT ANGIO HRT W/3D IMAGE: CPT | Mod: 26

## 2017-08-30 RX ADMIN — SENNA PLUS 2 TABLET(S): 8.6 TABLET ORAL at 21:27

## 2017-08-30 RX ADMIN — TAMSULOSIN HYDROCHLORIDE 0.4 MILLIGRAM(S): 0.4 CAPSULE ORAL at 05:37

## 2017-08-30 RX ADMIN — Medication 100 MILLIGRAM(S): at 05:37

## 2017-08-30 RX ADMIN — LIDOCAINE 1 PATCH: 4 CREAM TOPICAL at 00:57

## 2017-08-30 RX ADMIN — Medication 180 MILLIGRAM(S): at 05:37

## 2017-08-30 RX ADMIN — LIDOCAINE 1 PATCH: 4 CREAM TOPICAL at 12:21

## 2017-08-30 RX ADMIN — ATORVASTATIN CALCIUM 10 MILLIGRAM(S): 80 TABLET, FILM COATED ORAL at 21:27

## 2017-08-30 RX ADMIN — Medication 50 MICROGRAM(S): at 05:37

## 2017-08-30 RX ADMIN — Medication 1: at 17:25

## 2017-08-30 RX ADMIN — Medication 2: at 12:21

## 2017-08-30 RX ADMIN — Medication 100 MILLIGRAM(S): at 12:22

## 2017-08-30 RX ADMIN — Medication 100 MILLIGRAM(S): at 21:27

## 2017-08-30 RX ADMIN — Medication 100 MILLIGRAM(S): at 17:25

## 2017-08-30 RX ADMIN — Medication 0.12 MILLIGRAM(S): at 05:37

## 2017-08-30 NOTE — PROGRESS NOTE ADULT - ASSESSMENT
85 year old man w/ PMH AS (lAVA 0.8, 2017), chronic Afib on Coumadin, DM, HTN, HLD, hypothyroidism p/w syncope found to have R subarachnoid hemorrhage with no acute changes in status.    1) Chronic AF  -Asymptomatic  -CHADS2-Vasc: 5+  -Continue Lovenox 40mg Subcutaneous  -C/w digoxin 0.125mg qd, metoprolol tartrate 100mg BID, and diltiazem 180mg daily, per cardiology

## 2017-08-30 NOTE — CONSULT NOTE ADULT - CONSULT REQUESTED DATE/TIME
30-Aug-2017 14:03
20-Aug-2017
20-Aug-2017 18:30
21-Aug-2017 11:13
24-Aug-2017 11:56
24-Aug-2017 15:34
26-Aug-2017 13:19

## 2017-08-30 NOTE — PROGRESS NOTE ADULT - NSHPATTENDINGPLANDISCUSS_GEN_ALL_CORE
Dr. Wyatt
Pt and significant other Brandi, Dr. Quintanilla, Dr. Wyatt, NSx, and Primary team
Dr. Sutton
Dr. Wyatt

## 2017-08-30 NOTE — CONSULT NOTE ADULT - PROBLEM SELECTOR PROBLEM 1
Aortic valve stenosis, unspecified etiology
Aortic stenosis, severe
Fall
Subarachnoid hemorrhage, postinjury with < 1 hour LOC

## 2017-08-30 NOTE — PROGRESS NOTE ADULT - SUBJECTIVE AND OBJECTIVE BOX
Interval Events: Patient reports no acute events overnight. Denies palpitations, CP, SOB, nausea, vomiting, diarrhea. HR increased to 120-130s while ambulating this AM.    ROS: Denies HA/dizziness/CP/SOB/PND/orthopnea/LE edema/abd pain    MEDICATIONS:  digoxin     Tablet 0.125 milliGRAM(s) Oral daily  diltiazem    milliGRAM(s) Oral daily  metoprolol 100 milliGRAM(s) Oral two times a day  tamsulosin 0.4 milliGRAM(s) Oral before breakfast        acetaminophen   Tablet 650 milliGRAM(s) Oral every 6 hours PRN    polyethylene glycol 3350 17 Gram(s) Oral daily PRN  senna 2 Tablet(s) Oral at bedtime  docusate sodium 100 milliGRAM(s) Oral three times a day    dextrose Gel 1 Dose(s) Oral once PRN  dextrose 50% Injectable 12.5 Gram(s) IV Push once  dextrose 50% Injectable 25 Gram(s) IV Push once  dextrose 50% Injectable 25 Gram(s) IV Push once  glucagon  Injectable 1 milliGRAM(s) IntraMuscular once PRN  levothyroxine 50 MICROGram(s) Oral daily  atorvastatin 10 milliGRAM(s) Oral at bedtime  insulin lispro (HumaLOG) corrective regimen sliding scale   SubCutaneous three times a day before meals  insulin lispro (HumaLOG) corrective regimen sliding scale   SubCutaneous at bedtime    dextrose 5%. 1000 milliLiter(s) IV Continuous <Continuous>  lidocaine   Patch 1 Patch Transdermal daily      PHYSICAL EXAM:  T(C): 36.7 (08-30-17 @ 08:39), Max: 36.9 (08-29-17 @ 16:11)  HR: 75 (08-30-17 @ 08:39) (67 - 95)  BP: 115/777 (08-30-17 @ 08:39) (102/63 - 143/86)  RR: 17 (08-30-17 @ 08:39) (17 - 18)  SpO2: 97% (08-30-17 @ 08:39) (96% - 98%)  Wt(kg): --  I&O's Summary    29 Aug 2017 07:01  -  30 Aug 2017 07:00  --------------------------------------------------------  IN: 1360 mL / OUT: 1100 mL / NET: 260 mL    30 Aug 2017 07:01  -  30 Aug 2017 11:55  --------------------------------------------------------  IN: 250 mL / OUT: 0 mL / NET: 250 mL        Appearance: No Acute Distress  HEENT: No JVD  Cardiovascular: Normal S1 S2, RRR, No murmurs/rubs/gallops  Respiratory: Clear to auscultation bilaterally  Gastrointestinal: Soft, Non-tender	  Skin: No cyanosis	  Neurologic: Non-focal  Extremities: No clubbing, cyanosis or edema  Psychiatry: A & O x 3, Mood & affect appropriate    LABS:	 	      08-29    142  |  105  |  20  ----------------------------<  138<H>  4.0   |  24  |  0.74    Ca    9.5      29 Aug 2017 06:56        proBNP:   Lipid Profile:   HgA1c:     CARDIAC MARKERS:            TELEMETRY: Atrial Fibrillation with baseline 80s with acceleration to 120-130s at 7:30a-10am this AM. No pauses since 8/27/17.    ECG:      DIAGNOSTIC TESTING:    [ ] Echocardiogram:  [ ] Catheterization:

## 2017-08-30 NOTE — PROGRESS NOTE ADULT - ATTENDING COMMENTS
I updated Brandi (HCP) of his status and the plan for dc to rehab first and TAVR as outpatient due to his deconditioning. She agrees with the plan. Will plan to discharge to rehab tomorrow if he remains stable. Discussed and update  about the planning

## 2017-08-30 NOTE — CONSULT NOTE ADULT - PROBLEM SELECTOR RECOMMENDATION 9
1.) Cardiac CT, F/U Cardiac Catheterization. TAVR timing to be discussed in conjunction with Neurosurgery with regards to Bleed.

## 2017-08-30 NOTE — PROGRESS NOTE ADULT - PROBLEM SELECTOR PLAN 5
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  Reviewed TTE, gradient 0.8.  Cards and structural heart disease appreciated.  -per NSX, can get DAPT if necessary, can get hep gtt, but CANNOT GET hep bolus.   -per Dr. nichole, less likely arrthymogenic cause of syncope.  - PT consult  - likely dc to rehab

## 2017-08-30 NOTE — PROGRESS NOTE ADULT - PROBLEM SELECTOR PLAN 1
severe AS with gradient 0.8 on TTE. Card/structural heart disease recommends TAVR. Discussed with Dr. Wyatt (card) - will plan for CT TAVR protocol, rehab and then TAVR as outpatient given deconditioning  - CT for TAVR protocol today as at least 48 hrs in between the LHC and CT  - appreciated cardiology recs  - likely require heparin continuous infusion before TAVR but not as inpatient at this time

## 2017-08-30 NOTE — PROGRESS NOTE ADULT - PROBLEM SELECTOR PLAN 2
[facility] 6. Discussed with neurosugery NP and confirmed that it is okay to resume his full anticoagulation for afib. Will resume his coumadin 4mg home dose  - cont home metoprolol tartrate 100mg bid and diltiazem 180mg extended release  - coumadin resumption cleared by trauma and neurosurgery.  - check INR and adjust coumadin  - continue coumadin 4mg po bedtime

## 2017-08-30 NOTE — PROGRESS NOTE ADULT - SUBJECTIVE AND OBJECTIVE BOX
Patient is a 85y old  Male who presents with a chief complaint of Fall with right frontal intraparenchymal hemorrhage and R 6-7 rib fractures (23 Aug 2017 14:43)      SUBJECTIVE / OVERNIGHT EVENTS:  No acute events overnight.       MEDICATIONS  (STANDING):  digoxin     Tablet 0.125 milliGRAM(s) Oral daily  diltiazem    milliGRAM(s) Oral daily  metoprolol 100 milliGRAM(s) Oral two times a day  dextrose 5%. 1000 milliLiter(s) (50 mL/Hr) IV Continuous <Continuous>  dextrose 50% Injectable 12.5 Gram(s) IV Push once  dextrose 50% Injectable 25 Gram(s) IV Push once  dextrose 50% Injectable 25 Gram(s) IV Push once  levothyroxine 50 MICROGram(s) Oral daily  tamsulosin 0.4 milliGRAM(s) Oral before breakfast  atorvastatin 10 milliGRAM(s) Oral at bedtime  lidocaine   Patch 1 Patch Transdermal daily  insulin lispro (HumaLOG) corrective regimen sliding scale   SubCutaneous three times a day before meals  insulin lispro (HumaLOG) corrective regimen sliding scale   SubCutaneous at bedtime  senna 2 Tablet(s) Oral at bedtime  docusate sodium 100 milliGRAM(s) Oral three times a day    MEDICATIONS  (PRN):  dextrose Gel 1 Dose(s) Oral once PRN Blood Glucose LESS THAN 70 milliGRAM(s)/deciliter  glucagon  Injectable 1 milliGRAM(s) IntraMuscular once PRN Glucose LESS THAN 70 milligrams/deciliter  acetaminophen   Tablet 650 milliGRAM(s) Oral every 6 hours PRN mild pain  polyethylene glycol 3350 17 Gram(s) Oral daily PRN Constipation      CAPILLARY BLOOD GLUCOSE  220 (30 Aug 2017 12:18)  117 (30 Aug 2017 07:39)  119 (29 Aug 2017 21:12)  126 (29 Aug 2017 16:11)        I&O's Summary    29 Aug 2017 07:01  -  30 Aug 2017 07:00  --------------------------------------------------------  IN: 1360 mL / OUT: 1100 mL / NET: 260 mL    30 Aug 2017 07:01  -  30 Aug 2017 12:59  --------------------------------------------------------  IN: 250 mL / OUT: 0 mL / NET: 250 mL        PHYSICAL EXAM:  Vital Signs Last 24 Hrs  T(C): 36.7 (30 Aug 2017 08:39), Max: 36.9 (29 Aug 2017 16:11)  T(F): 98 (30 Aug 2017 08:39), Max: 98.5 (29 Aug 2017 21:12)  HR: 75 (30 Aug 2017 08:39) (67 - 95)  BP: 115/777 (30 Aug 2017 08:39) (102/63 - 143/86)  BP(mean): --  RR: 17 (30 Aug 2017 08:39) (17 - 18)  SpO2: 97% (30 Aug 2017 08:39) (96% - 98%)      GENERAL: NAD, well-developed  HEAD:  Atraumatic, Normocephalic  EYES: EOMI, PERRLA, conjunctiva and sclera clear  NECK: Supple, No JVD  CHEST/LUNG: Clear to auscultation bilaterally; No wheeze  HEART: bradycardic but regular rhythm; No murmurs, rubs, or gallops  ABDOMEN: Soft, Nontender, Nondistended; Bowel sounds present  EXTREMITIES:  2+ Peripheral Pulses, No clubbing, cyanosis, or edema  NEUROLOGY: AAOx3; non-focal  SKIN: No rashes or lesions    LABS:    08-29    142  |  105  |  20  ----------------------------<  138<H>  4.0   |  24  |  0.74    Ca    9.5      29 Aug 2017 06:56      PT/INR - ( 30 Aug 2017 05:59 )   PT: 12.8 sec;   INR: 1.18 ratio         PTT - ( 30 Aug 2017 05:59 )  PTT:29.0 sec      Personally reviewed the labs: notable for trop peaked at 1.6    RADIOLOGY & ADDITIONAL TESTS:    Imaging Personally Reviewed:    Consultant(s) Notes Reviewed:      Care Discussed with Consultants/Other Providers: Patient is a 85y old  Male who presents with a chief complaint of Fall with right frontal intraparenchymal hemorrhage and R 6-7 rib fractures (23 Aug 2017 14:43)      SUBJECTIVE / OVERNIGHT EVENTS:  No acute events overnight. Afebrile, VSS. Pt reports that he is doing okay. He is not thrilled to go to rehab but he understands his situation and agrees with the planning. He is scheduled for CT TAVR protocol today. Denies fever/chills, chest pain, palpitation, headache, sob, abdominal pain, n/v/c/d, leg swelling.       MEDICATIONS  (STANDING):  digoxin     Tablet 0.125 milliGRAM(s) Oral daily  diltiazem    milliGRAM(s) Oral daily  metoprolol 100 milliGRAM(s) Oral two times a day  dextrose 5%. 1000 milliLiter(s) (50 mL/Hr) IV Continuous <Continuous>  dextrose 50% Injectable 12.5 Gram(s) IV Push once  dextrose 50% Injectable 25 Gram(s) IV Push once  dextrose 50% Injectable 25 Gram(s) IV Push once  levothyroxine 50 MICROGram(s) Oral daily  tamsulosin 0.4 milliGRAM(s) Oral before breakfast  atorvastatin 10 milliGRAM(s) Oral at bedtime  lidocaine   Patch 1 Patch Transdermal daily  insulin lispro (HumaLOG) corrective regimen sliding scale   SubCutaneous three times a day before meals  insulin lispro (HumaLOG) corrective regimen sliding scale   SubCutaneous at bedtime  senna 2 Tablet(s) Oral at bedtime  docusate sodium 100 milliGRAM(s) Oral three times a day    MEDICATIONS  (PRN):  dextrose Gel 1 Dose(s) Oral once PRN Blood Glucose LESS THAN 70 milliGRAM(s)/deciliter  glucagon  Injectable 1 milliGRAM(s) IntraMuscular once PRN Glucose LESS THAN 70 milligrams/deciliter  acetaminophen   Tablet 650 milliGRAM(s) Oral every 6 hours PRN mild pain  polyethylene glycol 3350 17 Gram(s) Oral daily PRN Constipation      CAPILLARY BLOOD GLUCOSE  220 (30 Aug 2017 12:18)  117 (30 Aug 2017 07:39)  119 (29 Aug 2017 21:12)  126 (29 Aug 2017 16:11)        I&O's Summary    29 Aug 2017 07:01  -  30 Aug 2017 07:00  --------------------------------------------------------  IN: 1360 mL / OUT: 1100 mL / NET: 260 mL    30 Aug 2017 07:01  -  30 Aug 2017 12:59  --------------------------------------------------------  IN: 250 mL / OUT: 0 mL / NET: 250 mL        PHYSICAL EXAM:  Vital Signs Last 24 Hrs  T(C): 36.7 (30 Aug 2017 08:39), Max: 36.9 (29 Aug 2017 16:11)  T(F): 98 (30 Aug 2017 08:39), Max: 98.5 (29 Aug 2017 21:12)  HR: 75 (30 Aug 2017 08:39) (67 - 95)  BP: 115/777 (30 Aug 2017 08:39) (102/63 - 143/86)  BP(mean): --  RR: 17 (30 Aug 2017 08:39) (17 - 18)  SpO2: 97% (30 Aug 2017 08:39) (96% - 98%)      GENERAL: NAD, well-developed  HEAD:  Atraumatic, Normocephalic  EYES: EOMI, PERRLA, conjunctiva and sclera clear  NECK: Supple, No JVD  CHEST/LUNG: Clear to auscultation bilaterally; No wheeze  HEART: irregular; + JC  ABDOMEN: Soft, Nontender, Nondistended; Bowel sounds present  EXTREMITIES:  2+ Peripheral Pulses, No clubbing, cyanosis, or edema  NEUROLOGY: AAOx3; non-focal  SKIN: skin lesion on forehead healing well    LABS:    08-29    142  |  105  |  20  ----------------------------<  138<H>  4.0   |  24  |  0.74    Ca    9.5      29 Aug 2017 06:56      PT/INR - ( 30 Aug 2017 05:59 )   PT: 12.8 sec;   INR: 1.18 ratio         PTT - ( 30 Aug 2017 05:59 )  PTT:29.0 sec      Personally reviewed the labs: notable for INR 1.18; h/h and bmp unremarkable      RADIOLOGY & ADDITIONAL TESTS:    Imaging Personally Reviewed: CT heart pending    Consultant(s) Notes Reviewed:  cardiology, EP    Care Discussed with Consultants/Other Providers: Dr. Wyatt (cardiology)

## 2017-08-30 NOTE — CONSULT NOTE ADULT - PROBLEM SELECTOR PROBLEM 2
Sinus pause
Subarachnoid hemorrhage, postinjury with < 1 hour LOC
Subarachnoid hemorrhage, postinjury with < 1 hour LOC

## 2017-08-30 NOTE — CONSULT NOTE ADULT - ASSESSMENT
85 male with syncope and Severe Aortic Stenosis
85 year old man with history AS (last ROMULO 1.0 in 2012), Afib on Coumadin, DM, HTN, HLD, hypothyroidism p/w fall possible syncope found to have R subarachnoid hemorrhage and R Rib  fractures with 2.5 sec pause while in NSR.     # Syncope  - Last known valve area 1.0 sq cm in 2012; harsh AS murmur on exam; please repeat TTE to evaluate valve area  - Given h/o AFib and now in NSR, syncope could be due to a conversion pause, or possible symptoms of severe AS  - Please discuss with neurosurgery capacity for full anticoagulation and/or DAPT, in case Pt requires procedure  - Further recs to follow pending TTE    Victorino Alfaro  [facility]  [id number]
85m pmh htn hl afib on coumadin dm2 bph arthritis who p/w fall
Patient is an 85 year old man who [facility]acerations to the face, rib fractures and ICH.  -Incentive spirometry (currently pulling 1500)  -multimodal pain control  -PT consult  -f/u NSx  -CT scan in am for stability  -Hold Coumadin and Rx DVT ppx for now  -ambulation with assistance and SCDs  -Regular diet  -Discussed with Dr ASHLEY Cosme PGYII
This is an 86 yo M on coumadin for afib who presents s/p syncopal fall w/ tiny R frontal tSAH on CTH. Patient is neurologically intact w/ mild HA. Conservative management recommended.
85 year old male with traumatic syncope of unclear etiology; cardiac possibilities include arrhythmic (AF with conversion pause or TBS) and valvular (aortic stenosis), or a combination of the two.  I reviewed his TTE in detail, which does calculate an ROMULO of 0.8, albeit in the setting of a preserved LVEF and low (in the range of moderate at best) transvalvular gradients and velocities (similar to the outpatient study in 2/17)--suggestive of possible low output / low gradient / preserved LVEF severe AS.  I think a right and left cardiac catheterization with transvalvular gradients would be helpful, and possibly a BRIAN, for better assessment of AS severity prior to deciding if TAVR is appropriate at this time.  Ultimately, a cardiac CT would also be needed to complete the pre TAVR imaging evaluation.  I have explained this to the patient in detail and answered all questions.

## 2017-08-30 NOTE — CONSULT NOTE ADULT - SUBJECTIVE AND OBJECTIVE BOX
85y Male    HPI:  The patient is an 84 y/o male, with a known history of Aortic Stenosis. The patient had a syncopal episode while walking up the stairs. He suffered a intraparenchymal bleed, which has been stable on CT. He reports having episodes of dizzyness. He denies any SOB or CP. His significant other also states that he has been forgetful of late, and feeling Tired.          PAST MEDICAL & SURGICAL HISTORY:  Nonrheumatic aortic valve stenosis  Atrial fibrillation  Spinal stenosis of lumbar region  Torn rotator cuff: Right  Arthritis, shoulder region: Right  Diabetes mellitus  Congenital heart defect  Hyperlipidemia  HTN - Hypertension  BPH (benign prostatic hypertrophy)  Hx of appendectomy: age 17  S/P TURP (status post transurethral resection of prostate): 2008      No Known Allergies    digoxin     Tablet 0.125 milliGRAM(s) Oral daily  diltiazem    milliGRAM(s) Oral daily  metoprolol 100 milliGRAM(s) Oral two times a day  dextrose 5%. 1000 milliLiter(s) IV Continuous <Continuous>  dextrose 50% Injectable 12.5 Gram(s) IV Push once  dextrose 50% Injectable 25 Gram(s) IV Push once  dextrose 50% Injectable 25 Gram(s) IV Push once  levothyroxine 50 MICROGram(s) Oral daily  tamsulosin 0.4 milliGRAM(s) Oral before breakfast  atorvastatin 10 milliGRAM(s) Oral at bedtime  lidocaine   Patch 1 Patch Transdermal daily  insulin lispro (HumaLOG) corrective regimen sliding scale   SubCutaneous three times a day before meals  insulin lispro (HumaLOG) corrective regimen sliding scale   SubCutaneous at bedtime  senna 2 Tablet(s) Oral at bedtime  docusate sodium 100 milliGRAM(s) Oral three times a day      T(C): 36.7 (08-30-17 @ 08:39), Max: 36.9 (08-29-17 @ 16:11)  HR: 75 (08-30-17 @ 08:39) (75 - 95)  BP: 115/777 (08-30-17 @ 08:39) (115/777 - 143/86)  RR: 17 (08-30-17 @ 08:39) (17 - 18)  SpO2: 97% (08-30-17 @ 08:39) (96% - 98%)  Wt(kg): --    08-29 @ 07:01 - 08-30 @ 07:00  --------------------------------------------------------  IN: 1360 mL / OUT: 1100 mL / NET: 260 mL    08-30 @ 07:01 - 08-30 @ 14:06  --------------------------------------------------------  IN: 250 mL / OUT: 0 mL / NET: 250 mL        REVIEW OF SYSTEMS    General:  fatigue	    Skin/Breast:  ecchymosis right orbit    Cardiovascular:	rare "pinch", described as "needle like" in his chest    Gastrointestinal:	  constipated    Genitourinary:	incontinence    Musculoskeletal:  arthritis pain	    Neurological:	possible syncope    Psychiatric:	no acute symptoms    PHYSICAL EXAM:      Constitutional: A/O x3    Eyes: Ecchymosis around R Eye, no Visual disturbances    Respiratory: CTAB    Cardiovascular: RRR, S1S2, III/VI JC    Gastrointestinal: Soft, NT/ND    Extremities: No Edema    Vascular: 1+ Pulses Bilateral    Neurological: Intact, Non Focal      Echo:      Laboratory:   08-29< from: Transthoracic Echocardiogram (08.25.17 @ 16:11) >  imensions:    Normal Values:  LA:     4.2    2.0 - 4.0 cm  Ao:     4.0    2.0 - 3.8 cm  SEPTUM: 1.1    0.6 - 1.2 cm  PWT:    1.1 0.6 - 1.1 cm  LVIDd:  4.5    3.0 - 5.6 cm  LVIDs:  2.9    1.8 - 4.0 cm  Derived variables:  LVMI: 101 g/m2  RWT: 0.48  Fractional short: 36 %  EF (Chandlertz): 65 %  Doppler Peak Velocity (m/sec): AoV=2.8  ------------------------------------------------------------------------  Observations:  Mitral Valve: Mitral annular calcification and calcified  mitral leaflets with normal diastolic opening. Minimal  mitral regurgitation.  Aortic Valve/Aorta: Calcified trileaflet aortic valve with  decreasedopening. Peak transaortic valve gradient equals  31 mm Hg, mean transaortic valve gradient equals 17 mm Hg,  estimated aortic valve area equals 0.8 sqcm (by continuity  equation), aortic valve velocity time integral equals 58  cm, consistent with severe aortic stenosis. Peak left  ventricular outflow tract gradient equals 3 mm Hg, mean  gradient is equal to 1 mm Hg, LVOT velocity time integral  equals 14 cm.  Aortic Root: 4 cm.  LVOT diameter: 2.1 cm.  Left Atrium: Moderately dilated left atrium. LA volume  index = 42 cc/m2.  Left Ventricle: Normal left ventricular systolic function.  Mild concentric left ventricular hypertrophy.  Right Heart: Normal right atrium. Normal right ventricular  size and function. Normal tricuspid valve. Mild tricuspid  regurgitation. Normal pulmonic valve.  Pericardium/Pleura: Normal pericardium with no pericardial  effusion.    < end of copied text >    :      142  |  105  |  20  ----------------------------<  138<H>  4.0   |  24  |  0.74    Ca    9.5      29 Aug 2017 06:56     PT/INR - ( 30 Aug 2017 05:59 )   PT: 12.8 sec;   INR: 1.18 ratio         PTT - ( 30 Aug 2017 05:59 )  PTT:29.0 sec    Pro-BNP:          Echo:  Cardiac Cath:

## 2017-08-30 NOTE — PROGRESS NOTE ADULT - PROBLEM SELECTOR PLAN 4
R frontal vertex parasagittal gyrus small hemorrhage, stable on repeat CTH 8/21   - no acute intervention at this time.  - monitor mental status

## 2017-08-31 LAB
ANION GAP SERPL CALC-SCNC: 11 MMOL/L — SIGNIFICANT CHANGE UP (ref 5–17)
APTT BLD: 29.7 SEC — SIGNIFICANT CHANGE UP (ref 27.5–37.4)
BUN SERPL-MCNC: 19 MG/DL — SIGNIFICANT CHANGE UP (ref 7–23)
CALCIUM SERPL-MCNC: 9.8 MG/DL — SIGNIFICANT CHANGE UP (ref 8.4–10.5)
CHLORIDE SERPL-SCNC: 103 MMOL/L — SIGNIFICANT CHANGE UP (ref 96–108)
CO2 SERPL-SCNC: 27 MMOL/L — SIGNIFICANT CHANGE UP (ref 22–31)
CREAT SERPL-MCNC: 1.15 MG/DL — SIGNIFICANT CHANGE UP (ref 0.5–1.3)
GLUCOSE SERPL-MCNC: 164 MG/DL — HIGH (ref 70–99)
HCT VFR BLD CALC: 44.9 % — SIGNIFICANT CHANGE UP (ref 39–50)
HGB BLD-MCNC: 15.3 G/DL — SIGNIFICANT CHANGE UP (ref 13–17)
INR BLD: 1.17 RATIO — HIGH (ref 0.88–1.16)
MCHC RBC-ENTMCNC: 33.9 GM/DL — SIGNIFICANT CHANGE UP (ref 32–36)
MCHC RBC-ENTMCNC: 36.1 PG — HIGH (ref 27–34)
MCV RBC AUTO: 106 FL — HIGH (ref 80–100)
PLATELET # BLD AUTO: 274 K/UL — SIGNIFICANT CHANGE UP (ref 150–400)
POTASSIUM SERPL-MCNC: 4.9 MMOL/L — SIGNIFICANT CHANGE UP (ref 3.5–5.3)
POTASSIUM SERPL-SCNC: 4.9 MMOL/L — SIGNIFICANT CHANGE UP (ref 3.5–5.3)
PROTHROM AB SERPL-ACNC: 12.7 SEC — SIGNIFICANT CHANGE UP (ref 9.8–12.7)
RBC # BLD: 4.23 M/UL — SIGNIFICANT CHANGE UP (ref 4.2–5.8)
RBC # FLD: 12.8 % — SIGNIFICANT CHANGE UP (ref 10.3–14.5)
SODIUM SERPL-SCNC: 141 MMOL/L — SIGNIFICANT CHANGE UP (ref 135–145)
WBC # BLD: 7.3 K/UL — SIGNIFICANT CHANGE UP (ref 3.8–10.5)
WBC # FLD AUTO: 7.3 K/UL — SIGNIFICANT CHANGE UP (ref 3.8–10.5)

## 2017-08-31 PROCEDURE — 99233 SBSQ HOSP IP/OBS HIGH 50: CPT

## 2017-08-31 RX ORDER — WARFARIN SODIUM 2.5 MG/1
4 TABLET ORAL ONCE
Qty: 0 | Refills: 0 | Status: COMPLETED | OUTPATIENT
Start: 2017-08-31 | End: 2017-08-31

## 2017-08-31 RX ADMIN — LIDOCAINE 1 PATCH: 4 CREAM TOPICAL at 11:10

## 2017-08-31 RX ADMIN — WARFARIN SODIUM 4 MILLIGRAM(S): 2.5 TABLET ORAL at 21:43

## 2017-08-31 RX ADMIN — WARFARIN SODIUM 4 MILLIGRAM(S): 2.5 TABLET ORAL at 13:03

## 2017-08-31 RX ADMIN — Medication 180 MILLIGRAM(S): at 06:29

## 2017-08-31 RX ADMIN — Medication 100 MILLIGRAM(S): at 13:03

## 2017-08-31 RX ADMIN — Medication 100 MILLIGRAM(S): at 06:29

## 2017-08-31 RX ADMIN — Medication 50 MICROGRAM(S): at 06:29

## 2017-08-31 RX ADMIN — SENNA PLUS 2 TABLET(S): 8.6 TABLET ORAL at 21:43

## 2017-08-31 RX ADMIN — Medication 0.12 MILLIGRAM(S): at 06:29

## 2017-08-31 RX ADMIN — ATORVASTATIN CALCIUM 10 MILLIGRAM(S): 80 TABLET, FILM COATED ORAL at 21:43

## 2017-08-31 RX ADMIN — TAMSULOSIN HYDROCHLORIDE 0.4 MILLIGRAM(S): 0.4 CAPSULE ORAL at 06:29

## 2017-08-31 RX ADMIN — Medication 100 MILLIGRAM(S): at 17:08

## 2017-08-31 RX ADMIN — LIDOCAINE 1 PATCH: 4 CREAM TOPICAL at 00:00

## 2017-08-31 RX ADMIN — Medication 100 MILLIGRAM(S): at 21:43

## 2017-08-31 NOTE — PROVIDER CONTACT NOTE (OTHER) - RECOMMENDATIONS
Continue to monitor w/ tele
Follow up with provider regarding refusal, reschedule patient for scan tomorrow
Continue to monitor
continue to monitor
continue to monitor patient
provider to be aware

## 2017-08-31 NOTE — PROVIDER CONTACT NOTE (OTHER) - ACTION/TREATMENT ORDERED:
Tele in progress, Will cont monitoring. No new orders at this time
Continue to monitor w/ tele - notify for symptoms
PA aware, will reschedule patient for tomorrow
MD aware. continue to monitor vitals as ordered.
Continue to monitor
continue to monitor.
made Sabine ARAIZA aware and will continue ot monitor patient, pt pending CT of heart and coronies.
provider aware, no intervention needed at this time.

## 2017-08-31 NOTE — PROGRESS NOTE ADULT - SUBJECTIVE AND OBJECTIVE BOX
Interval Events: No acute events overnight, per nurse. Tele check performed.      MEDICATIONS:  digoxin     Tablet 0.125 milliGRAM(s) Oral daily  diltiazem    milliGRAM(s) Oral daily  metoprolol 100 milliGRAM(s) Oral two times a day  tamsulosin 0.4 milliGRAM(s) Oral before breakfast        acetaminophen   Tablet 650 milliGRAM(s) Oral every 6 hours PRN    polyethylene glycol 3350 17 Gram(s) Oral daily PRN  senna 2 Tablet(s) Oral at bedtime  docusate sodium 100 milliGRAM(s) Oral three times a day    dextrose Gel 1 Dose(s) Oral once PRN  dextrose 50% Injectable 12.5 Gram(s) IV Push once  dextrose 50% Injectable 25 Gram(s) IV Push once  dextrose 50% Injectable 25 Gram(s) IV Push once  glucagon  Injectable 1 milliGRAM(s) IntraMuscular once PRN  levothyroxine 50 MICROGram(s) Oral daily  atorvastatin 10 milliGRAM(s) Oral at bedtime  insulin lispro (HumaLOG) corrective regimen sliding scale   SubCutaneous three times a day before meals  insulin lispro (HumaLOG) corrective regimen sliding scale   SubCutaneous at bedtime    dextrose 5%. 1000 milliLiter(s) IV Continuous <Continuous>  lidocaine   Patch 1 Patch Transdermal daily      PHYSICAL EXAM:  T(C): 36.6 (08-31-17 @ 10:21), Max: 36.9 (08-30-17 @ 14:36)  HR: 63 (08-31-17 @ 10:21) (63 - 95)  BP: 94/62 (08-31-17 @ 10:21) (94/62 - 129/93)  RR: 17 (08-31-17 @ 10:21) (17 - 18)  SpO2: 96% (08-31-17 @ 10:21) (96% - 98%)  Wt(kg): --  I&O's Summary    30 Aug 2017 07:01  -  31 Aug 2017 07:00  --------------------------------------------------------  IN: 1570 mL / OUT: 750 mL / NET: 820 mL    31 Aug 2017 07:01  -  31 Aug 2017 11:18  --------------------------------------------------------  IN: 240 mL / OUT: 350 mL / NET: -110 mL        Appearance: No Acute Distress    LABS:	 	              proBNP:   Lipid Profile:   HgA1c:     CARDIAC MARKERS:            TELEMETRY: Atrial Fibrillation at 70s-100s. Last pause 8/27. No conversion to sinus.  ECG:      DIAGNOSTIC TESTING:    [ ] Echocardiogram:  [ ] Catheterization:

## 2017-08-31 NOTE — PROVIDER CONTACT NOTE (OTHER) - BACKGROUND
pt is admitted s/p fall and dx with acute frontal hemorrhage, plan for d/c to rehab on 8/23
admitted s/p fall, pt with hx of a-fib
hx of afib
pt s/p fall
pt with subarchnoid hemorrhage, right 6&7 ribs fx, pt with right groin Pashto sheath, pt in need of a valve replacement
Pt s/p fall at home and syncopal episode in ED w/ extensive cardiac history being worked up for possible cardiac stent placement
Pt w/ extensive cardiac history, s/p fall at home w/ LOC

## 2017-08-31 NOTE — PROGRESS NOTE ADULT - PROBLEM SELECTOR PLAN 5
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  Reviewed TTE, gradient 0.8.  Cards and structural heart disease appreciated.  -per NSX, can get DAPT if necessary, can get hep gtt, but CANNOT GET hep bolus.   -per Dr. nichole, less likely arrthymogenic cause of syncope.  - PT consult  - likely dc to rehab Likely 2/2 severe AoS.  Reviewed TTE, gradient 0.8.  Cards and structural heart disease appreciated.  -per NSX, can get DAPT if necessary, can get hep gtt, but CANNOT GET hep bolus.   -per Dr. nichole, less likely arrthymogenic cause of syncope.  - PT consult  - dc to rehab once medically ready

## 2017-08-31 NOTE — PROGRESS NOTE ADULT - SUBJECTIVE AND OBJECTIVE BOX
Patient is a 85y old  Male who presents with a chief complaint of Fall with right frontal intraparenchymal hemorrhage and R 6-7 rib fractures (23 Aug 2017 14:43)      SUBJECTIVE / OVERNIGHT EVENTS:  No acute events overnight.     MEDICATIONS  (STANDING):  digoxin     Tablet 0.125 milliGRAM(s) Oral daily  diltiazem    milliGRAM(s) Oral daily  metoprolol 100 milliGRAM(s) Oral two times a day  dextrose 5%. 1000 milliLiter(s) (50 mL/Hr) IV Continuous <Continuous>  dextrose 50% Injectable 12.5 Gram(s) IV Push once  dextrose 50% Injectable 25 Gram(s) IV Push once  dextrose 50% Injectable 25 Gram(s) IV Push once  levothyroxine 50 MICROGram(s) Oral daily  tamsulosin 0.4 milliGRAM(s) Oral before breakfast  atorvastatin 10 milliGRAM(s) Oral at bedtime  lidocaine   Patch 1 Patch Transdermal daily  insulin lispro (HumaLOG) corrective regimen sliding scale   SubCutaneous three times a day before meals  insulin lispro (HumaLOG) corrective regimen sliding scale   SubCutaneous at bedtime  senna 2 Tablet(s) Oral at bedtime  docusate sodium 100 milliGRAM(s) Oral three times a day    MEDICATIONS  (PRN):  dextrose Gel 1 Dose(s) Oral once PRN Blood Glucose LESS THAN 70 milliGRAM(s)/deciliter  glucagon  Injectable 1 milliGRAM(s) IntraMuscular once PRN Glucose LESS THAN 70 milligrams/deciliter  acetaminophen   Tablet 650 milliGRAM(s) Oral every 6 hours PRN mild pain  polyethylene glycol 3350 17 Gram(s) Oral daily PRN Constipation      CAPILLARY BLOOD GLUCOSE  122 (31 Aug 2017 07:59)  175 (30 Aug 2017 15:54)  220 (30 Aug 2017 12:18)        I&O's Summary    30 Aug 2017 07:01  -  31 Aug 2017 07:00  --------------------------------------------------------  IN: 1570 mL / OUT: 750 mL / NET: 820 mL        PHYSICAL EXAM:  Vital Signs Last 24 Hrs  T(C): 36.9 (31 Aug 2017 05:06), Max: 36.9 (30 Aug 2017 14:36)  T(F): 98.4 (31 Aug 2017 05:06), Max: 98.5 (30 Aug 2017 14:36)  HR: 95 (31 Aug 2017 05:06) (80 - 95)  BP: 129/93 (31 Aug 2017 05:06) (121/83 - 129/93)  BP(mean): --  RR: 18 (31 Aug 2017 05:06) (18 - 18)  SpO2: 96% (31 Aug 2017 05:06) (96% - 98%)  GENERAL: NAD, well-developed  HEAD:  Atraumatic, Normocephalic  EYES: EOMI, PERRLA, conjunctiva and sclera clear  NECK: Supple, No JVD  CHEST/LUNG: Clear to auscultation bilaterally; No wheeze  HEART: Regular rate and rhythm; No murmurs, rubs, or gallops  ABDOMEN: Soft, Nontender, Nondistended; Bowel sounds present  EXTREMITIES:  2+ Peripheral Pulses, No clubbing, cyanosis, or edema  PSYCH: AAOx3  NEUROLOGY: non-focal  SKIN: No rashes or lesions    LABS:          PT/INR - ( 30 Aug 2017 05:59 )   PT: 12.8 sec;   INR: 1.18 ratio         PTT - ( 30 Aug 2017 05:59 )  PTT:29.0 sec          RADIOLOGY & ADDITIONAL TESTS:    Imaging Personally Reviewed:    Consultant(s) Notes Reviewed:      Care Discussed with Consultants/Other Providers: Patient is a 85y old  Male who presents with a chief complaint of Fall with right frontal intraparenchymal hemorrhage and R 6-7 rib fractures (23 Aug 2017 14:43)      SUBJECTIVE / OVERNIGHT EVENTS:  No acute events overnight. Afebrile, VSS. He is happy that his scar/bruise on the face is fading. He hopes that he can get stronger in rehab and be ready for TAVR soon. Denies fever/chills, chest pain, sob, palpitation, abdominal pain, n/v/c/d.       MEDICATIONS  (STANDING):  digoxin     Tablet 0.125 milliGRAM(s) Oral daily  diltiazem    milliGRAM(s) Oral daily  metoprolol 100 milliGRAM(s) Oral two times a day  dextrose 5%. 1000 milliLiter(s) (50 mL/Hr) IV Continuous <Continuous>  dextrose 50% Injectable 12.5 Gram(s) IV Push once  dextrose 50% Injectable 25 Gram(s) IV Push once  dextrose 50% Injectable 25 Gram(s) IV Push once  levothyroxine 50 MICROGram(s) Oral daily  tamsulosin 0.4 milliGRAM(s) Oral before breakfast  atorvastatin 10 milliGRAM(s) Oral at bedtime  lidocaine   Patch 1 Patch Transdermal daily  insulin lispro (HumaLOG) corrective regimen sliding scale   SubCutaneous three times a day before meals  insulin lispro (HumaLOG) corrective regimen sliding scale   SubCutaneous at bedtime  senna 2 Tablet(s) Oral at bedtime  docusate sodium 100 milliGRAM(s) Oral three times a day    MEDICATIONS  (PRN):  dextrose Gel 1 Dose(s) Oral once PRN Blood Glucose LESS THAN 70 milliGRAM(s)/deciliter  glucagon  Injectable 1 milliGRAM(s) IntraMuscular once PRN Glucose LESS THAN 70 milligrams/deciliter  acetaminophen   Tablet 650 milliGRAM(s) Oral every 6 hours PRN mild pain  polyethylene glycol 3350 17 Gram(s) Oral daily PRN Constipation      CAPILLARY BLOOD GLUCOSE  122 (31 Aug 2017 07:59)  175 (30 Aug 2017 15:54)  220 (30 Aug 2017 12:18)        I&O's Summary    30 Aug 2017 07:01  -  31 Aug 2017 07:00  --------------------------------------------------------  IN: 1570 mL / OUT: 750 mL / NET: 820 mL        PHYSICAL EXAM:  Vital Signs Last 24 Hrs  T(C): 36.9 (31 Aug 2017 05:06), Max: 36.9 (30 Aug 2017 14:36)  T(F): 98.4 (31 Aug 2017 05:06), Max: 98.5 (30 Aug 2017 14:36)  HR: 95 (31 Aug 2017 05:06) (80 - 95)  BP: 129/93 (31 Aug 2017 05:06) (121/83 - 129/93)  BP(mean): --  RR: 18 (31 Aug 2017 05:06) (18 - 18)  SpO2: 96% (31 Aug 2017 05:06) (96% - 98%)      GENERAL: laying in bed comfortably  HEAD:  healing wound over the left eye/forehead s/p fall  EYES: EOMI, PERRLA, conjunctiva and sclera clear; R eye laceration cdi, ecchymosis improving   NECK: Supple, No JVD  CHEST/LUNG: Clear to auscultation bilaterally; No wheeze  HEART: irregularly irregular; systolic ejection murmur  ABDOMEN: Soft, Nontender, Nondistended; Bowel sounds present  EXTREMITIES:  2+ Peripheral Pulses, No clubbing, cyanosis, or edema  NEUROLOGY: AAOx3, non-focal      LABS:    PT/INR - ( 30 Aug 2017 05:59 )   PT: 12.8 sec;   INR: 1.18 ratio         PTT - ( 30 Aug 2017 05:59 )  PTT:29.0 sec      Personally reviewed the labs: notable for INR 1.18      RADIOLOGY & ADDITIONAL TESTS:    Imaging Personally Reviewed:  - CT    Consultant(s) Notes Reviewed:      Care Discussed with Consultants/Other Providers: Patient is a 85y old  Male who presents with a chief complaint of Fall with right frontal intraparenchymal hemorrhage and R 6-7 rib fractures (23 Aug 2017 14:43)      SUBJECTIVE / OVERNIGHT EVENTS:  No acute events overnight. Afebrile, VSS. He is happy that his scar/bruise on the face is fading. He hopes that he can get stronger in rehab and be ready for TAVR soon. Denies fever/chills, chest pain, sob, palpitation, abdominal pain, n/v/c/d.       MEDICATIONS  (STANDING):  digoxin     Tablet 0.125 milliGRAM(s) Oral daily  diltiazem    milliGRAM(s) Oral daily  metoprolol 100 milliGRAM(s) Oral two times a day  dextrose 5%. 1000 milliLiter(s) (50 mL/Hr) IV Continuous <Continuous>  dextrose 50% Injectable 12.5 Gram(s) IV Push once  dextrose 50% Injectable 25 Gram(s) IV Push once  dextrose 50% Injectable 25 Gram(s) IV Push once  levothyroxine 50 MICROGram(s) Oral daily  tamsulosin 0.4 milliGRAM(s) Oral before breakfast  atorvastatin 10 milliGRAM(s) Oral at bedtime  lidocaine   Patch 1 Patch Transdermal daily  insulin lispro (HumaLOG) corrective regimen sliding scale   SubCutaneous three times a day before meals  insulin lispro (HumaLOG) corrective regimen sliding scale   SubCutaneous at bedtime  senna 2 Tablet(s) Oral at bedtime  docusate sodium 100 milliGRAM(s) Oral three times a day    MEDICATIONS  (PRN):  dextrose Gel 1 Dose(s) Oral once PRN Blood Glucose LESS THAN 70 milliGRAM(s)/deciliter  glucagon  Injectable 1 milliGRAM(s) IntraMuscular once PRN Glucose LESS THAN 70 milligrams/deciliter  acetaminophen   Tablet 650 milliGRAM(s) Oral every 6 hours PRN mild pain  polyethylene glycol 3350 17 Gram(s) Oral daily PRN Constipation      CAPILLARY BLOOD GLUCOSE  122 (31 Aug 2017 07:59)  175 (30 Aug 2017 15:54)  220 (30 Aug 2017 12:18)        I&O's Summary    30 Aug 2017 07:01  -  31 Aug 2017 07:00  --------------------------------------------------------  IN: 1570 mL / OUT: 750 mL / NET: 820 mL        PHYSICAL EXAM:  Vital Signs Last 24 Hrs  T(C): 36.9 (31 Aug 2017 05:06), Max: 36.9 (30 Aug 2017 14:36)  T(F): 98.4 (31 Aug 2017 05:06), Max: 98.5 (30 Aug 2017 14:36)  HR: 95 (31 Aug 2017 05:06) (80 - 95)  BP: 129/93 (31 Aug 2017 05:06) (121/83 - 129/93)  BP(mean): --  RR: 18 (31 Aug 2017 05:06) (18 - 18)  SpO2: 96% (31 Aug 2017 05:06) (96% - 98%)      GENERAL: laying in bed comfortably  HEAD:  healing wound over the left eye/forehead s/p fall  EYES: EOMI, PERRLA, conjunctiva and sclera clear; R eye laceration cdi, ecchymosis improving   NECK: Supple, No JVD  CHEST/LUNG: Clear to auscultation bilaterally; No wheeze  HEART: irregularly irregular; systolic ejection murmur  ABDOMEN: Soft, Nontender, Nondistended; Bowel sounds present  EXTREMITIES:  2+ Peripheral Pulses, No clubbing, cyanosis, or edema  NEUROLOGY: AAOx3, non-focal      LABS:    PT/INR - ( 30 Aug 2017 05:59 )   PT: 12.8 sec;   INR: 1.18 ratio         PTT - ( 30 Aug 2017 05:59 )  PTT:29.0 sec      Personally reviewed the labs: notable for INR 1.18      RADIOLOGY & ADDITIONAL TESTS:    Imaging Personally Reviewed:  - CT heart: calcification in aorta - specific calcium score/final read pending    Consultant(s) Notes Reviewed:      Care Discussed with Consultants/Other Providers:

## 2017-08-31 NOTE — PROVIDER CONTACT NOTE (OTHER) - REASON
5 and 6 beats of wide complex tachycardia on tele
Pt had 2.5 second pause on tele monitoring
Pt refusing CT Head
Pt with 2.8 sec pause on tele monitoring
pt w episode of bradycardia
pt with HR of 120-150
Elevated HR

## 2017-08-31 NOTE — PROVIDER CONTACT NOTE (OTHER) - DATE AND TIME:
22-Aug-2017 18:12
23-Aug-2017 13:50
25-Aug-2017 00:30
25-Aug-2017 11:39
26-Aug-2017 00:51
30-Aug-2017 08:25
31-Aug-2017 14:48
22-Aug-2017 22:00

## 2017-08-31 NOTE — PROGRESS NOTE ADULT - PROBLEM SELECTOR PLAN 2
BROOKS 6. Discussed with neurosugery NP and confirmed that it is okay to resume his full anticoagulation for afib. Will resume his coumadin 4mg home dose  - cont home metoprolol tartrate 100mg bid and diltiazem 180mg extended release  - coumadin resumption cleared by trauma and neurosurgery.  - check INR and adjust coumadin  - continue coumadin 4mg po bedtime BROOKS 6. Discussed with neurosugery NP and confirmed that it is okay to resume his full anticoagulation for afib. Will resume his coumadin 4mg home dose  - cont home metoprolol tartrate 100mg bid and diltiazem 180mg extended release  - coumadin resumption cleared by trauma and neurosurgery.  - check INR and adjust coumadin  - continue coumadin 4mg po bedtime  - appreciate EP recs BROOKS 6. Discussed with neurosugery NP and confirmed that it is okay to resume his full anticoagulation for afib. Will resume his coumadin 4mg home dose  - cont home metoprolol tartrate 100mg bid and diltiazem 180mg extended release  - coumadin resumption cleared by trauma and neurosurgery.  - check INR and adjust coumadin: INR not at goal - will continue to monitor  - continue coumadin 4mg po bedtime  - appreciate EP recs

## 2017-08-31 NOTE — PROGRESS NOTE ADULT - PROBLEM SELECTOR PLAN 1
severe AS with gradient 0.8 on TTE. Card/structural heart disease recommends TAVR. Discussed with Dr. Wyatt (card) - will plan for CT TAVR protocol, rehab and then TAVR as outpatient given deconditioning  - follow up CT result  - appreciated cardiology recs  - likely require heparin continuous infusion before TAVR but not as inpatient at this time severe AS with gradient 0.8 on TTE. Card/structural heart disease recommends TAVR. Discussed with Dr. Wyatt (card) - will plan for CT TAVR protocol, rehab and then TAVR as outpatient given deconditioning  - follow up final CT heart reading  - appreciate cardiology recs  - appreciate CT surgery recs

## 2017-08-31 NOTE — PROVIDER CONTACT NOTE (OTHER) - SITUATION
Pt is refusing to have CT of the Head completed
Elevated HR, 130s as per telemetry monitoring.
Pt had episode of bradycardia while sitting in chair. HR- 38 for a few seconds.
Pt had 2.5 second pause (had one in AM on 8/24 and bout of bradycardia to 30s). Pt on tele for Afib pending TTE today 8/25 for repeated bouts of bradycardia, wide complexes and pauses
Pt with 2.8 sec pause on tele monitoring
pt on tele monitoring, tele tech called to report bradycardia HR in 30's
pt with HR of 120-150
Pt on tele, had episode of 5 and then 6 beats of wide complex tachycardia

## 2017-09-01 VITALS
RESPIRATION RATE: 17 BRPM | OXYGEN SATURATION: 96 % | TEMPERATURE: 98 F | DIASTOLIC BLOOD PRESSURE: 77 MMHG | SYSTOLIC BLOOD PRESSURE: 115 MMHG | HEART RATE: 87 BPM

## 2017-09-01 LAB
ANION GAP SERPL CALC-SCNC: 13 MMOL/L — SIGNIFICANT CHANGE UP (ref 5–17)
APTT BLD: 31.8 SEC — SIGNIFICANT CHANGE UP (ref 27.5–37.4)
BUN SERPL-MCNC: 19 MG/DL — SIGNIFICANT CHANGE UP (ref 7–23)
CALCIUM SERPL-MCNC: 9.7 MG/DL — SIGNIFICANT CHANGE UP (ref 8.4–10.5)
CHLORIDE SERPL-SCNC: 105 MMOL/L — SIGNIFICANT CHANGE UP (ref 96–108)
CO2 SERPL-SCNC: 25 MMOL/L — SIGNIFICANT CHANGE UP (ref 22–31)
CREAT SERPL-MCNC: 1.09 MG/DL — SIGNIFICANT CHANGE UP (ref 0.5–1.3)
DIGOXIN SERPL-MCNC: 0.6 NG/ML — LOW (ref 0.8–2)
GLUCOSE SERPL-MCNC: 130 MG/DL — HIGH (ref 70–99)
HCT VFR BLD CALC: 43.1 % — SIGNIFICANT CHANGE UP (ref 39–50)
HGB BLD-MCNC: 15 G/DL — SIGNIFICANT CHANGE UP (ref 13–17)
INR BLD: 1.17 RATIO — HIGH (ref 0.88–1.16)
MCHC RBC-ENTMCNC: 34.8 GM/DL — SIGNIFICANT CHANGE UP (ref 32–36)
MCHC RBC-ENTMCNC: 36.6 PG — HIGH (ref 27–34)
MCV RBC AUTO: 105 FL — HIGH (ref 80–100)
PLATELET # BLD AUTO: 242 K/UL — SIGNIFICANT CHANGE UP (ref 150–400)
POTASSIUM SERPL-MCNC: 4.2 MMOL/L — SIGNIFICANT CHANGE UP (ref 3.5–5.3)
POTASSIUM SERPL-SCNC: 4.2 MMOL/L — SIGNIFICANT CHANGE UP (ref 3.5–5.3)
PROTHROM AB SERPL-ACNC: 12.7 SEC — SIGNIFICANT CHANGE UP (ref 9.8–12.7)
RBC # BLD: 4.1 M/UL — LOW (ref 4.2–5.8)
RBC # FLD: 13 % — SIGNIFICANT CHANGE UP (ref 10.3–14.5)
SODIUM SERPL-SCNC: 143 MMOL/L — SIGNIFICANT CHANGE UP (ref 135–145)
WBC # BLD: 6.8 K/UL — SIGNIFICANT CHANGE UP (ref 3.8–10.5)
WBC # FLD AUTO: 6.8 K/UL — SIGNIFICANT CHANGE UP (ref 3.8–10.5)

## 2017-09-01 PROCEDURE — 99239 HOSP IP/OBS DSCHRG MGMT >30: CPT

## 2017-09-01 RX ORDER — WARFARIN SODIUM 2.5 MG/1
4 TABLET ORAL
Qty: 56 | Refills: 0 | OUTPATIENT
Start: 2017-09-01 | End: 2017-09-15

## 2017-09-01 RX ORDER — WARFARIN SODIUM 2.5 MG/1
4 TABLET ORAL
Qty: 40 | Refills: 0 | OUTPATIENT
Start: 2017-09-01 | End: 2017-09-15

## 2017-09-01 RX ORDER — METOPROLOL TARTRATE 50 MG
1 TABLET ORAL
Qty: 0 | Refills: 0 | COMMUNITY
Start: 2017-09-01

## 2017-09-01 RX ORDER — WARFARIN SODIUM 2.5 MG/1
1 TABLET ORAL
Qty: 0 | Refills: 0 | DISCHARGE
Start: 2017-09-01 | End: 2017-09-15

## 2017-09-01 RX ORDER — WARFARIN SODIUM 2.5 MG/1
5 TABLET ORAL
Qty: 0 | Refills: 0 | COMMUNITY
Start: 2017-09-01 | End: 2017-09-15

## 2017-09-01 RX ORDER — DOCUSATE SODIUM 100 MG
1 CAPSULE ORAL
Qty: 0 | Refills: 0 | COMMUNITY
Start: 2017-09-01

## 2017-09-01 RX ADMIN — Medication 0.12 MILLIGRAM(S): at 07:58

## 2017-09-01 RX ADMIN — Medication 50 MICROGRAM(S): at 06:22

## 2017-09-01 RX ADMIN — Medication 1: at 11:54

## 2017-09-01 RX ADMIN — Medication 100 MILLIGRAM(S): at 06:21

## 2017-09-01 RX ADMIN — LIDOCAINE 1 PATCH: 4 CREAM TOPICAL at 11:56

## 2017-09-01 RX ADMIN — TAMSULOSIN HYDROCHLORIDE 0.4 MILLIGRAM(S): 0.4 CAPSULE ORAL at 06:21

## 2017-09-01 RX ADMIN — LIDOCAINE 1 PATCH: 4 CREAM TOPICAL at 00:00

## 2017-09-01 RX ADMIN — Medication 180 MILLIGRAM(S): at 06:22

## 2017-09-01 NOTE — PROGRESS NOTE ADULT - PROBLEM SELECTOR PLAN 4
R frontal vertex parasagittal gyrus small hemorrhage, stable on repeat CTH 8/21   - no acute intervention at this time.  - monitor mental status R frontal vertex parasagittal gyrus small hemorrhage, stable on repeat CTH 8/21   - no acute intervention at this time.  - will need repeat CTH in 2 weeks to reevaluate the size of SDH in prep for TAVR where heparin bolus will be given  - monitor mental status

## 2017-09-01 NOTE — PROGRESS NOTE ADULT - SUBJECTIVE AND OBJECTIVE BOX
Pt. undergoing workup for TAVR in context of severe symptomatic AS. TAVR evaluation is complete. Pt. will need Head CT in 2 weeks to evaluate SDH, timing of TAVR TBD after review. CTS dept will arrange for outpt appts and follow up. Discussed w/ pt. and Dr. Garcia.    Message left for [facility] contact information  Dr. Kee   [phone]    Please call w/ questions/concerns [phone] or [phone]

## 2017-09-01 NOTE — PROGRESS NOTE ADULT - PROBLEM SELECTOR PLAN 5
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  Reviewed TTE, gradient 0.8.  Cards and structural heart disease appreciated.  -per NSX, can get DAPT if necessary, can get hep gtt, but CANNOT GET hep bolus.   -per Dr. nichole, less likely arrthymogenic cause of syncope.  - PT consult  - dc to rehab once medically ready

## 2017-09-01 NOTE — PROGRESS NOTE ADULT - PROBLEM SELECTOR PLAN 2
BROOKS 6. Discussed with neurosugery NP and confirmed that it is okay to resume his full anticoagulation for afib. Will resume his coumadin 4mg home dose  - cont home metoprolol tartrate 100mg bid and diltiazem 180mg extended release  - coumadin resumption cleared by trauma and neurosurgery.  - check INR and adjust coumadin: INR not at goal - will continue to monitor  - continue coumadin 4mg po bedtime  - appreciate EP recs BROOKS 6. Discussed with neurosugery NP and confirmed that it is okay to resume his full anticoagulation for afib. Will resume his coumadin 4mg home dose  - cont home metoprolol tartrate 100mg bid and diltiazem 180mg extended release  - coumadin resumption cleared by trauma and neurosurgery.  - check INR and adjust coumadin: INR not at goal - will continue to be monitored in rehab and coumadin to be adjusted accordingly  - continue coumadin 4mg po bedtime  - appreciate EP recs

## 2017-09-01 NOTE — PROGRESS NOTE ADULT - PROBLEM SELECTOR PLAN 10
- dc to rehab once TAVR workups complete  - updated his HCP (Brandi) who agreed with the plan - dc to rehab   - updated his HCP (Brandi) who agreed with the plan

## 2017-09-01 NOTE — PROGRESS NOTE ADULT - PROBLEM SELECTOR PLAN 9
cont synthroid 50mcg daily
cont synthroid 50mcg daily
continue synthroid 50mcg
cont synthroid 50mcg daily

## 2017-09-01 NOTE — PROGRESS NOTE ADULT - PROBLEM SELECTOR PLAN 1
severe AS with gradient 0.8 on TTE. Card/structural heart disease recommends TAVR. Discussed with Dr. Wyatt (card) - will plan for CT TAVR protocol, rehab and then TAVR as outpatient given deconditioning  - follow up final CT heart reading  - appreciate cardiology recs  - appreciate CT surgery recs severe AS with gradient 0.8 on TTE. Card/structural heart disease recommends TAVR. Discussed with Dr. Wyatt (card) - will dc to rehab, repeat CT head in 2 weeks and then TAVR as outpatient given deconditioning  - follow up final CT heart reading  - appreciate cardiology recs  - appreciate CT surgery recs

## 2017-09-01 NOTE — PROGRESS NOTE ADULT - PROBLEM SELECTOR PLAN 8
on lovastatin 40mg outpt, cont statin.
blood sugars acceptable while inpatient  on metformin 500mg bid at home, hold while inpt  cont accuchecks, sliding scale insulin.
on lovastatin 40mg outpt, cont statin.
on lovastatin 40mg outpt, cont statin.
blood sugars acceptable while inpatient  on metformin 500mg bid at home, hold while inpt  cont accuchecks, sliding scale insulin.
blood sugars acceptable while inpatient  on metformin 500mg bid at home, hold while inpt  cont accuchecks, sliding scale insulin.
cont synthroid 50mcg daily

## 2017-09-01 NOTE — PROGRESS NOTE ADULT - ATTENDING COMMENTS
Safe discharge planning discussed with patient, Brandi (caregiver), . Given his deconditioning from the hospitalization and need for reevaluation of the subdural hematoma in 2 weeks, he will be discharge to subacute rehab and then TAVR to be performed as outpatient. All the questions and concerns were addressed.

## 2017-09-01 NOTE — PROGRESS NOTE ADULT - PROBLEM SELECTOR PROBLEM 10
Discharge planning issues

## 2017-09-01 NOTE — PROGRESS NOTE ADULT - SUBJECTIVE AND OBJECTIVE BOX
Patient is a 85y old  Male who presents with a chief complaint of Fall with right frontal intraparenchymal hemorrhage and R 6-7 rib fractures (23 Aug 2017 14:43)      SUBJECTIVE / OVERNIGHT EVENTS:  No acute events overnight.     MEDICATIONS  (STANDING):  digoxin     Tablet 0.125 milliGRAM(s) Oral daily  diltiazem    milliGRAM(s) Oral daily  metoprolol 100 milliGRAM(s) Oral two times a day  dextrose 5%. 1000 milliLiter(s) (50 mL/Hr) IV Continuous <Continuous>  dextrose 50% Injectable 12.5 Gram(s) IV Push once  dextrose 50% Injectable 25 Gram(s) IV Push once  dextrose 50% Injectable 25 Gram(s) IV Push once  levothyroxine 50 MICROGram(s) Oral daily  tamsulosin 0.4 milliGRAM(s) Oral before breakfast  atorvastatin 10 milliGRAM(s) Oral at bedtime  lidocaine   Patch 1 Patch Transdermal daily  insulin lispro (HumaLOG) corrective regimen sliding scale   SubCutaneous three times a day before meals  insulin lispro (HumaLOG) corrective regimen sliding scale   SubCutaneous at bedtime  senna 2 Tablet(s) Oral at bedtime  docusate sodium 100 milliGRAM(s) Oral three times a day    MEDICATIONS  (PRN):  dextrose Gel 1 Dose(s) Oral once PRN Blood Glucose LESS THAN 70 milliGRAM(s)/deciliter  glucagon  Injectable 1 milliGRAM(s) IntraMuscular once PRN Glucose LESS THAN 70 milligrams/deciliter  acetaminophen   Tablet 650 milliGRAM(s) Oral every 6 hours PRN mild pain  polyethylene glycol 3350 17 Gram(s) Oral daily PRN Constipation      CAPILLARY BLOOD GLUCOSE  141 (01 Sep 2017 07:53)  132 (31 Aug 2017 22:25)  132 (31 Aug 2017 16:31)  146 (31 Aug 2017 11:35)        I&O's Summary    31 Aug 2017 07:01  -  01 Sep 2017 07:00  --------------------------------------------------------  IN: 1430 mL / OUT: 1855 mL / NET: -425 mL        PHYSICAL EXAM:  Vital Signs Last 24 Hrs  T(C): 36.4 (01 Sep 2017 05:45), Max: 37.1 (31 Aug 2017 17:02)  T(F): 97.5 (01 Sep 2017 05:45), Max: 98.7 (31 Aug 2017 17:02)  HR: 84 (01 Sep 2017 05:45) (61 - 89)  BP: 136/89 (01 Sep 2017 05:45) (94/62 - 139/71)  BP(mean): --  RR: 16 (01 Sep 2017 05:45) (16 - 18)  SpO2: 98% (01 Sep 2017 05:45) (96% - 99%)      GENERAL: NAD, well-developed  HEAD:  Atraumatic, Normocephalic  EYES: EOMI, PERRLA, conjunctiva and sclera clear  NECK: Supple, No JVD  CHEST/LUNG: Clear to auscultation bilaterally; No wheeze  HEART: irregular; + JC  ABDOMEN: Soft, Nontender, Nondistended; Bowel sounds present  EXTREMITIES:  2+ Peripheral Pulses, No clubbing, cyanosis, or edema  NEUROLOGY: AAOx3; non-focal  SKIN: skin lesion on forehead healing well      LABS:                        15.0   6.8   )-----------( 242      ( 01 Sep 2017 06:42 )             43.1     09-01    143  |  105  |  19  ----------------------------<  130<H>  4.2   |  25  |  1.09    Ca    9.7      01 Sep 2017 06:42      PT/INR - ( 01 Sep 2017 06:42 )   PT: 12.7 sec;   INR: 1.17 ratio         PTT - ( 01 Sep 2017 06:42 )  PTT:31.8 sec    Personally reviewed the labs: notable for INR subtherapeutic at 1.17 (goal 2-3)      RADIOLOGY & ADDITIONAL TESTS:    Imaging Personally Reviewed:    Consultant(s) Notes Reviewed:  cardiology, EP, structural heart disease, CT surgery    Care Discussed with Consultants/Other Providers: Dr. Wyatt (cardiology) Patient is a 85y old  Male who presents with a chief complaint of Fall with right frontal intraparenchymal hemorrhage and R 6-7 rib fractures (23 Aug 2017 14:43)      SUBJECTIVE / OVERNIGHT EVENTS:  No acute events overnight. Afebrile, VSS. Pt complains that he couldn't get sleep as his roommate was snoring so much, stating that he cannot wait to leave the hospital. He is anxious about what is going to happen - he is concerned about the operation as he is old. Had some BM but not satisfactory as per patient. He otherwise feels well. Denies fever/chills, chest pain, lightheadedness, sob, n/v/c/d, abdominal pain.     MEDICATIONS  (STANDING):  digoxin     Tablet 0.125 milliGRAM(s) Oral daily  diltiazem    milliGRAM(s) Oral daily  metoprolol 100 milliGRAM(s) Oral two times a day  dextrose 5%. 1000 milliLiter(s) (50 mL/Hr) IV Continuous <Continuous>  dextrose 50% Injectable 12.5 Gram(s) IV Push once  dextrose 50% Injectable 25 Gram(s) IV Push once  dextrose 50% Injectable 25 Gram(s) IV Push once  levothyroxine 50 MICROGram(s) Oral daily  tamsulosin 0.4 milliGRAM(s) Oral before breakfast  atorvastatin 10 milliGRAM(s) Oral at bedtime  lidocaine   Patch 1 Patch Transdermal daily  insulin lispro (HumaLOG) corrective regimen sliding scale   SubCutaneous three times a day before meals  insulin lispro (HumaLOG) corrective regimen sliding scale   SubCutaneous at bedtime  senna 2 Tablet(s) Oral at bedtime  docusate sodium 100 milliGRAM(s) Oral three times a day    MEDICATIONS  (PRN):  dextrose Gel 1 Dose(s) Oral once PRN Blood Glucose LESS THAN 70 milliGRAM(s)/deciliter  glucagon  Injectable 1 milliGRAM(s) IntraMuscular once PRN Glucose LESS THAN 70 milligrams/deciliter  acetaminophen   Tablet 650 milliGRAM(s) Oral every 6 hours PRN mild pain  polyethylene glycol 3350 17 Gram(s) Oral daily PRN Constipation      CAPILLARY BLOOD GLUCOSE  141 (01 Sep 2017 07:53)  132 (31 Aug 2017 22:25)  132 (31 Aug 2017 16:31)  146 (31 Aug 2017 11:35)        I&O's Summary    31 Aug 2017 07:01  -  01 Sep 2017 07:00  --------------------------------------------------------  IN: 1430 mL / OUT: 1855 mL / NET: -425 mL        PHYSICAL EXAM:  Vital Signs Last 24 Hrs  T(C): 36.4 (01 Sep 2017 05:45), Max: 37.1 (31 Aug 2017 17:02)  T(F): 97.5 (01 Sep 2017 05:45), Max: 98.7 (31 Aug 2017 17:02)  HR: 84 (01 Sep 2017 05:45) (61 - 89)  BP: 136/89 (01 Sep 2017 05:45) (94/62 - 139/71)  BP(mean): --  RR: 16 (01 Sep 2017 05:45) (16 - 18)  SpO2: 98% (01 Sep 2017 05:45) (96% - 99%)      GENERAL: NAD, well-developed  HEAD:  Atraumatic, Normocephalic  EYES: EOMI, PERRLA, conjunctiva and sclera clear  NECK: Supple, No JVD  CHEST/LUNG: Clear to auscultation bilaterally; No wheeze  HEART: irregular; + JC  ABDOMEN: Soft, Nontender, Nondistended; Bowel sounds present  EXTREMITIES:  2+ Peripheral Pulses, No clubbing, cyanosis, or edema  NEUROLOGY: AAOx3; non-focal  SKIN: skin lesion on forehead healing well      LABS:                        15.0   6.8   )-----------( 242      ( 01 Sep 2017 06:42 )             43.1     09-01    143  |  105  |  19  ----------------------------<  130<H>  4.2   |  25  |  1.09    Ca    9.7      01 Sep 2017 06:42      PT/INR - ( 01 Sep 2017 06:42 )   PT: 12.7 sec;   INR: 1.17 ratio         PTT - ( 01 Sep 2017 06:42 )  PTT:31.8 sec    Personally reviewed the labs: notable for INR subtherapeutic at 1.17 (goal 2-3)      RADIOLOGY & ADDITIONAL TESTS:    Imaging Personally Reviewed:    Consultant(s) Notes Reviewed:  cardiology, EP, structural heart disease, CT surgery    Care Discussed with Consultants/Other Providers: Dr. Wyatt (cardiology), TEODORA Underwood

## 2017-09-01 NOTE — PROGRESS NOTE ADULT - PROVIDER SPECIALTY LIST ADULT
Cardiology
Electrophysiology
Hospitalist
Intervent Cardiology
Intervent Cardiology
Neurosurgery
Neurosurgery
Trauma Surgery
Cardiology
Electrophysiology
Hospitalist

## 2017-09-01 NOTE — CHART NOTE - NSCHARTNOTEFT_GEN_A_CORE
Asked by Dr Garcia to complete discharge paperwork and facilitate discharge to rehab - INR checks to be done daily at rehab and coumadin dosed accordingly  Discharge paperwork completed - Discharge medications were already completed by Dr Garcia   informed of medical clearance for discharge to rehab with INR level checks daily as patient's INR today is 1.17 - goal 2-3.

## 2017-09-01 NOTE — PROGRESS NOTE ADULT - PROBLEM SELECTOR PLAN 3
-appreciate EP eval and cards eval  -continue current regimen, less likely cause of syncope  -may need loop recorder before dc No further episode. Pt ambulates well without lightheadedness  -appreciate EP eval and cards eval

## 2017-09-01 NOTE — PROGRESS NOTE ADULT - PROBLEM SELECTOR PROBLEM 8
Hyperlipidemia
Type 2 diabetes mellitus with hyperglycemia, without long-term current use of insulin
Hypothyroid
Type 2 diabetes mellitus with hyperglycemia, without long-term current use of insulin

## 2017-09-15 ENCOUNTER — CLINICAL ADVICE (OUTPATIENT)
Age: 82
End: 2017-09-15

## 2017-09-15 DIAGNOSIS — I62.9 NONTRAUMATIC INTRACRANIAL HEMORRHAGE, UNSPECIFIED: ICD-10-CM

## 2017-09-21 ENCOUNTER — RECORD ABSTRACTING (OUTPATIENT)
Age: 82
End: 2017-09-21

## 2017-09-21 DIAGNOSIS — Z87.898 PERSONAL HISTORY OF OTHER SPECIFIED CONDITIONS: ICD-10-CM

## 2017-09-21 DIAGNOSIS — I35.0 NONRHEUMATIC AORTIC (VALVE) STENOSIS: ICD-10-CM

## 2017-09-21 DIAGNOSIS — I10 ESSENTIAL (PRIMARY) HYPERTENSION: ICD-10-CM

## 2017-09-21 DIAGNOSIS — E78.5 HYPERLIPIDEMIA, UNSPECIFIED: ICD-10-CM

## 2017-09-21 DIAGNOSIS — M48.00 SPINAL STENOSIS, SITE UNSPECIFIED: ICD-10-CM

## 2017-09-21 DIAGNOSIS — Z87.74 PERSONAL HISTORY OF (CORRECTED) CONGENITAL MALFORMATIONS OF HEART AND CIRCULATORY SYSTEM: ICD-10-CM

## 2017-09-21 DIAGNOSIS — N40.0 BENIGN PROSTATIC HYPERPLASIA WITHOUT LOWER URINARY TRACT SYMPMS: ICD-10-CM

## 2017-09-21 DIAGNOSIS — Z87.39 PERSONAL HISTORY OF OTHER DISEASES OF THE MUSCULOSKELETAL SYSTEM AND CONNECTIVE TISSUE: ICD-10-CM

## 2017-09-21 DIAGNOSIS — E11.9 TYPE 2 DIABETES MELLITUS W/OUT COMPLICATIONS: ICD-10-CM

## 2017-09-21 DIAGNOSIS — Z91.81 HISTORY OF FALLING: ICD-10-CM

## 2017-09-21 DIAGNOSIS — I45.5 OTHER SPECIFIED HEART BLOCK: ICD-10-CM

## 2017-09-21 DIAGNOSIS — E03.9 HYPOTHYROIDISM, UNSPECIFIED: ICD-10-CM

## 2017-09-21 RX ORDER — LEVOTHYROXINE SODIUM 0.05 MG/1
50 TABLET ORAL DAILY
Refills: 0 | Status: ACTIVE | COMMUNITY

## 2017-09-21 RX ORDER — DIGOXIN 125 UG/1
125 TABLET ORAL DAILY
Refills: 0 | Status: ACTIVE | COMMUNITY

## 2017-09-21 RX ORDER — DOCUSATE SODIUM 100 MG/1
100 CAPSULE, LIQUID FILLED ORAL 3 TIMES DAILY
Refills: 0 | Status: ACTIVE | COMMUNITY

## 2017-09-21 RX ORDER — TAMSULOSIN HYDROCHLORIDE 0.4 MG/1
0.4 CAPSULE ORAL DAILY
Refills: 0 | Status: ACTIVE | COMMUNITY

## 2017-09-25 ENCOUNTER — APPOINTMENT (OUTPATIENT)
Dept: CARDIOTHORACIC SURGERY | Facility: CLINIC | Age: 82
End: 2017-09-25
Payer: MEDICARE

## 2017-09-25 ENCOUNTER — OUTPATIENT (OUTPATIENT)
Dept: OUTPATIENT SERVICES | Facility: HOSPITAL | Age: 82
LOS: 1 days | End: 2017-09-25
Payer: MEDICARE

## 2017-09-25 VITALS
WEIGHT: 136.91 LBS | RESPIRATION RATE: 16 BRPM | SYSTOLIC BLOOD PRESSURE: 119 MMHG | HEART RATE: 106 BPM | DIASTOLIC BLOOD PRESSURE: 90 MMHG | HEIGHT: 65 IN | TEMPERATURE: 98 F | OXYGEN SATURATION: 97 %

## 2017-09-25 VITALS
OXYGEN SATURATION: 97 % | SYSTOLIC BLOOD PRESSURE: 151 MMHG | HEART RATE: 108 BPM | RESPIRATION RATE: 16 BRPM | WEIGHT: 140 LBS | BODY MASS INDEX: 21.97 KG/M2 | DIASTOLIC BLOOD PRESSURE: 95 MMHG | HEIGHT: 67 IN

## 2017-09-25 VITALS — DIASTOLIC BLOOD PRESSURE: 79 MMHG | SYSTOLIC BLOOD PRESSURE: 128 MMHG

## 2017-09-25 DIAGNOSIS — N32.81 OVERACTIVE BLADDER: ICD-10-CM

## 2017-09-25 DIAGNOSIS — Z98.49 CATARACT EXTRACTION STATUS, UNSPECIFIED EYE: Chronic | ICD-10-CM

## 2017-09-25 DIAGNOSIS — Z87.891 PERSONAL HISTORY OF NICOTINE DEPENDENCE: ICD-10-CM

## 2017-09-25 DIAGNOSIS — E11.9 TYPE 2 DIABETES MELLITUS WITHOUT COMPLICATIONS: ICD-10-CM

## 2017-09-25 DIAGNOSIS — I35.0 NONRHEUMATIC AORTIC (VALVE) STENOSIS: ICD-10-CM

## 2017-09-25 DIAGNOSIS — Z98.890 OTHER SPECIFIED POSTPROCEDURAL STATES: Chronic | ICD-10-CM

## 2017-09-25 DIAGNOSIS — Z60.2 PROBLEMS RELATED TO LIVING ALONE: ICD-10-CM

## 2017-09-25 DIAGNOSIS — N40.0 BENIGN PROSTATIC HYPERPLASIA WITHOUT LOWER URINARY TRACT SYMPTOMS: ICD-10-CM

## 2017-09-25 DIAGNOSIS — I48.91 UNSPECIFIED ATRIAL FIBRILLATION: ICD-10-CM

## 2017-09-25 DIAGNOSIS — I10 ESSENTIAL (PRIMARY) HYPERTENSION: ICD-10-CM

## 2017-09-25 DIAGNOSIS — Z78.9 OTHER SPECIFIED HEALTH STATUS: ICD-10-CM

## 2017-09-25 DIAGNOSIS — H40.9 UNSPECIFIED GLAUCOMA: ICD-10-CM

## 2017-09-25 DIAGNOSIS — I62.9 NONTRAUMATIC INTRACRANIAL HEMORRHAGE, UNSPECIFIED: ICD-10-CM

## 2017-09-25 DIAGNOSIS — Z01.818 ENCOUNTER FOR OTHER PREPROCEDURAL EXAMINATION: ICD-10-CM

## 2017-09-25 LAB
ANION GAP SERPL CALC-SCNC: 11 MMOL/L — SIGNIFICANT CHANGE UP (ref 5–17)
BLD GP AB SCN SERPL QL: NEGATIVE — SIGNIFICANT CHANGE UP
BUN SERPL-MCNC: 18 MG/DL — SIGNIFICANT CHANGE UP (ref 7–23)
CALCIUM SERPL-MCNC: 9.4 MG/DL — SIGNIFICANT CHANGE UP (ref 8.4–10.5)
CHLORIDE SERPL-SCNC: 103 MMOL/L — SIGNIFICANT CHANGE UP (ref 96–108)
CO2 SERPL-SCNC: 26 MMOL/L — SIGNIFICANT CHANGE UP (ref 22–31)
CREAT SERPL-MCNC: 0.9 MG/DL — SIGNIFICANT CHANGE UP (ref 0.5–1.3)
GLUCOSE SERPL-MCNC: 178 MG/DL — HIGH (ref 70–99)
HCT VFR BLD CALC: 40 % — SIGNIFICANT CHANGE UP (ref 39–50)
HGB BLD-MCNC: 13.5 G/DL — SIGNIFICANT CHANGE UP (ref 13–17)
MCHC RBC-ENTMCNC: 33.8 GM/DL — SIGNIFICANT CHANGE UP (ref 32–36)
MCHC RBC-ENTMCNC: 34 PG — SIGNIFICANT CHANGE UP (ref 27–34)
MCV RBC AUTO: 100.8 FL — HIGH (ref 80–100)
MRSA PCR RESULT.: SIGNIFICANT CHANGE UP
PLATELET # BLD AUTO: 163 K/UL — SIGNIFICANT CHANGE UP (ref 150–400)
POTASSIUM SERPL-MCNC: 4.2 MMOL/L — SIGNIFICANT CHANGE UP (ref 3.5–5.3)
POTASSIUM SERPL-SCNC: 4.2 MMOL/L — SIGNIFICANT CHANGE UP (ref 3.5–5.3)
RBC # BLD: 3.97 M/UL — LOW (ref 4.2–5.8)
RBC # FLD: 13.9 % — SIGNIFICANT CHANGE UP (ref 10.3–14.5)
RH IG SCN BLD-IMP: POSITIVE — SIGNIFICANT CHANGE UP
S AUREUS DNA NOSE QL NAA+PROBE: DETECTED
SODIUM SERPL-SCNC: 140 MMOL/L — SIGNIFICANT CHANGE UP (ref 135–145)
WBC # BLD: 6.64 K/UL — SIGNIFICANT CHANGE UP (ref 3.8–10.5)
WBC # FLD AUTO: 6.64 K/UL — SIGNIFICANT CHANGE UP (ref 3.8–10.5)

## 2017-09-25 PROCEDURE — 70450 CT HEAD/BRAIN W/O DYE: CPT | Mod: 26

## 2017-09-25 PROCEDURE — 80048 BASIC METABOLIC PNL TOTAL CA: CPT

## 2017-09-25 PROCEDURE — 86900 BLOOD TYPING SEROLOGIC ABO: CPT

## 2017-09-25 PROCEDURE — 70450 CT HEAD/BRAIN W/O DYE: CPT

## 2017-09-25 PROCEDURE — 86850 RBC ANTIBODY SCREEN: CPT

## 2017-09-25 PROCEDURE — 86901 BLOOD TYPING SEROLOGIC RH(D): CPT

## 2017-09-25 PROCEDURE — 99204 OFFICE O/P NEW MOD 45 MIN: CPT

## 2017-09-25 PROCEDURE — 85027 COMPLETE CBC AUTOMATED: CPT

## 2017-09-25 PROCEDURE — 87641 MR-STAPH DNA AMP PROBE: CPT

## 2017-09-25 PROCEDURE — G0463: CPT

## 2017-09-25 PROCEDURE — 87640 STAPH A DNA AMP PROBE: CPT

## 2017-09-25 RX ORDER — LOVASTATIN 20 MG
1 TABLET ORAL
Qty: 0 | Refills: 0 | COMMUNITY

## 2017-09-25 RX ORDER — LATANOPROST/PF 0.005 %
0.01 DROPS OPHTHALMIC (EYE)
Refills: 0 | Status: ACTIVE | COMMUNITY

## 2017-09-25 RX ORDER — LOVASTATIN 40 MG/1
40 TABLET ORAL DAILY
Refills: 0 | Status: DISCONTINUED | COMMUNITY
End: 2017-09-25

## 2017-09-25 RX ORDER — WARFARIN SODIUM 4 MG/1
4 TABLET ORAL DAILY
Refills: 0 | Status: DISCONTINUED | COMMUNITY
End: 2017-09-25

## 2017-09-25 RX ORDER — DILTIAZEM HYDROCHLORIDE 180 MG/1
180 CAPSULE, COATED, EXTENDED RELEASE ORAL DAILY
Refills: 0 | Status: DISCONTINUED | COMMUNITY
End: 2017-09-25

## 2017-09-25 RX ORDER — ATORVASTATIN CALCIUM 10 MG/1
10 TABLET, FILM COATED ORAL
Refills: 0 | Status: DISCONTINUED | COMMUNITY
End: 2017-09-25

## 2017-09-25 RX ORDER — METFORMIN HYDROCHLORIDE 500 MG/1
500 TABLET, FILM COATED ORAL TWICE DAILY
Refills: 0 | Status: ACTIVE | COMMUNITY

## 2017-09-25 RX ORDER — INSULIN LISPRO 100 [IU]/ML
100 INJECTION, SOLUTION INTRAVENOUS; SUBCUTANEOUS
Refills: 0 | Status: DISCONTINUED | COMMUNITY
End: 2017-09-25

## 2017-09-25 RX ORDER — LIDOCAINE 5% 700 MG/1
5 PATCH TOPICAL
Refills: 0 | Status: DISCONTINUED | COMMUNITY
End: 2017-09-25

## 2017-09-25 RX ORDER — CHOLESTYRAMINE 4 G/9G
0.33 POWDER, FOR SUSPENSION ORAL
Qty: 0 | Refills: 0 | COMMUNITY

## 2017-09-25 RX ORDER — MIRABEGRON 25 MG/1
25 TABLET, FILM COATED, EXTENDED RELEASE ORAL
Refills: 0 | Status: ACTIVE | COMMUNITY

## 2017-09-25 SDOH — SOCIAL STABILITY - SOCIAL INSECURITY: PROBLEMS RELATED TO LIVING ALONE: Z60.2

## 2017-09-25 NOTE — H&P PST ADULT - NSANTHOSAYNRD_GEN_A_CORE
No. NANETTE screening performed.  STOP BANG Legend: 0-2 = LOW Risk; 3-4 = INTERMEDIATE Risk; 5-8 = HIGH Risk

## 2017-09-25 NOTE — H&P PST ADULT - PMH
Aortic stenosis    Arthritis, shoulder region  Right  Atrial fibrillation    BPH (benign prostatic hypertrophy)    Congenital heart defect    Dementia    Diabetes mellitus    Glaucoma    HTN - Hypertension    Hyperlipidemia    Nonrheumatic aortic valve stenosis    OAB (overactive bladder)    Spinal stenosis of lumbar region    Torn rotator cuff  Right Aortic stenosis    Arthritis  generalized medrol pack q 6 weeks  Atrial fibrillation    BPH (benign prostatic hypertrophy)    Congenital heart defect    Dementia    Diabetes mellitus    Glaucoma    HTN - Hypertension    Hyperlipidemia    Nonrheumatic aortic valve stenosis    OAB (overactive bladder)    Spinal stenosis of lumbar region    Torn rotator cuff  Right

## 2017-09-25 NOTE — H&P PST ADULT - PSH
Hx of appendectomy  age 17  S/P cataract surgery    S/P lumbar laminectomy  2013  S/P TURP (status post transurethral resection of prostate)  2008 History of prior ablation treatment  cardiac  Hx of appendectomy  age 17  S/P cataract surgery    S/P lumbar laminectomy  2013  S/P TURP (status post transurethral resection of prostate)  2008

## 2017-09-25 NOTE — H&P PST ADULT - HISTORY OF PRESENT ILLNESS
85 year old male PMH of HTN, HLD, Afib on Coumadin s/p ablation T2DM ( HgA1c 7.4 8/2017), hypothyroid, OAB, BPH, dementia ( mild), glaucoma; Pt fell August 2017 on his way home from the store; He fell on the concrete and hit his face due to syncopal episode was brought to [facility]; Work up showed aortic stenosis presents to PST for TAVR

## 2017-09-25 NOTE — H&P PST ADULT - PROBLEM SELECTOR PLAN 1
TAVR  Pt starts medrol pack every 6 weeks for arthritis pt is starting 9/26/17 OK with WARNER Garg from CTU  Coumadin stop 72 hours prior to surgery

## 2017-09-27 DIAGNOSIS — A49.01 METHICILLIN SUSCEPTIBLE STAPHYLOCOCCUS AUREUS INFECTION, UNSPECIFIED SITE: ICD-10-CM

## 2017-10-18 ENCOUNTER — INPATIENT (INPATIENT)
Facility: HOSPITAL | Age: 82
LOS: 1 days | Discharge: HOME | DRG: 267 | End: 2017-10-20
Attending: THORACIC SURGERY (CARDIOTHORACIC VASCULAR SURGERY) | Admitting: THORACIC SURGERY (CARDIOTHORACIC VASCULAR SURGERY)
Payer: MEDICARE

## 2017-10-18 ENCOUNTER — APPOINTMENT (OUTPATIENT)
Dept: CARDIOTHORACIC SURGERY | Facility: HOSPITAL | Age: 82
End: 2017-10-18

## 2017-10-18 ENCOUNTER — TRANSCRIPTION ENCOUNTER (OUTPATIENT)
Age: 82
End: 2017-10-18

## 2017-10-18 VITALS
TEMPERATURE: 98 F | SYSTOLIC BLOOD PRESSURE: 144 MMHG | RESPIRATION RATE: 18 BRPM | OXYGEN SATURATION: 100 % | WEIGHT: 138.01 LBS | DIASTOLIC BLOOD PRESSURE: 90 MMHG | HEIGHT: 65 IN | HEART RATE: 99 BPM

## 2017-10-18 DIAGNOSIS — I35.0 NONRHEUMATIC AORTIC (VALVE) STENOSIS: ICD-10-CM

## 2017-10-18 DIAGNOSIS — Z98.890 OTHER SPECIFIED POSTPROCEDURAL STATES: Chronic | ICD-10-CM

## 2017-10-18 DIAGNOSIS — Z98.49 CATARACT EXTRACTION STATUS, UNSPECIFIED EYE: Chronic | ICD-10-CM

## 2017-10-18 LAB
ANION GAP SERPL CALC-SCNC: 16 MMOL/L — SIGNIFICANT CHANGE UP (ref 5–17)
APTT BLD: 35 SEC — SIGNIFICANT CHANGE UP (ref 27.5–37.4)
BASOPHILS # BLD AUTO: 0.1 K/UL — SIGNIFICANT CHANGE UP (ref 0–0.2)
BASOPHILS NFR BLD AUTO: 0.6 % — SIGNIFICANT CHANGE UP (ref 0–2)
BLD GP AB SCN SERPL QL: NEGATIVE — SIGNIFICANT CHANGE UP
BUN SERPL-MCNC: 11 MG/DL — SIGNIFICANT CHANGE UP (ref 7–23)
CALCIUM SERPL-MCNC: 8.1 MG/DL — LOW (ref 8.4–10.5)
CHLORIDE SERPL-SCNC: 104 MMOL/L — SIGNIFICANT CHANGE UP (ref 96–108)
CO2 SERPL-SCNC: 22 MMOL/L — SIGNIFICANT CHANGE UP (ref 22–31)
CREAT SERPL-MCNC: 0.79 MG/DL — SIGNIFICANT CHANGE UP (ref 0.5–1.3)
EOSINOPHIL # BLD AUTO: 0.3 K/UL — SIGNIFICANT CHANGE UP (ref 0–0.5)
EOSINOPHIL NFR BLD AUTO: 3.5 % — SIGNIFICANT CHANGE UP (ref 0–6)
GAS PNL BLDA: SIGNIFICANT CHANGE UP
GLUCOSE BLDC GLUCOMTR-MCNC: 141 MG/DL — HIGH (ref 70–99)
GLUCOSE SERPL-MCNC: 178 MG/DL — HIGH (ref 70–99)
HCT VFR BLD CALC: 39.5 % — SIGNIFICANT CHANGE UP (ref 39–50)
HGB BLD-MCNC: 13.5 G/DL — SIGNIFICANT CHANGE UP (ref 13–17)
INR BLD: 1.68 RATIO — HIGH (ref 0.88–1.16)
LYMPHOCYTES # BLD AUTO: 0.9 K/UL — LOW (ref 1–3.3)
LYMPHOCYTES # BLD AUTO: 11 % — LOW (ref 13–44)
MCHC RBC-ENTMCNC: 34.1 GM/DL — SIGNIFICANT CHANGE UP (ref 32–36)
MCHC RBC-ENTMCNC: 35.9 PG — HIGH (ref 27–34)
MCV RBC AUTO: 105 FL — HIGH (ref 80–100)
MONOCYTES # BLD AUTO: 0.4 K/UL — SIGNIFICANT CHANGE UP (ref 0–0.9)
MONOCYTES NFR BLD AUTO: 4.6 % — SIGNIFICANT CHANGE UP (ref 2–14)
NEUTROPHILS # BLD AUTO: 6.6 K/UL — SIGNIFICANT CHANGE UP (ref 1.8–7.4)
NEUTROPHILS NFR BLD AUTO: 80.3 % — HIGH (ref 43–77)
PLATELET # BLD AUTO: 156 K/UL — SIGNIFICANT CHANGE UP (ref 150–400)
POTASSIUM SERPL-MCNC: 4.2 MMOL/L — SIGNIFICANT CHANGE UP (ref 3.5–5.3)
POTASSIUM SERPL-SCNC: 4.2 MMOL/L — SIGNIFICANT CHANGE UP (ref 3.5–5.3)
PROTHROM AB SERPL-ACNC: 18.5 SEC — HIGH (ref 9.8–12.7)
RBC # BLD: 3.75 M/UL — LOW (ref 4.2–5.8)
RBC # FLD: 13 % — SIGNIFICANT CHANGE UP (ref 10.3–14.5)
RH IG SCN BLD-IMP: POSITIVE — SIGNIFICANT CHANGE UP
SODIUM SERPL-SCNC: 142 MMOL/L — SIGNIFICANT CHANGE UP (ref 135–145)
WBC # BLD: 8.3 K/UL — SIGNIFICANT CHANGE UP (ref 3.8–10.5)
WBC # FLD AUTO: 8.3 K/UL — SIGNIFICANT CHANGE UP (ref 3.8–10.5)

## 2017-10-18 PROCEDURE — 93355 ECHO TRANSESOPHAGEAL (TEE): CPT

## 2017-10-18 PROCEDURE — 33361 REPLACE AORTIC VALVE PERQ: CPT

## 2017-10-18 PROCEDURE — 33361 REPLACE AORTIC VALVE PERQ: CPT | Mod: 62,Q0

## 2017-10-18 RX ORDER — WARFARIN SODIUM 2.5 MG/1
5 TABLET ORAL ONCE
Qty: 0 | Refills: 0 | Status: COMPLETED | OUTPATIENT
Start: 2017-10-18 | End: 2017-10-18

## 2017-10-18 RX ORDER — CEFUROXIME AXETIL 250 MG
1500 TABLET ORAL EVERY 8 HOURS
Qty: 0 | Refills: 0 | Status: COMPLETED | OUTPATIENT
Start: 2017-10-18 | End: 2017-10-19

## 2017-10-18 RX ORDER — FAMOTIDINE 10 MG/ML
20 INJECTION INTRAVENOUS DAILY
Qty: 0 | Refills: 0 | Status: DISCONTINUED | OUTPATIENT
Start: 2017-10-18 | End: 2017-10-20

## 2017-10-18 RX ORDER — SODIUM CHLORIDE 9 MG/ML
3 INJECTION INTRAMUSCULAR; INTRAVENOUS; SUBCUTANEOUS EVERY 8 HOURS
Qty: 0 | Refills: 0 | Status: DISCONTINUED | OUTPATIENT
Start: 2017-10-18 | End: 2017-10-20

## 2017-10-18 RX ORDER — SODIUM CHLORIDE 9 MG/ML
3 INJECTION INTRAMUSCULAR; INTRAVENOUS; SUBCUTANEOUS EVERY 8 HOURS
Qty: 0 | Refills: 0 | Status: DISCONTINUED | OUTPATIENT
Start: 2017-10-18 | End: 2017-10-18

## 2017-10-18 RX ORDER — LEVOTHYROXINE SODIUM 125 MCG
50 TABLET ORAL DAILY
Qty: 0 | Refills: 0 | Status: DISCONTINUED | OUTPATIENT
Start: 2017-10-18 | End: 2017-10-20

## 2017-10-18 RX ORDER — LATANOPROST 0.05 MG/ML
1 SOLUTION/ DROPS OPHTHALMIC; TOPICAL AT BEDTIME
Qty: 0 | Refills: 0 | Status: DISCONTINUED | OUTPATIENT
Start: 2017-10-18 | End: 2017-10-20

## 2017-10-18 RX ORDER — ASPIRIN/CALCIUM CARB/MAGNESIUM 324 MG
325 TABLET ORAL DAILY
Qty: 0 | Refills: 0 | Status: DISCONTINUED | OUTPATIENT
Start: 2017-10-18 | End: 2017-10-20

## 2017-10-18 RX ORDER — DOCUSATE SODIUM 100 MG
100 CAPSULE ORAL DAILY
Qty: 0 | Refills: 0 | Status: DISCONTINUED | OUTPATIENT
Start: 2017-10-18 | End: 2017-10-20

## 2017-10-18 RX ORDER — CEFUROXIME AXETIL 250 MG
1500 TABLET ORAL ONCE
Qty: 0 | Refills: 0 | Status: COMPLETED | OUTPATIENT
Start: 2017-10-18 | End: 2017-10-18

## 2017-10-18 RX ORDER — NOREPINEPHRINE BITARTRATE/D5W 8 MG/250ML
0.03 PLASTIC BAG, INJECTION (ML) INTRAVENOUS
Qty: 8 | Refills: 0 | Status: DISCONTINUED | OUTPATIENT
Start: 2017-10-18 | End: 2017-10-19

## 2017-10-18 RX ADMIN — Medication 100 MILLIGRAM(S): at 20:28

## 2017-10-18 RX ADMIN — WARFARIN SODIUM 5 MILLIGRAM(S): 2.5 TABLET ORAL at 22:41

## 2017-10-18 RX ADMIN — LATANOPROST 1 DROP(S): 0.05 SOLUTION/ DROPS OPHTHALMIC; TOPICAL at 22:41

## 2017-10-18 RX ADMIN — SODIUM CHLORIDE 3 MILLILITER(S): 9 INJECTION INTRAMUSCULAR; INTRAVENOUS; SUBCUTANEOUS at 22:37

## 2017-10-18 RX ADMIN — Medication 3 MICROGRAM(S)/KG/MIN: at 16:55

## 2017-10-18 NOTE — BRIEF OPERATIVE NOTE - PROCEDURE
<<-----Click on this checkbox to enter Procedure TAVR, femoral approach  10/18/2017  [facility]  Active  CHART1

## 2017-10-18 NOTE — PATIENT PROFILE ADULT. - PSH
History of prior ablation treatment  cardiac  Hx of appendectomy  age 17  S/P cataract surgery    S/P lumbar laminectomy  2013  S/P TURP (status post transurethral resection of prostate)  2008

## 2017-10-18 NOTE — PATIENT PROFILE ADULT. - PMH
Aortic stenosis    Arthritis  generalized medrol pack q 6 weeks  Atrial fibrillation    BPH (benign prostatic hypertrophy)    Congenital heart defect    Dementia    Diabetes mellitus    Glaucoma    HTN - Hypertension    Hyperlipidemia    Nonrheumatic aortic valve stenosis    OAB (overactive bladder)    Spinal stenosis of lumbar region    Torn rotator cuff  Right

## 2017-10-19 ENCOUNTER — TRANSCRIPTION ENCOUNTER (OUTPATIENT)
Age: 82
End: 2017-10-19

## 2017-10-19 DIAGNOSIS — Z95.2 PRESENCE OF PROSTHETIC HEART VALVE: ICD-10-CM

## 2017-10-19 LAB
ALBUMIN SERPL ELPH-MCNC: 3.4 G/DL — SIGNIFICANT CHANGE UP (ref 3.3–5)
ALP SERPL-CCNC: 67 U/L — SIGNIFICANT CHANGE UP (ref 40–120)
ALT FLD-CCNC: 11 U/L RC — SIGNIFICANT CHANGE UP (ref 10–45)
ANION GAP SERPL CALC-SCNC: 16 MMOL/L — SIGNIFICANT CHANGE UP (ref 5–17)
AST SERPL-CCNC: 21 U/L — SIGNIFICANT CHANGE UP (ref 10–40)
BILIRUB SERPL-MCNC: 2 MG/DL — HIGH (ref 0.2–1.2)
BUN SERPL-MCNC: 13 MG/DL — SIGNIFICANT CHANGE UP (ref 7–23)
CALCIUM SERPL-MCNC: 8.8 MG/DL — SIGNIFICANT CHANGE UP (ref 8.4–10.5)
CHLORIDE SERPL-SCNC: 105 MMOL/L — SIGNIFICANT CHANGE UP (ref 96–108)
CO2 SERPL-SCNC: 20 MMOL/L — LOW (ref 22–31)
CREAT SERPL-MCNC: 0.75 MG/DL — SIGNIFICANT CHANGE UP (ref 0.5–1.3)
GAS PNL BLDA: SIGNIFICANT CHANGE UP
GAS PNL BLDA: SIGNIFICANT CHANGE UP
GLUCOSE BLDC GLUCOMTR-MCNC: 162 MG/DL — HIGH (ref 70–99)
GLUCOSE BLDC GLUCOMTR-MCNC: 239 MG/DL — HIGH (ref 70–99)
GLUCOSE SERPL-MCNC: 202 MG/DL — HIGH (ref 70–99)
HCT VFR BLD CALC: 40 % — SIGNIFICANT CHANGE UP (ref 39–50)
HCT VFR BLD CALC: 44.7 % — SIGNIFICANT CHANGE UP (ref 39–50)
HGB BLD-MCNC: 14 G/DL — SIGNIFICANT CHANGE UP (ref 13–17)
HGB BLD-MCNC: 15 G/DL — SIGNIFICANT CHANGE UP (ref 13–17)
INR BLD: 1.84 RATIO — HIGH (ref 0.88–1.16)
MCHC RBC-ENTMCNC: 33.6 GM/DL — SIGNIFICANT CHANGE UP (ref 32–36)
MCHC RBC-ENTMCNC: 34.6 PG — HIGH (ref 27–34)
MCHC RBC-ENTMCNC: 35.1 GM/DL — SIGNIFICANT CHANGE UP (ref 32–36)
MCHC RBC-ENTMCNC: 36.8 PG — HIGH (ref 27–34)
MCV RBC AUTO: 103 FL — HIGH (ref 80–100)
MCV RBC AUTO: 105 FL — HIGH (ref 80–100)
PLATELET # BLD AUTO: 146 K/UL — LOW (ref 150–400)
PLATELET # BLD AUTO: 189 K/UL — SIGNIFICANT CHANGE UP (ref 150–400)
POTASSIUM SERPL-MCNC: 4.6 MMOL/L — SIGNIFICANT CHANGE UP (ref 3.5–5.3)
POTASSIUM SERPL-SCNC: 4.6 MMOL/L — SIGNIFICANT CHANGE UP (ref 3.5–5.3)
PROT SERPL-MCNC: 6.2 G/DL — SIGNIFICANT CHANGE UP (ref 6–8.3)
PROTHROM AB SERPL-ACNC: 20.1 SEC — HIGH (ref 9.8–12.7)
RBC # BLD: 3.82 M/UL — LOW (ref 4.2–5.8)
RBC # BLD: 4.34 M/UL — SIGNIFICANT CHANGE UP (ref 4.2–5.8)
RBC # FLD: 13 % — SIGNIFICANT CHANGE UP (ref 10.3–14.5)
RBC # FLD: 14.4 % — SIGNIFICANT CHANGE UP (ref 10.3–14.5)
SODIUM SERPL-SCNC: 141 MMOL/L — SIGNIFICANT CHANGE UP (ref 135–145)
WBC # BLD: 11.17 K/UL — HIGH (ref 3.8–10.5)
WBC # BLD: 6.9 K/UL — SIGNIFICANT CHANGE UP (ref 3.8–10.5)
WBC # FLD AUTO: 11.17 K/UL — HIGH (ref 3.8–10.5)
WBC # FLD AUTO: 6.9 K/UL — SIGNIFICANT CHANGE UP (ref 3.8–10.5)

## 2017-10-19 PROCEDURE — 86900 BLOOD TYPING SEROLOGIC ABO: CPT

## 2017-10-19 PROCEDURE — 82553 CREATINE MB FRACTION: CPT

## 2017-10-19 PROCEDURE — 80048 BASIC METABOLIC PNL TOTAL CA: CPT

## 2017-10-19 PROCEDURE — 70486 CT MAXILLOFACIAL W/O DYE: CPT

## 2017-10-19 PROCEDURE — 70450 CT HEAD/BRAIN W/O DYE: CPT

## 2017-10-19 PROCEDURE — 84295 ASSAY OF SERUM SODIUM: CPT

## 2017-10-19 PROCEDURE — 85610 PROTHROMBIN TIME: CPT

## 2017-10-19 PROCEDURE — 97116 GAIT TRAINING THERAPY: CPT

## 2017-10-19 PROCEDURE — C1887: CPT

## 2017-10-19 PROCEDURE — 85027 COMPLETE CBC AUTOMATED: CPT

## 2017-10-19 PROCEDURE — 83036 HEMOGLOBIN GLYCOSYLATED A1C: CPT

## 2017-10-19 PROCEDURE — 82330 ASSAY OF CALCIUM: CPT

## 2017-10-19 PROCEDURE — 84100 ASSAY OF PHOSPHORUS: CPT

## 2017-10-19 PROCEDURE — 99232 SBSQ HOSP IP/OBS MODERATE 35: CPT

## 2017-10-19 PROCEDURE — 82550 ASSAY OF CK (CPK): CPT

## 2017-10-19 PROCEDURE — 93005 ELECTROCARDIOGRAM TRACING: CPT

## 2017-10-19 PROCEDURE — 99285 EMERGENCY DEPT VISIT HI MDM: CPT | Mod: 25

## 2017-10-19 PROCEDURE — 93970 EXTREMITY STUDY: CPT

## 2017-10-19 PROCEDURE — 93460 R&L HRT ART/VENTRICLE ANGIO: CPT

## 2017-10-19 PROCEDURE — 84484 ASSAY OF TROPONIN QUANT: CPT

## 2017-10-19 PROCEDURE — 82435 ASSAY OF BLOOD CHLORIDE: CPT

## 2017-10-19 PROCEDURE — 96374 THER/PROPH/DIAG INJ IV PUSH: CPT

## 2017-10-19 PROCEDURE — 83735 ASSAY OF MAGNESIUM: CPT

## 2017-10-19 PROCEDURE — 75574 CT ANGIO HRT W/3D IMAGE: CPT

## 2017-10-19 PROCEDURE — 80162 ASSAY OF DIGOXIN TOTAL: CPT

## 2017-10-19 PROCEDURE — 93880 EXTRACRANIAL BILAT STUDY: CPT

## 2017-10-19 PROCEDURE — 84132 ASSAY OF SERUM POTASSIUM: CPT

## 2017-10-19 PROCEDURE — 71010: CPT | Mod: 26

## 2017-10-19 PROCEDURE — 93306 TTE W/DOPPLER COMPLETE: CPT | Mod: 26

## 2017-10-19 PROCEDURE — 82947 ASSAY GLUCOSE BLOOD QUANT: CPT

## 2017-10-19 PROCEDURE — 85730 THROMBOPLASTIN TIME PARTIAL: CPT

## 2017-10-19 PROCEDURE — 97162 PT EVAL MOD COMPLEX 30 MIN: CPT

## 2017-10-19 PROCEDURE — 80053 COMPREHEN METABOLIC PANEL: CPT

## 2017-10-19 PROCEDURE — 86901 BLOOD TYPING SEROLOGIC RH(D): CPT

## 2017-10-19 PROCEDURE — 94010 BREATHING CAPACITY TEST: CPT

## 2017-10-19 PROCEDURE — 93010 ELECTROCARDIOGRAM REPORT: CPT

## 2017-10-19 PROCEDURE — C1769: CPT

## 2017-10-19 PROCEDURE — 71045 X-RAY EXAM CHEST 1 VIEW: CPT

## 2017-10-19 PROCEDURE — 82803 BLOOD GASES ANY COMBINATION: CPT

## 2017-10-19 PROCEDURE — 97110 THERAPEUTIC EXERCISES: CPT

## 2017-10-19 PROCEDURE — 83605 ASSAY OF LACTIC ACID: CPT

## 2017-10-19 PROCEDURE — 86850 RBC ANTIBODY SCREEN: CPT

## 2017-10-19 PROCEDURE — 85014 HEMATOCRIT: CPT

## 2017-10-19 PROCEDURE — C1894: CPT

## 2017-10-19 PROCEDURE — 93306 TTE W/DOPPLER COMPLETE: CPT

## 2017-10-19 RX ORDER — HEPARIN SODIUM 5000 [USP'U]/ML
5000 INJECTION INTRAVENOUS; SUBCUTANEOUS EVERY 8 HOURS
Qty: 0 | Refills: 0 | Status: DISCONTINUED | OUTPATIENT
Start: 2017-10-19 | End: 2017-10-20

## 2017-10-19 RX ORDER — METOPROLOL TARTRATE 50 MG
50 TABLET ORAL
Qty: 0 | Refills: 0 | Status: DISCONTINUED | OUTPATIENT
Start: 2017-10-19 | End: 2017-10-20

## 2017-10-19 RX ORDER — METFORMIN HYDROCHLORIDE 850 MG/1
500 TABLET ORAL
Qty: 0 | Refills: 0 | Status: DISCONTINUED | OUTPATIENT
Start: 2017-10-19 | End: 2017-10-20

## 2017-10-19 RX ORDER — POTASSIUM CHLORIDE 20 MEQ
20 PACKET (EA) ORAL ONCE
Qty: 0 | Refills: 0 | Status: COMPLETED | OUTPATIENT
Start: 2017-10-19 | End: 2017-10-19

## 2017-10-19 RX ORDER — INSULIN LISPRO 100/ML
VIAL (ML) SUBCUTANEOUS
Qty: 0 | Refills: 0 | Status: DISCONTINUED | OUTPATIENT
Start: 2017-10-19 | End: 2017-10-20

## 2017-10-19 RX ORDER — DIGOXIN 250 MCG
0.12 TABLET ORAL DAILY
Qty: 0 | Refills: 0 | Status: DISCONTINUED | OUTPATIENT
Start: 2017-10-19 | End: 2017-10-20

## 2017-10-19 RX ORDER — WARFARIN SODIUM 2.5 MG/1
5 TABLET ORAL ONCE
Qty: 0 | Refills: 0 | Status: COMPLETED | OUTPATIENT
Start: 2017-10-19 | End: 2017-10-19

## 2017-10-19 RX ORDER — TAMSULOSIN HYDROCHLORIDE 0.4 MG/1
0.4 CAPSULE ORAL AT BEDTIME
Qty: 0 | Refills: 0 | Status: DISCONTINUED | OUTPATIENT
Start: 2017-10-19 | End: 2017-10-20

## 2017-10-19 RX ADMIN — Medication 100 MILLIGRAM(S): at 05:27

## 2017-10-19 RX ADMIN — Medication 20 MILLIEQUIVALENT(S): at 08:59

## 2017-10-19 RX ADMIN — Medication 325 MILLIGRAM(S): at 11:10

## 2017-10-19 RX ADMIN — Medication 50 MILLIGRAM(S): at 11:31

## 2017-10-19 RX ADMIN — Medication 1: at 18:02

## 2017-10-19 RX ADMIN — METFORMIN HYDROCHLORIDE 500 MILLIGRAM(S): 850 TABLET ORAL at 08:59

## 2017-10-19 RX ADMIN — Medication 0.12 MILLIGRAM(S): at 11:10

## 2017-10-19 RX ADMIN — WARFARIN SODIUM 5 MILLIGRAM(S): 2.5 TABLET ORAL at 22:16

## 2017-10-19 RX ADMIN — METFORMIN HYDROCHLORIDE 500 MILLIGRAM(S): 850 TABLET ORAL at 17:18

## 2017-10-19 RX ADMIN — FAMOTIDINE 20 MILLIGRAM(S): 10 INJECTION INTRAVENOUS at 11:10

## 2017-10-19 RX ADMIN — Medication 100 MILLIGRAM(S): at 11:13

## 2017-10-19 RX ADMIN — SODIUM CHLORIDE 3 MILLILITER(S): 9 INJECTION INTRAMUSCULAR; INTRAVENOUS; SUBCUTANEOUS at 05:13

## 2017-10-19 RX ADMIN — Medication 50 MILLIGRAM(S): at 22:16

## 2017-10-19 RX ADMIN — HEPARIN SODIUM 5000 UNIT(S): 5000 INJECTION INTRAVENOUS; SUBCUTANEOUS at 15:25

## 2017-10-19 RX ADMIN — Medication 2: at 12:23

## 2017-10-19 RX ADMIN — SODIUM CHLORIDE 3 MILLILITER(S): 9 INJECTION INTRAMUSCULAR; INTRAVENOUS; SUBCUTANEOUS at 15:21

## 2017-10-19 RX ADMIN — SODIUM CHLORIDE 3 MILLILITER(S): 9 INJECTION INTRAMUSCULAR; INTRAVENOUS; SUBCUTANEOUS at 22:22

## 2017-10-19 RX ADMIN — Medication 50 MICROGRAM(S): at 05:28

## 2017-10-19 NOTE — PROGRESS NOTE ADULT - ASSESSMENT
Patient care discussed on morning interdisciplinary rounds with CTS team.   85 y.o. M s/p TAVR,  hemodynamically stable, not requiring vasodilatory/inotropic support, extubated.

## 2017-10-19 NOTE — DISCHARGE NOTE ADULT - HOME CARE AGENCY
[facility] [phone] for RN assessment & HHA referral. Start of care the day after discharge

## 2017-10-19 NOTE — DISCHARGE NOTE ADULT - ADDITIONAL INSTRUCTIONS
You have appointment with Dr Prakash for blood draw on Monday 10/23/17 11 am [phone]  You have an appoinmtment with Dr Kee 10/30/17 at 1200 [phone]

## 2017-10-19 NOTE — DISCHARGE NOTE ADULT - HOSPITAL COURSE
85 year old M PMH of HTN, HLD, Afib on Coumadin s/p ablation, T2DM ( HgA1c 7.4 8/2017), hypothyroid, OAB, BPH, dementia, glaucoma, s/p syncopal event and subsequent fall in August 2017. Was brought to [facility] s/p syncopal event and fall, w/u revealed severe AS. Is now s/p TAVR 10/18/17. Uncomplicated post op course, confusion overnight c/w history of dementia. ASA/Plavix initiated for valve prophylaxis, also continues to be on Coumadin for atrial fibrillation. Post TAVR TTE revealing trace paravalvular leak and normal systolic LV function. Patient hemodynamically stable, to be discharged home today. 85 year old M PMH of HTN, HLD, Afib on Coumadin s/p ablation, T2DM ( HgA1c 7.4 8/2017), hypothyroid, OAB, BPH, dementia, glaucoma, s/p syncopal event and subsequent fall in August 2017. Was brought to [facility] s/p syncopal event and fall, w/u revealed severe AS. Is now s/p TAVR 10/18/17. Uncomplicated post op course, confusion overnight c/w history of dementia. ASA/Plavix initiated for valve prophylaxis, also continues to be on Coumadin for atrial fibrillation. Post TAVR TTE revealing trace paravalvular leak and normal systolic LV function. Patient hemodynamically stable, to be discharged home today.  He has INR draw arranged at Dr Laboy office on  Monday 10/23/17 11am.

## 2017-10-19 NOTE — DISCHARGE NOTE ADULT - PROVIDER TOKENS
TOKEN:'[id number]',TOKEN:'[id number]' TOKEN:'[id number]',TOKEN:'[id number]',TOKEN:'[id number]'

## 2017-10-19 NOTE — DISCHARGE NOTE ADULT - CARE PROVIDER_API CALL
Elias Kee), Thoracic and Cardiac Surgery  [address] [place] [address]  Phone: [phone]  Fax: [phone]    Jose D Quintanilla), Cardiovascular Disease; Interventional Cardiology  [address] [place] [address]  Phone: [phone]  Fax: [phone] Elias Kee), Thoracic and Cardiac Surgery  [address] [place] [address]  Phone: [phone]  Fax: [phone]    Jose D Quintanilla), Cardiovascular Disease; Interventional Cardiology  [address] [place] [address]  Phone: [phone]  Fax: [phone]    Prashanth Prakash), Cardiovascular Disease; Internal Medicine  [address] [place] [address]  Phone: [phone]  Fax: [phone]

## 2017-10-19 NOTE — DISCHARGE NOTE ADULT - CARE PLAN
Principal Discharge DX:	S/P TAVR (transcatheter aortic valve replacement)  Goal:	recovery  Instructions for follow-up, activity and diet:	Patient is to follow up with Dr. Kee in 1 week post discharge. He should then follow up with Dr. Quintanilla in 30 days, with a repeat Transthoracic Echo to be done at that visit. Principal Discharge DX:	S/P TAVR (transcatheter aortic valve replacement)  Goal:	recovery  Instructions for follow-up, activity and diet:	Patient is to follow up with Dr. Kee in 1 week post discharge. He should then follow up with Dr. Quintanilla in 30 days, with a repeat Transthoracic Echo to be done at that visit.  Keep the groin site clean, you may shower and pat the dry. Watch the site for signs of redness or drainage-these should be reported to doctor. You will receive a card about your new valve in the mail  for your wallet, please carry it with you. Tell your doctor and dentist about your new valve, you will need antibiotics to reduce risk of infections before dental and medical procedures.   You will be given an appointment for follow up with your cardiologist at 4 weeks. It is important to keep this appointment so the valve can be checked. You may resume all your normal activities  Secondary Diagnosis:	Type 2 diabetes mellitus without complication, without long-term current use of insulin  Goal:	Your hemoglobin A1C will be between 7-8  Instructions for follow-up, activity and diet:	Continue to follow with your primary care MD or your endocrinologist.  Follow a heart healthy diabetic diet. If you check your fingerstick glucose at home, call your MD if it is greater than 250mg/dL on 2 occasions or less than 100mg/dL on 2 occasions. Know signs of low blood sugar, such as: dizziness, shakiness, sweating, confusion, hunger, nervousness-drink 4 ounces apple juice if occurs and call your doctor. Know early signs of high blood sugar, such as: frequent urination, increased thirst, blurry vision, fatigue, headache - call your doctor if this occurs. Follow with other practitioners to care for your diabetes, such as ophthamologist and podiatrist.  Secondary Diagnosis:	Chronic atrial fibrillation  Goal:	Your heart rate and rhythm will be controlled.  Instructions for follow-up, activity and diet:	Continue with your cardiologist and primary care MD. Continue your current medications. Call your physician for palpitations, feelings of rapid heart beat, lightheadedness, or dizziness. If you are on warfarin (Coumadin), have your blood work drawn on _________Monday oct 10/23/17 11 am at Dr Prakash office _______. Ask your nurse for written material about Coumadin and to provide patient education before discharge.

## 2017-10-19 NOTE — PROGRESS NOTE ADULT - PROBLEM SELECTOR PLAN 1
ASA/Plavix for valve prophylaxis.   Coumadin for a. fib  Prophylaxis: DVT-Systemic anticoagulation, venodynes; GI-Protonix  Pain management  Glucose control  Resume betablocker rate control  Echo today

## 2017-10-19 NOTE — DISCHARGE NOTE ADULT - MEDICATION SUMMARY - MEDICATIONS TO TAKE
I will START or STAY ON the medications listed below when I get home from the hospital:    one touch glucomete  -- Apply on skin to affected area 4 times a day   -- Indication: For diabetes    one touch test strips  -- 1 unit(s) subcutaneous 4 times a day   -- Indication: For diabeteas    lancets  -- 1 application subcutaneous 4 times a day   -- Indication: For diabetes    aspirin 325 mg oral delayed release tablet  -- 1 tab(s) by mouth once a day  -- Indication: For aortic stenosis    tamsulosin 0.4 mg oral capsule  -- 1 cap(s) by mouth once a day  -- Indication: For prostate    digoxin 125 mcg (0.125 mg) oral tablet  -- 1 tab(s) by mouth once a day  -- Indication: For heart rhythm    warfarin 5 mg oral tablet  -- 1 tab(s) by mouth once a day stop 72 hours prior to surgery  -- Indication: For aflutter    metFORMIN 500 mg oral tablet  -- 1 tab(s) by mouth 2 times a day  -- Indication: For diabetes    metoprolol tartrate 50 mg oral tablet  -- 1 tab(s) by mouth 2 times a day  -- Indication: For heart rate rhtym    docusate sodium 100 mg oral capsule  -- 1 cap(s) by mouth once a day  -- Indication: For Stool softener    latanoprost 0.005% ophthalmic solution  -- 1 drop(s) to each affected eye once a day (in the evening) right eye  -- Indication: For glaucoma    levothyroxine 50 mcg (0.05 mg) oral tablet  -- 1 tab(s) by mouth once a day  -- Indication: For thyroid hormone    Myrbetriq 25 mg oral tablet, extended release  -- 1 tab(s) by mouth once a day PM  -- Indication: For overactive bladder

## 2017-10-19 NOTE — DISCHARGE NOTE ADULT - NS AS ACTIVITY OBS
Showering allowed/Walking-Indoors allowed/Walking-Outdoors allowed/Stairs allowed/Do not make important decisions/Do not drive or operate machinery/No Heavy lifting/straining

## 2017-10-19 NOTE — DISCHARGE NOTE ADULT - CARE PROVIDERS DIRECT ADDRESSES
[email or web address],DirectAddress_Unknown

## 2017-10-19 NOTE — DISCHARGE NOTE ADULT - PATIENT PORTAL LINK FT
“You can access the FollowHealth Patient Portal, offered by [facility], by registering with the following website: [email or web address]”

## 2017-10-19 NOTE — PROGRESS NOTE ADULT - SUBJECTIVE AND OBJECTIVE BOX
This patient has been implanted with a Transcatheter Aortic Valve Implant under the following NCT/HUI:    TAVR:    Commercial Implant NCT [id number], HUI N/A and will be placed into the TVT Registry.

## 2017-10-19 NOTE — PROGRESS NOTE ADULT - SUBJECTIVE AND OBJECTIVE BOX
Subjective:  "I am very upset...the nurse ripped off my dressing and my blood was all over the floor"  Pt supine in bed,       Tele:  Afib 130s           V/S:                    T(F): 97.7 (10-19-17 @ 10:34), Max: 98.5 (10-19-17 @ 08:00)  HR: 137 (10-19-17 @ 10:34) (73 - 137)  BP: 119/63 (10-19-17 @ 10:34) (119/63 - 126/84)  RR: 16 (10-19-17 @ 10:34) (10 - 26)  SpO2: 99% (10-19-17 @ 10:34) (96% - 100%)    LV EF:  65%      Labs:  10-19    141  |  105  |  13  ----------------------------<  202<H>  4.6   |  20<L>  |  0.75    Ca    8.8      19 Oct 2017 03:01    TPro  6.2  /  Alb  3.4  /  TBili  2.0<H>  /  DBili  x   /  AST  21  /  ALT  11  /  AlkPhos  67  10-19                               14.0   6.9   )-----------( 146      ( 19 Oct 2017 03:01 )             40.0        PT/INR - ( 18 Oct 2017 16:44 )   PT: 18.5 sec;   INR: 1.68 ratio         PTT - ( 18 Oct 2017 16:44 )  PTT:35.0 sec     CAPILLARY BLOOD GLUCOSE  161 (19 Oct 2017 08:00)  167 (18 Oct 2017 16:00)      POCT Blood Glucose.: 239 mg/dL (19 Oct 2017 10:58)           CXR:    I&O's Detail    18 Oct 2017 07:01  -  19 Oct 2017 07:00  --------------------------------------------------------  IN:    norepinephrine Infusion: 10 mL    Oral Fluid: 100 mL  Total IN: 110 mL    OUT:    Voided: 850 mL  Total OUT: 850 mL    Total NET: -740 mL      19 Oct 2017 07:01  -  19 Oct 2017 11:59  --------------------------------------------------------  IN:  Total IN: 0 mL    OUT:    Voided: 425 mL  Total OUT: 425 mL    Total NET: -425 mL          BOWEL MOVEMENT:  [ ] YES [x ] NO        Medications:  aspirin enteric coated 325 milliGRAM(s) Oral daily  digoxin     Tablet 0.125 milliGRAM(s) Oral daily  docusate sodium 100 milliGRAM(s) Oral daily  famotidine    Tablet 20 milliGRAM(s) Oral daily  heparin  Injectable 5000 Unit(s) SubCutaneous every 8 hours  insulin lispro (HumaLOG) corrective regimen sliding scale   SubCutaneous Before meals and at bedtime  latanoprost 0.005% Ophthalmic Solution 1 Drop(s) Right EYE at bedtime  levothyroxine 50 MICROGram(s) Oral daily  metFORMIN 500 milliGRAM(s) Oral two times a day with meals  metoprolol 50 milliGRAM(s) Oral two times a day  sodium chloride 0.9% lock flush 3 milliLiter(s) IV Push every 8 hours  tamsulosin 0.4 milliGRAM(s) Oral at bedtime  warfarin 5 milliGRAM(s) Oral once        Physical Exam:    Neuro: alert, no deficits  oriented x 3    Pulm: essentially clear    CV:  S1  S2  irreg  irreg    Abd: flat  soft  non tender    Extremities:  no edema      Incision(s): B/L fem DSD intact ,   R Dsg  noted w/ old blood noted  no hematoma                 PAST MEDICAL & SURGICAL HISTORY:  Arthritis: generalized medrol pack q 6 weeks  OAB (overactive bladder)  Glaucoma  Aortic stenosis  Dementia  Nonrheumatic aortic valve stenosis  Atrial fibrillation  Spinal stenosis of lumbar region  Torn rotator cuff: Right  Diabetes mellitus  Congenital heart defect  Hyperlipidemia  HTN - Hypertension  BPH (benign prostatic hypertrophy)  History of prior ablation treatment: cardiac  S/P cataract surgery  S/P lumbar laminectomy: 2013  Hx of appendectomy: age 17  S/P TURP (status post transurethral resection of prostate): 2008

## 2017-10-19 NOTE — DISCHARGE NOTE ADULT - PLAN OF CARE
recovery Patient is to follow up with Dr. Kee in 1 week post discharge. He should then follow up with Dr. Quintanilla in 30 days, with a repeat Transthoracic Echo to be done at that visit. Patient is to follow up with Dr. Kee in 1 week post discharge. He should then follow up with Dr. Quintanilla in 30 days, with a repeat Transthoracic Echo to be done at that visit.  Keep the groin site clean, you may shower and pat the dry. Watch the site for signs of redness or drainage-these should be reported to doctor. You will receive a card about your new valve in the mail  for your wallet, please carry it with you. Tell your doctor and dentist about your new valve, you will need antibiotics to reduce risk of infections before dental and medical procedures.   You will be given an appointment for follow up with your cardiologist at 4 weeks. It is important to keep this appointment so the valve can be checked. You may resume all your normal activities Your hemoglobin A1C will be between 7-8 Continue to follow with your primary care MD or your endocrinologist.  Follow a heart healthy diabetic diet. If you check your fingerstick glucose at home, call your MD if it is greater than 250mg/dL on 2 occasions or less than 100mg/dL on 2 occasions. Know signs of low blood sugar, such as: dizziness, shakiness, sweating, confusion, hunger, nervousness-drink 4 ounces apple juice if occurs and call your doctor. Know early signs of high blood sugar, such as: frequent urination, increased thirst, blurry vision, fatigue, headache - call your doctor if this occurs. Follow with other practitioners to care for your diabetes, such as ophthamologist and podiatrist. Your heart rate and rhythm will be controlled. Continue with your cardiologist and primary care MD. Continue your current medications. Call your physician for palpitations, feelings of rapid heart beat, lightheadedness, or dizziness. If you are on warfarin (Coumadin), have your blood work drawn on _________Monday oct 10/23/17 11 am at Dr Prakash office _______. Ask your nurse for written material about Coumadin and to provide patient education before discharge.

## 2017-10-19 NOTE — DISCHARGE NOTE ADULT - INSTRUCTIONS
1. Daily Shower  2. Weight yourself daily and notify any weight gain greater than 2-3 pounds in 24 hours.  3. Regular diet - low fat, low cholesterol, no added salt.  4. Cleanse Midsternal incision and leg incision daily while showering with warm water and mild soap, pat dry and maintain open to air.   5. Follow Cardiac Surgery Do's and Don'ts discharge instructions.   6. No driving until cleared by MD.   7. No heavy lifting nothing greater than 5 pounds until cleared by MD.   8. Call / Notify MD any fever greater than 101.0  9. Increase Activity as tolerated. Appointments, Diet, Activity, Medication Administration, Incision Care

## 2017-10-19 NOTE — PROGRESS NOTE ADULT - SUBJECTIVE AND OBJECTIVE BOX
Operation: POD #1 TAVR   Narrative: 85 year old M, resting comfortably in bed, no acute distress, extubated     Vital Signs Last 24 Hrs  T(C): 36.8 (18 Oct 2017 18:00), Max: 36.8 (18 Oct 2017 18:00)  T(F): 98.2 (18 Oct 2017 18:00), Max: 98.2 (18 Oct 2017 18:00)  HR: 104 (19 Oct 2017 02:00) (73 - 104)  BP: 144/90 (18 Oct 2017 11:16) (144/90 - 144/90)  BP(mean): --  RR: 24 (19 Oct 2017 02:00) (10 - 26)  SpO2: 98% (19 Oct 2017 02:00) (96% - 100%)      10-18 @ 07:01  -  10-19 @ 03:12  --------------------------------------------------------  IN:    norepinephrine Infusion: 10 mL    Oral Fluid: 100 mL  Total IN: 110 mL    OUT:    Voided: 850 mL  Total OUT: 850 mL    Total NET: -740 mL    LABS:     MEDICATIONS  (STANDING):  aspirin enteric coated 325 milliGRAM(s) Oral daily  cefuroxime  IVPB 1500 milliGRAM(s) IV Intermittent every 8 hours  docusate sodium 100 milliGRAM(s) Oral daily  famotidine    Tablet 20 milliGRAM(s) Oral daily  latanoprost 0.005% Ophthalmic Solution 1 Drop(s) Right EYE at bedtime  levothyroxine 50 MICROGram(s) Oral daily  norepinephrine Infusion 0.026 MICROgram(s)/kG/Min (3 mL/Hr) IV Continuous <Continuous>  sodium chloride 0.9% lock flush 3 milliLiter(s) IV Push every 8 hours    MEDICATIONS  (PRN):    PHYSICAL EXAM:  General: A+Ox3, in NAD, follows commands, no focal deficits noted  Cardiovascular: S1, S2, irregular   Lungs: CTA b/l  Abdomen: Soft, Non-distended, non-tender,  positive BS  Extremities: Warm, well perfused, palpable/dopplerable distal pulses, no edema    Incision: Groin incision clean, dry, intact. W/o hematoma b/l  Lines: L radial Sangeeta, PIV    RADIOLOGY & ADDITIONAL TESTS:    ADDITIONAL DATA:   Does the patient have a history of CHF? No  -If yes, systolic or diastolic  -Pre-operative EF: normal    Extubation within 24 hours? Yes    Does the patient have a history of kidney disease? No  -Give CKD stage if applicable:  -Patient's baseline Creatinine: 0.8-0.9    Did the patient receive transfusion of blood and/or products? No  -If yes, indication: Intraop acute blood loss    Vianey-operative antibiotics discontinued within 48 hours of CTU admission? Yes  -Name/date/time of discontinue: Mauricenacef 10/19/17 12:30 Operation: POD #1 TAVR   Narrative: 85 year old M, resting comfortably in bed, no acute distress, extubated     Vital Signs Last 24 Hrs  T(C): 36.8 (18 Oct 2017 18:00), Max: 36.8 (18 Oct 2017 18:00)  T(F): 98.2 (18 Oct 2017 18:00), Max: 98.2 (18 Oct 2017 18:00)  HR: 104 (19 Oct 2017 02:00) (73 - 104)  BP: 144/90 (18 Oct 2017 11:16) (144/90 - 144/90)  BP(mean): --  RR: 24 (19 Oct 2017 02:00) (10 - 26)  SpO2: 98% (19 Oct 2017 02:00) (96% - 100%)      10-18 @ 07:01  -  10-19 @ 03:12  --------------------------------------------------------  IN:    norepinephrine Infusion: 10 mL    Oral Fluid: 100 mL  Total IN: 110 mL    OUT:    Voided: 850 mL  Total OUT: 850 mL    Total NET: -740 mL    LABS:                       14.0   6.9   )-----------( 146      ( 19 Oct 2017 03:01 )             40.0     10-19    141  |  105  |  13  ----------------------------<  202<H>  4.6   |  20<L>  |  0.75    Ca    8.8      19 Oct 2017 03:01    TPro  6.2  /  Alb  3.4  /  TBili  2.0<H>  /  DBili  x   /  AST  21  /  ALT  11  /  AlkPhos  67  10-19    PT/INR - ( 18 Oct 2017 16:44 )   PT: 18.5 sec;   INR: 1.68 ratio         PTT - ( 18 Oct 2017 16:44 )  PTT:35.0 sec  ABG - ( 19 Oct 2017 02:57 )  pH: 7.45  /  pCO2: 34    /  pO2: 125   / HCO3: 23    / Base Excess: -.2   /  SaO2: 99                    MEDICATIONS  (STANDING):  aspirin enteric coated 325 milliGRAM(s) Oral daily  cefuroxime  IVPB 1500 milliGRAM(s) IV Intermittent every 8 hours  docusate sodium 100 milliGRAM(s) Oral daily  famotidine    Tablet 20 milliGRAM(s) Oral daily  latanoprost 0.005% Ophthalmic Solution 1 Drop(s) Right EYE at bedtime  levothyroxine 50 MICROGram(s) Oral daily  norepinephrine Infusion 0.026 MICROgram(s)/kG/Min (3 mL/Hr) IV Continuous <Continuous>  sodium chloride 0.9% lock flush 3 milliLiter(s) IV Push every 8 hours    MEDICATIONS  (PRN):    PHYSICAL EXAM:  General: A+Ox3, in NAD, follows commands, no focal deficits noted  Cardiovascular: S1, S2, irregular   Lungs: CTA b/l  Abdomen: Soft, Non-distended, non-tender,  positive BS  Extremities: Warm, well perfused, palpable/dopplerable distal pulses, no edema    Incision: Groin incision clean, dry, intact. W/o hematoma b/l  Lines: L radial Sangeeta, PIV    RADIOLOGY & ADDITIONAL TESTS:    ADDITIONAL DATA:   Does the patient have a history of CHF? No  -If yes, systolic or diastolic  -Pre-operative EF: normal    Extubation within 24 hours? Yes    Does the patient have a history of kidney disease? No  -Give CKD stage if applicable:  -Patient's baseline Creatinine: 0.8-0.9    Did the patient receive transfusion of blood and/or products? No  -If yes, indication: Intraop acute blood loss    Vianey-operative antibiotics discontinued within 48 hours of CTU admission? Yes  -Name/date/time of discontinue: Clifton 10/19/17 12:30

## 2017-10-19 NOTE — PROGRESS NOTE ADULT - PROBLEM SELECTOR PLAN 1
ASA/Plavix for valve prophylaxis.   Coumadin for a. fib  Prophylaxis: DVT-Systemic anticoagulation, venodynes; GI-Protonix  Pain management  Glucose control  Resume appropriate home medications.

## 2017-10-19 NOTE — PROGRESS NOTE ADULT - SUBJECTIVE AND OBJECTIVE BOX
*****Structural Heart Team*****    Subjective:    85M resting comfortably in bed offering no complaints. Patients friend at bedside at states he has had some mild confusion earlier. No c/o SOB/CP/Dizzyness. Patient Alert at this time    T(C): 36.3 (10-19-17 @ 16:27), Max: 36.9 (10-19-17 @ 08:00)  HR: 115 (10-19-17 @ 16:27) (78 - 137)  BP: 132/79 (10-19-17 @ 16:27) (116/76 - 132/79)  RR: 16 (10-19-17 @ 16:27) (14 - 26)  SpO2: 96% (10-19-17 @ 16:27) (96% - 100%)  Wt(kg): --  10-18 @ 07:01  -  10-19 @ 07:00  --------------------------------------------------------  IN: 110 mL / OUT: 850 mL / NET: -740 mL    10-19 @ 07:01  -  10-19 @ 17:20  --------------------------------------------------------  IN: 295 mL / OUT: 875 mL / NET: -580 mL      MEDICATIONS  (STANDING):  aspirin enteric coated 325 milliGRAM(s) Oral daily  digoxin     Tablet 0.125 milliGRAM(s) Oral daily  docusate sodium 100 milliGRAM(s) Oral daily  famotidine    Tablet 20 milliGRAM(s) Oral daily  heparin  Injectable 5000 Unit(s) SubCutaneous every 8 hours  insulin lispro (HumaLOG) corrective regimen sliding scale   SubCutaneous Before meals and at bedtime  latanoprost 0.005% Ophthalmic Solution 1 Drop(s) Right EYE at bedtime  levothyroxine 50 MICROGram(s) Oral daily  metFORMIN 500 milliGRAM(s) Oral two times a day with meals  metoprolol 50 milliGRAM(s) Oral two times a day  sodium chloride 0.9% lock flush 3 milliLiter(s) IV Push every 8 hours  tamsulosin 0.4 milliGRAM(s) Oral at bedtime  warfarin 5 milliGRAM(s) Oral once    MEDICATIONS  (PRN):         Exam:  General: A/O x3  Pulmonary: CTAB  Cor: S1S2, Irregular, No Murmur noted  ECG: Afib  Groin/Wound: Soft, No Hematoma noted  Abdomen: Soft, NT/ND, + Bowel Sounds  Neuro: non focal  Extremeties: No edema noted                          14.0   6.9   )-----------( 146      ( 19 Oct 2017 03:01 )             40.0   10-19    141  |  105  |  13  ----------------------------<  202<H>  4.6   |  20<L>  |  0.75    Ca    8.8      19 Oct 2017 03:01    TPro  6.2  /  Alb  3.4  /  TBili  2.0<H>  /  DBili  x   /  AST  21  /  ALT  11  /  AlkPhos  67  10-19  PT/INR - ( 18 Oct 2017 16:44 )   PT: 18.5 sec;   INR: 1.68 ratio         PTT - ( 18 Oct 2017 16:44 )  PTT:35.0 sec    Assesment/Plan:  85M s/p Percutaneous femoral TAVR, Chronic Diastolic HF    1.) ASA 81 mg PO Daily, Plavix 75 mg po daily, Coumadin per INR.  2.) Ambulate as tolerated  3.) GI/DVT Prophylaxis.  4.) Monitor Neuro Status  5.) Discharge Plan: Patient is to follow up with Dr. Kee in 1 week post discharge. HE/She should then follow up with Dr. Quintanilla in 30 days, with a repeat Transthoracic Echo to be done at that visit.

## 2017-10-20 VITALS
RESPIRATION RATE: 18 BRPM | SYSTOLIC BLOOD PRESSURE: 143 MMHG | OXYGEN SATURATION: 97 % | TEMPERATURE: 98 F | DIASTOLIC BLOOD PRESSURE: 87 MMHG | HEART RATE: 104 BPM

## 2017-10-20 LAB
ANION GAP SERPL CALC-SCNC: 12 MMOL/L — SIGNIFICANT CHANGE UP (ref 5–17)
BUN SERPL-MCNC: 17 MG/DL — SIGNIFICANT CHANGE UP (ref 7–23)
CALCIUM SERPL-MCNC: 9.1 MG/DL — SIGNIFICANT CHANGE UP (ref 8.4–10.5)
CHLORIDE SERPL-SCNC: 105 MMOL/L — SIGNIFICANT CHANGE UP (ref 96–108)
CO2 SERPL-SCNC: 24 MMOL/L — SIGNIFICANT CHANGE UP (ref 22–31)
CREAT SERPL-MCNC: 0.97 MG/DL — SIGNIFICANT CHANGE UP (ref 0.5–1.3)
GLUCOSE BLDC GLUCOMTR-MCNC: 114 MG/DL — HIGH (ref 70–99)
GLUCOSE SERPL-MCNC: 122 MG/DL — HIGH (ref 70–99)
HCT VFR BLD CALC: 39.5 % — SIGNIFICANT CHANGE UP (ref 39–50)
HGB BLD-MCNC: 13.2 G/DL — SIGNIFICANT CHANGE UP (ref 13–17)
INR BLD: 1.98 RATIO — HIGH (ref 0.88–1.16)
MCHC RBC-ENTMCNC: 33.4 GM/DL — SIGNIFICANT CHANGE UP (ref 32–36)
MCHC RBC-ENTMCNC: 35.2 PG — HIGH (ref 27–34)
MCV RBC AUTO: 105 FL — HIGH (ref 80–100)
PLATELET # BLD AUTO: 137 K/UL — LOW (ref 150–400)
POTASSIUM SERPL-MCNC: 4.2 MMOL/L — SIGNIFICANT CHANGE UP (ref 3.5–5.3)
POTASSIUM SERPL-SCNC: 4.2 MMOL/L — SIGNIFICANT CHANGE UP (ref 3.5–5.3)
PROTHROM AB SERPL-ACNC: 21.7 SEC — HIGH (ref 9.8–12.7)
RBC # BLD: 3.75 M/UL — LOW (ref 4.2–5.8)
RBC # FLD: 13.2 % — SIGNIFICANT CHANGE UP (ref 10.3–14.5)
SODIUM SERPL-SCNC: 141 MMOL/L — SIGNIFICANT CHANGE UP (ref 135–145)
TSH SERPL-MCNC: 0.33 UIU/ML — SIGNIFICANT CHANGE UP (ref 0.27–4.2)
WBC # BLD: 8.1 K/UL — SIGNIFICANT CHANGE UP (ref 3.8–10.5)
WBC # FLD AUTO: 8.1 K/UL — SIGNIFICANT CHANGE UP (ref 3.8–10.5)

## 2017-10-20 PROCEDURE — 71010: CPT | Mod: 26

## 2017-10-20 RX ORDER — ASPIRIN/CALCIUM CARB/MAGNESIUM 324 MG
1 TABLET ORAL
Qty: 30 | Refills: 0 | OUTPATIENT
Start: 2017-10-20 | End: 2017-11-19

## 2017-10-20 RX ORDER — METOPROLOL TARTRATE 50 MG
1 TABLET ORAL
Qty: 60 | Refills: 0 | OUTPATIENT
Start: 2017-10-20 | End: 2017-11-19

## 2017-10-20 RX ORDER — LEVOTHYROXINE SODIUM 125 MCG
1 TABLET ORAL
Qty: 0 | Refills: 0 | DISCHARGE
Start: 2017-10-20

## 2017-10-20 RX ORDER — ASPIRIN/CALCIUM CARB/MAGNESIUM 324 MG
1 TABLET ORAL
Qty: 30 | Refills: 0
Start: 2017-10-20 | End: 2017-11-19

## 2017-10-20 RX ORDER — METOPROLOL TARTRATE 50 MG
2 TABLET ORAL
Qty: 0 | Refills: 0 | DISCHARGE
Start: 2017-10-20 | End: 2017-11-19

## 2017-10-20 RX ORDER — METOPROLOL TARTRATE 50 MG
1 TABLET ORAL
Qty: 60 | Refills: 0
Start: 2017-10-20 | End: 2017-11-19

## 2017-10-20 RX ORDER — LEVOTHYROXINE SODIUM 125 MCG
1 TABLET ORAL
Qty: 0 | Refills: 0 | COMMUNITY

## 2017-10-20 RX ORDER — DOCUSATE SODIUM 100 MG
1 CAPSULE ORAL
Qty: 0 | Refills: 0 | DISCHARGE
Start: 2017-10-20

## 2017-10-20 RX ADMIN — Medication 50 MILLIGRAM(S): at 04:29

## 2017-10-20 RX ADMIN — Medication 0.12 MILLIGRAM(S): at 04:29

## 2017-10-20 RX ADMIN — Medication 50 MICROGRAM(S): at 04:28

## 2017-10-20 RX ADMIN — Medication 100 MILLIGRAM(S): at 11:00

## 2017-10-20 RX ADMIN — SODIUM CHLORIDE 3 MILLILITER(S): 9 INJECTION INTRAMUSCULAR; INTRAVENOUS; SUBCUTANEOUS at 04:26

## 2017-10-20 RX ADMIN — Medication 325 MILLIGRAM(S): at 11:00

## 2017-10-20 RX ADMIN — METFORMIN HYDROCHLORIDE 500 MILLIGRAM(S): 850 TABLET ORAL at 11:00

## 2017-10-20 RX ADMIN — FAMOTIDINE 20 MILLIGRAM(S): 10 INJECTION INTRAVENOUS at 11:00

## 2017-10-20 RX ADMIN — HEPARIN SODIUM 5000 UNIT(S): 5000 INJECTION INTRAVENOUS; SUBCUTANEOUS at 04:28

## 2017-10-24 RX ORDER — METHYLPREDNISOLONE 4 MG/1
TABLET ORAL
Refills: 0 | Status: DISCONTINUED | COMMUNITY
End: 2017-10-24

## 2017-10-24 RX ORDER — NAPROXEN SODIUM 220 MG
TABLET ORAL
Refills: 0 | Status: DISCONTINUED | COMMUNITY
End: 2017-10-24

## 2017-10-24 RX ORDER — MUPIROCIN 2 %
2 OINTMENT (GRAM) TOPICAL TWICE DAILY
Refills: 0 | Status: DISCONTINUED | COMMUNITY
End: 2017-10-24

## 2017-10-24 RX ORDER — ACETAMINOPHEN 325 MG/1
325 TABLET, FILM COATED ORAL EVERY 6 HOURS
Refills: 0 | Status: DISCONTINUED | COMMUNITY
End: 2017-10-24

## 2017-10-24 RX ORDER — CHOLESTYRAMINE 4 G/9G
4 POWDER, FOR SUSPENSION ORAL
Refills: 0 | Status: DISCONTINUED | COMMUNITY
End: 2017-10-24

## 2017-10-24 RX ORDER — METOPROLOL TARTRATE 50 MG/1
50 TABLET, FILM COATED ORAL TWICE DAILY
Refills: 0 | Status: ACTIVE | COMMUNITY

## 2017-10-30 ENCOUNTER — APPOINTMENT (OUTPATIENT)
Dept: CARDIOTHORACIC SURGERY | Facility: CLINIC | Age: 82
End: 2017-10-30
Payer: MEDICARE

## 2017-10-30 VITALS
RESPIRATION RATE: 15 BRPM | WEIGHT: 135 LBS | BODY MASS INDEX: 22.49 KG/M2 | HEIGHT: 65 IN | HEART RATE: 107 BPM | TEMPERATURE: 98.3 F | DIASTOLIC BLOOD PRESSURE: 90 MMHG | SYSTOLIC BLOOD PRESSURE: 144 MMHG | OXYGEN SATURATION: 99 %

## 2017-10-30 VITALS — SYSTOLIC BLOOD PRESSURE: 149 MMHG | DIASTOLIC BLOOD PRESSURE: 97 MMHG

## 2017-10-30 PROCEDURE — 99213 OFFICE O/P EST LOW 20 MIN: CPT

## 2017-10-30 RX ORDER — WARFARIN SODIUM 5 MG/1
5 TABLET ORAL DAILY
Qty: 30 | Refills: 2 | Status: ACTIVE | COMMUNITY

## 2017-10-30 RX ORDER — DILTIAZEM HYDROCHLORIDE 240 MG/1
240 CAPSULE, EXTENDED RELEASE ORAL
Qty: 30 | Refills: 0 | Status: DISCONTINUED | COMMUNITY
Start: 2017-05-31

## 2017-10-30 RX ORDER — LOVASTATIN 40 MG/1
40 TABLET ORAL
Refills: 0 | Status: ACTIVE | COMMUNITY

## 2017-10-30 RX ORDER — METHYLPREDNISOLONE 4 MG/1
4 TABLET ORAL
Qty: 21 | Refills: 0 | Status: DISCONTINUED | COMMUNITY
Start: 2017-06-05

## 2017-10-30 RX ORDER — SOLIFENACIN SUCCINATE 5 MG/1
5 TABLET, FILM COATED ORAL
Qty: 30 | Refills: 0 | Status: DISCONTINUED | COMMUNITY
Start: 2017-05-15

## 2017-10-30 RX ORDER — DONEPEZIL HYDROCHLORIDE 10 MG/1
10 TABLET ORAL
Qty: 30 | Refills: 0 | Status: DISCONTINUED | COMMUNITY
Start: 2017-05-18

## 2017-12-04 ENCOUNTER — NON-APPOINTMENT (OUTPATIENT)
Age: 82
End: 2017-12-04

## 2017-12-04 ENCOUNTER — APPOINTMENT (OUTPATIENT)
Dept: CV DIAGNOSITCS | Facility: HOSPITAL | Age: 82
End: 2017-12-04

## 2017-12-04 ENCOUNTER — OUTPATIENT (OUTPATIENT)
Dept: OUTPATIENT SERVICES | Facility: HOSPITAL | Age: 82
LOS: 1 days | End: 2017-12-04
Payer: MEDICARE

## 2017-12-04 ENCOUNTER — APPOINTMENT (OUTPATIENT)
Dept: CARDIOLOGY | Facility: CLINIC | Age: 82
End: 2017-12-04
Payer: MEDICARE

## 2017-12-04 VITALS — DIASTOLIC BLOOD PRESSURE: 67 MMHG | HEART RATE: 109 BPM | OXYGEN SATURATION: 97 % | SYSTOLIC BLOOD PRESSURE: 154 MMHG

## 2017-12-04 DIAGNOSIS — R42 DIZZINESS AND GIDDINESS: ICD-10-CM

## 2017-12-04 DIAGNOSIS — Z98.890 OTHER SPECIFIED POSTPROCEDURAL STATES: Chronic | ICD-10-CM

## 2017-12-04 DIAGNOSIS — Z98.49 CATARACT EXTRACTION STATUS, UNSPECIFIED EYE: Chronic | ICD-10-CM

## 2017-12-04 DIAGNOSIS — Z95.2 PRESENCE OF PROSTHETIC HEART VALVE: ICD-10-CM

## 2017-12-04 DIAGNOSIS — I48.91 UNSPECIFIED ATRIAL FIBRILLATION: ICD-10-CM

## 2017-12-04 PROCEDURE — 99215 OFFICE O/P EST HI 40 MIN: CPT

## 2017-12-04 PROCEDURE — 93000 ELECTROCARDIOGRAM COMPLETE: CPT

## 2017-12-04 PROCEDURE — 93306 TTE W/DOPPLER COMPLETE: CPT

## 2017-12-04 PROCEDURE — 93306 TTE W/DOPPLER COMPLETE: CPT | Mod: 26

## 2017-12-04 RX ORDER — CHOLESTYRAMINE
POWDER (GRAM) MISCELLANEOUS
Refills: 0 | Status: ACTIVE | COMMUNITY

## 2017-12-19 PROCEDURE — 84132 ASSAY OF SERUM POTASSIUM: CPT

## 2017-12-19 PROCEDURE — 93005 ELECTROCARDIOGRAM TRACING: CPT

## 2017-12-19 PROCEDURE — 85027 COMPLETE CBC AUTOMATED: CPT

## 2017-12-19 PROCEDURE — 84295 ASSAY OF SERUM SODIUM: CPT

## 2017-12-19 PROCEDURE — 85610 PROTHROMBIN TIME: CPT

## 2017-12-19 PROCEDURE — 93306 TTE W/DOPPLER COMPLETE: CPT

## 2017-12-19 PROCEDURE — 80053 COMPREHEN METABOLIC PANEL: CPT

## 2017-12-19 PROCEDURE — 82803 BLOOD GASES ANY COMBINATION: CPT

## 2017-12-19 PROCEDURE — C1894: CPT

## 2017-12-19 PROCEDURE — 86901 BLOOD TYPING SEROLOGIC RH(D): CPT

## 2017-12-19 PROCEDURE — 83605 ASSAY OF LACTIC ACID: CPT

## 2017-12-19 PROCEDURE — 93355 ECHO TRANSESOPHAGEAL (TEE): CPT

## 2017-12-19 PROCEDURE — P9047: CPT

## 2017-12-19 PROCEDURE — C1887: CPT

## 2017-12-19 PROCEDURE — 76000 FLUOROSCOPY <1 HR PHYS/QHP: CPT

## 2017-12-19 PROCEDURE — 86923 COMPATIBILITY TEST ELECTRIC: CPT

## 2017-12-19 PROCEDURE — 85014 HEMATOCRIT: CPT

## 2017-12-19 PROCEDURE — 84443 ASSAY THYROID STIM HORMONE: CPT

## 2017-12-19 PROCEDURE — C1889: CPT

## 2017-12-19 PROCEDURE — 86900 BLOOD TYPING SEROLOGIC ABO: CPT

## 2017-12-19 PROCEDURE — 80048 BASIC METABOLIC PNL TOTAL CA: CPT

## 2017-12-19 PROCEDURE — P9016: CPT

## 2017-12-19 PROCEDURE — 85730 THROMBOPLASTIN TIME PARTIAL: CPT

## 2017-12-19 PROCEDURE — C1760: CPT

## 2017-12-19 PROCEDURE — 82330 ASSAY OF CALCIUM: CPT

## 2017-12-19 PROCEDURE — 82962 GLUCOSE BLOOD TEST: CPT

## 2017-12-19 PROCEDURE — 82435 ASSAY OF BLOOD CHLORIDE: CPT

## 2017-12-19 PROCEDURE — 82947 ASSAY GLUCOSE BLOOD QUANT: CPT

## 2017-12-19 PROCEDURE — 71045 X-RAY EXAM CHEST 1 VIEW: CPT

## 2017-12-19 PROCEDURE — 86850 RBC ANTIBODY SCREEN: CPT

## 2017-12-19 PROCEDURE — C1769: CPT

## 2018-07-18 NOTE — ED ADULT NURSE NOTE - NS ED NURSE LEVEL OF CONSCIOUSNESS MENTAL STATUS
common in Graves' disease  patient has H/o hep c never treated, likely liver cirrhosis   CHARLES positive   f/u anti Ds CHARLES, Anti RNP, beta 2 glyco  f/u free light chains  Hep C reactive   viral titres neg   hepatic US no cirrhosis   Dr lujan consult appreciated  stable at present   imunnofix showed Lambda and keppa chains   Dr lujan on board hx CHF, no SOB currently and no LE edema  TTE on 01/2017 show severe diastolic dysfunction, EF 68% and NST last yr normal  f/u TTE Platelet count on admission 75 now dropped to 57  patient is only on aspirin,  unknown etiology   -no signs of infection but will send procalcitonin   f/u spep  Dr lujan consult appreciated common in Graves' disease  f/u spep, sife, CHARLES,   Hep C reactive   f/u viral titres   Dr lujan consult appreciated  platelet trending down common in Graves' disease  f/u spep, sife, CHARLES, b12, folate, hepatitis b and c  Dr lujan consult appreciated common in Graves' disease  patient has H/o hep c never treated, likely liver cirrhosis   f/u spep, sife, CHARLES,   Hep C reactive   f/u viral titres   f/u hepatic US   Dr lujan consult appreciated  stable at present   imunnofix showed Lambda and keppa chains   Dr lujan on board hx CHF, no SOB currently and no LE edema  repeat echo shows EF >55%. grossly normal LV systolic function. Severe TR  PPM check -> medtronic hx CHF, no SOB currently and no LE edema  repeat echo shows EF >55%. grossly normal LV systolic function. Severe TR  PPM check -> medtronic Alert/Cooperative/Awake

## 2018-10-10 NOTE — PRE-OP CHECKLIST - NOTHING BY MOUTH SINCE
Patient states nose was oozing last night around bolster but did stop this morning  Reviewed to sleep with head elevated to help decrease swelling and oozing  Verbalized understanding   Will follow up at 10/12 appt for bolster removal 
17-Oct-2017 21:00

## 2019-07-17 NOTE — ED ADULT NURSE NOTE - NS ED NURSE RELATIONSHIP NOTIFICATION
Gricelda Salas discharged to home accompanied by family member.   Patient provided with the following educational materials upon discharge:  Ileostomy Takedown.   Valuables and belongings sent with patient.   discharge summary, discharge instructions, medications and follow up appointments reviewed with patient and family member.  Patient and family member verbalized understanding. Patient transported to main entrance via wheelchair and discharged home.   Friend

## 2019-07-22 NOTE — DISCHARGE NOTE ADULT - OTHER SIGNIFICANT FINDINGS
Problem: Adult Inpatient Plan of Care  Goal: Plan of Care Review  Outcome: Ongoing (interventions implemented as appropriate)  Pt tolerated 2L IVF and lab draw without issue. VSS. AVS given. Pt d.c home in stable condition with instructions to return 7/30/19. In interim, pt knows to call clinic with any issues.         
Telemetry aflutter   Vital Signs Last 24 Hrs  T(C): 36.7 (20 Oct 2017 05:00), Max: 36.7 (20 Oct 2017 05:00)  T(F): 98 (20 Oct 2017 05:00), Max: 98 (20 Oct 2017 05:00)  HR: 104 (20 Oct 2017 05:00) (78 - 115)  BP: 143/87 (20 Oct 2017 05:00) (116/76 - 165/96)  BP(mean): --  RR: 18 (20 Oct 2017 05:00) (16 - 18)  SpO2: 97% (20 Oct 2017 05:00) (96% - 98%)    Subjective: no complaints  Neurology: alert and oriented x 3, nonfocal, no gross deficits  CV : s1s1 reg no MRGS  Lungs: CTA, equal chest expansion  Abdomen: soft, nontender, nondistended, positive bowel sounds, last bowel movement 10/20/17  :    voids  Extremities: b/l groins no ecchymosis, no hematomas, no  edema,  +dp b/l , no caltf enderness b/l , warm and perfused b/l

## 2019-10-02 PROBLEM — Z60.2 PERSON LIVING ALONE: Status: ACTIVE | Noted: 2017-09-25

## 2019-11-30 ENCOUNTER — EMERGENCY (EMERGENCY)
Facility: HOSPITAL | Age: 84
LOS: 1 days | Discharge: HOME | End: 2019-11-30
Attending: INTERNAL MEDICINE | Admitting: INTERNAL MEDICINE
Payer: MEDICARE

## 2019-11-30 VITALS
DIASTOLIC BLOOD PRESSURE: 65 MMHG | OXYGEN SATURATION: 99 % | TEMPERATURE: 98 F | SYSTOLIC BLOOD PRESSURE: 138 MMHG | RESPIRATION RATE: 18 BRPM | HEART RATE: 84 BPM

## 2019-11-30 DIAGNOSIS — Z98.890 OTHER SPECIFIED POSTPROCEDURAL STATES: Chronic | ICD-10-CM

## 2019-11-30 DIAGNOSIS — Z98.49 CATARACT EXTRACTION STATUS, UNSPECIFIED EYE: Chronic | ICD-10-CM

## 2019-11-30 LAB
ALBUMIN SERPL ELPH-MCNC: 3.9 G/DL — SIGNIFICANT CHANGE UP (ref 3.3–5)
ALP SERPL-CCNC: 50 U/L — SIGNIFICANT CHANGE UP (ref 40–120)
ALT FLD-CCNC: 14 U/L — SIGNIFICANT CHANGE UP (ref 4–41)
ANION GAP SERPL CALC-SCNC: 13 MMO/L — SIGNIFICANT CHANGE UP (ref 7–14)
APTT BLD: 37.9 SEC — HIGH (ref 27.5–36.3)
AST SERPL-CCNC: 20 U/L — SIGNIFICANT CHANGE UP (ref 4–40)
BASE EXCESS BLDV CALC-SCNC: 1.1 MMOL/L — SIGNIFICANT CHANGE UP
BILIRUB SERPL-MCNC: 1.5 MG/DL — HIGH (ref 0.2–1.2)
BLOOD GAS VENOUS - CREATININE: 0.93 MG/DL — SIGNIFICANT CHANGE UP (ref 0.5–1.3)
BUN SERPL-MCNC: 18 MG/DL — SIGNIFICANT CHANGE UP (ref 7–23)
CALCIUM SERPL-MCNC: 9.2 MG/DL — SIGNIFICANT CHANGE UP (ref 8.4–10.5)
CHLORIDE BLDV-SCNC: 108 MMOL/L — SIGNIFICANT CHANGE UP (ref 96–108)
CHLORIDE SERPL-SCNC: 105 MMOL/L — SIGNIFICANT CHANGE UP (ref 98–107)
CO2 SERPL-SCNC: 22 MMOL/L — SIGNIFICANT CHANGE UP (ref 22–31)
CREAT SERPL-MCNC: 0.88 MG/DL — SIGNIFICANT CHANGE UP (ref 0.5–1.3)
GAS PNL BLDV: 137 MMOL/L — SIGNIFICANT CHANGE UP (ref 136–146)
GLUCOSE BLDV-MCNC: 115 MG/DL — HIGH (ref 70–99)
GLUCOSE SERPL-MCNC: 116 MG/DL — HIGH (ref 70–99)
HCO3 BLDV-SCNC: 25 MMOL/L — SIGNIFICANT CHANGE UP (ref 20–27)
HCT VFR BLD CALC: 38.9 % — LOW (ref 39–50)
HCT VFR BLDV CALC: 41.2 % — SIGNIFICANT CHANGE UP (ref 39–51)
HGB BLD-MCNC: 13.1 G/DL — SIGNIFICANT CHANGE UP (ref 13–17)
HGB BLDV-MCNC: 13.4 G/DL — SIGNIFICANT CHANGE UP (ref 13–17)
INR BLD: 3.12 — HIGH (ref 0.88–1.17)
LACTATE BLDV-MCNC: 1.5 MMOL/L — SIGNIFICANT CHANGE UP (ref 0.5–2)
LIDOCAIN IGE QN: 15.5 U/L — SIGNIFICANT CHANGE UP (ref 7–60)
MAGNESIUM SERPL-MCNC: 1.8 MG/DL — SIGNIFICANT CHANGE UP (ref 1.6–2.6)
MCHC RBC-ENTMCNC: 33.7 % — SIGNIFICANT CHANGE UP (ref 32–36)
MCHC RBC-ENTMCNC: 33.7 PG — SIGNIFICANT CHANGE UP (ref 27–34)
MCV RBC AUTO: 100 FL — SIGNIFICANT CHANGE UP (ref 80–100)
NRBC # FLD: 0 K/UL — SIGNIFICANT CHANGE UP (ref 0–0)
PCO2 BLDV: 41 MMHG — SIGNIFICANT CHANGE UP (ref 41–51)
PH BLDV: 7.41 PH — SIGNIFICANT CHANGE UP (ref 7.32–7.43)
PLATELET # BLD AUTO: 175 K/UL — SIGNIFICANT CHANGE UP (ref 150–400)
PMV BLD: 10.3 FL — SIGNIFICANT CHANGE UP (ref 7–13)
PO2 BLDV: 40 MMHG — SIGNIFICANT CHANGE UP (ref 35–40)
POTASSIUM BLDV-SCNC: 3.8 MMOL/L — SIGNIFICANT CHANGE UP (ref 3.4–4.5)
POTASSIUM SERPL-MCNC: 4 MMOL/L — SIGNIFICANT CHANGE UP (ref 3.5–5.3)
POTASSIUM SERPL-SCNC: 4 MMOL/L — SIGNIFICANT CHANGE UP (ref 3.5–5.3)
PROT SERPL-MCNC: 6.7 G/DL — SIGNIFICANT CHANGE UP (ref 6–8.3)
PROTHROM AB SERPL-ACNC: 36.8 SEC — HIGH (ref 9.8–13.1)
RBC # BLD: 3.89 M/UL — LOW (ref 4.2–5.8)
RBC # FLD: 14.1 % — SIGNIFICANT CHANGE UP (ref 10.3–14.5)
SAO2 % BLDV: 71.6 % — SIGNIFICANT CHANGE UP (ref 60–85)
SODIUM SERPL-SCNC: 140 MMOL/L — SIGNIFICANT CHANGE UP (ref 135–145)
TROPONIN T, HIGH SENSITIVITY: 18 NG/L — SIGNIFICANT CHANGE UP (ref ?–14)
WBC # BLD: 7.67 K/UL — SIGNIFICANT CHANGE UP (ref 3.8–10.5)
WBC # FLD AUTO: 7.67 K/UL — SIGNIFICANT CHANGE UP (ref 3.8–10.5)

## 2019-11-30 PROCEDURE — 71045 X-RAY EXAM CHEST 1 VIEW: CPT | Mod: 26

## 2019-11-30 PROCEDURE — 93010 ELECTROCARDIOGRAM REPORT: CPT

## 2019-11-30 PROCEDURE — 99284 EMERGENCY DEPT VISIT MOD MDM: CPT | Mod: 25,GC

## 2019-11-30 RX ORDER — HALOPERIDOL DECANOATE 100 MG/ML
2.5 INJECTION INTRAMUSCULAR ONCE
Refills: 0 | Status: COMPLETED | OUTPATIENT
Start: 2019-11-30 | End: 2019-11-30

## 2019-11-30 RX ORDER — SODIUM CHLORIDE 9 MG/ML
1000 INJECTION INTRAMUSCULAR; INTRAVENOUS; SUBCUTANEOUS ONCE
Refills: 0 | Status: COMPLETED | OUTPATIENT
Start: 2019-11-30 | End: 2019-11-30

## 2019-11-30 RX ADMIN — SODIUM CHLORIDE 1000 MILLILITER(S): 9 INJECTION INTRAMUSCULAR; INTRAVENOUS; SUBCUTANEOUS at 22:19

## 2019-11-30 RX ADMIN — HALOPERIDOL DECANOATE 2.5 MILLIGRAM(S): 100 INJECTION INTRAMUSCULAR at 23:07

## 2019-11-30 NOTE — ED PROVIDER NOTE - PROGRESS NOTE DETAILS
Prem-PGY1: patient intermittently agitated, tugging at IV sites, unable to sit still for CT scan.  Verbal redirection unsuccessful.  Will medicate patient with Haldol 2.5 mg IV and reassess. Prem-PGY1: labs and imaging showing now acute pathology warranting hospitalization.  Pt with stable vitals in NAD. Will prepare for DC.

## 2019-11-30 NOTE — ED PROVIDER NOTE - OBJECTIVE STATEMENT
87 year old male with PMH dementia (baseline A&Ox1), atrial fibrillation on coumadin and digoxin, s/p TAVR, DM, HTN, HLD presents with failure to thrive x 2 days.  Pt poor historian, spoke to staff at [facility] who states patient is baseline A&Ox1 and has been more confused than usual and has not been eating/drinking.  Per staff, patient is usually ambulatory with walker independently and has not been able to ambulate, requiring assistance.  Denies any recent illness.  Pt states he feels well and denies any complaints.  Denies any chest pain, shortness of breath, headache, abdominal pain, back pain, nausea/vomiting, numbness, or weakness.  Assisted living staff denies any recent injury or fall.  Denies any additional complaints.

## 2019-11-30 NOTE — ED ADULT NURSE NOTE - CHIEF COMPLAINT QUOTE
pt. brought from [facility] assisted living, per staff, pt. reported to be weaker than usual w/ unsteady gait since he returned from having Thanksgiving dinner w/ his family. PMHx HTN, DM, Afib.

## 2019-11-30 NOTE — ED PROVIDER NOTE - CLINICAL SUMMARY MEDICAL DECISION MAKING FREE TEXT BOX
Prem-PGY1: 87 year old male with PMH dementia (baseline A&Ox1), atrial fibrillation on coumadin and digoxin, s/p TAVR, DM, HTN, HLD presents with failure to thrive x 2 days. No complaints at this time and states he feels well.  No recent injury or fall per assisted living staff, but are concerned because of inability to ambulate and decreased PO intake. Pt afebrile with normal vitals in NAD. Plan includes labs, imaging, fluids, reassessment and likely admission. Prem-PGY1: 87 year old male with PMH dementia (baseline A&Ox1), atrial fibrillation on coumadin and digoxin, s/p TAVR, DM, HTN, HLD presents with failure to thrive x 2 days. No complaints at this time and states he feels well.  No recent injury or fall per assisted living staff, but are concerned because of inability to ambulate and decreased PO intake. Pt afebrile with normal vitals in NAD. Plan includes labs, imaging, fluids, reassessment and likely admission.    Dr. Bhakta: I agree with the above provided history and exam and addend/modify it as follows.  87M hx dementia  atrial fibrillation, AS s/p TAVR, DM, HTN, HLD P/W decreased PO intake and generalized weakness x 2 days.  Now no longer able to stand.  Plan to evaluate for undelring infectious or metabolic etiology.  R/O ICH though less likely.  Will R/O ACS.  Plan for labs, EKG, CT head, CXR, urine.  Anticipate admission. Prem-PGY1: 87 year old male with PMH dementia (baseline A&Ox1), atrial fibrillation on coumadin and digoxin, s/p TAVR, DM, HTN, HLD presents with failure to thrive x 2 days. No complaints at this time and states he feels well.  No recent injury or fall per assisted living staff, but are concerned because of inability to ambulate and decreased PO intake. Pt afebrile with normal vitals in NAD. Plan includes labs, imaging, fluids, reassessment and disposition accordingly.    Dr. Bhakta: I agree with the above provided history and exam and addend/modify it as follows.  87M hx dementia  atrial fibrillation, AS s/p TAVR, DM, HTN, HLD P/W decreased PO intake and generalized weakness x 2 days.  Now no longer able to stand.  Plan to evaluate for undelring infectious or metabolic etiology.  R/O ICH though less likely.  Will R/O ACS.  Plan for labs, EKG, CT head, CXR, urine.  Anticipate admission.

## 2019-11-30 NOTE — ED ADULT NURSE REASSESSMENT NOTE - NS ED NURSE REASSESS COMMENT FT1
Pt becoming agitated when drawing blood. Pt states "stop doing experimental tests on m e". PT educated on importance of test. Will continue to monitor.

## 2019-11-30 NOTE — ED ADULT TRIAGE NOTE - CHIEF COMPLAINT QUOTE
pt. brought from [facility] assisted living, per staff, pt. reported to be weaker than usual w/ unsteady gait since he returned from having Thanksgiving dinner w/ his family. PMHx HTN, DM, Afib.

## 2019-11-30 NOTE — ED PROVIDER NOTE - NSFOLLOWUPINSTRUCTIONS_ED_ALL_ED_FT
Rest and drink plenty of fluids.    Continue taking all medications as prescribed.    Follow up with primary care physician this week.    Return immediately with any new or worsening symptoms, including chest pain, shortness of breath, fever, severe headache, change in mental status, vomiting, numbness, weakness, or any additional concerns.

## 2019-11-30 NOTE — ED ADULT NURSE REASSESSMENT NOTE - NS ED NURSE REASSESS COMMENT FT1
Pt becoming agitated with staff after x-ray. Pt reports "I want to go home what do you not understand". Pt cursing at staff. Pt plan of care explained, warm blanket provided, lights dimmed. MD notified

## 2019-11-30 NOTE — ED ADULT NURSE NOTE - OBJECTIVE STATEMENT
Pt is a 87 year old male reporting to the Ed from assisted living facility. As per triage note pt was sent to ED for weakness. Pt arrives to room 11 via stretcher. Pt is AOX1 (self). Pt reports sent to the ED for a place to sleep, pt believes it is the year 2200, pt re-directed.  Pt denies pain or discomfort. PT able to follow commands appropriately. Pt unable to stay focused on one topic. Pt rectally afebrile. Pt placed on cardiac monitor, showing NSR. Pt spo2 100 % on room air. PT skin is intact. MD at bedside, evaluating pt, will continue to monitor.

## 2019-11-30 NOTE — ED ADULT NURSE NOTE - NSIMPLEMENTINTERV_GEN_ALL_ED
Implemented All Fall with Harm Risk Interventions:  [place] to call system. Call bell, personal items and telephone within reach. Instruct patient to call for assistance. Room bathroom lighting operational. Non-slip footwear when patient is off stretcher. Physically safe environment: no spills, clutter or unnecessary equipment. Stretcher in lowest position, wheels locked, appropriate side rails in place. Provide visual cue, wrist band, yellow gown, etc. Monitor gait and stability. Monitor for mental status changes and reorient to person, place, and time. Review medications for side effects contributing to fall risk. Reinforce activity limits and safety measures with patient and family. Provide visual clues: red socks.

## 2019-11-30 NOTE — ED PROVIDER NOTE - PATIENT PORTAL LINK FT
You can access the FollowMyHealth Patient Portal offered by [facility] by registering at the following website: [email or web address]. By joining [employer]’s FollowMyHealth portal, you will also be able to view your health information using other applications (apps) compatible with our system.

## 2019-11-30 NOTE — ED ADULT NURSE REASSESSMENT NOTE - NS ED NURSE REASSESS COMMENT FT1
Pt did not stay still for CT. Pt became agitated in CT, brought back to room 11. MD notified, awaiting medication order, will continue to monitor.

## 2019-11-30 NOTE — ED PROVIDER NOTE - ATTENDING CONTRIBUTION TO CARE
DELMY Bhakta MD performed a history and physical exam of the patient and discussed their management with the resident and /or advanced care provider. I reviewed the resident and /or ACP's note and agree with the documented findings and plan of care. My medical decision making and observations are found above.    Awake, Alert, Conversant.  Resting comfortably.  Thin.  Breath sounds clear in all lung fields.  Normal heart rate with irregular rhythm, precordial systolic murmur, rubs, or gallops.  Normal S1/S2.  Abdomen soft and nontender.  No lower extremity swelling or tenderness.  Alert and Oriented x1 (C/W known baseline) CN II-XII intact, conversant with fluent speech, moving all extremities with good strength, sensation intact throughout.

## 2019-11-30 NOTE — ED PROVIDER NOTE - PHYSICAL EXAMINATION
VITAL SIGNS: I have reviewed nursing notes and confirm.  CONSTITUTIONAL: non-toxic, well appearing  SKIN: no rash, no petechiae.  EYES: PERRL, EOMI, pink conjunctiva, anicteric  ENT: tongue and uvular midline, no exudates, mucous membranes moderately dry  NECK: Supple; no meningismus, no JVD  CARD: Irregularly irregular, no murmurs, equal radial pulses bilaterally 2+  RESP: CTAB, no respiratory distress  ABD: Soft, non-tender, non-distended, no peritoneal signs, no CVA tenderness  EXT: Normal ROM x4. No edema. No calf tenderness  NEURO: Alert, oriented to person only, states he's currently in [place] and year 2200. Neuro exam nonfocal, equal strength bilaterally.  PSYCH: Cooperative, speech fluent.

## 2019-11-30 NOTE — ED PROVIDER NOTE - NS ED ROS FT
Review of Systems    Constitutional: (-) fever, (-) chills, (-) fatigue  HEENT: (-) sore throat, (-) hearing loss, (-) nasal congestion  Cardiovascular: (-) chest pain, (-) syncope  Respiratory: (-) cough, (-) shortness of breath  Gastrointestinal: (-) vomiting, (-) diarrhea, (-) abdominal pain  Musculoskeletal: (-) neck pain, (-) back pain, (-) joint pain  Integumentary: (-) rash, (-) edema, (-) wound  Neurological: (-) headache, (+) altered mental status    Except as documented in the HPI, all other systems are negative.

## 2019-12-01 VITALS
HEART RATE: 98 BPM | RESPIRATION RATE: 16 BRPM | OXYGEN SATURATION: 98 % | SYSTOLIC BLOOD PRESSURE: 147 MMHG | DIASTOLIC BLOOD PRESSURE: 87 MMHG

## 2019-12-01 PROBLEM — H40.9 UNSPECIFIED GLAUCOMA: Chronic | Status: ACTIVE | Noted: 2017-09-25

## 2019-12-01 PROBLEM — N32.81 OVERACTIVE BLADDER: Chronic | Status: ACTIVE | Noted: 2017-09-25

## 2019-12-01 PROBLEM — I35.0 NONRHEUMATIC AORTIC (VALVE) STENOSIS: Chronic | Status: ACTIVE | Noted: 2017-09-25

## 2019-12-01 PROBLEM — M19.90 UNSPECIFIED OSTEOARTHRITIS, UNSPECIFIED SITE: Chronic | Status: ACTIVE | Noted: 2017-09-25

## 2019-12-01 PROBLEM — F03.90 UNSPECIFIED DEMENTIA WITHOUT BEHAVIORAL DISTURBANCE: Chronic | Status: ACTIVE | Noted: 2017-09-25

## 2019-12-01 PROBLEM — I35.0 NONRHEUMATIC AORTIC (VALVE) STENOSIS: Chronic | Status: ACTIVE | Noted: 2017-08-21

## 2019-12-01 PROBLEM — I48.91 UNSPECIFIED ATRIAL FIBRILLATION: Chronic | Status: ACTIVE | Noted: 2017-08-20

## 2019-12-01 LAB
APPEARANCE UR: CLEAR — SIGNIFICANT CHANGE UP
BILIRUB UR-MCNC: NEGATIVE — SIGNIFICANT CHANGE UP
BLOOD UR QL VISUAL: NEGATIVE — SIGNIFICANT CHANGE UP
COLOR SPEC: SIGNIFICANT CHANGE UP
GLUCOSE UR-MCNC: NEGATIVE — SIGNIFICANT CHANGE UP
KETONES UR-MCNC: SIGNIFICANT CHANGE UP
LEUKOCYTE ESTERASE UR-ACNC: NEGATIVE — SIGNIFICANT CHANGE UP
NITRITE UR-MCNC: NEGATIVE — SIGNIFICANT CHANGE UP
PH UR: 6 — SIGNIFICANT CHANGE UP (ref 5–8)
PROT UR-MCNC: NEGATIVE — SIGNIFICANT CHANGE UP
RBC CASTS # UR COMP ASSIST: SIGNIFICANT CHANGE UP (ref 0–?)
SP GR SPEC: 1.01 — SIGNIFICANT CHANGE UP (ref 1–1.04)
UROBILINOGEN FLD QL: NORMAL — SIGNIFICANT CHANGE UP
WBC UR QL: SIGNIFICANT CHANGE UP (ref 0–?)

## 2019-12-01 PROCEDURE — 70450 CT HEAD/BRAIN W/O DYE: CPT | Mod: 26

## 2019-12-01 NOTE — PROVIDER CONTACT NOTE (OTHER) - SITUATION
SW notified that pt is being discharged and requires ambulance transport back to [facility].  SW confirmed with Wellness Dept that pt can return to facility.

## 2020-12-21 NOTE — PHYSICAL THERAPY INITIAL EVALUATION ADULT - LEVEL OF INDEPENDENCE: STAIR NEGOTIATION, REHAB EVAL
Pt states lab results from Dr Morrow should be coming over. Pt states he called out a higher dose of her Levothyroxine but that she wants to stay w/Dr Wong on that medication and is wanting Dr Wong to determine dosage. Pt informed we have not received copy of labs but will call her once we do.   n/a

## 2021-01-01 ENCOUNTER — TRANSCRIPTION ENCOUNTER (OUTPATIENT)
Age: 86
End: 2021-01-01

## 2021-01-01 ENCOUNTER — INPATIENT (INPATIENT)
Facility: HOSPITAL | Age: 86
LOS: 2 days | Discharge: NURSING, REHAB OR LONG-TERM CARE | End: 2021-04-27
Attending: INTERNAL MEDICINE | Admitting: INTERNAL MEDICINE
Payer: MEDICARE

## 2021-01-01 ENCOUNTER — OUTPATIENT (OUTPATIENT)
Dept: OUTPATIENT SERVICES | Facility: HOSPITAL | Age: 86
LOS: 1 days | End: 2021-01-01
Payer: MEDICARE

## 2021-01-01 ENCOUNTER — EMERGENCY (EMERGENCY)
Facility: HOSPITAL | Age: 86
LOS: 1 days | Discharge: HOME | End: 2021-01-01
Attending: EMERGENCY MEDICINE
Payer: MEDICARE

## 2021-01-01 ENCOUNTER — INPATIENT (INPATIENT)
Facility: HOSPITAL | Age: 86
LOS: 5 days | Discharge: NURSING, REHAB OR LONG-TERM CARE | End: 2021-05-07
Attending: INTERNAL MEDICINE | Admitting: INTERNAL MEDICINE
Payer: MEDICARE

## 2021-01-01 ENCOUNTER — INPATIENT (INPATIENT)
Facility: HOSPITAL | Age: 86
LOS: 6 days | Discharge: HOME | End: 2021-10-26
Attending: INTERNAL MEDICINE | Admitting: INTERNAL MEDICINE
Payer: MEDICARE

## 2021-01-01 ENCOUNTER — APPOINTMENT (OUTPATIENT)
Dept: CT IMAGING | Facility: IMAGING CENTER | Age: 86
End: 2021-01-01
Payer: MEDICARE

## 2021-01-01 VITALS
DIASTOLIC BLOOD PRESSURE: 83 MMHG | HEIGHT: 65 IN | TEMPERATURE: 98 F | SYSTOLIC BLOOD PRESSURE: 187 MMHG | HEART RATE: 92 BPM | RESPIRATION RATE: 19 BRPM | OXYGEN SATURATION: 100 %

## 2021-01-01 VITALS
RESPIRATION RATE: 18 BRPM | TEMPERATURE: 98 F | DIASTOLIC BLOOD PRESSURE: 60 MMHG | HEART RATE: 68 BPM | SYSTOLIC BLOOD PRESSURE: 108 MMHG | OXYGEN SATURATION: 98 %

## 2021-01-01 VITALS
RESPIRATION RATE: 16 BRPM | HEART RATE: 95 BPM | TEMPERATURE: 99 F | OXYGEN SATURATION: 100 % | SYSTOLIC BLOOD PRESSURE: 133 MMHG | DIASTOLIC BLOOD PRESSURE: 92 MMHG | HEIGHT: 65 IN

## 2021-01-01 VITALS
TEMPERATURE: 98 F | DIASTOLIC BLOOD PRESSURE: 81 MMHG | HEIGHT: 65 IN | HEART RATE: 78 BPM | OXYGEN SATURATION: 98 % | RESPIRATION RATE: 18 BRPM | SYSTOLIC BLOOD PRESSURE: 135 MMHG

## 2021-01-01 VITALS
RESPIRATION RATE: 25 BRPM | SYSTOLIC BLOOD PRESSURE: 100 MMHG | OXYGEN SATURATION: 97 % | HEIGHT: 65 IN | DIASTOLIC BLOOD PRESSURE: 70 MMHG | HEART RATE: 99 BPM | TEMPERATURE: 98 F

## 2021-01-01 VITALS
HEART RATE: 80 BPM | OXYGEN SATURATION: 96 % | DIASTOLIC BLOOD PRESSURE: 64 MMHG | SYSTOLIC BLOOD PRESSURE: 123 MMHG | RESPIRATION RATE: 18 BRPM | TEMPERATURE: 98 F

## 2021-01-01 VITALS
HEART RATE: 93 BPM | OXYGEN SATURATION: 100 % | SYSTOLIC BLOOD PRESSURE: 127 MMHG | HEIGHT: 65 IN | DIASTOLIC BLOOD PRESSURE: 58 MMHG | RESPIRATION RATE: 20 BRPM | TEMPERATURE: 98 F

## 2021-01-01 VITALS — DIASTOLIC BLOOD PRESSURE: 76 MMHG | HEART RATE: 96 BPM | SYSTOLIC BLOOD PRESSURE: 125 MMHG

## 2021-01-01 DIAGNOSIS — N30.90 CYSTITIS, UNSPECIFIED WITHOUT HEMATURIA: ICD-10-CM

## 2021-01-01 DIAGNOSIS — Z98.890 OTHER SPECIFIED POSTPROCEDURAL STATES: Chronic | ICD-10-CM

## 2021-01-01 DIAGNOSIS — K08.89 OTHER SPECIFIED DISORDERS OF TEETH AND SUPPORTING STRUCTURES: ICD-10-CM

## 2021-01-01 DIAGNOSIS — E03.9 HYPOTHYROIDISM, UNSPECIFIED: ICD-10-CM

## 2021-01-01 DIAGNOSIS — Z98.49 CATARACT EXTRACTION STATUS, UNSPECIFIED EYE: Chronic | ICD-10-CM

## 2021-01-01 DIAGNOSIS — Z29.9 ENCOUNTER FOR PROPHYLACTIC MEASURES, UNSPECIFIED: ICD-10-CM

## 2021-01-01 DIAGNOSIS — I48.91 UNSPECIFIED ATRIAL FIBRILLATION: ICD-10-CM

## 2021-01-01 DIAGNOSIS — J18.9 PNEUMONIA, UNSPECIFIED ORGANISM: ICD-10-CM

## 2021-01-01 DIAGNOSIS — F03.90 UNSPECIFIED DEMENTIA, UNSPECIFIED SEVERITY, WITHOUT BEHAVIORAL DISTURBANCE, PSYCHOTIC DISTURBANCE, MOOD DISTURBANCE, AND ANXIETY: ICD-10-CM

## 2021-01-01 DIAGNOSIS — R13.10 DYSPHAGIA, UNSPECIFIED: ICD-10-CM

## 2021-01-01 DIAGNOSIS — Z51.5 ENCOUNTER FOR PALLIATIVE CARE: ICD-10-CM

## 2021-01-01 DIAGNOSIS — I63.50 CEREBRAL INFARCTION DUE TO UNSPECIFIED OCCLUSION OR STENOSIS OF UNSPECIFIED CEREBRAL ARTERY: ICD-10-CM

## 2021-01-01 DIAGNOSIS — N40.0 BENIGN PROSTATIC HYPERPLASIA WITHOUT LOWER URINARY TRACT SYMPTOMS: ICD-10-CM

## 2021-01-01 DIAGNOSIS — R45.1 RESTLESSNESS AND AGITATION: ICD-10-CM

## 2021-01-01 DIAGNOSIS — A41.9 SEPSIS, UNSPECIFIED ORGANISM: ICD-10-CM

## 2021-01-01 DIAGNOSIS — H40.9 UNSPECIFIED GLAUCOMA: ICD-10-CM

## 2021-01-01 DIAGNOSIS — Z71.89 OTHER SPECIFIED COUNSELING: ICD-10-CM

## 2021-01-01 DIAGNOSIS — I62.9 NONTRAUMATIC INTRACRANIAL HEMORRHAGE, UNSPECIFIED: ICD-10-CM

## 2021-01-01 DIAGNOSIS — F03.91 UNSPECIFIED DEMENTIA WITH BEHAVIORAL DISTURBANCE: ICD-10-CM

## 2021-01-01 DIAGNOSIS — E11.9 TYPE 2 DIABETES MELLITUS WITHOUT COMPLICATIONS: ICD-10-CM

## 2021-01-01 DIAGNOSIS — I61.5 NONTRAUMATIC INTRACEREBRAL HEMORRHAGE, INTRAVENTRICULAR: ICD-10-CM

## 2021-01-01 DIAGNOSIS — F03.90 UNSPECIFIED DEMENTIA WITHOUT BEHAVIORAL DISTURBANCE: ICD-10-CM

## 2021-01-01 DIAGNOSIS — I25.10 ATHEROSCLEROTIC HEART DISEASE OF NATIVE CORONARY ARTERY WITHOUT ANGINA PECTORIS: ICD-10-CM

## 2021-01-01 LAB
A1C WITH ESTIMATED AVERAGE GLUCOSE RESULT: 6.3 % — HIGH (ref 4–5.6)
A1C WITH ESTIMATED AVERAGE GLUCOSE RESULT: 7 % — HIGH (ref 4–5.6)
ALBUMIN SERPL ELPH-MCNC: 3.4 G/DL — SIGNIFICANT CHANGE UP (ref 3.3–5)
ALBUMIN SERPL ELPH-MCNC: 3.7 G/DL — SIGNIFICANT CHANGE UP (ref 3.3–5)
ALBUMIN SERPL ELPH-MCNC: 3.7 G/DL — SIGNIFICANT CHANGE UP (ref 3.5–5)
ALBUMIN SERPL ELPH-MCNC: 3.9 G/DL — SIGNIFICANT CHANGE UP (ref 3.3–5)
ALBUMIN SERPL ELPH-MCNC: 3.9 G/DL — SIGNIFICANT CHANGE UP (ref 3.3–5)
ALBUMIN SERPL ELPH-MCNC: 4 G/DL — SIGNIFICANT CHANGE UP (ref 3.3–5)
ALBUMIN SERPL ELPH-MCNC: 4 G/DL — SIGNIFICANT CHANGE UP (ref 3.3–5)
ALBUMIN SERPL ELPH-MCNC: 4.1 G/DL — SIGNIFICANT CHANGE UP (ref 3.3–5)
ALBUMIN SERPL ELPH-MCNC: 4.1 G/DL — SIGNIFICANT CHANGE UP (ref 3.3–5)
ALBUMIN SERPL ELPH-MCNC: 4.2 G/DL — SIGNIFICANT CHANGE UP (ref 3.3–5)
ALBUMIN SERPL ELPH-MCNC: 4.4 G/DL — SIGNIFICANT CHANGE UP (ref 3.3–5)
ALP SERPL-CCNC: 54 U/L — SIGNIFICANT CHANGE UP (ref 40–120)
ALP SERPL-CCNC: 59 U/L — SIGNIFICANT CHANGE UP (ref 40–120)
ALP SERPL-CCNC: 59 U/L — SIGNIFICANT CHANGE UP (ref 40–120)
ALP SERPL-CCNC: 60 U/L — SIGNIFICANT CHANGE UP (ref 40–120)
ALP SERPL-CCNC: 61 U/L — SIGNIFICANT CHANGE UP (ref 40–120)
ALP SERPL-CCNC: 63 U/L — SIGNIFICANT CHANGE UP (ref 40–120)
ALP SERPL-CCNC: 64 U/L — SIGNIFICANT CHANGE UP (ref 40–120)
ALP SERPL-CCNC: 64 U/L — SIGNIFICANT CHANGE UP (ref 40–120)
ALP SERPL-CCNC: 74 U/L — SIGNIFICANT CHANGE UP (ref 40–120)
ALT FLD-CCNC: 10 U/L — SIGNIFICANT CHANGE UP (ref 4–41)
ALT FLD-CCNC: 10 U/L — SIGNIFICANT CHANGE UP (ref 4–41)
ALT FLD-CCNC: 11 U/L — SIGNIFICANT CHANGE UP (ref 4–41)
ALT FLD-CCNC: 12 U/L — SIGNIFICANT CHANGE UP (ref 4–41)
ALT FLD-CCNC: 14 U/L — SIGNIFICANT CHANGE UP (ref 4–41)
ALT FLD-CCNC: 15 U/L — SIGNIFICANT CHANGE UP (ref 4–41)
ALT FLD-CCNC: 31 U/L DA — SIGNIFICANT CHANGE UP (ref 10–60)
ALT FLD-CCNC: 8 U/L — SIGNIFICANT CHANGE UP (ref 4–41)
ALT FLD-CCNC: 9 U/L — SIGNIFICANT CHANGE UP (ref 4–41)
ANION GAP SERPL CALC-SCNC: 10 MMOL/L — SIGNIFICANT CHANGE UP (ref 7–14)
ANION GAP SERPL CALC-SCNC: 11 MMOL/L — SIGNIFICANT CHANGE UP (ref 7–14)
ANION GAP SERPL CALC-SCNC: 12 MMOL/L — SIGNIFICANT CHANGE UP (ref 7–14)
ANION GAP SERPL CALC-SCNC: 13 MMOL/L — SIGNIFICANT CHANGE UP (ref 7–14)
ANION GAP SERPL CALC-SCNC: 14 MMOL/L — SIGNIFICANT CHANGE UP (ref 7–14)
ANION GAP SERPL CALC-SCNC: 15 MMOL/L — HIGH (ref 7–14)
ANION GAP SERPL CALC-SCNC: 16 MMOL/L — HIGH (ref 7–14)
ANION GAP SERPL CALC-SCNC: 16 MMOL/L — HIGH (ref 7–14)
ANION GAP SERPL CALC-SCNC: 17 MMOL/L — HIGH (ref 7–14)
ANION GAP SERPL CALC-SCNC: 21 MMOL/L — HIGH (ref 7–14)
ANION GAP SERPL CALC-SCNC: 22 MMOL/L — HIGH (ref 7–14)
ANION GAP SERPL CALC-SCNC: 7 MMOL/L — SIGNIFICANT CHANGE UP (ref 5–17)
APPEARANCE UR: CLEAR — SIGNIFICANT CHANGE UP
APPEARANCE UR: CLEAR — SIGNIFICANT CHANGE UP
APTT BLD: 31.2 SEC — SIGNIFICANT CHANGE UP (ref 27–36.3)
APTT BLD: 37 SEC — HIGH (ref 27–36.3)
APTT BLD: 37.8 SEC — HIGH (ref 27–36.3)
APTT BLD: 39.5 SEC — HIGH (ref 27–36.3)
AST SERPL-CCNC: 10 U/L — SIGNIFICANT CHANGE UP (ref 4–40)
AST SERPL-CCNC: 13 U/L — SIGNIFICANT CHANGE UP (ref 4–40)
AST SERPL-CCNC: 16 U/L — SIGNIFICANT CHANGE UP (ref 4–40)
AST SERPL-CCNC: 17 U/L — SIGNIFICANT CHANGE UP (ref 4–40)
AST SERPL-CCNC: 17 U/L — SIGNIFICANT CHANGE UP (ref 4–40)
AST SERPL-CCNC: 19 U/L — SIGNIFICANT CHANGE UP (ref 4–40)
AST SERPL-CCNC: 19 U/L — SIGNIFICANT CHANGE UP (ref 4–40)
AST SERPL-CCNC: 22 U/L — SIGNIFICANT CHANGE UP (ref 4–40)
AST SERPL-CCNC: 26 U/L — SIGNIFICANT CHANGE UP (ref 10–40)
B PERT DNA SPEC QL NAA+PROBE: SIGNIFICANT CHANGE UP
B PERT+PARAPERT DNA PNL SPEC NAA+PROBE: SIGNIFICANT CHANGE UP
BACTERIA # UR AUTO: ABNORMAL
BASE EXCESS BLDV CALC-SCNC: -10.1 MMOL/L — LOW (ref -2–3)
BASE EXCESS BLDV CALC-SCNC: -9.1 MMOL/L — LOW (ref -2–3)
BASOPHILS # BLD AUTO: 0 K/UL — SIGNIFICANT CHANGE UP (ref 0–0.2)
BASOPHILS # BLD AUTO: 0.06 K/UL — SIGNIFICANT CHANGE UP (ref 0–0.2)
BASOPHILS # BLD AUTO: 0.07 K/UL — SIGNIFICANT CHANGE UP (ref 0–0.2)
BASOPHILS # BLD AUTO: 0.08 K/UL — SIGNIFICANT CHANGE UP (ref 0–0.2)
BASOPHILS # BLD AUTO: 0.09 K/UL — SIGNIFICANT CHANGE UP (ref 0–0.2)
BASOPHILS # BLD AUTO: 0.1 K/UL — SIGNIFICANT CHANGE UP (ref 0–0.2)
BASOPHILS # BLD AUTO: 0.1 K/UL — SIGNIFICANT CHANGE UP (ref 0–0.2)
BASOPHILS NFR BLD AUTO: 0 % — SIGNIFICANT CHANGE UP (ref 0–2)
BASOPHILS NFR BLD AUTO: 0.7 % — SIGNIFICANT CHANGE UP (ref 0–2)
BASOPHILS NFR BLD AUTO: 0.7 % — SIGNIFICANT CHANGE UP (ref 0–2)
BASOPHILS NFR BLD AUTO: 0.8 % — SIGNIFICANT CHANGE UP (ref 0–2)
BASOPHILS NFR BLD AUTO: 0.9 % — SIGNIFICANT CHANGE UP (ref 0–2)
BASOPHILS NFR BLD AUTO: 1 % — SIGNIFICANT CHANGE UP (ref 0–2)
BASOPHILS NFR BLD AUTO: 1.1 % — SIGNIFICANT CHANGE UP (ref 0–2)
BILIRUB SERPL-MCNC: 1.1 MG/DL — SIGNIFICANT CHANGE UP (ref 0.2–1.2)
BILIRUB SERPL-MCNC: 1.4 MG/DL — HIGH (ref 0.2–1.2)
BILIRUB SERPL-MCNC: 1.6 MG/DL — HIGH (ref 0.2–1.2)
BILIRUB SERPL-MCNC: 1.8 MG/DL — HIGH (ref 0.2–1.2)
BILIRUB SERPL-MCNC: 2.3 MG/DL — HIGH (ref 0.2–1.2)
BILIRUB SERPL-MCNC: 2.4 MG/DL — HIGH (ref 0.2–1.2)
BILIRUB SERPL-MCNC: 2.6 MG/DL — HIGH (ref 0.2–1.2)
BILIRUB SERPL-MCNC: 2.7 MG/DL — HIGH (ref 0.2–1.2)
BILIRUB UR-MCNC: NEGATIVE — SIGNIFICANT CHANGE UP
BILIRUB UR-MCNC: NEGATIVE — SIGNIFICANT CHANGE UP
BLOOD GAS VENOUS COMPREHENSIVE RESULT: SIGNIFICANT CHANGE UP
BLOOD GAS VENOUS COMPREHENSIVE RESULT: SIGNIFICANT CHANGE UP
BORDETELLA PARAPERTUSSIS (RAPRVP): SIGNIFICANT CHANGE UP
BUN SERPL-MCNC: 10 MG/DL — SIGNIFICANT CHANGE UP (ref 7–23)
BUN SERPL-MCNC: 10 MG/DL — SIGNIFICANT CHANGE UP (ref 7–23)
BUN SERPL-MCNC: 11 MG/DL — SIGNIFICANT CHANGE UP (ref 7–23)
BUN SERPL-MCNC: 12 MG/DL — SIGNIFICANT CHANGE UP (ref 7–23)
BUN SERPL-MCNC: 16 MG/DL — SIGNIFICANT CHANGE UP (ref 7–18)
BUN SERPL-MCNC: 18 MG/DL — SIGNIFICANT CHANGE UP (ref 7–23)
BUN SERPL-MCNC: 19 MG/DL — SIGNIFICANT CHANGE UP (ref 7–23)
BUN SERPL-MCNC: 20 MG/DL — SIGNIFICANT CHANGE UP (ref 7–23)
BUN SERPL-MCNC: 20 MG/DL — SIGNIFICANT CHANGE UP (ref 7–23)
BUN SERPL-MCNC: 21 MG/DL — SIGNIFICANT CHANGE UP (ref 7–23)
BUN SERPL-MCNC: 22 MG/DL — SIGNIFICANT CHANGE UP (ref 7–23)
BUN SERPL-MCNC: 22 MG/DL — SIGNIFICANT CHANGE UP (ref 7–23)
BUN SERPL-MCNC: 23 MG/DL — SIGNIFICANT CHANGE UP (ref 7–23)
BUN SERPL-MCNC: 24 MG/DL — HIGH (ref 7–23)
BUN SERPL-MCNC: 26 MG/DL — HIGH (ref 7–23)
BUN SERPL-MCNC: 26 MG/DL — HIGH (ref 7–23)
BUN SERPL-MCNC: 27 MG/DL — HIGH (ref 7–23)
BUN SERPL-MCNC: 27 MG/DL — HIGH (ref 7–23)
BUN SERPL-MCNC: 9 MG/DL — SIGNIFICANT CHANGE UP (ref 7–23)
C PNEUM DNA SPEC QL NAA+PROBE: SIGNIFICANT CHANGE UP
CA-I SERPL-SCNC: 1.17 MMOL/L — SIGNIFICANT CHANGE UP (ref 1.15–1.33)
CALCIUM SERPL-MCNC: 10 MG/DL — SIGNIFICANT CHANGE UP (ref 8.4–10.5)
CALCIUM SERPL-MCNC: 8.6 MG/DL — SIGNIFICANT CHANGE UP (ref 8.4–10.5)
CALCIUM SERPL-MCNC: 8.6 MG/DL — SIGNIFICANT CHANGE UP (ref 8.4–10.5)
CALCIUM SERPL-MCNC: 8.7 MG/DL — SIGNIFICANT CHANGE UP (ref 8.4–10.5)
CALCIUM SERPL-MCNC: 8.9 MG/DL — SIGNIFICANT CHANGE UP (ref 8.4–10.5)
CALCIUM SERPL-MCNC: 9 MG/DL — SIGNIFICANT CHANGE UP (ref 8.4–10.5)
CALCIUM SERPL-MCNC: 9.1 MG/DL — SIGNIFICANT CHANGE UP (ref 8.4–10.5)
CALCIUM SERPL-MCNC: 9.1 MG/DL — SIGNIFICANT CHANGE UP (ref 8.4–10.5)
CALCIUM SERPL-MCNC: 9.2 MG/DL — SIGNIFICANT CHANGE UP (ref 8.4–10.5)
CALCIUM SERPL-MCNC: 9.3 MG/DL — SIGNIFICANT CHANGE UP (ref 8.4–10.5)
CALCIUM SERPL-MCNC: 9.4 MG/DL — SIGNIFICANT CHANGE UP (ref 8.4–10.5)
CALCIUM SERPL-MCNC: 9.4 MG/DL — SIGNIFICANT CHANGE UP (ref 8.4–10.5)
CALCIUM SERPL-MCNC: 9.5 MG/DL — SIGNIFICANT CHANGE UP (ref 8.4–10.5)
CALCIUM SERPL-MCNC: 9.5 MG/DL — SIGNIFICANT CHANGE UP (ref 8.4–10.5)
CALCIUM SERPL-MCNC: 9.6 MG/DL — SIGNIFICANT CHANGE UP (ref 8.4–10.5)
CALCIUM SERPL-MCNC: 9.7 MG/DL — SIGNIFICANT CHANGE UP (ref 8.4–10.5)
CHLORIDE BLDV-SCNC: 101 MMOL/L — SIGNIFICANT CHANGE UP (ref 96–108)
CHLORIDE BLDV-SCNC: 103 MMOL/L — SIGNIFICANT CHANGE UP (ref 96–108)
CHLORIDE SERPL-SCNC: 100 MMOL/L — SIGNIFICANT CHANGE UP (ref 98–107)
CHLORIDE SERPL-SCNC: 100 MMOL/L — SIGNIFICANT CHANGE UP (ref 98–107)
CHLORIDE SERPL-SCNC: 101 MMOL/L — SIGNIFICANT CHANGE UP (ref 98–107)
CHLORIDE SERPL-SCNC: 101 MMOL/L — SIGNIFICANT CHANGE UP (ref 98–107)
CHLORIDE SERPL-SCNC: 102 MMOL/L — SIGNIFICANT CHANGE UP (ref 98–107)
CHLORIDE SERPL-SCNC: 102 MMOL/L — SIGNIFICANT CHANGE UP (ref 98–107)
CHLORIDE SERPL-SCNC: 103 MMOL/L — SIGNIFICANT CHANGE UP (ref 98–107)
CHLORIDE SERPL-SCNC: 104 MMOL/L — SIGNIFICANT CHANGE UP (ref 98–107)
CHLORIDE SERPL-SCNC: 105 MMOL/L — SIGNIFICANT CHANGE UP (ref 98–107)
CHLORIDE SERPL-SCNC: 106 MMOL/L — SIGNIFICANT CHANGE UP (ref 98–107)
CHLORIDE SERPL-SCNC: 109 MMOL/L — HIGH (ref 96–108)
CHLORIDE SERPL-SCNC: 99 MMOL/L — SIGNIFICANT CHANGE UP (ref 98–107)
CK SERPL-CCNC: 36 U/L — SIGNIFICANT CHANGE UP (ref 30–200)
CO2 BLDV-SCNC: 20.9 MMOL/L — LOW (ref 22–26)
CO2 BLDV-SCNC: 22 MMOL/L — SIGNIFICANT CHANGE UP (ref 22–26)
CO2 SERPL-SCNC: 17 MMOL/L — LOW (ref 22–31)
CO2 SERPL-SCNC: 19 MMOL/L — LOW (ref 22–31)
CO2 SERPL-SCNC: 20 MMOL/L — LOW (ref 22–31)
CO2 SERPL-SCNC: 20 MMOL/L — LOW (ref 22–31)
CO2 SERPL-SCNC: 21 MMOL/L — LOW (ref 22–31)
CO2 SERPL-SCNC: 22 MMOL/L — SIGNIFICANT CHANGE UP (ref 22–31)
CO2 SERPL-SCNC: 24 MMOL/L — SIGNIFICANT CHANGE UP (ref 22–31)
CO2 SERPL-SCNC: 25 MMOL/L — SIGNIFICANT CHANGE UP (ref 22–31)
CO2 SERPL-SCNC: 25 MMOL/L — SIGNIFICANT CHANGE UP (ref 22–31)
CO2 SERPL-SCNC: 27 MMOL/L — SIGNIFICANT CHANGE UP (ref 22–31)
COLOR SPEC: SIGNIFICANT CHANGE UP
COLOR SPEC: YELLOW — SIGNIFICANT CHANGE UP
COVID-19 SPIKE DOMAIN AB INTERP: POSITIVE
COVID-19 SPIKE DOMAIN ANTIBODY RESULT: 230 U/ML — HIGH
COVID-19 SPIKE DOMAIN ANTIBODY RESULT: 243 U/ML — HIGH
COVID-19 SPIKE DOMAIN ANTIBODY RESULT: >250 U/ML — HIGH
CREAT SERPL-MCNC: 0.57 MG/DL — SIGNIFICANT CHANGE UP (ref 0.5–1.3)
CREAT SERPL-MCNC: 0.58 MG/DL — SIGNIFICANT CHANGE UP (ref 0.5–1.3)
CREAT SERPL-MCNC: 0.59 MG/DL — SIGNIFICANT CHANGE UP (ref 0.5–1.3)
CREAT SERPL-MCNC: 0.64 MG/DL — SIGNIFICANT CHANGE UP (ref 0.5–1.3)
CREAT SERPL-MCNC: 0.66 MG/DL — SIGNIFICANT CHANGE UP (ref 0.5–1.3)
CREAT SERPL-MCNC: 0.67 MG/DL — SIGNIFICANT CHANGE UP (ref 0.5–1.3)
CREAT SERPL-MCNC: 0.67 MG/DL — SIGNIFICANT CHANGE UP (ref 0.5–1.3)
CREAT SERPL-MCNC: 0.68 MG/DL — SIGNIFICANT CHANGE UP (ref 0.5–1.3)
CREAT SERPL-MCNC: 0.7 MG/DL — SIGNIFICANT CHANGE UP (ref 0.5–1.3)
CREAT SERPL-MCNC: 0.7 MG/DL — SIGNIFICANT CHANGE UP (ref 0.5–1.3)
CREAT SERPL-MCNC: 0.72 MG/DL — SIGNIFICANT CHANGE UP (ref 0.5–1.3)
CREAT SERPL-MCNC: 0.72 MG/DL — SIGNIFICANT CHANGE UP (ref 0.5–1.3)
CREAT SERPL-MCNC: 0.73 MG/DL — SIGNIFICANT CHANGE UP (ref 0.5–1.3)
CREAT SERPL-MCNC: 0.77 MG/DL — SIGNIFICANT CHANGE UP (ref 0.5–1.3)
CREAT SERPL-MCNC: 0.79 MG/DL — SIGNIFICANT CHANGE UP (ref 0.5–1.3)
CREAT SERPL-MCNC: 0.8 MG/DL — SIGNIFICANT CHANGE UP (ref 0.5–1.3)
CREAT SERPL-MCNC: 0.84 MG/DL — SIGNIFICANT CHANGE UP (ref 0.5–1.3)
CREAT SERPL-MCNC: 0.85 MG/DL — SIGNIFICANT CHANGE UP (ref 0.5–1.3)
CREAT SERPL-MCNC: 0.89 MG/DL — SIGNIFICANT CHANGE UP (ref 0.5–1.3)
CULTURE RESULTS: NO GROWTH — SIGNIFICANT CHANGE UP
CULTURE RESULTS: SIGNIFICANT CHANGE UP
DIFF PNL FLD: NEGATIVE — SIGNIFICANT CHANGE UP
DIFF PNL FLD: NEGATIVE — SIGNIFICANT CHANGE UP
DIGOXIN SERPL-MCNC: 1.1 NG/ML — SIGNIFICANT CHANGE UP (ref 0.8–2)
DIGOXIN SERPL-MCNC: 1.2 NG/ML — SIGNIFICANT CHANGE UP (ref 0.8–2)
EOSINOPHIL # BLD AUTO: 0 K/UL — SIGNIFICANT CHANGE UP (ref 0–0.5)
EOSINOPHIL # BLD AUTO: 0.16 K/UL — SIGNIFICANT CHANGE UP (ref 0–0.5)
EOSINOPHIL # BLD AUTO: 0.2 K/UL — SIGNIFICANT CHANGE UP (ref 0–0.5)
EOSINOPHIL # BLD AUTO: 0.25 K/UL — SIGNIFICANT CHANGE UP (ref 0–0.5)
EOSINOPHIL # BLD AUTO: 0.26 K/UL — SIGNIFICANT CHANGE UP (ref 0–0.5)
EOSINOPHIL # BLD AUTO: 0.27 K/UL — SIGNIFICANT CHANGE UP (ref 0–0.5)
EOSINOPHIL # BLD AUTO: 0.41 K/UL — SIGNIFICANT CHANGE UP (ref 0–0.5)
EOSINOPHIL NFR BLD AUTO: 0 % — SIGNIFICANT CHANGE UP (ref 0–6)
EOSINOPHIL NFR BLD AUTO: 1.8 % — SIGNIFICANT CHANGE UP (ref 0–6)
EOSINOPHIL NFR BLD AUTO: 2.1 % — SIGNIFICANT CHANGE UP (ref 0–6)
EOSINOPHIL NFR BLD AUTO: 2.7 % — SIGNIFICANT CHANGE UP (ref 0–6)
EOSINOPHIL NFR BLD AUTO: 2.9 % — SIGNIFICANT CHANGE UP (ref 0–6)
EOSINOPHIL NFR BLD AUTO: 3.2 % — SIGNIFICANT CHANGE UP (ref 0–6)
EOSINOPHIL NFR BLD AUTO: 3.4 % — SIGNIFICANT CHANGE UP (ref 0–6)
EPI CELLS # UR: 0 /HPF — SIGNIFICANT CHANGE UP (ref 0–5)
ESTIMATED AVERAGE GLUCOSE: 134 — SIGNIFICANT CHANGE UP
ESTIMATED AVERAGE GLUCOSE: 154 MG/DL — HIGH (ref 68–114)
FLUAV SUBTYP SPEC NAA+PROBE: SIGNIFICANT CHANGE UP
FLUBV RNA SPEC QL NAA+PROBE: SIGNIFICANT CHANGE UP
FOLATE SERPL-MCNC: 10 NG/ML — SIGNIFICANT CHANGE UP (ref 3.1–17.5)
GAS PNL BLDV: 134 MMOL/L — LOW (ref 136–145)
GAS PNL BLDV: 137 MMOL/L — SIGNIFICANT CHANGE UP (ref 136–145)
GAS PNL BLDV: SIGNIFICANT CHANGE UP
GAS PNL BLDV: SIGNIFICANT CHANGE UP
GLUCOSE BLDC GLUCOMTR-MCNC: 100 MG/DL — HIGH (ref 70–99)
GLUCOSE BLDC GLUCOMTR-MCNC: 101 MG/DL — HIGH (ref 70–99)
GLUCOSE BLDC GLUCOMTR-MCNC: 105 MG/DL — HIGH (ref 70–99)
GLUCOSE BLDC GLUCOMTR-MCNC: 107 MG/DL — HIGH (ref 70–99)
GLUCOSE BLDC GLUCOMTR-MCNC: 111 MG/DL — HIGH (ref 70–99)
GLUCOSE BLDC GLUCOMTR-MCNC: 113 MG/DL — HIGH (ref 70–99)
GLUCOSE BLDC GLUCOMTR-MCNC: 120 MG/DL — HIGH (ref 70–99)
GLUCOSE BLDC GLUCOMTR-MCNC: 121 MG/DL — HIGH (ref 70–99)
GLUCOSE BLDC GLUCOMTR-MCNC: 122 MG/DL — HIGH (ref 70–99)
GLUCOSE BLDC GLUCOMTR-MCNC: 125 MG/DL — HIGH (ref 70–99)
GLUCOSE BLDC GLUCOMTR-MCNC: 127 MG/DL — HIGH (ref 70–99)
GLUCOSE BLDC GLUCOMTR-MCNC: 128 MG/DL — HIGH (ref 70–99)
GLUCOSE BLDC GLUCOMTR-MCNC: 128 MG/DL — HIGH (ref 70–99)
GLUCOSE BLDC GLUCOMTR-MCNC: 129 MG/DL — HIGH (ref 70–99)
GLUCOSE BLDC GLUCOMTR-MCNC: 130 MG/DL — HIGH (ref 70–99)
GLUCOSE BLDC GLUCOMTR-MCNC: 130 MG/DL — HIGH (ref 70–99)
GLUCOSE BLDC GLUCOMTR-MCNC: 131 MG/DL — HIGH (ref 70–99)
GLUCOSE BLDC GLUCOMTR-MCNC: 131 MG/DL — HIGH (ref 70–99)
GLUCOSE BLDC GLUCOMTR-MCNC: 132 MG/DL — HIGH (ref 70–99)
GLUCOSE BLDC GLUCOMTR-MCNC: 133 MG/DL — HIGH (ref 70–99)
GLUCOSE BLDC GLUCOMTR-MCNC: 134 MG/DL — HIGH (ref 70–99)
GLUCOSE BLDC GLUCOMTR-MCNC: 134 MG/DL — HIGH (ref 70–99)
GLUCOSE BLDC GLUCOMTR-MCNC: 138 MG/DL — HIGH (ref 70–99)
GLUCOSE BLDC GLUCOMTR-MCNC: 140 MG/DL — HIGH (ref 70–99)
GLUCOSE BLDC GLUCOMTR-MCNC: 142 MG/DL — HIGH (ref 70–99)
GLUCOSE BLDC GLUCOMTR-MCNC: 145 MG/DL — HIGH (ref 70–99)
GLUCOSE BLDC GLUCOMTR-MCNC: 147 MG/DL — HIGH (ref 70–99)
GLUCOSE BLDC GLUCOMTR-MCNC: 148 MG/DL — HIGH (ref 70–99)
GLUCOSE BLDC GLUCOMTR-MCNC: 151 MG/DL — HIGH (ref 70–99)
GLUCOSE BLDC GLUCOMTR-MCNC: 152 MG/DL — HIGH (ref 70–99)
GLUCOSE BLDC GLUCOMTR-MCNC: 153 MG/DL — HIGH (ref 70–99)
GLUCOSE BLDC GLUCOMTR-MCNC: 154 MG/DL — HIGH (ref 70–99)
GLUCOSE BLDC GLUCOMTR-MCNC: 154 MG/DL — HIGH (ref 70–99)
GLUCOSE BLDC GLUCOMTR-MCNC: 157 MG/DL — HIGH (ref 70–99)
GLUCOSE BLDC GLUCOMTR-MCNC: 159 MG/DL — HIGH (ref 70–99)
GLUCOSE BLDC GLUCOMTR-MCNC: 163 MG/DL — HIGH (ref 70–99)
GLUCOSE BLDC GLUCOMTR-MCNC: 164 MG/DL — HIGH (ref 70–99)
GLUCOSE BLDC GLUCOMTR-MCNC: 177 MG/DL — HIGH (ref 70–99)
GLUCOSE BLDC GLUCOMTR-MCNC: 219 MG/DL — HIGH (ref 70–99)
GLUCOSE BLDC GLUCOMTR-MCNC: 225 MG/DL — HIGH (ref 70–99)
GLUCOSE BLDC GLUCOMTR-MCNC: 233 MG/DL — HIGH (ref 70–99)
GLUCOSE BLDC GLUCOMTR-MCNC: 233 MG/DL — HIGH (ref 70–99)
GLUCOSE BLDC GLUCOMTR-MCNC: 96 MG/DL — SIGNIFICANT CHANGE UP (ref 70–99)
GLUCOSE BLDC GLUCOMTR-MCNC: 97 MG/DL — SIGNIFICANT CHANGE UP (ref 70–99)
GLUCOSE BLDV-MCNC: 266 MG/DL — HIGH (ref 70–99)
GLUCOSE BLDV-MCNC: 278 MG/DL — HIGH (ref 70–99)
GLUCOSE SERPL-MCNC: 102 MG/DL — HIGH (ref 70–99)
GLUCOSE SERPL-MCNC: 109 MG/DL — HIGH (ref 70–99)
GLUCOSE SERPL-MCNC: 116 MG/DL — HIGH (ref 70–99)
GLUCOSE SERPL-MCNC: 125 MG/DL — HIGH (ref 70–99)
GLUCOSE SERPL-MCNC: 127 MG/DL — HIGH (ref 70–99)
GLUCOSE SERPL-MCNC: 131 MG/DL — HIGH (ref 70–99)
GLUCOSE SERPL-MCNC: 135 MG/DL — HIGH (ref 70–99)
GLUCOSE SERPL-MCNC: 136 MG/DL — HIGH (ref 70–99)
GLUCOSE SERPL-MCNC: 138 MG/DL — HIGH (ref 70–99)
GLUCOSE SERPL-MCNC: 153 MG/DL — HIGH (ref 70–99)
GLUCOSE SERPL-MCNC: 158 MG/DL — HIGH (ref 70–99)
GLUCOSE SERPL-MCNC: 160 MG/DL — HIGH (ref 70–99)
GLUCOSE SERPL-MCNC: 170 MG/DL — HIGH (ref 70–99)
GLUCOSE SERPL-MCNC: 178 MG/DL — HIGH (ref 70–99)
GLUCOSE SERPL-MCNC: 200 MG/DL — HIGH (ref 70–99)
GLUCOSE SERPL-MCNC: 216 MG/DL — HIGH (ref 70–99)
GLUCOSE SERPL-MCNC: 225 MG/DL — HIGH (ref 70–99)
GLUCOSE SERPL-MCNC: 242 MG/DL — HIGH (ref 70–99)
GLUCOSE SERPL-MCNC: 87 MG/DL — SIGNIFICANT CHANGE UP (ref 70–99)
GLUCOSE UR QL: NEGATIVE — SIGNIFICANT CHANGE UP
GLUCOSE UR QL: NEGATIVE — SIGNIFICANT CHANGE UP
GRAM STN FLD: SIGNIFICANT CHANGE UP
HADV DNA SPEC QL NAA+PROBE: SIGNIFICANT CHANGE UP
HCO3 BLDV-SCNC: 19 MMOL/L — LOW (ref 22–29)
HCO3 BLDV-SCNC: 20 MMOL/L — LOW (ref 22–29)
HCOV 229E RNA SPEC QL NAA+PROBE: SIGNIFICANT CHANGE UP
HCOV HKU1 RNA SPEC QL NAA+PROBE: SIGNIFICANT CHANGE UP
HCOV NL63 RNA SPEC QL NAA+PROBE: SIGNIFICANT CHANGE UP
HCOV OC43 RNA SPEC QL NAA+PROBE: SIGNIFICANT CHANGE UP
HCT VFR BLD CALC: 33 % — LOW (ref 39–50)
HCT VFR BLD CALC: 34.8 % — LOW (ref 39–50)
HCT VFR BLD CALC: 37.9 % — LOW (ref 39–50)
HCT VFR BLD CALC: 38.4 % — LOW (ref 39–50)
HCT VFR BLD CALC: 38.9 % — LOW (ref 39–50)
HCT VFR BLD CALC: 39.5 % — SIGNIFICANT CHANGE UP (ref 39–50)
HCT VFR BLD CALC: 39.7 % — SIGNIFICANT CHANGE UP (ref 39–50)
HCT VFR BLD CALC: 39.9 % — SIGNIFICANT CHANGE UP (ref 39–50)
HCT VFR BLD CALC: 40.4 % — SIGNIFICANT CHANGE UP (ref 39–50)
HCT VFR BLD CALC: 40.8 % — SIGNIFICANT CHANGE UP (ref 39–50)
HCT VFR BLD CALC: 41.2 % — SIGNIFICANT CHANGE UP (ref 39–50)
HCT VFR BLD CALC: 41.4 % — SIGNIFICANT CHANGE UP (ref 39–50)
HCT VFR BLD CALC: 41.5 % — SIGNIFICANT CHANGE UP (ref 39–50)
HCT VFR BLD CALC: 41.6 % — SIGNIFICANT CHANGE UP (ref 39–50)
HCT VFR BLD CALC: 42.6 % — SIGNIFICANT CHANGE UP (ref 39–50)
HCT VFR BLD CALC: 42.9 % — SIGNIFICANT CHANGE UP (ref 39–50)
HCT VFR BLD CALC: 45 % — SIGNIFICANT CHANGE UP (ref 39–50)
HCT VFR BLDA CALC: 43 % — SIGNIFICANT CHANGE UP (ref 39–51)
HCT VFR BLDA CALC: 45 % — SIGNIFICANT CHANGE UP (ref 39–51)
HGB BLD CALC-MCNC: 14.4 G/DL — SIGNIFICANT CHANGE UP (ref 13–17)
HGB BLD CALC-MCNC: 15 G/DL — SIGNIFICANT CHANGE UP (ref 13–17)
HGB BLD-MCNC: 11.3 G/DL — LOW (ref 13–17)
HGB BLD-MCNC: 11.9 G/DL — LOW (ref 13–17)
HGB BLD-MCNC: 13.1 G/DL — SIGNIFICANT CHANGE UP (ref 13–17)
HGB BLD-MCNC: 13.1 G/DL — SIGNIFICANT CHANGE UP (ref 13–17)
HGB BLD-MCNC: 13.3 G/DL — SIGNIFICANT CHANGE UP (ref 13–17)
HGB BLD-MCNC: 13.3 G/DL — SIGNIFICANT CHANGE UP (ref 13–17)
HGB BLD-MCNC: 13.4 G/DL — SIGNIFICANT CHANGE UP (ref 13–17)
HGB BLD-MCNC: 13.6 G/DL — SIGNIFICANT CHANGE UP (ref 13–17)
HGB BLD-MCNC: 13.7 G/DL — SIGNIFICANT CHANGE UP (ref 13–17)
HGB BLD-MCNC: 13.9 G/DL — SIGNIFICANT CHANGE UP (ref 13–17)
HGB BLD-MCNC: 13.9 G/DL — SIGNIFICANT CHANGE UP (ref 13–17)
HGB BLD-MCNC: 14 G/DL — SIGNIFICANT CHANGE UP (ref 13–17)
HGB BLD-MCNC: 14.1 G/DL — SIGNIFICANT CHANGE UP (ref 13–17)
HGB BLD-MCNC: 14.3 G/DL — SIGNIFICANT CHANGE UP (ref 13–17)
HGB BLD-MCNC: 14.4 G/DL — SIGNIFICANT CHANGE UP (ref 13–17)
HGB BLD-MCNC: 14.8 G/DL — SIGNIFICANT CHANGE UP (ref 13–17)
HGB BLD-MCNC: 15.3 G/DL — SIGNIFICANT CHANGE UP (ref 13–17)
HMPV RNA SPEC QL NAA+PROBE: SIGNIFICANT CHANGE UP
HPIV1 RNA SPEC QL NAA+PROBE: SIGNIFICANT CHANGE UP
HPIV2 RNA SPEC QL NAA+PROBE: SIGNIFICANT CHANGE UP
HPIV3 RNA SPEC QL NAA+PROBE: SIGNIFICANT CHANGE UP
HPIV4 RNA SPEC QL NAA+PROBE: SIGNIFICANT CHANGE UP
HYALINE CASTS # UR AUTO: 2 /LPF — SIGNIFICANT CHANGE UP (ref 0–7)
IANC: 5.1 K/UL — SIGNIFICANT CHANGE UP (ref 1.5–8.5)
IANC: 5.97 K/UL — SIGNIFICANT CHANGE UP (ref 1.5–8.5)
IANC: 6.06 K/UL — SIGNIFICANT CHANGE UP (ref 1.5–8.5)
IANC: 6.3 K/UL — SIGNIFICANT CHANGE UP (ref 1.5–8.5)
IANC: 6.54 K/UL — SIGNIFICANT CHANGE UP (ref 1.5–8.5)
IANC: 7.89 K/UL — SIGNIFICANT CHANGE UP (ref 1.5–8.5)
IMM GRANULOCYTES NFR BLD AUTO: 0.2 % — SIGNIFICANT CHANGE UP (ref 0–1.5)
IMM GRANULOCYTES NFR BLD AUTO: 0.3 % — SIGNIFICANT CHANGE UP (ref 0–1.5)
IMM GRANULOCYTES NFR BLD AUTO: 0.4 % — SIGNIFICANT CHANGE UP (ref 0–1.5)
IMM GRANULOCYTES NFR BLD AUTO: 0.4 % — SIGNIFICANT CHANGE UP (ref 0–1.5)
IMM GRANULOCYTES NFR BLD AUTO: 0.5 % — SIGNIFICANT CHANGE UP (ref 0–1.5)
IMM GRANULOCYTES NFR BLD AUTO: 0.5 % — SIGNIFICANT CHANGE UP (ref 0–1.5)
INR BLD: 1.15 RATIO — SIGNIFICANT CHANGE UP (ref 0.88–1.16)
INR BLD: 1.28 RATIO — HIGH (ref 0.88–1.16)
INR BLD: 1.41 RATIO — HIGH (ref 0.88–1.16)
INR BLD: 2.11 RATIO — HIGH (ref 0.88–1.16)
INR BLD: 2.5 RATIO — HIGH (ref 0.88–1.16)
INR BLD: 2.63 RATIO — HIGH (ref 0.88–1.16)
KETONES UR-MCNC: NEGATIVE — SIGNIFICANT CHANGE UP
KETONES UR-MCNC: NEGATIVE — SIGNIFICANT CHANGE UP
LACTATE BLDV-MCNC: 10.6 MMOL/L — CRITICAL HIGH (ref 0.5–2)
LACTATE BLDV-MCNC: 8.6 MMOL/L — CRITICAL HIGH (ref 0.5–2)
LEUKOCYTE ESTERASE UR-ACNC: ABNORMAL
LEUKOCYTE ESTERASE UR-ACNC: NEGATIVE — SIGNIFICANT CHANGE UP
LYMPHOCYTES # BLD AUTO: 0.38 K/UL — LOW (ref 1–3.3)
LYMPHOCYTES # BLD AUTO: 1.43 K/UL — SIGNIFICANT CHANGE UP (ref 1–3.3)
LYMPHOCYTES # BLD AUTO: 1.53 K/UL — SIGNIFICANT CHANGE UP (ref 1–3.3)
LYMPHOCYTES # BLD AUTO: 1.75 K/UL — SIGNIFICANT CHANGE UP (ref 1–3.3)
LYMPHOCYTES # BLD AUTO: 1.79 K/UL — SIGNIFICANT CHANGE UP (ref 1–3.3)
LYMPHOCYTES # BLD AUTO: 1.82 K/UL — SIGNIFICANT CHANGE UP (ref 1–3.3)
LYMPHOCYTES # BLD AUTO: 1.99 K/UL — SIGNIFICANT CHANGE UP (ref 1–3.3)
LYMPHOCYTES # BLD AUTO: 16.2 % — SIGNIFICANT CHANGE UP (ref 13–44)
LYMPHOCYTES # BLD AUTO: 16.3 % — SIGNIFICANT CHANGE UP (ref 13–44)
LYMPHOCYTES # BLD AUTO: 16.6 % — SIGNIFICANT CHANGE UP (ref 13–44)
LYMPHOCYTES # BLD AUTO: 19.1 % — SIGNIFICANT CHANGE UP (ref 13–44)
LYMPHOCYTES # BLD AUTO: 19.6 % — SIGNIFICANT CHANGE UP (ref 13–44)
LYMPHOCYTES # BLD AUTO: 21.8 % — SIGNIFICANT CHANGE UP (ref 13–44)
LYMPHOCYTES # BLD AUTO: 4.3 % — LOW (ref 13–44)
M PNEUMO DNA SPEC QL NAA+PROBE: SIGNIFICANT CHANGE UP
MAGNESIUM SERPL-MCNC: 1.6 MG/DL — SIGNIFICANT CHANGE UP (ref 1.6–2.6)
MAGNESIUM SERPL-MCNC: 1.7 MG/DL — SIGNIFICANT CHANGE UP (ref 1.6–2.6)
MAGNESIUM SERPL-MCNC: 1.8 MG/DL — SIGNIFICANT CHANGE UP (ref 1.6–2.6)
MAGNESIUM SERPL-MCNC: 1.9 MG/DL — SIGNIFICANT CHANGE UP (ref 1.6–2.6)
MAGNESIUM SERPL-MCNC: 2 MG/DL — SIGNIFICANT CHANGE UP (ref 1.6–2.6)
MAGNESIUM SERPL-MCNC: 2.1 MG/DL — SIGNIFICANT CHANGE UP (ref 1.6–2.6)
MAGNESIUM SERPL-MCNC: 2.1 MG/DL — SIGNIFICANT CHANGE UP (ref 1.6–2.6)
MCHC RBC-ENTMCNC: 32.5 GM/DL — SIGNIFICANT CHANGE UP (ref 32–36)
MCHC RBC-ENTMCNC: 33 GM/DL — SIGNIFICANT CHANGE UP (ref 32–36)
MCHC RBC-ENTMCNC: 33.2 GM/DL — SIGNIFICANT CHANGE UP (ref 32–36)
MCHC RBC-ENTMCNC: 33.3 PG — SIGNIFICANT CHANGE UP (ref 27–34)
MCHC RBC-ENTMCNC: 33.6 GM/DL — SIGNIFICANT CHANGE UP (ref 32–36)
MCHC RBC-ENTMCNC: 33.6 PG — SIGNIFICANT CHANGE UP (ref 27–34)
MCHC RBC-ENTMCNC: 33.6 PG — SIGNIFICANT CHANGE UP (ref 27–34)
MCHC RBC-ENTMCNC: 33.7 GM/DL — SIGNIFICANT CHANGE UP (ref 32–36)
MCHC RBC-ENTMCNC: 33.7 PG — SIGNIFICANT CHANGE UP (ref 27–34)
MCHC RBC-ENTMCNC: 33.8 PG — SIGNIFICANT CHANGE UP (ref 27–34)
MCHC RBC-ENTMCNC: 33.9 PG — SIGNIFICANT CHANGE UP (ref 27–34)
MCHC RBC-ENTMCNC: 34 GM/DL — SIGNIFICANT CHANGE UP (ref 32–36)
MCHC RBC-ENTMCNC: 34 PG — SIGNIFICANT CHANGE UP (ref 27–34)
MCHC RBC-ENTMCNC: 34.1 GM/DL — SIGNIFICANT CHANGE UP (ref 32–36)
MCHC RBC-ENTMCNC: 34.1 PG — HIGH (ref 27–34)
MCHC RBC-ENTMCNC: 34.2 GM/DL — SIGNIFICANT CHANGE UP (ref 32–36)
MCHC RBC-ENTMCNC: 34.3 GM/DL — SIGNIFICANT CHANGE UP (ref 32–36)
MCHC RBC-ENTMCNC: 34.3 PG — HIGH (ref 27–34)
MCHC RBC-ENTMCNC: 34.3 PG — HIGH (ref 27–34)
MCHC RBC-ENTMCNC: 34.4 PG — HIGH (ref 27–34)
MCHC RBC-ENTMCNC: 34.5 GM/DL — SIGNIFICANT CHANGE UP (ref 32–36)
MCHC RBC-ENTMCNC: 34.5 PG — HIGH (ref 27–34)
MCHC RBC-ENTMCNC: 34.6 GM/DL — SIGNIFICANT CHANGE UP (ref 32–36)
MCHC RBC-ENTMCNC: 34.6 GM/DL — SIGNIFICANT CHANGE UP (ref 32–36)
MCHC RBC-ENTMCNC: 34.6 PG — HIGH (ref 27–34)
MCHC RBC-ENTMCNC: 34.7 GM/DL — SIGNIFICANT CHANGE UP (ref 32–36)
MCHC RBC-ENTMCNC: 34.7 PG — HIGH (ref 27–34)
MCHC RBC-ENTMCNC: 34.8 GM/DL — SIGNIFICANT CHANGE UP (ref 32–36)
MCHC RBC-ENTMCNC: 34.8 PG — HIGH (ref 27–34)
MCHC RBC-ENTMCNC: 34.9 GM/DL — SIGNIFICANT CHANGE UP (ref 32–36)
MCV RBC AUTO: 100 FL — SIGNIFICANT CHANGE UP (ref 80–100)
MCV RBC AUTO: 100.2 FL — HIGH (ref 80–100)
MCV RBC AUTO: 100.5 FL — HIGH (ref 80–100)
MCV RBC AUTO: 100.8 FL — HIGH (ref 80–100)
MCV RBC AUTO: 100.9 FL — HIGH (ref 80–100)
MCV RBC AUTO: 101 FL — HIGH (ref 80–100)
MCV RBC AUTO: 101.1 FL — HIGH (ref 80–100)
MCV RBC AUTO: 101.7 FL — HIGH (ref 80–100)
MCV RBC AUTO: 102.2 FL — HIGH (ref 80–100)
MCV RBC AUTO: 102.6 FL — HIGH (ref 80–100)
MCV RBC AUTO: 98.5 FL — SIGNIFICANT CHANGE UP (ref 80–100)
MCV RBC AUTO: 98.7 FL — SIGNIFICANT CHANGE UP (ref 80–100)
MCV RBC AUTO: 98.9 FL — SIGNIFICANT CHANGE UP (ref 80–100)
MCV RBC AUTO: 99 FL — SIGNIFICANT CHANGE UP (ref 80–100)
MCV RBC AUTO: 99.3 FL — SIGNIFICANT CHANGE UP (ref 80–100)
MCV RBC AUTO: 99.5 FL — SIGNIFICANT CHANGE UP (ref 80–100)
MCV RBC AUTO: 99.8 FL — SIGNIFICANT CHANGE UP (ref 80–100)
MONOCYTES # BLD AUTO: 0.53 K/UL — SIGNIFICANT CHANGE UP (ref 0–0.9)
MONOCYTES # BLD AUTO: 0.84 K/UL — SIGNIFICANT CHANGE UP (ref 0–0.9)
MONOCYTES # BLD AUTO: 0.97 K/UL — HIGH (ref 0–0.9)
MONOCYTES # BLD AUTO: 0.98 K/UL — HIGH (ref 0–0.9)
MONOCYTES # BLD AUTO: 1 K/UL — HIGH (ref 0–0.9)
MONOCYTES # BLD AUTO: 1.07 K/UL — HIGH (ref 0–0.9)
MONOCYTES # BLD AUTO: 1.12 K/UL — HIGH (ref 0–0.9)
MONOCYTES NFR BLD AUTO: 10.6 % — SIGNIFICANT CHANGE UP (ref 2–14)
MONOCYTES NFR BLD AUTO: 10.7 % — SIGNIFICANT CHANGE UP (ref 2–14)
MONOCYTES NFR BLD AUTO: 10.7 % — SIGNIFICANT CHANGE UP (ref 2–14)
MONOCYTES NFR BLD AUTO: 12.8 % — SIGNIFICANT CHANGE UP (ref 2–14)
MONOCYTES NFR BLD AUTO: 6 % — SIGNIFICANT CHANGE UP (ref 2–14)
MONOCYTES NFR BLD AUTO: 9.3 % — SIGNIFICANT CHANGE UP (ref 2–14)
MONOCYTES NFR BLD AUTO: 9.5 % — SIGNIFICANT CHANGE UP (ref 2–14)
NEUTROPHILS # BLD AUTO: 5.1 K/UL — SIGNIFICANT CHANGE UP (ref 1.8–7.4)
NEUTROPHILS # BLD AUTO: 5.97 K/UL — SIGNIFICANT CHANGE UP (ref 1.8–7.4)
NEUTROPHILS # BLD AUTO: 6.06 K/UL — SIGNIFICANT CHANGE UP (ref 1.8–7.4)
NEUTROPHILS # BLD AUTO: 6.3 K/UL — SIGNIFICANT CHANGE UP (ref 1.8–7.4)
NEUTROPHILS # BLD AUTO: 6.54 K/UL — SIGNIFICANT CHANGE UP (ref 1.8–7.4)
NEUTROPHILS # BLD AUTO: 7.82 K/UL — HIGH (ref 1.8–7.4)
NEUTROPHILS # BLD AUTO: 8.3 K/UL — HIGH (ref 1.8–7.4)
NEUTROPHILS NFR BLD AUTO: 61 % — SIGNIFICANT CHANGE UP (ref 43–77)
NEUTROPHILS NFR BLD AUTO: 65.6 % — SIGNIFICANT CHANGE UP (ref 43–77)
NEUTROPHILS NFR BLD AUTO: 66 % — SIGNIFICANT CHANGE UP (ref 43–77)
NEUTROPHILS NFR BLD AUTO: 69.4 % — SIGNIFICANT CHANGE UP (ref 43–77)
NEUTROPHILS NFR BLD AUTO: 69.8 % — SIGNIFICANT CHANGE UP (ref 43–77)
NEUTROPHILS NFR BLD AUTO: 71.4 % — SIGNIFICANT CHANGE UP (ref 43–77)
NEUTROPHILS NFR BLD AUTO: 78.5 % — HIGH (ref 43–77)
NITRITE UR-MCNC: NEGATIVE — SIGNIFICANT CHANGE UP
NITRITE UR-MCNC: NEGATIVE — SIGNIFICANT CHANGE UP
NRBC # BLD: 0 /100 WBCS — SIGNIFICANT CHANGE UP
NRBC # BLD: 0 /100 WBCS — SIGNIFICANT CHANGE UP (ref 0–0)
NRBC # FLD: 0 K/UL — SIGNIFICANT CHANGE UP
NT-PROBNP SERPL-SCNC: 3412 PG/ML — HIGH
OTHER CELLS CSF MANUAL: SIGNIFICANT CHANGE UP ML/DL (ref 17.5–23)
PCO2 BLDV: 55 MMHG — SIGNIFICANT CHANGE UP (ref 42–55)
PCO2 BLDV: 57 MMHG — HIGH (ref 42–55)
PH BLDV: 7.15 — LOW (ref 7.32–7.43)
PH BLDV: 7.16 — LOW (ref 7.32–7.43)
PH UR: 5.5 — SIGNIFICANT CHANGE UP (ref 5–8)
PH UR: 5.5 — SIGNIFICANT CHANGE UP (ref 5–8)
PHOSPHATE SERPL-MCNC: 1.6 MG/DL — LOW (ref 2.5–4.5)
PHOSPHATE SERPL-MCNC: 1.7 MG/DL — LOW (ref 2.5–4.5)
PHOSPHATE SERPL-MCNC: 1.8 MG/DL — LOW (ref 2.5–4.5)
PHOSPHATE SERPL-MCNC: 2.3 MG/DL — LOW (ref 2.5–4.5)
PHOSPHATE SERPL-MCNC: 2.6 MG/DL — SIGNIFICANT CHANGE UP (ref 2.5–4.5)
PHOSPHATE SERPL-MCNC: 2.7 MG/DL — SIGNIFICANT CHANGE UP (ref 2.5–4.5)
PHOSPHATE SERPL-MCNC: 2.9 MG/DL — SIGNIFICANT CHANGE UP (ref 2.5–4.5)
PHOSPHATE SERPL-MCNC: 2.9 MG/DL — SIGNIFICANT CHANGE UP (ref 2.5–4.5)
PHOSPHATE SERPL-MCNC: 3 MG/DL — SIGNIFICANT CHANGE UP (ref 2.5–4.5)
PHOSPHATE SERPL-MCNC: 3.3 MG/DL — SIGNIFICANT CHANGE UP (ref 2.5–4.5)
PHOSPHATE SERPL-MCNC: 3.4 MG/DL — SIGNIFICANT CHANGE UP (ref 2.5–4.5)
PHOSPHATE SERPL-MCNC: 3.5 MG/DL — SIGNIFICANT CHANGE UP (ref 2.5–4.5)
PLATELET # BLD AUTO: 148 K/UL — LOW (ref 150–400)
PLATELET # BLD AUTO: 158 K/UL — SIGNIFICANT CHANGE UP (ref 150–400)
PLATELET # BLD AUTO: 173 K/UL — SIGNIFICANT CHANGE UP (ref 150–400)
PLATELET # BLD AUTO: 184 K/UL — SIGNIFICANT CHANGE UP (ref 150–400)
PLATELET # BLD AUTO: 197 K/UL — SIGNIFICANT CHANGE UP (ref 150–400)
PLATELET # BLD AUTO: 210 K/UL — SIGNIFICANT CHANGE UP (ref 150–400)
PLATELET # BLD AUTO: 211 K/UL — SIGNIFICANT CHANGE UP (ref 150–400)
PLATELET # BLD AUTO: 212 K/UL — SIGNIFICANT CHANGE UP (ref 150–400)
PLATELET # BLD AUTO: 214 K/UL — SIGNIFICANT CHANGE UP (ref 150–400)
PLATELET # BLD AUTO: 222 K/UL — SIGNIFICANT CHANGE UP (ref 150–400)
PLATELET # BLD AUTO: 223 K/UL — SIGNIFICANT CHANGE UP (ref 150–400)
PLATELET # BLD AUTO: 226 K/UL — SIGNIFICANT CHANGE UP (ref 150–400)
PLATELET # BLD AUTO: 235 K/UL — SIGNIFICANT CHANGE UP (ref 150–400)
PLATELET # BLD AUTO: 243 K/UL — SIGNIFICANT CHANGE UP (ref 150–400)
PLATELET # BLD AUTO: 244 K/UL — SIGNIFICANT CHANGE UP (ref 150–400)
PLATELET # BLD AUTO: 251 K/UL — SIGNIFICANT CHANGE UP (ref 150–400)
PLATELET # BLD AUTO: 290 K/UL — SIGNIFICANT CHANGE UP (ref 150–400)
PO2 BLDV: 51 MMHG — SIGNIFICANT CHANGE UP
PO2 BLDV: <20 MMHG — SIGNIFICANT CHANGE UP
POTASSIUM BLDV-SCNC: 3.4 MMOL/L — LOW (ref 3.5–5.1)
POTASSIUM BLDV-SCNC: 3.7 MMOL/L — SIGNIFICANT CHANGE UP (ref 3.5–5.1)
POTASSIUM SERPL-MCNC: 3.4 MMOL/L — LOW (ref 3.5–5.3)
POTASSIUM SERPL-MCNC: 3.4 MMOL/L — LOW (ref 3.5–5.3)
POTASSIUM SERPL-MCNC: 3.6 MMOL/L — SIGNIFICANT CHANGE UP (ref 3.5–5.3)
POTASSIUM SERPL-MCNC: 3.7 MMOL/L — SIGNIFICANT CHANGE UP (ref 3.5–5.3)
POTASSIUM SERPL-MCNC: 3.8 MMOL/L — SIGNIFICANT CHANGE UP (ref 3.5–5.3)
POTASSIUM SERPL-MCNC: 3.9 MMOL/L — SIGNIFICANT CHANGE UP (ref 3.5–5.3)
POTASSIUM SERPL-MCNC: 3.9 MMOL/L — SIGNIFICANT CHANGE UP (ref 3.5–5.3)
POTASSIUM SERPL-MCNC: 4 MMOL/L — SIGNIFICANT CHANGE UP (ref 3.5–5.3)
POTASSIUM SERPL-MCNC: 4.1 MMOL/L — SIGNIFICANT CHANGE UP (ref 3.5–5.3)
POTASSIUM SERPL-MCNC: 4.1 MMOL/L — SIGNIFICANT CHANGE UP (ref 3.5–5.3)
POTASSIUM SERPL-MCNC: 4.2 MMOL/L — SIGNIFICANT CHANGE UP (ref 3.5–5.3)
POTASSIUM SERPL-MCNC: 4.3 MMOL/L — SIGNIFICANT CHANGE UP (ref 3.5–5.3)
POTASSIUM SERPL-MCNC: 4.4 MMOL/L — SIGNIFICANT CHANGE UP (ref 3.5–5.3)
POTASSIUM SERPL-MCNC: 4.6 MMOL/L — SIGNIFICANT CHANGE UP (ref 3.5–5.3)
POTASSIUM SERPL-MCNC: 4.7 MMOL/L — SIGNIFICANT CHANGE UP (ref 3.5–5.3)
POTASSIUM SERPL-SCNC: 3.4 MMOL/L — LOW (ref 3.5–5.3)
POTASSIUM SERPL-SCNC: 3.4 MMOL/L — LOW (ref 3.5–5.3)
POTASSIUM SERPL-SCNC: 3.6 MMOL/L — SIGNIFICANT CHANGE UP (ref 3.5–5.3)
POTASSIUM SERPL-SCNC: 3.7 MMOL/L — SIGNIFICANT CHANGE UP (ref 3.5–5.3)
POTASSIUM SERPL-SCNC: 3.8 MMOL/L — SIGNIFICANT CHANGE UP (ref 3.5–5.3)
POTASSIUM SERPL-SCNC: 3.9 MMOL/L — SIGNIFICANT CHANGE UP (ref 3.5–5.3)
POTASSIUM SERPL-SCNC: 3.9 MMOL/L — SIGNIFICANT CHANGE UP (ref 3.5–5.3)
POTASSIUM SERPL-SCNC: 4 MMOL/L — SIGNIFICANT CHANGE UP (ref 3.5–5.3)
POTASSIUM SERPL-SCNC: 4.1 MMOL/L — SIGNIFICANT CHANGE UP (ref 3.5–5.3)
POTASSIUM SERPL-SCNC: 4.1 MMOL/L — SIGNIFICANT CHANGE UP (ref 3.5–5.3)
POTASSIUM SERPL-SCNC: 4.2 MMOL/L — SIGNIFICANT CHANGE UP (ref 3.5–5.3)
POTASSIUM SERPL-SCNC: 4.3 MMOL/L — SIGNIFICANT CHANGE UP (ref 3.5–5.3)
POTASSIUM SERPL-SCNC: 4.4 MMOL/L — SIGNIFICANT CHANGE UP (ref 3.5–5.3)
POTASSIUM SERPL-SCNC: 4.6 MMOL/L — SIGNIFICANT CHANGE UP (ref 3.5–5.3)
POTASSIUM SERPL-SCNC: 4.7 MMOL/L — SIGNIFICANT CHANGE UP (ref 3.5–5.3)
PROCALCITONIN SERPL-MCNC: 0.98 NG/ML — HIGH (ref 0.02–0.1)
PROT SERPL-MCNC: 6.4 G/DL — SIGNIFICANT CHANGE UP (ref 6–8.3)
PROT SERPL-MCNC: 6.4 G/DL — SIGNIFICANT CHANGE UP (ref 6–8.3)
PROT SERPL-MCNC: 6.7 G/DL — SIGNIFICANT CHANGE UP (ref 6–8.3)
PROT SERPL-MCNC: 6.9 G/DL — SIGNIFICANT CHANGE UP (ref 6–8.3)
PROT SERPL-MCNC: 7 G/DL — SIGNIFICANT CHANGE UP (ref 6–8.3)
PROT SERPL-MCNC: 7.1 G/DL — SIGNIFICANT CHANGE UP (ref 6–8.3)
PROT SERPL-MCNC: 7.2 G/DL — SIGNIFICANT CHANGE UP (ref 6–8.3)
PROT SERPL-MCNC: 7.2 G/DL — SIGNIFICANT CHANGE UP (ref 6–8.3)
PROT SERPL-MCNC: 7.4 G/DL — SIGNIFICANT CHANGE UP (ref 6–8.3)
PROT SERPL-MCNC: 7.5 G/DL — SIGNIFICANT CHANGE UP (ref 6–8.3)
PROT SERPL-MCNC: 7.5 G/DL — SIGNIFICANT CHANGE UP (ref 6–8.3)
PROT UR-MCNC: ABNORMAL
PROT UR-MCNC: NEGATIVE — SIGNIFICANT CHANGE UP
PROTHROM AB SERPL-ACNC: 13.1 SEC — SIGNIFICANT CHANGE UP (ref 10.6–13.6)
PROTHROM AB SERPL-ACNC: 14.5 SEC — HIGH (ref 10.6–13.6)
PROTHROM AB SERPL-ACNC: 16.5 SEC — HIGH (ref 10.6–13.6)
PROTHROM AB SERPL-ACNC: 23.2 SEC — HIGH (ref 10.6–13.6)
PROTHROM AB SERPL-ACNC: 27.5 SEC — HIGH (ref 10.6–13.6)
PROTHROM AB SERPL-ACNC: 28.9 SEC — HIGH (ref 10.6–13.6)
RAPID RVP RESULT: DETECTED
RBC # BLD: 3.27 M/UL — LOW (ref 4.2–5.8)
RBC # BLD: 3.52 M/UL — LOW (ref 4.2–5.8)
RBC # BLD: 3.84 M/UL — LOW (ref 4.2–5.8)
RBC # BLD: 3.85 M/UL — LOW (ref 4.2–5.8)
RBC # BLD: 3.85 M/UL — LOW (ref 4.2–5.8)
RBC # BLD: 3.88 M/UL — LOW (ref 4.2–5.8)
RBC # BLD: 3.94 M/UL — LOW (ref 4.2–5.8)
RBC # BLD: 4.03 M/UL — LOW (ref 4.2–5.8)
RBC # BLD: 4.05 M/UL — LOW (ref 4.2–5.8)
RBC # BLD: 4.05 M/UL — LOW (ref 4.2–5.8)
RBC # BLD: 4.08 M/UL — LOW (ref 4.2–5.8)
RBC # BLD: 4.1 M/UL — LOW (ref 4.2–5.8)
RBC # BLD: 4.12 M/UL — LOW (ref 4.2–5.8)
RBC # BLD: 4.16 M/UL — LOW (ref 4.2–5.8)
RBC # BLD: 4.28 M/UL — SIGNIFICANT CHANGE UP (ref 4.2–5.8)
RBC # BLD: 4.29 M/UL — SIGNIFICANT CHANGE UP (ref 4.2–5.8)
RBC # BLD: 4.45 M/UL — SIGNIFICANT CHANGE UP (ref 4.2–5.8)
RBC # FLD: 14.5 % — SIGNIFICANT CHANGE UP (ref 10.3–14.5)
RBC # FLD: 14.7 % — HIGH (ref 10.3–14.5)
RBC # FLD: 14.8 % — HIGH (ref 10.3–14.5)
RBC # FLD: 14.9 % — HIGH (ref 10.3–14.5)
RBC # FLD: 15.1 % — HIGH (ref 10.3–14.5)
RBC # FLD: 15.1 % — HIGH (ref 10.3–14.5)
RBC # FLD: 16.2 % — HIGH (ref 10.3–14.5)
RBC # FLD: 16.3 % — HIGH (ref 10.3–14.5)
RBC # FLD: 16.4 % — HIGH (ref 10.3–14.5)
RBC CASTS # UR COMP ASSIST: 7 /HPF — HIGH (ref 0–4)
RSV RNA SPEC QL NAA+PROBE: SIGNIFICANT CHANGE UP
RV+EV RNA SPEC QL NAA+PROBE: DETECTED
SAO2 % BLDV: 19.1 % — SIGNIFICANT CHANGE UP
SAO2 % BLDV: 77 % — SIGNIFICANT CHANGE UP
SARS-COV-2 IGG+IGM SERPL QL IA: 230 U/ML — HIGH
SARS-COV-2 IGG+IGM SERPL QL IA: 243 U/ML — HIGH
SARS-COV-2 IGG+IGM SERPL QL IA: >250 U/ML — HIGH
SARS-COV-2 IGG+IGM SERPL QL IA: POSITIVE
SARS-COV-2 RNA SPEC QL NAA+PROBE: SIGNIFICANT CHANGE UP
SODIUM SERPL-SCNC: 135 MMOL/L — SIGNIFICANT CHANGE UP (ref 135–145)
SODIUM SERPL-SCNC: 136 MMOL/L — SIGNIFICANT CHANGE UP (ref 135–145)
SODIUM SERPL-SCNC: 136 MMOL/L — SIGNIFICANT CHANGE UP (ref 135–145)
SODIUM SERPL-SCNC: 137 MMOL/L — SIGNIFICANT CHANGE UP (ref 135–145)
SODIUM SERPL-SCNC: 138 MMOL/L — SIGNIFICANT CHANGE UP (ref 135–145)
SODIUM SERPL-SCNC: 139 MMOL/L — SIGNIFICANT CHANGE UP (ref 135–145)
SODIUM SERPL-SCNC: 140 MMOL/L — SIGNIFICANT CHANGE UP (ref 135–145)
SODIUM SERPL-SCNC: 140 MMOL/L — SIGNIFICANT CHANGE UP (ref 135–145)
SODIUM SERPL-SCNC: 141 MMOL/L — SIGNIFICANT CHANGE UP (ref 135–145)
SODIUM SERPL-SCNC: 142 MMOL/L — SIGNIFICANT CHANGE UP (ref 135–145)
SP GR SPEC: 1.01 — SIGNIFICANT CHANGE UP (ref 1–1.05)
SP GR SPEC: 1.04 — HIGH (ref 1.01–1.02)
SPECIMEN SOURCE: SIGNIFICANT CHANGE UP
TROPONIN I SERPL-MCNC: <0.015 NG/ML — SIGNIFICANT CHANGE UP (ref 0–0.04)
TROPONIN T, HIGH SENSITIVITY RESULT: 13 NG/L — SIGNIFICANT CHANGE UP
TROPONIN T, HIGH SENSITIVITY RESULT: 20 NG/L — SIGNIFICANT CHANGE UP
TROPONIN T, HIGH SENSITIVITY RESULT: 26 NG/L — SIGNIFICANT CHANGE UP
TSH SERPL-MCNC: 0.17 UIU/ML — LOW (ref 0.27–4.2)
TSH SERPL-MCNC: 0.24 UIU/ML — LOW (ref 0.27–4.2)
TSH SERPL-MCNC: 0.36 UIU/ML — SIGNIFICANT CHANGE UP (ref 0.27–4.2)
UROBILINOGEN FLD QL: ABNORMAL
UROBILINOGEN FLD QL: SIGNIFICANT CHANGE UP
VIT B12 SERPL-MCNC: 427 PG/ML — SIGNIFICANT CHANGE UP (ref 200–900)
WBC # BLD: 10.48 K/UL — SIGNIFICANT CHANGE UP (ref 3.8–10.5)
WBC # BLD: 10.52 K/UL — HIGH (ref 3.8–10.5)
WBC # BLD: 11.98 K/UL — HIGH (ref 3.8–10.5)
WBC # BLD: 5.88 K/UL — SIGNIFICANT CHANGE UP (ref 3.8–10.5)
WBC # BLD: 6.49 K/UL — SIGNIFICANT CHANGE UP (ref 3.8–10.5)
WBC # BLD: 7.67 K/UL — SIGNIFICANT CHANGE UP (ref 3.8–10.5)
WBC # BLD: 7.96 K/UL — SIGNIFICANT CHANGE UP (ref 3.8–10.5)
WBC # BLD: 8.36 K/UL — SIGNIFICANT CHANGE UP (ref 3.8–10.5)
WBC # BLD: 8.83 K/UL — SIGNIFICANT CHANGE UP (ref 3.8–10.5)
WBC # BLD: 8.85 K/UL — SIGNIFICANT CHANGE UP (ref 3.8–10.5)
WBC # BLD: 8.89 K/UL — SIGNIFICANT CHANGE UP (ref 3.8–10.5)
WBC # BLD: 9.08 K/UL — SIGNIFICANT CHANGE UP (ref 3.8–10.5)
WBC # BLD: 9.11 K/UL — SIGNIFICANT CHANGE UP (ref 3.8–10.5)
WBC # BLD: 9.18 K/UL — SIGNIFICANT CHANGE UP (ref 3.8–10.5)
WBC # BLD: 9.38 K/UL — SIGNIFICANT CHANGE UP (ref 3.8–10.5)
WBC # BLD: 9.55 K/UL — SIGNIFICANT CHANGE UP (ref 3.8–10.5)
WBC # BLD: 9.63 K/UL — SIGNIFICANT CHANGE UP (ref 3.8–10.5)
WBC # FLD AUTO: 10.48 K/UL — SIGNIFICANT CHANGE UP (ref 3.8–10.5)
WBC # FLD AUTO: 10.52 K/UL — HIGH (ref 3.8–10.5)
WBC # FLD AUTO: 11.98 K/UL — HIGH (ref 3.8–10.5)
WBC # FLD AUTO: 5.88 K/UL — SIGNIFICANT CHANGE UP (ref 3.8–10.5)
WBC # FLD AUTO: 6.49 K/UL — SIGNIFICANT CHANGE UP (ref 3.8–10.5)
WBC # FLD AUTO: 7.67 K/UL — SIGNIFICANT CHANGE UP (ref 3.8–10.5)
WBC # FLD AUTO: 7.96 K/UL — SIGNIFICANT CHANGE UP (ref 3.8–10.5)
WBC # FLD AUTO: 8.36 K/UL — SIGNIFICANT CHANGE UP (ref 3.8–10.5)
WBC # FLD AUTO: 8.83 K/UL — SIGNIFICANT CHANGE UP (ref 3.8–10.5)
WBC # FLD AUTO: 8.85 K/UL — SIGNIFICANT CHANGE UP (ref 3.8–10.5)
WBC # FLD AUTO: 8.89 K/UL — SIGNIFICANT CHANGE UP (ref 3.8–10.5)
WBC # FLD AUTO: 9.08 K/UL — SIGNIFICANT CHANGE UP (ref 3.8–10.5)
WBC # FLD AUTO: 9.11 K/UL — SIGNIFICANT CHANGE UP (ref 3.8–10.5)
WBC # FLD AUTO: 9.18 K/UL — SIGNIFICANT CHANGE UP (ref 3.8–10.5)
WBC # FLD AUTO: 9.38 K/UL — SIGNIFICANT CHANGE UP (ref 3.8–10.5)
WBC # FLD AUTO: 9.55 K/UL — SIGNIFICANT CHANGE UP (ref 3.8–10.5)
WBC # FLD AUTO: 9.63 K/UL — SIGNIFICANT CHANGE UP (ref 3.8–10.5)
WBC UR QL: SIGNIFICANT CHANGE UP /HPF (ref 0–5)

## 2021-01-01 PROCEDURE — 36415 COLL VENOUS BLD VENIPUNCTURE: CPT

## 2021-01-01 PROCEDURE — 70450 CT HEAD/BRAIN W/O DYE: CPT | Mod: 26,MH

## 2021-01-01 PROCEDURE — 99283 EMERGENCY DEPT VISIT LOW MDM: CPT

## 2021-01-01 PROCEDURE — 99285 EMERGENCY DEPT VISIT HI MDM: CPT | Mod: CS,25,GC

## 2021-01-01 PROCEDURE — 70450 CT HEAD/BRAIN W/O DYE: CPT | Mod: 26,MA

## 2021-01-01 PROCEDURE — 85610 PROTHROMBIN TIME: CPT

## 2021-01-01 PROCEDURE — 99223 1ST HOSP IP/OBS HIGH 75: CPT

## 2021-01-01 PROCEDURE — 93005 ELECTROCARDIOGRAM TRACING: CPT

## 2021-01-01 PROCEDURE — 82962 GLUCOSE BLOOD TEST: CPT

## 2021-01-01 PROCEDURE — 74177 CT ABD & PELVIS W/CONTRAST: CPT | Mod: 26

## 2021-01-01 PROCEDURE — 99284 EMERGENCY DEPT VISIT MOD MDM: CPT | Mod: 25

## 2021-01-01 PROCEDURE — 99291 CRITICAL CARE FIRST HOUR: CPT | Mod: CS,25,GC

## 2021-01-01 PROCEDURE — 99223 1ST HOSP IP/OBS HIGH 75: CPT | Mod: GC

## 2021-01-01 PROCEDURE — 84484 ASSAY OF TROPONIN QUANT: CPT

## 2021-01-01 PROCEDURE — 99233 SBSQ HOSP IP/OBS HIGH 50: CPT

## 2021-01-01 PROCEDURE — 93010 ELECTROCARDIOGRAM REPORT: CPT

## 2021-01-01 PROCEDURE — 85025 COMPLETE CBC W/AUTO DIFF WBC: CPT

## 2021-01-01 PROCEDURE — 70450 CT HEAD/BRAIN W/O DYE: CPT | Mod: 26

## 2021-01-01 PROCEDURE — 80053 COMPREHEN METABOLIC PANEL: CPT

## 2021-01-01 PROCEDURE — 99232 SBSQ HOSP IP/OBS MODERATE 35: CPT

## 2021-01-01 PROCEDURE — 99285 EMERGENCY DEPT VISIT HI MDM: CPT

## 2021-01-01 PROCEDURE — 99232 SBSQ HOSP IP/OBS MODERATE 35: CPT | Mod: GC

## 2021-01-01 PROCEDURE — 70450 CT HEAD/BRAIN W/O DYE: CPT | Mod: 26,77

## 2021-01-01 PROCEDURE — 71275 CT ANGIOGRAPHY CHEST: CPT | Mod: 26

## 2021-01-01 PROCEDURE — 73502 X-RAY EXAM HIP UNI 2-3 VIEWS: CPT | Mod: 26,RT

## 2021-01-01 PROCEDURE — 71045 X-RAY EXAM CHEST 1 VIEW: CPT | Mod: 26

## 2021-01-01 PROCEDURE — 70450 CT HEAD/BRAIN W/O DYE: CPT

## 2021-01-01 PROCEDURE — 71046 X-RAY EXAM CHEST 2 VIEWS: CPT | Mod: 26

## 2021-01-01 PROCEDURE — 71045 X-RAY EXAM CHEST 1 VIEW: CPT

## 2021-01-01 PROCEDURE — 74230 X-RAY XM SWLNG FUNCJ C+: CPT | Mod: 26

## 2021-01-01 PROCEDURE — 99285 EMERGENCY DEPT VISIT HI MDM: CPT | Mod: CS,GC

## 2021-01-01 PROCEDURE — 70553 MRI BRAIN STEM W/O & W/DYE: CPT | Mod: 26

## 2021-01-01 RX ORDER — ATORVASTATIN CALCIUM 80 MG/1
1 TABLET, FILM COATED ORAL
Qty: 0 | Refills: 0 | DISCHARGE
Start: 2021-01-01

## 2021-01-01 RX ORDER — METOPROLOL TARTRATE 50 MG
1 TABLET ORAL
Qty: 0 | Refills: 0 | DISCHARGE
Start: 2021-01-01

## 2021-01-01 RX ORDER — LEVOTHYROXINE SODIUM 125 MCG
50 TABLET ORAL DAILY
Refills: 0 | Status: DISCONTINUED | OUTPATIENT
Start: 2021-01-01 | End: 2021-01-01

## 2021-01-01 RX ORDER — METOPROLOL TARTRATE 50 MG
10 TABLET ORAL EVERY 6 HOURS
Refills: 0 | Status: DISCONTINUED | OUTPATIENT
Start: 2021-01-01 | End: 2021-01-01

## 2021-01-01 RX ORDER — POLYETHYLENE GLYCOL 3350 17 G/17G
1 POWDER, FOR SOLUTION ORAL
Qty: 0 | Refills: 0 | DISCHARGE

## 2021-01-01 RX ORDER — LANOLIN ALCOHOL/MO/W.PET/CERES
6 CREAM (GRAM) TOPICAL AT BEDTIME
Refills: 0 | Status: DISCONTINUED | OUTPATIENT
Start: 2021-01-01 | End: 2021-01-01

## 2021-01-01 RX ORDER — SODIUM,POTASSIUM PHOSPHATES 278-250MG
1 POWDER IN PACKET (EA) ORAL ONCE
Refills: 0 | Status: COMPLETED | OUTPATIENT
Start: 2021-01-01 | End: 2021-01-01

## 2021-01-01 RX ORDER — SODIUM CHLORIDE 9 MG/ML
1000 INJECTION, SOLUTION INTRAVENOUS
Refills: 0 | Status: DISCONTINUED | OUTPATIENT
Start: 2021-01-01 | End: 2021-01-01

## 2021-01-01 RX ORDER — PIPERACILLIN AND TAZOBACTAM 4; .5 G/20ML; G/20ML
3.38 INJECTION, POWDER, LYOPHILIZED, FOR SOLUTION INTRAVENOUS EVERY 8 HOURS
Refills: 0 | Status: COMPLETED | OUTPATIENT
Start: 2021-01-01 | End: 2021-01-01

## 2021-01-01 RX ORDER — DIGOXIN 250 MCG
125 TABLET ORAL DAILY
Refills: 0 | Status: DISCONTINUED | OUTPATIENT
Start: 2021-01-01 | End: 2021-01-01

## 2021-01-01 RX ORDER — GLYCERIN ADULT
1 SUPPOSITORY, RECTAL RECTAL AT BEDTIME
Refills: 0 | Status: COMPLETED | OUTPATIENT
Start: 2021-01-01 | End: 2021-01-01

## 2021-01-01 RX ORDER — PIPERACILLIN AND TAZOBACTAM 4; .5 G/20ML; G/20ML
3.38 INJECTION, POWDER, LYOPHILIZED, FOR SOLUTION INTRAVENOUS ONCE
Refills: 0 | Status: DISCONTINUED | OUTPATIENT
Start: 2021-01-01 | End: 2021-01-01

## 2021-01-01 RX ORDER — SENNA PLUS 8.6 MG/1
2 TABLET ORAL
Qty: 0 | Refills: 0 | DISCHARGE
Start: 2021-01-01

## 2021-01-01 RX ORDER — POLYETHYLENE GLYCOL 3350 17 G/17G
17 POWDER, FOR SOLUTION ORAL DAILY
Refills: 0 | Status: DISCONTINUED | OUTPATIENT
Start: 2021-01-01 | End: 2021-01-01

## 2021-01-01 RX ORDER — LEVOTHYROXINE SODIUM 125 MCG
40 TABLET ORAL AT BEDTIME
Refills: 0 | Status: DISCONTINUED | OUTPATIENT
Start: 2021-01-01 | End: 2021-01-01

## 2021-01-01 RX ORDER — ATORVASTATIN CALCIUM 80 MG/1
80 TABLET, FILM COATED ORAL AT BEDTIME
Refills: 0 | Status: DISCONTINUED | OUTPATIENT
Start: 2021-01-01 | End: 2021-01-01

## 2021-01-01 RX ORDER — SENNA PLUS 8.6 MG/1
2 TABLET ORAL
Qty: 0 | Refills: 0 | DISCHARGE

## 2021-01-01 RX ORDER — POTASSIUM CHLORIDE 20 MEQ
10 PACKET (EA) ORAL
Refills: 0 | Status: COMPLETED | OUTPATIENT
Start: 2021-01-01 | End: 2021-01-01

## 2021-01-01 RX ORDER — TAMSULOSIN HYDROCHLORIDE 0.4 MG/1
1 CAPSULE ORAL
Qty: 0 | Refills: 0 | DISCHARGE

## 2021-01-01 RX ORDER — ASPIRIN/CALCIUM CARB/MAGNESIUM 324 MG
81 TABLET ORAL DAILY
Refills: 0 | Status: DISCONTINUED | OUTPATIENT
Start: 2021-01-01 | End: 2021-01-01

## 2021-01-01 RX ORDER — LATANOPROST 0.05 MG/ML
1 SOLUTION/ DROPS OPHTHALMIC; TOPICAL AT BEDTIME
Refills: 0 | Status: DISCONTINUED | OUTPATIENT
Start: 2021-01-01 | End: 2021-01-01

## 2021-01-01 RX ORDER — METOPROLOL TARTRATE 50 MG
0.5 TABLET ORAL
Qty: 30 | Refills: 0
Start: 2021-01-01 | End: 2021-01-01

## 2021-01-01 RX ORDER — POLYETHYLENE GLYCOL 3350 17 G/17G
17 POWDER, FOR SOLUTION ORAL
Qty: 0 | Refills: 0 | DISCHARGE
Start: 2021-01-01

## 2021-01-01 RX ORDER — MIRABEGRON 50 MG/1
25 TABLET, EXTENDED RELEASE ORAL
Refills: 0 | Status: DISCONTINUED | OUTPATIENT
Start: 2021-01-01 | End: 2021-01-01

## 2021-01-01 RX ORDER — INSULIN LISPRO 100/ML
VIAL (ML) SUBCUTANEOUS
Refills: 0 | Status: DISCONTINUED | OUTPATIENT
Start: 2021-01-01 | End: 2021-01-01

## 2021-01-01 RX ORDER — DIGOXIN 250 MCG
500 TABLET ORAL ONCE
Refills: 0 | Status: COMPLETED | OUTPATIENT
Start: 2021-01-01 | End: 2021-01-01

## 2021-01-01 RX ORDER — DIGOXIN 250 MCG
1 TABLET ORAL
Qty: 0 | Refills: 0 | DISCHARGE
Start: 2021-01-01

## 2021-01-01 RX ORDER — LOVASTATIN 20 MG
1 TABLET ORAL
Qty: 0 | Refills: 0 | DISCHARGE

## 2021-01-01 RX ORDER — POTASSIUM PHOSPHATE, MONOBASIC POTASSIUM PHOSPHATE, DIBASIC 236; 224 MG/ML; MG/ML
30 INJECTION, SOLUTION INTRAVENOUS ONCE
Refills: 0 | Status: COMPLETED | OUTPATIENT
Start: 2021-01-01 | End: 2021-01-01

## 2021-01-01 RX ORDER — HALOPERIDOL DECANOATE 100 MG/ML
0.5 INJECTION INTRAMUSCULAR EVERY 6 HOURS
Refills: 0 | Status: DISCONTINUED | OUTPATIENT
Start: 2021-01-01 | End: 2021-01-01

## 2021-01-01 RX ORDER — DEXTROSE 50 % IN WATER 50 %
12.5 SYRINGE (ML) INTRAVENOUS ONCE
Refills: 0 | Status: DISCONTINUED | OUTPATIENT
Start: 2021-01-01 | End: 2021-01-01

## 2021-01-01 RX ORDER — DIGOXIN 250 MCG
125 TABLET ORAL EVERY OTHER DAY
Refills: 0 | Status: DISCONTINUED | OUTPATIENT
Start: 2021-01-01 | End: 2021-01-01

## 2021-01-01 RX ORDER — POTASSIUM PHOSPHATE, MONOBASIC POTASSIUM PHOSPHATE, DIBASIC 236; 224 MG/ML; MG/ML
15 INJECTION, SOLUTION INTRAVENOUS ONCE
Refills: 0 | Status: COMPLETED | OUTPATIENT
Start: 2021-01-01 | End: 2021-01-01

## 2021-01-01 RX ORDER — LEVALBUTEROL 1.25 MG/.5ML
0.63 SOLUTION, CONCENTRATE RESPIRATORY (INHALATION) EVERY 6 HOURS
Refills: 0 | Status: DISCONTINUED | OUTPATIENT
Start: 2021-01-01 | End: 2021-01-01

## 2021-01-01 RX ORDER — HALOPERIDOL DECANOATE 100 MG/ML
1 INJECTION INTRAMUSCULAR ONCE
Refills: 0 | Status: COMPLETED | OUTPATIENT
Start: 2021-01-01 | End: 2021-01-01

## 2021-01-01 RX ORDER — DOCUSATE SODIUM 100 MG
1 CAPSULE ORAL
Qty: 0 | Refills: 0 | DISCHARGE

## 2021-01-01 RX ORDER — DEXTROSE 50 % IN WATER 50 %
25 SYRINGE (ML) INTRAVENOUS ONCE
Refills: 0 | Status: DISCONTINUED | OUTPATIENT
Start: 2021-01-01 | End: 2021-01-01

## 2021-01-01 RX ORDER — TAMSULOSIN HYDROCHLORIDE 0.4 MG/1
1 CAPSULE ORAL
Qty: 0 | Refills: 0 | DISCHARGE
Start: 2021-01-01

## 2021-01-01 RX ORDER — SODIUM CHLORIDE 9 MG/ML
3 INJECTION INTRAMUSCULAR; INTRAVENOUS; SUBCUTANEOUS ONCE
Refills: 0 | Status: COMPLETED | OUTPATIENT
Start: 2021-01-01 | End: 2021-01-01

## 2021-01-01 RX ORDER — INSULIN LISPRO 100/ML
VIAL (ML) SUBCUTANEOUS EVERY 6 HOURS
Refills: 0 | Status: DISCONTINUED | OUTPATIENT
Start: 2021-01-01 | End: 2021-01-01

## 2021-01-01 RX ORDER — HALOPERIDOL DECANOATE 100 MG/ML
0.5 INJECTION INTRAMUSCULAR ONCE
Refills: 0 | Status: COMPLETED | OUTPATIENT
Start: 2021-01-01 | End: 2021-01-01

## 2021-01-01 RX ORDER — PHYTONADIONE (VIT K1) 5 MG
2.5 TABLET ORAL ONCE
Refills: 0 | Status: COMPLETED | OUTPATIENT
Start: 2021-01-01 | End: 2021-01-01

## 2021-01-01 RX ORDER — LATANOPROST 0.05 MG/ML
1 SOLUTION/ DROPS OPHTHALMIC; TOPICAL
Qty: 0 | Refills: 0 | DISCHARGE

## 2021-01-01 RX ORDER — SENNA PLUS 8.6 MG/1
2 TABLET ORAL AT BEDTIME
Refills: 0 | Status: DISCONTINUED | OUTPATIENT
Start: 2021-01-01 | End: 2021-01-01

## 2021-01-01 RX ORDER — SODIUM,POTASSIUM PHOSPHATES 278-250MG
1 POWDER IN PACKET (EA) ORAL
Refills: 0 | Status: COMPLETED | OUTPATIENT
Start: 2021-01-01 | End: 2021-01-01

## 2021-01-01 RX ORDER — MAGNESIUM SULFATE 500 MG/ML
2 VIAL (ML) INJECTION ONCE
Refills: 0 | Status: DISCONTINUED | OUTPATIENT
Start: 2021-01-01 | End: 2021-01-01

## 2021-01-01 RX ORDER — DIGOXIN 250 MCG
100 TABLET ORAL DAILY
Refills: 0 | Status: DISCONTINUED | OUTPATIENT
Start: 2021-01-01 | End: 2021-01-01

## 2021-01-01 RX ORDER — SODIUM CHLORIDE 9 MG/ML
1000 INJECTION INTRAMUSCULAR; INTRAVENOUS; SUBCUTANEOUS ONCE
Refills: 0 | Status: COMPLETED | OUTPATIENT
Start: 2021-01-01 | End: 2021-01-01

## 2021-01-01 RX ORDER — LEVOTHYROXINE SODIUM 125 MCG
1 TABLET ORAL
Qty: 0 | Refills: 0 | DISCHARGE
Start: 2021-01-01

## 2021-01-01 RX ORDER — MIRABEGRON 50 MG/1
1 TABLET, EXTENDED RELEASE ORAL
Qty: 0 | Refills: 0 | DISCHARGE

## 2021-01-01 RX ORDER — ATORVASTATIN CALCIUM 80 MG/1
10 TABLET, FILM COATED ORAL AT BEDTIME
Refills: 0 | Status: DISCONTINUED | OUTPATIENT
Start: 2021-01-01 | End: 2021-01-01

## 2021-01-01 RX ORDER — DIGOXIN 250 MCG
1 TABLET ORAL
Qty: 15 | Refills: 0
Start: 2021-01-01 | End: 2021-01-01

## 2021-01-01 RX ORDER — ASPIRIN/CALCIUM CARB/MAGNESIUM 324 MG
1 TABLET ORAL
Qty: 0 | Refills: 0 | DISCHARGE

## 2021-01-01 RX ORDER — POTASSIUM CHLORIDE 20 MEQ
20 PACKET (EA) ORAL ONCE
Refills: 0 | Status: COMPLETED | OUTPATIENT
Start: 2021-01-01 | End: 2021-01-01

## 2021-01-01 RX ORDER — ENOXAPARIN SODIUM 100 MG/ML
40 INJECTION SUBCUTANEOUS
Qty: 0 | Refills: 0 | DISCHARGE
Start: 2021-01-01

## 2021-01-01 RX ORDER — INSULIN LISPRO 100/ML
VIAL (ML) SUBCUTANEOUS AT BEDTIME
Refills: 0 | Status: DISCONTINUED | OUTPATIENT
Start: 2021-01-01 | End: 2021-01-01

## 2021-01-01 RX ORDER — HEPARIN SODIUM 5000 [USP'U]/ML
5000 INJECTION INTRAVENOUS; SUBCUTANEOUS EVERY 12 HOURS
Refills: 0 | Status: DISCONTINUED | OUTPATIENT
Start: 2021-01-01 | End: 2021-01-01

## 2021-01-01 RX ORDER — ASPIRIN/CALCIUM CARB/MAGNESIUM 324 MG
1 TABLET ORAL
Qty: 0 | Refills: 0 | DISCHARGE
Start: 2021-01-01

## 2021-01-01 RX ORDER — ENOXAPARIN SODIUM 100 MG/ML
40 INJECTION SUBCUTANEOUS DAILY
Refills: 0 | Status: DISCONTINUED | OUTPATIENT
Start: 2021-01-01 | End: 2021-01-01

## 2021-01-01 RX ORDER — MAGNESIUM SULFATE 500 MG/ML
1 VIAL (ML) INJECTION ONCE
Refills: 0 | Status: COMPLETED | OUTPATIENT
Start: 2021-01-01 | End: 2021-01-01

## 2021-01-01 RX ORDER — METFORMIN HYDROCHLORIDE 850 MG/1
1 TABLET ORAL
Qty: 0 | Refills: 0 | DISCHARGE

## 2021-01-01 RX ORDER — LANOLIN ALCOHOL/MO/W.PET/CERES
1 CREAM (GRAM) TOPICAL
Qty: 0 | Refills: 0 | DISCHARGE

## 2021-01-01 RX ORDER — CEFTRIAXONE 500 MG/1
1000 INJECTION, POWDER, FOR SOLUTION INTRAMUSCULAR; INTRAVENOUS ONCE
Refills: 0 | Status: COMPLETED | OUTPATIENT
Start: 2021-01-01 | End: 2021-01-01

## 2021-01-01 RX ORDER — DIGOXIN 250 MCG
250 TABLET ORAL ONCE
Refills: 0 | Status: COMPLETED | OUTPATIENT
Start: 2021-01-01 | End: 2021-01-01

## 2021-01-01 RX ORDER — DIGOXIN 250 MCG
1 TABLET ORAL
Qty: 0 | Refills: 0 | DISCHARGE

## 2021-01-01 RX ORDER — METOPROLOL TARTRATE 50 MG
5 TABLET ORAL EVERY 6 HOURS
Refills: 0 | Status: DISCONTINUED | OUTPATIENT
Start: 2021-01-01 | End: 2021-01-01

## 2021-01-01 RX ORDER — CEFTRIAXONE 500 MG/1
1000 INJECTION, POWDER, FOR SOLUTION INTRAMUSCULAR; INTRAVENOUS EVERY 24 HOURS
Refills: 0 | Status: COMPLETED | OUTPATIENT
Start: 2021-01-01 | End: 2021-01-01

## 2021-01-01 RX ORDER — METOPROLOL TARTRATE 50 MG
100 TABLET ORAL
Refills: 0 | Status: DISCONTINUED | OUTPATIENT
Start: 2021-01-01 | End: 2021-01-01

## 2021-01-01 RX ORDER — VANCOMYCIN HCL 1 G
1000 VIAL (EA) INTRAVENOUS ONCE
Refills: 0 | Status: COMPLETED | OUTPATIENT
Start: 2021-01-01 | End: 2021-01-01

## 2021-01-01 RX ORDER — DEXTROSE 50 % IN WATER 50 %
15 SYRINGE (ML) INTRAVENOUS ONCE
Refills: 0 | Status: DISCONTINUED | OUTPATIENT
Start: 2021-01-01 | End: 2021-01-01

## 2021-01-01 RX ORDER — GLUCAGON INJECTION, SOLUTION 0.5 MG/.1ML
1 INJECTION, SOLUTION SUBCUTANEOUS ONCE
Refills: 0 | Status: DISCONTINUED | OUTPATIENT
Start: 2021-01-01 | End: 2021-01-01

## 2021-01-01 RX ORDER — INSULIN GLARGINE 100 [IU]/ML
10 INJECTION, SOLUTION SUBCUTANEOUS AT BEDTIME
Refills: 0 | Status: DISCONTINUED | OUTPATIENT
Start: 2021-01-01 | End: 2021-01-01

## 2021-01-01 RX ORDER — WARFARIN SODIUM 2.5 MG/1
2 TABLET ORAL
Qty: 0 | Refills: 0 | DISCHARGE

## 2021-01-01 RX ORDER — MINERAL OIL
133 OIL (ML) MISCELLANEOUS ONCE
Refills: 0 | Status: DISCONTINUED | OUTPATIENT
Start: 2021-01-01 | End: 2021-01-01

## 2021-01-01 RX ORDER — SODIUM CHLORIDE 9 MG/ML
500 INJECTION INTRAMUSCULAR; INTRAVENOUS; SUBCUTANEOUS ONCE
Refills: 0 | Status: COMPLETED | OUTPATIENT
Start: 2021-01-01 | End: 2021-01-01

## 2021-01-01 RX ORDER — TAMSULOSIN HYDROCHLORIDE 0.4 MG/1
0.4 CAPSULE ORAL AT BEDTIME
Refills: 0 | Status: DISCONTINUED | OUTPATIENT
Start: 2021-01-01 | End: 2021-01-01

## 2021-01-01 RX ORDER — CEFEPIME 1 G/1
2000 INJECTION, POWDER, FOR SOLUTION INTRAMUSCULAR; INTRAVENOUS ONCE
Refills: 0 | Status: COMPLETED | OUTPATIENT
Start: 2021-01-01 | End: 2021-01-01

## 2021-01-01 RX ORDER — DIGOXIN 250 MCG
125 TABLET ORAL ONCE
Refills: 0 | Status: DISCONTINUED | OUTPATIENT
Start: 2021-01-01 | End: 2021-01-01

## 2021-01-01 RX ORDER — IPRATROPIUM/ALBUTEROL SULFATE 18-103MCG
3 AEROSOL WITH ADAPTER (GRAM) INHALATION ONCE
Refills: 0 | Status: COMPLETED | OUTPATIENT
Start: 2021-01-01 | End: 2021-01-01

## 2021-01-01 RX ORDER — PHYTONADIONE (VIT K1) 5 MG
5 TABLET ORAL ONCE
Refills: 0 | Status: COMPLETED | OUTPATIENT
Start: 2021-01-01 | End: 2021-01-01

## 2021-01-01 RX ORDER — LEVOTHYROXINE SODIUM 125 MCG
50 TABLET ORAL AT BEDTIME
Refills: 0 | Status: DISCONTINUED | OUTPATIENT
Start: 2021-01-01 | End: 2021-01-01

## 2021-01-01 RX ORDER — WARFARIN SODIUM 2.5 MG/1
1 TABLET ORAL
Qty: 0 | Refills: 0 | DISCHARGE

## 2021-01-01 RX ORDER — SODIUM CHLORIDE 9 MG/ML
5 INJECTION INTRAMUSCULAR; INTRAVENOUS; SUBCUTANEOUS ONCE
Refills: 0 | Status: COMPLETED | OUTPATIENT
Start: 2021-01-01 | End: 2021-01-01

## 2021-01-01 RX ORDER — SODIUM CHLORIDE 9 MG/ML
1000 INJECTION, SOLUTION INTRAVENOUS
Refills: 0 | Status: COMPLETED | OUTPATIENT
Start: 2021-01-01 | End: 2021-01-01

## 2021-01-01 RX ORDER — MINERAL OIL
133 OIL (ML) MISCELLANEOUS ONCE
Refills: 0 | Status: COMPLETED | OUTPATIENT
Start: 2021-01-01 | End: 2021-01-01

## 2021-01-01 RX ORDER — METOPROLOL TARTRATE 50 MG
50 TABLET ORAL
Refills: 0 | Status: DISCONTINUED | OUTPATIENT
Start: 2021-01-01 | End: 2021-01-01

## 2021-01-01 RX ORDER — ACETAMINOPHEN 500 MG
1000 TABLET ORAL ONCE
Refills: 0 | Status: COMPLETED | OUTPATIENT
Start: 2021-01-01 | End: 2021-01-01

## 2021-01-01 RX ORDER — ACETAMINOPHEN 500 MG
650 TABLET ORAL EVERY 6 HOURS
Refills: 0 | Status: DISCONTINUED | OUTPATIENT
Start: 2021-01-01 | End: 2021-01-01

## 2021-01-01 RX ORDER — ACETAMINOPHEN 500 MG
650 TABLET ORAL ONCE
Refills: 0 | Status: COMPLETED | OUTPATIENT
Start: 2021-01-01 | End: 2021-01-01

## 2021-01-01 RX ORDER — SODIUM CHLORIDE 9 MG/ML
500 INJECTION INTRAMUSCULAR; INTRAVENOUS; SUBCUTANEOUS ONCE
Refills: 0 | Status: DISCONTINUED | OUTPATIENT
Start: 2021-01-01 | End: 2021-01-01

## 2021-01-01 RX ORDER — POLYETHYLENE GLYCOL 3350 17 G/17G
17 POWDER, FOR SOLUTION ORAL
Refills: 0 | Status: DISCONTINUED | OUTPATIENT
Start: 2021-01-01 | End: 2021-01-01

## 2021-01-01 RX ADMIN — MIRABEGRON 25 MILLIGRAM(S): 50 TABLET, EXTENDED RELEASE ORAL at 22:50

## 2021-01-01 RX ADMIN — Medication 650 MILLIGRAM(S): at 08:11

## 2021-01-01 RX ADMIN — Medication 1 SUPPOSITORY(S): at 21:49

## 2021-01-01 RX ADMIN — Medication 125 MICROGRAM(S): at 12:27

## 2021-01-01 RX ADMIN — SODIUM CHLORIDE 500 MILLILITER(S): 9 INJECTION INTRAMUSCULAR; INTRAVENOUS; SUBCUTANEOUS at 15:21

## 2021-01-01 RX ADMIN — LATANOPROST 1 DROP(S): 0.05 SOLUTION/ DROPS OPHTHALMIC; TOPICAL at 22:25

## 2021-01-01 RX ADMIN — Medication 50 MILLIGRAM(S): at 05:50

## 2021-01-01 RX ADMIN — Medication 125 MICROGRAM(S): at 12:34

## 2021-01-01 RX ADMIN — HALOPERIDOL DECANOATE 0.5 MILLIGRAM(S): 100 INJECTION INTRAMUSCULAR at 21:47

## 2021-01-01 RX ADMIN — Medication 120 MILLIGRAM(S): at 09:10

## 2021-01-01 RX ADMIN — Medication 20 MILLIGRAM(S): at 11:33

## 2021-01-01 RX ADMIN — Medication 5 MILLIGRAM(S): at 12:37

## 2021-01-01 RX ADMIN — Medication 5 MILLIGRAM(S): at 23:55

## 2021-01-01 RX ADMIN — CEFTRIAXONE 100 MILLIGRAM(S): 500 INJECTION, POWDER, FOR SOLUTION INTRAMUSCULAR; INTRAVENOUS at 17:26

## 2021-01-01 RX ADMIN — SODIUM CHLORIDE 60 MILLILITER(S): 9 INJECTION, SOLUTION INTRAVENOUS at 12:34

## 2021-01-01 RX ADMIN — Medication 2: at 17:27

## 2021-01-01 RX ADMIN — PIPERACILLIN AND TAZOBACTAM 25 GRAM(S): 4; .5 INJECTION, POWDER, LYOPHILIZED, FOR SOLUTION INTRAVENOUS at 05:48

## 2021-01-01 RX ADMIN — SODIUM CHLORIDE 50 MILLILITER(S): 9 INJECTION, SOLUTION INTRAVENOUS at 19:28

## 2021-01-01 RX ADMIN — PIPERACILLIN AND TAZOBACTAM 25 GRAM(S): 4; .5 INJECTION, POWDER, LYOPHILIZED, FOR SOLUTION INTRAVENOUS at 14:38

## 2021-01-01 RX ADMIN — Medication 40 MICROGRAM(S): at 22:15

## 2021-01-01 RX ADMIN — Medication 1 PACKET(S): at 14:26

## 2021-01-01 RX ADMIN — Medication 100 MILLIGRAM(S): at 18:22

## 2021-01-01 RX ADMIN — Medication 5 MILLIGRAM(S): at 12:50

## 2021-01-01 RX ADMIN — Medication 81 MILLIGRAM(S): at 12:28

## 2021-01-01 RX ADMIN — Medication 20 MILLIGRAM(S): at 18:05

## 2021-01-01 RX ADMIN — Medication 250 MICROGRAM(S): at 21:38

## 2021-01-01 RX ADMIN — POLYETHYLENE GLYCOL 3350 17 GRAM(S): 17 POWDER, FOR SOLUTION ORAL at 17:21

## 2021-01-01 RX ADMIN — Medication 81 MILLIGRAM(S): at 12:31

## 2021-01-01 RX ADMIN — Medication 81 MILLIGRAM(S): at 12:38

## 2021-01-01 RX ADMIN — Medication 400 MILLIGRAM(S): at 14:30

## 2021-01-01 RX ADMIN — LATANOPROST 1 DROP(S): 0.05 SOLUTION/ DROPS OPHTHALMIC; TOPICAL at 22:06

## 2021-01-01 RX ADMIN — Medication 40 MICROGRAM(S): at 21:38

## 2021-01-01 RX ADMIN — Medication 100 MICROGRAM(S): at 01:24

## 2021-01-01 RX ADMIN — Medication 50 MICROGRAM(S): at 05:01

## 2021-01-01 RX ADMIN — Medication 5 MILLIGRAM(S): at 12:26

## 2021-01-01 RX ADMIN — HEPARIN SODIUM 5000 UNIT(S): 5000 INJECTION INTRAVENOUS; SUBCUTANEOUS at 17:30

## 2021-01-01 RX ADMIN — Medication 100 MILLIEQUIVALENT(S): at 10:07

## 2021-01-01 RX ADMIN — PIPERACILLIN AND TAZOBACTAM 25 GRAM(S): 4; .5 INJECTION, POWDER, LYOPHILIZED, FOR SOLUTION INTRAVENOUS at 06:36

## 2021-01-01 RX ADMIN — Medication 1 PACKET(S): at 17:19

## 2021-01-01 RX ADMIN — Medication 50 MILLIGRAM(S): at 17:05

## 2021-01-01 RX ADMIN — PIPERACILLIN AND TAZOBACTAM 25 GRAM(S): 4; .5 INJECTION, POWDER, LYOPHILIZED, FOR SOLUTION INTRAVENOUS at 06:08

## 2021-01-01 RX ADMIN — SENNA PLUS 2 TABLET(S): 8.6 TABLET ORAL at 22:11

## 2021-01-01 RX ADMIN — Medication 50 MILLIGRAM(S): at 17:21

## 2021-01-01 RX ADMIN — PIPERACILLIN AND TAZOBACTAM 25 GRAM(S): 4; .5 INJECTION, POWDER, LYOPHILIZED, FOR SOLUTION INTRAVENOUS at 14:31

## 2021-01-01 RX ADMIN — ATORVASTATIN CALCIUM 10 MILLIGRAM(S): 80 TABLET, FILM COATED ORAL at 21:24

## 2021-01-01 RX ADMIN — Medication 100 MICROGRAM(S): at 01:25

## 2021-01-01 RX ADMIN — Medication 5 MILLIGRAM(S): at 17:04

## 2021-01-01 RX ADMIN — SENNA PLUS 2 TABLET(S): 8.6 TABLET ORAL at 21:24

## 2021-01-01 RX ADMIN — Medication 1 SUPPOSITORY(S): at 21:34

## 2021-01-01 RX ADMIN — SENNA PLUS 2 TABLET(S): 8.6 TABLET ORAL at 22:46

## 2021-01-01 RX ADMIN — Medication 5 MILLIGRAM(S): at 01:02

## 2021-01-01 RX ADMIN — LATANOPROST 1 DROP(S): 0.05 SOLUTION/ DROPS OPHTHALMIC; TOPICAL at 21:39

## 2021-01-01 RX ADMIN — SENNA PLUS 2 TABLET(S): 8.6 TABLET ORAL at 21:49

## 2021-01-01 RX ADMIN — Medication 5 MILLIGRAM(S): at 05:48

## 2021-01-01 RX ADMIN — LATANOPROST 1 DROP(S): 0.05 SOLUTION/ DROPS OPHTHALMIC; TOPICAL at 22:18

## 2021-01-01 RX ADMIN — Medication 20 MILLIGRAM(S): at 08:47

## 2021-01-01 RX ADMIN — POLYETHYLENE GLYCOL 3350 17 GRAM(S): 17 POWDER, FOR SOLUTION ORAL at 05:51

## 2021-01-01 RX ADMIN — POTASSIUM PHOSPHATE, MONOBASIC POTASSIUM PHOSPHATE, DIBASIC 62.5 MILLIMOLE(S): 236; 224 INJECTION, SOLUTION INTRAVENOUS at 11:02

## 2021-01-01 RX ADMIN — ENOXAPARIN SODIUM 40 MILLIGRAM(S): 100 INJECTION SUBCUTANEOUS at 11:33

## 2021-01-01 RX ADMIN — Medication 81 MILLIGRAM(S): at 11:33

## 2021-01-01 RX ADMIN — MIRABEGRON 25 MILLIGRAM(S): 50 TABLET, EXTENDED RELEASE ORAL at 21:23

## 2021-01-01 RX ADMIN — Medication 100 GRAM(S): at 12:28

## 2021-01-01 RX ADMIN — ENOXAPARIN SODIUM 40 MILLIGRAM(S): 100 INJECTION SUBCUTANEOUS at 10:34

## 2021-01-01 RX ADMIN — SODIUM CHLORIDE 60 MILLILITER(S): 9 INJECTION, SOLUTION INTRAVENOUS at 12:31

## 2021-01-01 RX ADMIN — HEPARIN SODIUM 5000 UNIT(S): 5000 INJECTION INTRAVENOUS; SUBCUTANEOUS at 05:45

## 2021-01-01 RX ADMIN — HEPARIN SODIUM 5000 UNIT(S): 5000 INJECTION INTRAVENOUS; SUBCUTANEOUS at 05:20

## 2021-01-01 RX ADMIN — PIPERACILLIN AND TAZOBACTAM 25 GRAM(S): 4; .5 INJECTION, POWDER, LYOPHILIZED, FOR SOLUTION INTRAVENOUS at 05:28

## 2021-01-01 RX ADMIN — Medication 40 MICROGRAM(S): at 22:54

## 2021-01-01 RX ADMIN — Medication 40 MICROGRAM(S): at 22:45

## 2021-01-01 RX ADMIN — Medication 50 MICROGRAM(S): at 06:27

## 2021-01-01 RX ADMIN — Medication 5 MILLIGRAM(S): at 12:11

## 2021-01-01 RX ADMIN — SODIUM CHLORIDE 50 MILLILITER(S): 9 INJECTION, SOLUTION INTRAVENOUS at 09:10

## 2021-01-01 RX ADMIN — Medication 5 MILLIGRAM(S): at 06:37

## 2021-01-01 RX ADMIN — TAMSULOSIN HYDROCHLORIDE 0.4 MILLIGRAM(S): 0.4 CAPSULE ORAL at 21:24

## 2021-01-01 RX ADMIN — POLYETHYLENE GLYCOL 3350 17 GRAM(S): 17 POWDER, FOR SOLUTION ORAL at 05:01

## 2021-01-01 RX ADMIN — HALOPERIDOL DECANOATE 0.5 MILLIGRAM(S): 100 INJECTION INTRAMUSCULAR at 17:14

## 2021-01-01 RX ADMIN — PIPERACILLIN AND TAZOBACTAM 25 GRAM(S): 4; .5 INJECTION, POWDER, LYOPHILIZED, FOR SOLUTION INTRAVENOUS at 05:53

## 2021-01-01 RX ADMIN — LATANOPROST 1 DROP(S): 0.05 SOLUTION/ DROPS OPHTHALMIC; TOPICAL at 21:24

## 2021-01-01 RX ADMIN — ATORVASTATIN CALCIUM 80 MILLIGRAM(S): 80 TABLET, FILM COATED ORAL at 21:39

## 2021-01-01 RX ADMIN — Medication 250 MICROGRAM(S): at 15:20

## 2021-01-01 RX ADMIN — Medication 81 MILLIGRAM(S): at 12:27

## 2021-01-01 RX ADMIN — TAMSULOSIN HYDROCHLORIDE 0.4 MILLIGRAM(S): 0.4 CAPSULE ORAL at 22:11

## 2021-01-01 RX ADMIN — SODIUM CHLORIDE 60 MILLILITER(S): 9 INJECTION, SOLUTION INTRAVENOUS at 12:13

## 2021-01-01 RX ADMIN — Medication 100 MILLIEQUIVALENT(S): at 09:01

## 2021-01-01 RX ADMIN — Medication 20 MILLIGRAM(S): at 12:27

## 2021-01-01 RX ADMIN — Medication 20 MILLIGRAM(S): at 17:38

## 2021-01-01 RX ADMIN — Medication 3 MILLILITER(S): at 17:20

## 2021-01-01 RX ADMIN — Medication 120 MILLIGRAM(S): at 01:58

## 2021-01-01 RX ADMIN — PIPERACILLIN AND TAZOBACTAM 25 GRAM(S): 4; .5 INJECTION, POWDER, LYOPHILIZED, FOR SOLUTION INTRAVENOUS at 22:26

## 2021-01-01 RX ADMIN — ATORVASTATIN CALCIUM 80 MILLIGRAM(S): 80 TABLET, FILM COATED ORAL at 22:25

## 2021-01-01 RX ADMIN — CEFTRIAXONE 100 MILLIGRAM(S): 500 INJECTION, POWDER, FOR SOLUTION INTRAMUSCULAR; INTRAVENOUS at 17:14

## 2021-01-01 RX ADMIN — SODIUM CHLORIDE 60 MILLILITER(S): 9 INJECTION, SOLUTION INTRAVENOUS at 18:36

## 2021-01-01 RX ADMIN — POLYETHYLENE GLYCOL 3350 17 GRAM(S): 17 POWDER, FOR SOLUTION ORAL at 17:01

## 2021-01-01 RX ADMIN — ATORVASTATIN CALCIUM 80 MILLIGRAM(S): 80 TABLET, FILM COATED ORAL at 22:18

## 2021-01-01 RX ADMIN — Medication 5 MILLIGRAM(S): at 17:19

## 2021-01-01 RX ADMIN — Medication 40 MICROGRAM(S): at 22:37

## 2021-01-01 RX ADMIN — Medication 5 MILLIGRAM(S): at 23:13

## 2021-01-01 RX ADMIN — Medication 20 MILLIGRAM(S): at 12:38

## 2021-01-01 RX ADMIN — Medication 6 MILLIGRAM(S): at 21:02

## 2021-01-01 RX ADMIN — Medication 5 MILLIGRAM(S): at 18:04

## 2021-01-01 RX ADMIN — Medication 100 MILLIGRAM(S): at 17:23

## 2021-01-01 RX ADMIN — Medication 5 MILLIGRAM(S): at 00:50

## 2021-01-01 RX ADMIN — Medication 120 MILLIGRAM(S): at 12:55

## 2021-01-01 RX ADMIN — Medication 50 MICROGRAM(S): at 17:26

## 2021-01-01 RX ADMIN — LATANOPROST 1 DROP(S): 0.05 SOLUTION/ DROPS OPHTHALMIC; TOPICAL at 21:01

## 2021-01-01 RX ADMIN — PIPERACILLIN AND TAZOBACTAM 25 GRAM(S): 4; .5 INJECTION, POWDER, LYOPHILIZED, FOR SOLUTION INTRAVENOUS at 22:37

## 2021-01-01 RX ADMIN — PIPERACILLIN AND TAZOBACTAM 25 GRAM(S): 4; .5 INJECTION, POWDER, LYOPHILIZED, FOR SOLUTION INTRAVENOUS at 22:16

## 2021-01-01 RX ADMIN — Medication 125 MICROGRAM(S): at 06:27

## 2021-01-01 RX ADMIN — Medication 5 MILLIGRAM(S): at 23:25

## 2021-01-01 RX ADMIN — HEPARIN SODIUM 5000 UNIT(S): 5000 INJECTION INTRAVENOUS; SUBCUTANEOUS at 18:04

## 2021-01-01 RX ADMIN — Medication 5 MILLIGRAM(S): at 12:27

## 2021-01-01 RX ADMIN — POLYETHYLENE GLYCOL 3350 17 GRAM(S): 17 POWDER, FOR SOLUTION ORAL at 12:50

## 2021-01-01 RX ADMIN — HALOPERIDOL DECANOATE 1 MILLIGRAM(S): 100 INJECTION INTRAMUSCULAR at 19:28

## 2021-01-01 RX ADMIN — Medication 20 MILLIEQUIVALENT(S): at 21:01

## 2021-01-01 RX ADMIN — HALOPERIDOL DECANOATE 0.5 MILLIGRAM(S): 100 INJECTION INTRAMUSCULAR at 10:58

## 2021-01-01 RX ADMIN — Medication 133 MILLILITER(S): at 09:10

## 2021-01-01 RX ADMIN — Medication 100 MILLIGRAM(S): at 06:57

## 2021-01-01 RX ADMIN — SODIUM CHLORIDE 60 MILLILITER(S): 9 INJECTION, SOLUTION INTRAVENOUS at 22:47

## 2021-01-01 RX ADMIN — Medication 81 MILLIGRAM(S): at 12:37

## 2021-01-01 RX ADMIN — Medication 125 MICROGRAM(S): at 06:57

## 2021-01-01 RX ADMIN — ATORVASTATIN CALCIUM 80 MILLIGRAM(S): 80 TABLET, FILM COATED ORAL at 22:46

## 2021-01-01 RX ADMIN — SODIUM CHLORIDE 1000 MILLILITER(S): 9 INJECTION INTRAMUSCULAR; INTRAVENOUS; SUBCUTANEOUS at 13:37

## 2021-01-01 RX ADMIN — Medication 40 MICROGRAM(S): at 01:24

## 2021-01-01 RX ADMIN — SODIUM CHLORIDE 60 MILLILITER(S): 9 INJECTION, SOLUTION INTRAVENOUS at 22:38

## 2021-01-01 RX ADMIN — Medication 20 MILLIGRAM(S): at 12:50

## 2021-01-01 RX ADMIN — SENNA PLUS 2 TABLET(S): 8.6 TABLET ORAL at 22:23

## 2021-01-01 RX ADMIN — HEPARIN SODIUM 5000 UNIT(S): 5000 INJECTION INTRAVENOUS; SUBCUTANEOUS at 05:38

## 2021-01-01 RX ADMIN — Medication 100 MILLIGRAM(S): at 06:27

## 2021-01-01 RX ADMIN — Medication 650 MILLIGRAM(S): at 00:12

## 2021-01-01 RX ADMIN — Medication 100 MICROGRAM(S): at 11:34

## 2021-01-01 RX ADMIN — Medication 125 MICROGRAM(S): at 06:28

## 2021-01-01 RX ADMIN — ENOXAPARIN SODIUM 40 MILLIGRAM(S): 100 INJECTION SUBCUTANEOUS at 11:23

## 2021-01-01 RX ADMIN — Medication 5 MILLIGRAM(S): at 05:20

## 2021-01-01 RX ADMIN — Medication 650 MILLIGRAM(S): at 15:30

## 2021-01-01 RX ADMIN — POLYETHYLENE GLYCOL 3350 17 GRAM(S): 17 POWDER, FOR SOLUTION ORAL at 05:24

## 2021-01-01 RX ADMIN — Medication 2: at 12:25

## 2021-01-01 RX ADMIN — Medication 40 MICROGRAM(S): at 21:53

## 2021-01-01 RX ADMIN — Medication 50 MILLIGRAM(S): at 05:25

## 2021-01-01 RX ADMIN — SODIUM CHLORIDE 3 MILLILITER(S): 9 INJECTION INTRAMUSCULAR; INTRAVENOUS; SUBCUTANEOUS at 18:41

## 2021-01-01 RX ADMIN — Medication 50 MILLIGRAM(S): at 05:01

## 2021-01-01 RX ADMIN — Medication 260 MILLIGRAM(S): at 23:08

## 2021-01-01 RX ADMIN — LATANOPROST 1 DROP(S): 0.05 SOLUTION/ DROPS OPHTHALMIC; TOPICAL at 02:21

## 2021-01-01 RX ADMIN — Medication 5 MILLIGRAM(S): at 05:53

## 2021-01-01 RX ADMIN — Medication 100 MILLIGRAM(S): at 06:28

## 2021-01-01 RX ADMIN — HEPARIN SODIUM 5000 UNIT(S): 5000 INJECTION INTRAVENOUS; SUBCUTANEOUS at 17:26

## 2021-01-01 RX ADMIN — PIPERACILLIN AND TAZOBACTAM 25 GRAM(S): 4; .5 INJECTION, POWDER, LYOPHILIZED, FOR SOLUTION INTRAVENOUS at 21:39

## 2021-01-01 RX ADMIN — SODIUM CHLORIDE 1000 MILLILITER(S): 9 INJECTION INTRAMUSCULAR; INTRAVENOUS; SUBCUTANEOUS at 14:29

## 2021-01-01 RX ADMIN — Medication 1 ENEMA: at 12:20

## 2021-01-01 RX ADMIN — Medication 5 MILLIGRAM(S): at 17:26

## 2021-01-01 RX ADMIN — Medication 120 MILLIGRAM(S): at 17:13

## 2021-01-01 RX ADMIN — SODIUM CHLORIDE 1000 MILLILITER(S): 9 INJECTION INTRAMUSCULAR; INTRAVENOUS; SUBCUTANEOUS at 15:19

## 2021-01-01 RX ADMIN — Medication 5 MILLIGRAM(S): at 17:28

## 2021-01-01 RX ADMIN — Medication 500 MICROGRAM(S): at 08:32

## 2021-01-01 RX ADMIN — HEPARIN SODIUM 5000 UNIT(S): 5000 INJECTION INTRAVENOUS; SUBCUTANEOUS at 05:53

## 2021-01-01 RX ADMIN — Medication 50 MICROGRAM(S): at 06:57

## 2021-01-01 RX ADMIN — PIPERACILLIN AND TAZOBACTAM 25 GRAM(S): 4; .5 INJECTION, POWDER, LYOPHILIZED, FOR SOLUTION INTRAVENOUS at 13:54

## 2021-01-01 RX ADMIN — Medication 50 MILLIGRAM(S): at 17:01

## 2021-01-01 RX ADMIN — ATORVASTATIN CALCIUM 80 MILLIGRAM(S): 80 TABLET, FILM COATED ORAL at 21:02

## 2021-01-01 RX ADMIN — PIPERACILLIN AND TAZOBACTAM 25 GRAM(S): 4; .5 INJECTION, POWDER, LYOPHILIZED, FOR SOLUTION INTRAVENOUS at 08:32

## 2021-01-01 RX ADMIN — Medication 5 MILLIGRAM(S): at 12:33

## 2021-01-01 RX ADMIN — Medication 100 MILLIGRAM(S): at 17:37

## 2021-01-01 RX ADMIN — Medication 125 MICROGRAM(S): at 10:13

## 2021-01-01 RX ADMIN — POTASSIUM PHOSPHATE, MONOBASIC POTASSIUM PHOSPHATE, DIBASIC 62.5 MILLIMOLE(S): 236; 224 INJECTION, SOLUTION INTRAVENOUS at 10:15

## 2021-01-01 RX ADMIN — Medication 250 MILLIGRAM(S): at 15:21

## 2021-01-01 RX ADMIN — POLYETHYLENE GLYCOL 3350 17 GRAM(S): 17 POWDER, FOR SOLUTION ORAL at 12:31

## 2021-01-01 RX ADMIN — SENNA PLUS 2 TABLET(S): 8.6 TABLET ORAL at 22:18

## 2021-01-01 RX ADMIN — Medication 50 MILLIGRAM(S): at 08:47

## 2021-01-01 RX ADMIN — Medication 1 SUPPOSITORY(S): at 21:53

## 2021-01-01 RX ADMIN — Medication 20 MILLIGRAM(S): at 12:31

## 2021-01-01 RX ADMIN — LATANOPROST 1 DROP(S): 0.05 SOLUTION/ DROPS OPHTHALMIC; TOPICAL at 22:35

## 2021-01-01 RX ADMIN — Medication 1 SUPPOSITORY(S): at 21:48

## 2021-01-01 RX ADMIN — Medication 81 MILLIGRAM(S): at 18:05

## 2021-01-01 RX ADMIN — SODIUM CHLORIDE 75 MILLILITER(S): 9 INJECTION, SOLUTION INTRAVENOUS at 22:42

## 2021-01-01 RX ADMIN — HALOPERIDOL DECANOATE 0.5 MILLIGRAM(S): 100 INJECTION INTRAMUSCULAR at 07:36

## 2021-01-01 RX ADMIN — CEFTRIAXONE 100 MILLIGRAM(S): 500 INJECTION, POWDER, FOR SOLUTION INTRAMUSCULAR; INTRAVENOUS at 16:04

## 2021-01-01 RX ADMIN — Medication 120 MILLIGRAM(S): at 23:07

## 2021-01-01 RX ADMIN — Medication 20 MILLIGRAM(S): at 12:30

## 2021-01-01 RX ADMIN — Medication 50 MICROGRAM(S): at 05:50

## 2021-01-01 RX ADMIN — SENNA PLUS 2 TABLET(S): 8.6 TABLET ORAL at 21:46

## 2021-01-01 RX ADMIN — SENNA PLUS 2 TABLET(S): 8.6 TABLET ORAL at 21:38

## 2021-01-01 RX ADMIN — Medication 2.5 MILLIGRAM(S): at 22:08

## 2021-01-01 RX ADMIN — HEPARIN SODIUM 5000 UNIT(S): 5000 INJECTION INTRAVENOUS; SUBCUTANEOUS at 18:01

## 2021-01-01 RX ADMIN — HEPARIN SODIUM 5000 UNIT(S): 5000 INJECTION INTRAVENOUS; SUBCUTANEOUS at 17:20

## 2021-01-01 RX ADMIN — POLYETHYLENE GLYCOL 3350 17 GRAM(S): 17 POWDER, FOR SOLUTION ORAL at 07:36

## 2021-01-01 RX ADMIN — POTASSIUM PHOSPHATE, MONOBASIC POTASSIUM PHOSPHATE, DIBASIC 83.33 MILLIMOLE(S): 236; 224 INJECTION, SOLUTION INTRAVENOUS at 12:28

## 2021-01-01 RX ADMIN — PIPERACILLIN AND TAZOBACTAM 25 GRAM(S): 4; .5 INJECTION, POWDER, LYOPHILIZED, FOR SOLUTION INTRAVENOUS at 22:45

## 2021-01-01 RX ADMIN — HEPARIN SODIUM 5000 UNIT(S): 5000 INJECTION INTRAVENOUS; SUBCUTANEOUS at 06:39

## 2021-01-01 RX ADMIN — Medication 5 MILLIGRAM(S): at 12:38

## 2021-01-01 RX ADMIN — LEVALBUTEROL 0.63 MILLIGRAM(S): 1.25 SOLUTION, CONCENTRATE RESPIRATORY (INHALATION) at 17:46

## 2021-01-01 RX ADMIN — Medication 6 MILLIGRAM(S): at 22:21

## 2021-01-01 RX ADMIN — Medication 120 MILLIGRAM(S): at 05:30

## 2021-01-01 RX ADMIN — Medication 5 MILLIGRAM(S): at 11:57

## 2021-01-01 RX ADMIN — Medication 1000 MILLIGRAM(S): at 19:20

## 2021-01-01 RX ADMIN — Medication 5 MILLIGRAM(S): at 06:09

## 2021-01-01 RX ADMIN — Medication 20 MILLIGRAM(S): at 11:23

## 2021-01-01 RX ADMIN — Medication 20 MILLIGRAM(S): at 17:27

## 2021-01-01 RX ADMIN — PIPERACILLIN AND TAZOBACTAM 25 GRAM(S): 4; .5 INJECTION, POWDER, LYOPHILIZED, FOR SOLUTION INTRAVENOUS at 15:17

## 2021-01-01 RX ADMIN — Medication 1 SUPPOSITORY(S): at 21:46

## 2021-01-01 RX ADMIN — SENNA PLUS 2 TABLET(S): 8.6 TABLET ORAL at 21:02

## 2021-01-01 RX ADMIN — Medication 400 MILLIGRAM(S): at 18:46

## 2021-01-01 RX ADMIN — Medication 1 PACKET(S): at 23:13

## 2021-01-01 RX ADMIN — HEPARIN SODIUM 5000 UNIT(S): 5000 INJECTION INTRAVENOUS; SUBCUTANEOUS at 06:07

## 2021-01-01 RX ADMIN — Medication 5 MILLIGRAM(S): at 06:00

## 2021-01-01 RX ADMIN — Medication 1: at 12:50

## 2021-01-01 RX ADMIN — Medication 650 MILLIGRAM(S): at 14:32

## 2021-01-01 RX ADMIN — HEPARIN SODIUM 5000 UNIT(S): 5000 INJECTION INTRAVENOUS; SUBCUTANEOUS at 17:06

## 2021-01-01 RX ADMIN — Medication 50 MICROGRAM(S): at 06:28

## 2021-01-01 RX ADMIN — Medication 20 MILLIGRAM(S): at 12:29

## 2021-01-01 RX ADMIN — ATORVASTATIN CALCIUM 10 MILLIGRAM(S): 80 TABLET, FILM COATED ORAL at 22:11

## 2021-01-01 RX ADMIN — PIPERACILLIN AND TAZOBACTAM 25 GRAM(S): 4; .5 INJECTION, POWDER, LYOPHILIZED, FOR SOLUTION INTRAVENOUS at 22:24

## 2021-01-01 RX ADMIN — Medication 20 MILLIGRAM(S): at 12:37

## 2021-01-01 RX ADMIN — HEPARIN SODIUM 5000 UNIT(S): 5000 INJECTION INTRAVENOUS; SUBCUTANEOUS at 05:12

## 2021-01-01 RX ADMIN — Medication 1 PACKET(S): at 06:08

## 2021-01-01 RX ADMIN — PIPERACILLIN AND TAZOBACTAM 25 GRAM(S): 4; .5 INJECTION, POWDER, LYOPHILIZED, FOR SOLUTION INTRAVENOUS at 15:00

## 2021-01-01 RX ADMIN — Medication 5 MILLIGRAM(S): at 17:59

## 2021-01-01 RX ADMIN — Medication 50 MICROGRAM(S): at 05:35

## 2021-01-01 RX ADMIN — CEFTRIAXONE 100 MILLIGRAM(S): 500 INJECTION, POWDER, FOR SOLUTION INTRAMUSCULAR; INTRAVENOUS at 17:01

## 2021-01-01 RX ADMIN — Medication 40 MICROGRAM(S): at 23:24

## 2021-01-01 RX ADMIN — Medication 1 PACKET(S): at 12:31

## 2021-01-01 RX ADMIN — LATANOPROST 1 DROP(S): 0.05 SOLUTION/ DROPS OPHTHALMIC; TOPICAL at 22:46

## 2021-01-01 RX ADMIN — POLYETHYLENE GLYCOL 3350 17 GRAM(S): 17 POWDER, FOR SOLUTION ORAL at 17:38

## 2021-01-01 RX ADMIN — CEFEPIME 100 MILLIGRAM(S): 1 INJECTION, POWDER, FOR SOLUTION INTRAMUSCULAR; INTRAVENOUS at 14:31

## 2021-01-01 RX ADMIN — Medication 50 MICROGRAM(S): at 05:25

## 2021-01-01 RX ADMIN — PIPERACILLIN AND TAZOBACTAM 25 GRAM(S): 4; .5 INJECTION, POWDER, LYOPHILIZED, FOR SOLUTION INTRAVENOUS at 05:42

## 2021-01-01 RX ADMIN — POLYETHYLENE GLYCOL 3350 17 GRAM(S): 17 POWDER, FOR SOLUTION ORAL at 17:05

## 2021-01-01 RX ADMIN — Medication 650 MILLIGRAM(S): at 23:12

## 2021-01-03 ENCOUNTER — EMERGENCY (EMERGENCY)
Facility: HOSPITAL | Age: 86
LOS: 1 days | Discharge: HOME | End: 2021-01-03
Attending: EMERGENCY MEDICINE | Admitting: EMERGENCY MEDICINE
Payer: MEDICARE

## 2021-01-03 VITALS
SYSTOLIC BLOOD PRESSURE: 153 MMHG | TEMPERATURE: 98 F | HEART RATE: 90 BPM | DIASTOLIC BLOOD PRESSURE: 77 MMHG | OXYGEN SATURATION: 99 % | RESPIRATION RATE: 18 BRPM | HEIGHT: 65 IN

## 2021-01-03 DIAGNOSIS — Z98.890 OTHER SPECIFIED POSTPROCEDURAL STATES: Chronic | ICD-10-CM

## 2021-01-03 DIAGNOSIS — Z98.49 CATARACT EXTRACTION STATUS, UNSPECIFIED EYE: Chronic | ICD-10-CM

## 2021-01-03 PROCEDURE — 99285 EMERGENCY DEPT VISIT HI MDM: CPT | Mod: GC

## 2021-01-03 NOTE — ED ADULT TRIAGE NOTE - CHIEF COMPLAINT QUOTE
Unwitnessed fall at assisted living facility about 9pm. PT arrives in a neck collar from EMS. PMH of dementia, CAD, HTN PT is pt is pt is A&Ox1 and unable to provide any meaningful HX

## 2021-01-04 VITALS
SYSTOLIC BLOOD PRESSURE: 120 MMHG | HEART RATE: 73 BPM | OXYGEN SATURATION: 100 % | RESPIRATION RATE: 16 BRPM | DIASTOLIC BLOOD PRESSURE: 50 MMHG

## 2021-01-04 LAB
ALBUMIN SERPL ELPH-MCNC: 4.1 G/DL — SIGNIFICANT CHANGE UP (ref 3.3–5)
ALP SERPL-CCNC: 50 U/L — SIGNIFICANT CHANGE UP (ref 40–120)
ALT FLD-CCNC: 22 U/L — SIGNIFICANT CHANGE UP (ref 4–41)
ANION GAP SERPL CALC-SCNC: 12 MMOL/L — SIGNIFICANT CHANGE UP (ref 7–14)
APTT BLD: 34 SEC — SIGNIFICANT CHANGE UP (ref 27–36.3)
AST SERPL-CCNC: 31 U/L — SIGNIFICANT CHANGE UP (ref 4–40)
BASOPHILS # BLD AUTO: 0.09 K/UL — SIGNIFICANT CHANGE UP (ref 0–0.2)
BASOPHILS NFR BLD AUTO: 0.9 % — SIGNIFICANT CHANGE UP (ref 0–2)
BILIRUB SERPL-MCNC: 1.3 MG/DL — HIGH (ref 0.2–1.2)
BUN SERPL-MCNC: 20 MG/DL — SIGNIFICANT CHANGE UP (ref 7–23)
CALCIUM SERPL-MCNC: 9.9 MG/DL — SIGNIFICANT CHANGE UP (ref 8.4–10.5)
CHLORIDE SERPL-SCNC: 106 MMOL/L — SIGNIFICANT CHANGE UP (ref 98–107)
CO2 SERPL-SCNC: 23 MMOL/L — SIGNIFICANT CHANGE UP (ref 22–31)
CREAT SERPL-MCNC: 0.74 MG/DL — SIGNIFICANT CHANGE UP (ref 0.5–1.3)
DIGOXIN SERPL-MCNC: 0.5 NG/ML — LOW (ref 0.8–2)
EOSINOPHIL # BLD AUTO: 0.53 K/UL — HIGH (ref 0–0.5)
EOSINOPHIL NFR BLD AUTO: 5.3 % — SIGNIFICANT CHANGE UP (ref 0–6)
GLUCOSE SERPL-MCNC: 109 MG/DL — HIGH (ref 70–99)
HCT VFR BLD CALC: 39 % — SIGNIFICANT CHANGE UP (ref 39–50)
HGB BLD-MCNC: 13.3 G/DL — SIGNIFICANT CHANGE UP (ref 13–17)
IANC: 6.07 K/UL — SIGNIFICANT CHANGE UP (ref 1.5–8.5)
IMM GRANULOCYTES NFR BLD AUTO: 0.3 % — SIGNIFICANT CHANGE UP (ref 0–1.5)
LYMPHOCYTES # BLD AUTO: 2.24 K/UL — SIGNIFICANT CHANGE UP (ref 1–3.3)
LYMPHOCYTES # BLD AUTO: 22.3 % — SIGNIFICANT CHANGE UP (ref 13–44)
MCHC RBC-ENTMCNC: 34.1 GM/DL — SIGNIFICANT CHANGE UP (ref 32–36)
MCHC RBC-ENTMCNC: 34.6 PG — HIGH (ref 27–34)
MCV RBC AUTO: 101.6 FL — HIGH (ref 80–100)
MONOCYTES # BLD AUTO: 1.1 K/UL — HIGH (ref 0–0.9)
MONOCYTES NFR BLD AUTO: 10.9 % — SIGNIFICANT CHANGE UP (ref 2–14)
NEUTROPHILS # BLD AUTO: 6.07 K/UL — SIGNIFICANT CHANGE UP (ref 1.8–7.4)
NEUTROPHILS NFR BLD AUTO: 60.3 % — SIGNIFICANT CHANGE UP (ref 43–77)
NRBC # BLD: 0 /100 WBCS — SIGNIFICANT CHANGE UP
NRBC # FLD: 0 K/UL — SIGNIFICANT CHANGE UP
PLATELET # BLD AUTO: 189 K/UL — SIGNIFICANT CHANGE UP (ref 150–400)
POTASSIUM SERPL-MCNC: 4 MMOL/L — SIGNIFICANT CHANGE UP (ref 3.5–5.3)
POTASSIUM SERPL-SCNC: 4 MMOL/L — SIGNIFICANT CHANGE UP (ref 3.5–5.3)
PROT SERPL-MCNC: 7.1 G/DL — SIGNIFICANT CHANGE UP (ref 6–8.3)
RBC # BLD: 3.84 M/UL — LOW (ref 4.2–5.8)
RBC # FLD: 14.6 % — HIGH (ref 10.3–14.5)
SODIUM SERPL-SCNC: 141 MMOL/L — SIGNIFICANT CHANGE UP (ref 135–145)
TROPONIN T, HIGH SENSITIVITY RESULT: 15 NG/L — SIGNIFICANT CHANGE UP
WBC # BLD: 10.06 K/UL — SIGNIFICANT CHANGE UP (ref 3.8–10.5)
WBC # FLD AUTO: 10.06 K/UL — SIGNIFICANT CHANGE UP (ref 3.8–10.5)

## 2021-01-04 PROCEDURE — 72125 CT NECK SPINE W/O DYE: CPT | Mod: 26

## 2021-01-04 PROCEDURE — 70450 CT HEAD/BRAIN W/O DYE: CPT | Mod: 26

## 2021-01-04 RX ORDER — HALOPERIDOL DECANOATE 100 MG/ML
5 INJECTION INTRAMUSCULAR ONCE
Refills: 0 | Status: COMPLETED | OUTPATIENT
Start: 2021-01-04 | End: 2021-01-04

## 2021-01-04 RX ADMIN — HALOPERIDOL DECANOATE 5 MILLIGRAM(S): 100 INJECTION INTRAMUSCULAR at 03:02

## 2021-01-04 RX ADMIN — Medication 1 MILLIGRAM(S): at 03:01

## 2021-01-04 NOTE — ED PROVIDER NOTE - ATTENDING CONTRIBUTION TO CARE
coumading  dig   nkda  Unwitnessed fall at assisted living facility about 9pm. PT arrives in a neck collar from EMS. PMH of dementia, CAD, HTN PT is pt is pt is A&Ox1 and unable to provide any meaningful HX  labs/inr/type and screen  ctscan  reassess  dispo as per results and response I have seen and examined the patient on the patient´s visit date. I have reviewed the note written by Sha Rivera MD  on that visit day. I have supervised and participated as necessary in the performance of procedures indicated for patient management and was available at all phases of the patient´s visit when needed. We discussed the history, physical exam findings, management plan, and  medical decision making. I have made my additions, exceptions, and revisions within the chart and I agree with H and P as documented in its entirety. The data and my interpretation of any data collected from labs, interventions and imaging appear below as well as my independent medical decision making and considerations    88M who has a past medical history of Aortic stenosis Dementia DM HLD HTN BPH/TURP  Atrial fibrillation/ablation on coumadin Spinal stenosis of lumbar region/laminectomy Torn rotator cuff  OA Glaucoma overactive bladder Glaucoma PTED s/p unwitnessed fall at assisted living facility about 9pm. PT arrives in a neck collar from EMS. PMH of dementia,   Vital Signs Last 24 Hrs  T(F): 98.3 HR: 90 BP: 153/77 RR: 18 SpO2: 99% (03 Jan 2021 22:50) (99% - 99%)Height (cm): 165.1 (01-03-21 @ 22:50)  PE: as described; my additions and exceptions are noted in the chart  DATA:  EKG: See above;  LAB: Pending at time of eval  IMAGING: pending at the time of eval  IMPRESSION/RISK:  Dx=Fall  Occult hemorrhage or fx in demented/poor historian  Consideration include Occult hemorrhage or fx in demented/poor historian and prior precipitant such as sepsis   Plan  labs/dig level    CAD, HTN PT is pt is pt is A&Ox1 and unable to provide any meaningful HX  labs/inr/type and screen  ctscan  reassess  dispo as per results and response I have seen and examined the patient on the patient´s visit date. I have reviewed the note written by Sha Rivera MD  on that visit day. I have supervised and participated as necessary in the performance of procedures indicated for patient management and was available at all phases of the patient´s visit when needed. We discussed the history, physical exam findings, management plan, and  medical decision making. I have made my additions, exceptions, and revisions within the chart and I agree with H and P as documented in its entirety. The data and my interpretation of any data collected from labs, interventions and imaging appear below as well as my independent medical decision making and considerations    88M who has a past medical history of Aortic stenosis Dementia DM HLD HTN BPH/TURP  Atrial fibrillation/ablation on coumadin Spinal stenosis of lumbar region/laminectomy Torn rotator cuff  OA Glaucoma overactive bladder Glaucoma PTED s/p unwitnessed fall at assisted living facility about 9pm. PT arrives in a neck collar from EMS. PMH of dementia,   Vital Signs Last 24 Hrs  T(F): 98.3 HR: 90 BP: 153/77 RR: 18 SpO2: 99% (03 Jan 2021 22:50) (99% - 99%)Height (cm): 165.1 (01-03-21 @ 22:50)  PE: as described; my additions and exceptions are noted in the chart  DATA:  EKG: NSR@92;LAD  LAB: Pending at time of eval  IMAGING: pending at the time of eval  IMPRESSION/RISK:  Dx=Fall  Occult hemorrhage or fx in demented/poor historian  Consideration include Occult hemorrhage or fx in demented/poor historian and prior precipitant such as sepsis   Plan  labs/dig level/type and screen  ctscan  reassess  dispo as per results and response

## 2021-01-04 NOTE — ED ADULT NURSE REASSESSMENT NOTE - NS ED NURSE REASSESS COMMENT FT1
Pt resting comfortably in stretcher. Vitals as noted. Pt refusing temperature at this time. Respirations even and unlabored. Awaiting EMS pickup. Will continue to monitor.

## 2021-01-04 NOTE — ED PROVIDER NOTE - NSFOLLOWUPINSTRUCTIONS_ED_ALL_ED_FT
-You were seen in the Emergency Department (ED) for fall. Lab and imaging results, if performed, were discussed with you along with your discharge diagnosis.    MEDICATIONS:  -Continue all other prescribed medicine, IF ANY, as per your primary care doctor's (PMD) recommendations.    PAIN CONTROL:  -Please take over the counter Tylenol (also known as acetaminophen) 650mg every 6 hours or Ibuprofen (also known as motrin, advil) 600mg every 8 hour for your pain, IF ANY, unless you are not supposed to for any reason.    FOLLOW-UP:  -Please follow up with your PMD if symptoms return or for any concerning matter pertaining to your fall    RETURN PRECAUTIONS:  -Please return to the Emergency Department if you experience ANY new or concerning symptoms, such as, but not limited to: worsening pain, large amount of bleeding, passing out, fever >100.F, shaking chills, inability to see or new double vision, chest pain, difficulty breathing, diffuse abdominal pain, unable to eat or drink, continuous vomiting or diarrhea, unable to move or feel part of your body

## 2021-01-04 NOTE — ED PROVIDER NOTE - PHYSICAL EXAMINATION
no
GENERAL: no acute distress, non-toxic appearing  HEAD: normocephalic, atraumatic, in a cervical collar  HEENT: normal conjunctiva, oral mucosa moist, neck supple  CARDIAC: regular rate and rhythm, normal S1 and S2,  no appreciable murmurs  PULM: clear to ascultation bilaterally, no crackles, rales, rhonchi, or wheezing  GI: abdomen nondistended, soft, nontender, no guarding or rebound tenderness  : no CVA tenderness, no suprapubic tenderness    NEURO: alert and oriented x 1, normal speech, PERRLA, EOMI, no focal motor or sensory deficits    MSK: no visible deformities, no peripheral edema, calf tenderness/redness/swelling  SKIN: no visible rashes, dry, well-perfused  PSYCH: appropriate mood and affect

## 2021-01-04 NOTE — ED PROVIDER NOTE - OBJECTIVE STATEMENT
88M who has a past medical history of Aortic stenosis Dementia DM HLD HTN BPH/TURP  Atrial fibrillation/ablation on coumadin Spinal stenosis of lumbar region/laminectomy Torn rotator cuff OA Glaucoma overactive bladder Glaucoma present s/p unwitnessed fall. patient is AAOx1 at baseline 2/2 dementia. He denies any pain or symptoms at bedside.

## 2021-01-04 NOTE — ED PROVIDER NOTE - CLINICAL SUMMARY MEDICAL DECISION MAKING FREE TEXT BOX
88M who has a past medical history of Aortic stenosis Dementia DM HLD HTN BPH/TURP  Atrial fibrillation/ablation on coumadin Spinal stenosis of lumbar region/laminectomy Torn rotator cuff OA Glaucoma overactive bladder Glaucoma present s/p unwitnessed fall. patient is AAOx1 at baseline 2/2 dementia. He denies any pain or symptoms at bedside. PE shows comfortable patient without acute distress. no obvious deformities. no lacerations noted. no midline spinal tenderness. on anticoagulants. will order labs, imaging.

## 2021-01-04 NOTE — ED ADULT NURSE NOTE - NSIMPLEMENTINTERV_GEN_ALL_ED
Implemented All Fall Risk Interventions:  [place] to call system. Call bell, personal items and telephone within reach. Instruct patient to call for assistance. Room bathroom lighting operational. Non-slip footwear when patient is off stretcher. Physically safe environment: no spills, clutter or unnecessary equipment. Stretcher in lowest position, wheels locked, appropriate side rails in place. Provide visual cue, wrist band, yellow gown, etc. Monitor gait and stability. Monitor for mental status changes and reorient to person, place, and time. Review medications for side effects contributing to fall risk. Reinforce activity limits and safety measures with patient and family.

## 2021-01-04 NOTE — PROVIDER CONTACT NOTE (OTHER) - ASSESSMENT
Per provider, Dr. Rivera, pt is cleared and is able to return to previous residence [facility] [address] [place] [address]. Pt has dementia and requires AMBULANCE back to nursing home. SW has spoken to PRUDENCE Hirsch ([phone]) who confirmed that pt is a resident there and mode of transport is ambulance. Transportation coordinated via [facility], estimated  by 8AM, trip #142AM. Non emergent form located in pt's chart for EMS.

## 2021-01-04 NOTE — ED ADULT NURSE NOTE - OBJECTIVE STATEMENT
Pt received to room 10 a&ox4, ambulatory c/o fall. As per triage note, pt from nursing home and had an unwitnessed fall. No lacerations or abrasions noted to head. Pt a&ox1. Pt unable to answer any questions at this time. Pt in no acute distress. Respirations even and unlabored. Skin warm and dry. 20 gauge IV placed in left wrist. Blood drawn and sent to lab. Pt placed on cardiac monitor. Awaiting CT scan at this time. Comfort measures provided. Will continue to monitor.

## 2021-01-04 NOTE — ED PROVIDER NOTE - PATIENT PORTAL LINK FT
You can access the FollowMyHealth Patient Portal offered by [facility] by registering at the following website: [email or web address]. By joining [employer]’s FollowMyHealth portal, you will also be able to view your health information using other applications (apps) compatible with our system.

## 2021-04-12 NOTE — ED ADULT TRIAGE NOTE - CHIEF COMPLAINT QUOTE
Pt sent from, [facility] s/p fall in bathroom. As per EMS, pt with hx. of dementia, on aspirin. Pt noted contusion to Rt forehead, dry blood to Rt hand, Pt alert oriented only to self.

## 2021-04-12 NOTE — ED PROVIDER NOTE - CLINICAL SUMMARY MEDICAL DECISION MAKING FREE TEXT BOX
CT head reported : No acute intracranial hemorrhage or displaced skull fracture.  Pt in no distress. Case dw PMD Dr. Louise- instructed to discharge pt back to [facility] and will f/u.  Pt is well appearing, has no new complaints. Pt is stable for discharge and follow up with medical doctor. Pt educated on care and need for follow up. Discussed anticipatory guidance and return precautions. Questions answered. I had a detailed discussion with the patient and/or guardian regarding the historical points, exam findings, and any diagnostic results supporting the discharge diagnosis.

## 2021-04-12 NOTE — ED PROVIDER NOTE - PATIENT PORTAL LINK FT
You can access the FollowMyHealth Patient Portal offered by [facility] by registering at the following website: [email or web address]. By joining [employer]’s FollowMyHealth portal, you will also be able to view your health information using other applications (apps) compatible with our system.

## 2021-04-12 NOTE — ED PROVIDER NOTE - OBJECTIVE STATEMENT
Transferred from [facility] as per notes pt fell on way to bathroom, no known LOC. On evaluation pt is alert and oriented to self and his  and is without complaints. No headache, no chest pain, no shortness of breath, no abdominal pain, no extremity pain.

## 2021-04-12 NOTE — ED ADULT TRIAGE NOTE - SOURCE OF INFORMATION
[FreeTextEntry1] : This note was authored by Onofre Tay working as a medical scribe for Dr. Foreign Brothers. EMS

## 2021-04-12 NOTE — ED PROVIDER NOTE - CARE PROVIDER_API CALL
Joe Louise (DO)  Medicine  [address], [address]  [place] [address]  Phone: [phone]  Fax: [phone]  Follow Up Time:

## 2021-04-13 NOTE — ED ADULT NURSE NOTE - OBJECTIVE STATEMENT
Patient brought in to the ED s/p fall in bathroom., small hematoma noted to left side of the forehead.

## 2021-04-13 NOTE — ED ADULT NURSE NOTE - CHIEF COMPLAINT QUOTE
biba sent from [facility] assisted living for heplock removal  ems reports pt was discharged back to [facility] yesterday w/ a Heplock to Rt arm

## 2021-04-13 NOTE — ED ADULT NURSE NOTE - CHIEF COMPLAINT QUOTE
Pt sent from, [facility] s/p fall in bathroom. As per EMS, pt with hx. of dementia, on aspirin. Pt noted contusion to Rt forehead, dry blood to Rt hand, Pt alert oriented only to self.

## 2021-04-13 NOTE — ED PROVIDER NOTE - PATIENT PORTAL LINK FT
You can access the FollowMyHealth Patient Portal offered by [facility] by registering at the following website: [email or web address]. By joining [employer]’s FollowMyHealth portal, you will also be able to view your health information using other applications (apps) compatible with our system.

## 2021-04-13 NOTE — ED PROVIDER NOTE - OBJECTIVE STATEMENT
89 y/o M pt sent from nursing home to remove IV cannula that he was discharged with yesterday from hospital. At site of IV there is no rash or signs of infection. Patient has no complaints.

## 2021-04-13 NOTE — ED ADULT NURSE NOTE - NSIMPLEMENTINTERV_GEN_ALL_ED
Implemented All Fall with Harm Risk Interventions:  [place] to call system. Call bell, personal items and telephone within reach. Instruct patient to call for assistance. Room bathroom lighting operational. Non-slip footwear when patient is off stretcher. Physically safe environment: no spills, clutter or unnecessary equipment. Stretcher in lowest position, wheels locked, appropriate side rails in place. Provide visual cue, wrist band, yellow gown, etc. Monitor gait and stability. Monitor for mental status changes and reorient to person, place, and time. Review medications for side effects contributing to fall risk. Reinforce activity limits and safety measures with patient and family. Provide visual clues: red socks.

## 2021-04-13 NOTE — ED ADULT TRIAGE NOTE - CHIEF COMPLAINT QUOTE
biba sent from [facility] assisted living for heplock removal  ems reports pt was discharged back to [facility] yesterday w/ a Heplock to Rt arm
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
## 2021-04-24 NOTE — CONSULT NOTE ADULT - SUBJECTIVE AND OBJECTIVE BOX
HPI: 88 yr old male w/ hx of fall 4/12 went to [facility] had a negative CT head. Per assisted living facility patient was increasingly more agitated and combative. Had a rpt CT head in the ER here.   Patient denies headache.     PAST MEDICAL & SURGICAL HISTORY:  BPH (benign prostatic hypertrophy)    HTN - Hypertension    Hyperlipidemia    Congenital heart defect    Diabetes mellitus    Torn rotator cuff  Right    Spinal stenosis of lumbar region    Atrial fibrillation    Nonrheumatic aortic valve stenosis    Dementia    Aortic stenosis    Glaucoma    OAB (overactive bladder)    Arthritis  generalized medrol pack q 6 weeks    S/P TURP (status post transurethral resection of prostate)  2008    Hx of appendectomy  age 17    S/P lumbar laminectomy  2013    S/P cataract surgery    History of prior ablation treatment  cardiac      Allergies    No Known Allergies      SOCIAL HISTORY:  FAMILY HISTORY:  No pertinent family history in first degree relatives      Vital Signs Last 24 Hrs  T(C): 37.3 (24 Apr 2021 12:11), Max: 37.3 (24 Apr 2021 12:11)  T(F): 99.2 (24 Apr 2021 12:11), Max: 99.2 (24 Apr 2021 12:11)  HR: 95 (24 Apr 2021 10:57) (95 - 95)  BP: 133/92 (24 Apr 2021 10:57) (133/92 - 133/92)  RR: 16 (24 Apr 2021 10:57) (16 - 16)  SpO2: 100% (24 Apr 2021 10:57) (100% - 100%)  < from: CT Head No Cont (04.24.21 @ 12:39) >    EXAM:  CT BRAIN        PROCEDURE DATE:  Apr 24 2021         INTERPRETATION:  PROCEDURE: CT head without contrast.    INDICATION: Altered mental status; increased violent behavior.    TECHNIQUE: Multiple axial sections were obtained at 5 mm intervals. The images were reviewed in brain and bone windows. Imaging is performed using helical low-dose technique, and sagittal and coronal reformations are provided.    COMPARISON: Noncontrast CT scan of head 4/21/2021    FINDINGS:  The CT examination demonstrates extensive generalized volume loss as manifested by the enlargement of the ventricles, cisternal spaces, and cortical sulci throughout.    There is layering blood in both occipital horns more conspicuous on the left.    There is moderate to severe periventricular white matter lucency, likely the sequela of small vessel ischemic disease.    The bony windows demonstrates no fractures.    The included paranasal sinuses and mastoid air cells are predominantly clear.    Status post right lens placement surgery.    IMPRESSION:  Intraventricular hemorrhage more conspicuous in the left occipital horn. Underlying advanced chronic ischemic changes with moderate to severe atrophy. No intraparenchymal hematoma seen.    Further correlation and follow-up recommended.    Results discussed with Dr. Rice by Dr. Nguyen at 12:58 PM 4/24/2021                    GENEVIEVE NGUYEN MD; Resident Radiology  This document has been electronically signed.  PRINCE HESTER MD; Attending Radiologist  This documenthas been electronically signed. Apr 24 2021  1:01PM    < end of copied text >    PHYSICAL EXAM:  Awake Alert, oriented to self only  Pupils: PERRL  Motor- Moving all extremities well        LABS:                          13.1   8.83  )-----------( 235      ( 24 Apr 2021 13:25 )             39.5           RADIOLOGY & ADDITIONAL STUDIES:       HPI: 88 yr old male w/ hx of fall 4/12 went to [facility] had a negative CT head. Per assisted living facility patient was increasingly more agitated and combative. Had a rpt CT head in the ER here.   Patient denies headache.     PAST MEDICAL & SURGICAL HISTORY:  BPH (benign prostatic hypertrophy)    HTN - Hypertension    Hyperlipidemia    Congenital heart defect    Diabetes mellitus    Torn rotator cuff  Right    Spinal stenosis of lumbar region    Atrial fibrillation    Nonrheumatic aortic valve stenosis    Dementia    Aortic stenosis    Glaucoma    OAB (overactive bladder)    Arthritis  generalized medrol pack q 6 weeks    S/P TURP (status post transurethral resection of prostate)  2008    Hx of appendectomy  age 17    S/P lumbar laminectomy  2013    S/P cataract surgery    History of prior ablation treatment  cardiac      Allergies    No Known Allergies      SOCIAL HISTORY:  FAMILY HISTORY:  No pertinent family history in first degree relatives      Vital Signs Last 24 Hrs  T(C): 37.3 (24 Apr 2021 12:11), Max: 37.3 (24 Apr 2021 12:11)  T(F): 99.2 (24 Apr 2021 12:11), Max: 99.2 (24 Apr 2021 12:11)  HR: 95 (24 Apr 2021 10:57) (95 - 95)  BP: 133/92 (24 Apr 2021 10:57) (133/92 - 133/92)  RR: 16 (24 Apr 2021 10:57) (16 - 16)  SpO2: 100% (24 Apr 2021 10:57) (100% - 100%)  < from: CT Head No Cont (04.24.21 @ 12:39) >    EXAM:  CT BRAIN        PROCEDURE DATE:  Apr 24 2021         INTERPRETATION:  PROCEDURE: CT head without contrast.    INDICATION: Altered mental status; increased violent behavior.    TECHNIQUE: Multiple axial sections were obtained at 5 mm intervals. The images were reviewed in brain and bone windows. Imaging is performed using helical low-dose technique, and sagittal and coronal reformations are provided.    COMPARISON: Noncontrast CT scan of head 4/21/2021    FINDINGS:  The CT examination demonstrates extensive generalized volume loss as manifested by the enlargement of the ventricles, cisternal spaces, and cortical sulci throughout.    There is layering blood in both occipital horns more conspicuous on the left.    There is moderate to severe periventricular white matter lucency, likely the sequela of small vessel ischemic disease.    The bony windows demonstrates no fractures.    The included paranasal sinuses and mastoid air cells are predominantly clear.    Status post right lens placement surgery.    IMPRESSION:  Intraventricular hemorrhage more conspicuous in the left occipital horn. Underlying advanced chronic ischemic changes with moderate to severe atrophy. No intraparenchymal hematoma seen.    Further correlation and follow-up recommended.    Results discussed with Dr. Rice by Dr. Nguyen at 12:58 PM 4/24/2021                    GENEVIEVE NGUYEN MD; Resident Radiology  This document has been electronically signed.  PRINCE HESTER MD; Attending Radiologist  This documenthas been electronically signed. Apr 24 2021  1:01PM    < end of copied text >    PHYSICAL EXAM:  Awake Alert, oriented to self only  Pupils: PERRL  Motor- Moving all extremities well        LABS:                          13.1   8.83  )-----------( 235      ( 24 Apr 2021 13:25 )             39.5       PT/INR - ( 24 Apr 2021 13:25 )   PT: 28.9 sec;   INR: 2.63 ratio         PTT - ( 24 Apr 2021 13:25 )  PTT:37.8 sec          RADIOLOGY & ADDITIONAL STUDIES:
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
## 2021-04-24 NOTE — H&P ADULT - PROBLEM SELECTOR PLAN 10
DVT: SCDs  Diet: Consistent carb  Dispo: likely [facility]    Transitions of Care Status:  1.  Name of PCP:  2.  PCP Contacted on Admission: [ ] Y    [ ] N    3.  PCP contacted at Discharge: [ ] Y    [ ] N    [ ] N/A  4.  Post-Discharge Appointment Date and Location:  5.  Summary of Handoff given to PCP:

## 2021-04-24 NOTE — ED PROVIDER NOTE - NSFOLLOWUPINSTRUCTIONS_ED_ALL_ED_FT
Per Rpt CT head in 4 hours. Hold Coumadin , no need for replacement - let drift down slowly. Hold Coumadin x 2 weeks until outpatient follow up. D/w Dr Rios You had a CAT scan of your brain which showed an "Intraventricular hemorrhage". The bleed was stable on the repeat CAT scan obtained today     You should STOP the Coumadin given the bleed for 2 weeks. You should then follow up with Dr Rios (Neurosurgeon) in 2 weeks. You may get repeat imaging at that time, and may have the Coumadin restarted at that time     Please return to the ER if you notice headache that is new, worse, different, worsening confusion, changes in strength or sensation, difficulty walking, or worsening agitation    please follow up with your doctor in 24-48 hours
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
## 2021-04-24 NOTE — H&P ADULT - PROBLEM SELECTOR PLAN 7
DVT: SCDs  Diet: Consistent carb  Dispo: likely [facility]    Transitions of Care Status:  1.  Name of PCP:  2.  PCP Contacted on Admission: [ ] Y    [ ] N    3.  PCP contacted at Discharge: [ ] Y    [ ] N    [ ] N/A  4.  Post-Discharge Appointment Date and Location:  5.  Summary of Handoff given to PCP: c/w levothyroxine

## 2021-04-24 NOTE — CONSULT NOTE ADULT - ASSESSMENT
88 yr old male w/ L IVH (likely subacute blood) possibly due to trauma 2 weeks ago   -Rpt CT head in 4 hours  -Hold Coumadin , INR is 1.4 now no need for replacement   -D/w Dr Rios  88 yr old male w/ L IVH (likely subacute blood) possibly due to trauma 2 weeks ago   -Rpt CT head in 4 hours  -Hold Coumadin , no need for replacement - let drift down slowly  -Hold Coumadin x 2 weeks until outpatient follow up  -D/w Dr Rios

## 2021-04-24 NOTE — H&P ADULT - NSICDXPASTMEDICALHX_GEN_ALL_CORE_FT
PAST MEDICAL HISTORY:  Aortic stenosis     Arthritis generalized medrol pack q 6 weeks    Atrial fibrillation     BPH (benign prostatic hypertrophy)     Congenital heart defect     Dementia     Diabetes mellitus     Glaucoma     HTN - Hypertension     Hyperlipidemia     Nonrheumatic aortic valve stenosis     OAB (overactive bladder)     Spinal stenosis of lumbar region     Torn rotator cuff Right

## 2021-04-24 NOTE — H&P ADULT - ASSESSMENT
89yo M with hx of BPH, overactive bladder, Afib, AS s/p TAVR 2017, CAD, HTN, Dementia, DM, hypothyroidism presents with 1 week of agitation admitted for intraventricular bleed left occipital in the setting of recent fall 1 week ago.

## 2021-04-24 NOTE — H&P ADULT - HISTORY OF PRESENT ILLNESS
89 yo M with hx of BPH, overactive bladder, Afib, AS s/p TAVR 2017, CAD, HTN, Dementia, DM, hypothyroidism     Pharmacy [facility] [phone]  HCP: Brandi Mcguire 89yo M with hx of BPH, overactive bladder, Afib, AS s/p TAVR 2017, CAD, HTN, Dementia, DM, hypothyroidism presents from [facility] for agitation. Spoke with overnight staff [phone] who reported that patient was recently discharged last week after a fall with right hip pain, no LOC Patient in ED 4/12 with negative CT head and discharged to [facility]. Since his discharge, patient has been more confused and agitated. PCP was concerned for UTI due to his agitation but unable to obtain urine sample. No reports of fevers. Today, patient's agitation was worse and EMS was called. Patient did not require medications to calm him down. At baseline, patient is pleasantly confused (AOx1) and no recent change in medications.   Baseline, more confusion unable test urine.    Patient seen and examined at bedside. AOx1 (name) and pleasantly conversant but unable to recall events. In addition, he intermittently answers questions. Denies any pain or blurry vision    HCP: Brandi Mcguire
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
## 2021-04-24 NOTE — H&P ADULT - PROBLEM SELECTOR PLAN 2
worsening agitation over 1 week after discharge. Baseline pleasantly confused. Likely derlium vs ICH. Less likely infectious, no fevers or leukocytosis  -bladder scan q8   -order UA with culture  -reorient worsening agitation over 1 week after discharge. Baseline pleasantly confused. Likely delirium vs ICH. Less likely infectious, no fevers or leukocytosis  -bladder scan q8   -order UA with culture  -reorient

## 2021-04-24 NOTE — H&P ADULT - NSHPREVIEWOFSYSTEMS_GEN_ALL_CORE
Unable to assess due to mental status Unable to assess due to mental status, not answering ROS questions appropriately

## 2021-04-24 NOTE — H&P ADULT - NSHPOUTPATIENTPROVIDERS_GEN_ALL_CORE
PCP Dr. Joe Louise [phone] PCP Dr. Joe Louise [phone]  Neuro - Dr. Sushant Angelo  Cards - Dr. Prashanth Prakash  Uro - Dr. Tre Jaimes
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
## 2021-04-24 NOTE — H&P ADULT - NSHPLABSRESULTS_GEN_ALL_CORE
Personally reviewed labs, imaging, ekg                           13.1   8.83  )-----------( 235      ( 24 Apr 2021 13:25 )             39.5       04-24    138  |  106  |  26<H>  ----------------------------<  200<H>  4.1   |  22  |  0.79    Ca    9.7      24 Apr 2021 13:25    TPro  7.0  /  Alb  4.1  /  TBili  1.4<H>  /  DBili  x   /  AST  19  /  ALT  15  /  AlkPhos  61  04-24                  PT/INR - ( 24 Apr 2021 13:25 )   PT: 28.9 sec;   INR: 2.63 ratio         PTT - ( 24 Apr 2021 13:25 )  PTT:37.8 sec    Lactate Trend      CARDIAC MARKERS ( 24 Apr 2021 13:25 )  x     / x     / 36 U/L / x     / x            CAPILLARY BLOOD GLUCOSE      POCT Blood Glucose.: 236 mg/dL (24 Apr 2021 11:03)    EKG: Afib    < from: CT Head No Cont (04.24.21 @ 17:01) >    EXAM:  CT BRAIN        PROCEDURE DATE:  Apr 24 2021         INTERPRETATION:  CLINICAL INDICATION: Intraventricular hemorrhage follow-up exam.      TECHNIQUE: CT of the head was performed without the administration of intravenous contrast.    COMPARISON: Head CT from 4/24/2021, 12:37 PM; 4/12/2021, 1/4/2021.    FINDINGS:    Similar-appearing layering of blood products in left occipital horn. Size of the ventricles is unchanged. No mass effect or midline shift. No intraparenchymal hemorrhage. Cerebral volume loss is appropriate for patient's stated age. Moderate patchy hypoattenuation within the white matter likely representing chronic ischemic microvascular changes. Visualized bilateral paranasal sinuses and mastoid air cells are clear. No displaced calvarial fracture.  Right-sided cataract surgery.    IMPRESSION:    Small remaining intraventricular hemorrhage, unchanged since 4/24/2021, 12:56 PM exam.        < end of copied text > Personally reviewed labs, imaging, ekg                           13.1   8.83  )-----------( 235      ( 24 Apr 2021 13:25 )             39.5     04-24    138  |  106  |  26<H>  ----------------------------<  200<H>  4.1   |  22  |  0.79    Ca    9.7      24 Apr 2021 13:25    TPro  7.0  /  Alb  4.1  /  TBili  1.4<H>  /  DBili  x   /  AST  19  /  ALT  15  /  AlkPhos  61  04-24        PT/INR - ( 24 Apr 2021 13:25 )   PT: 28.9 sec;   INR: 2.63 ratio    PTT - ( 24 Apr 2021 13:25 )  PTT:37.8 sec    Digoxin Level, Serum: 1.2 ng/mL (04.24.21 @ 13:25)    Creatine Kinase, Serum: 36 U/L (04.24.21 @ 13:25)    CAPILLARY BLOOD GLUCOSE  POCT Blood Glucose.: 236 mg/dL (24 Apr 2021 11:03)    < from: CT Head No Cont (04.24.21 @ 17:01) >  Similar-appearing layering of blood products in left occipital horn. Size of the ventricles is unchanged. No mass effect or midline shift. No intraparenchymal hemorrhage. Cerebral volume loss is appropriate for patient's stated age. Moderate patchy hypoattenuation within the white matter likely representing chronic ischemic microvascular changes. Visualized bilateral paranasal sinuses and mastoid air cells are clear. No displaced calvarial fracture. Right-sided cataract surgery.  IMPRESSION: Small remaining intraventricular hemorrhage, unchanged since 4/24/2021, 12:56 PM exam.  < end of copied text >    < from: Xray Hip 2-3 Views, Right (04.24.21 @ 11:50) >  There is no evidence of acute fracture or dislocation. The joint spaces are maintained. Generalized osteopenia The soft tissues are unremarkable.   IMPRESSION: No evidence of acute fracture or dislocation.  < end of copied text >    < from: Xray Chest 2 Views PA/Lat (04.24.21 @ 11:51) >  The lungs are clear. The heart is not enlarged and there is no effusion or pneumothorax. No acute fractures. TAVR has been performed.  IMPRESSION:  Clear lungs.  < end of copied text >    EKG personally reviewed - 87bpm Afib, TWI aVL; biphasic T in 1 and V6; QTc 413ms

## 2021-04-24 NOTE — H&P ADULT - NSICDXPASTSURGICALHX_GEN_ALL_CORE_FT
PAST SURGICAL HISTORY:  History of prior ablation treatment cardiac    Hx of appendectomy age 17    S/P cataract surgery     S/P lumbar laminectomy 2013    S/P TURP (status post transurethral resection of prostate) 2008

## 2021-04-24 NOTE — H&P ADULT - PROBLEM SELECTOR PLAN 1
Recent Fall 4/12 with initial CT negative on coumadin. New intraventricular hemorrhage occipital region on CT 4/24. Repeat CTH stable  -neurosurgery recs appreciated: no intervention  -per neurosurgery hold coumadin for 2 weeks (risk of thrombus given TAVR)

## 2021-04-24 NOTE — H&P ADULT - NSHPSOCIALHISTORY_GEN_ALL_CORE
Lives at [facility]  No alcohol use. Non smoker    Uses a cane and rolling walker to ambulate  Baseline mental status at least AOx1 (name) Lives at [facility]  No alcohol use. Non smoker    Uses a cane and rolling walker to ambulate  Baseline mental status at least AOx1 (name)    HCP/Emergency contact - Friend, Brandi Mcguire  [phone]

## 2021-04-24 NOTE — H&P ADULT - NSHPPHYSICALEXAM_GEN_ALL_CORE
Vital Signs Last 24 Hrs  T(C): 36.6 (24 Apr 2021 22:26), Max: 37.3 (24 Apr 2021 12:11)  T(F): 97.8 (24 Apr 2021 22:26), Max: 99.2 (24 Apr 2021 12:11)  HR: 90 (24 Apr 2021 22:26) (85 - 95)  BP: 100/75 (24 Apr 2021 22:26) (100/75 - 142/55)  BP(mean): --  RR: 18 (24 Apr 2021 22:26) (16 - 18)  SpO2: 100% (24 Apr 2021 22:26) (98% - 100%)    PHYSICAL EXAM  GENERAL: NAD, lying comfortably in bed   HEAD:  Atraumatic, Normocephalic  EYES: PERRLA b/l, conjunctiva and sclera clear, difficulty to assess extra ocular movement   NECK: Supple, No LAD   CHEST/LUNG: Clear to auscultation bilaterally; No wheeze or ronchi  HEART: irregularly irregular; S1 and S2 present, No murmurs, rubs, or gallops  ABDOMEN: Soft, Nontender, Nondistended; Bowel sounds present  EXTREMITIES:  2+ Peripheral Pulses, No edema. No tenderness on bilateral hips   NEURO: AAOx1, non-focal, 5/5 motor strength all extremities  SKIN: No rashes or lesions Vital Signs Last 24 Hrs  T(C): 36.6 (24 Apr 2021 22:26), Max: 37.3 (24 Apr 2021 12:11)  T(F): 97.8 (24 Apr 2021 22:26), Max: 99.2 (24 Apr 2021 12:11)  HR: 90 (24 Apr 2021 22:26) (85 - 95)  BP: 100/75 (24 Apr 2021 22:26) (100/75 - 142/55)  BP(mean): --  RR: 18 (24 Apr 2021 22:26) (16 - 18)  SpO2: 100% (24 Apr 2021 22:26) (98% - 100%)    PHYSICAL EXAM  GENERAL: NAD, lying comfortably in bed   HEAD:  Atraumatic, Normocephalic  EYES: PERRLA b/l, conjunctiva and sclera clear, EOMI  NECK: Supple, No LAD   CHEST/LUNG: Clear to auscultation bilaterally; No wheeze or rhonchi  HEART: irregularly irregular; S1 and S2 present, No murmurs, rubs, or gallops  ABDOMEN: Soft, Nontender, Nondistended; Bowel sounds present  EXTREMITIES:  2+ Peripheral Pulses, No edema. No tenderness on bilateral hips   NEURO: AAOx1, non-focal, 5/5 motor strength all extremities  PSYCH: clam and cooperative  SKIN: No rashes or lesions

## 2021-04-24 NOTE — H&P ADULT - PROBLEM SELECTOR PLAN 5
How Severe Are Your Spot(S)?: mild Have Your Spot(S) Been Treated In The Past?: has not been treated Hpi Title: Evaluation of Skin Lesions -cw with flomax  -bladder scan -cw with flomax  -bladder scan  -cw with m

## 2021-04-24 NOTE — ED PROVIDER NOTE - PHYSICAL EXAMINATION
ATTENDING PHYSICAL EXAM  GEN - No respiratory distress.  Awake and alert.  Looks at examiner.  Answers to name and give .  Does not know place or time.  RR 12 reg.  O2 99%RA.  HEAD - NC/AT, no scalp hematoma or evidence head trauma; EYES/NOSE - PERRL, EOMI, mucous membranes moist, no discharge; THROAT: Oral cavity and pharynx normal. No inflammation, swelling, exudate, or lesions  NECK: Neck supple, non-tender without lymphadenopathy, no masses, no JVD  PULMONARY - CTA b/l, symmetric breath sounds, no w/r/r  CARDIAC -s1s2, irreg irreg, no M,R,G appreciated  ABDOMEN - +NABS, ND, NT, soft, no guarding, no rebound.  No pelvic instability.  BACK - no CVA tenderness, No vertebral or paravertebral tenderness  EXTREMITIES - B/L upper extremities without evidence of trauma, no ecchymosis or deformity.  FROM throughout.  Distal PMS fully intact.  Right hip with ecchymosis overlying lateral aspect.  No apparent deformity or leg shortening.  Hip with FROM.  Rest of RLE without evidence of trauma.  Left hip and LE without evidence of trauma.  Motor fully intact throughout.    NEUROLOGIC - alert, CN 2-12 intact, extremity motor exam as above.  Soft touch sensation intact throughout.

## 2021-04-24 NOTE — H&P ADULT - PROBLEM SELECTOR PLAN 4
hold coumadin, monitor INR daily in setting of ICH  -cw with metoprolol 100mg BID  -digoxin 125 daily    #AS s/p TAVR 2017  -hold coumadin given ICH

## 2021-04-24 NOTE — H&P ADULT - ATTENDING COMMENTS
Agree with resident H&P and plan as edited above.     88M w/BPH/OAB, constipation, glaucoma, Afib on warfarin, AS s/p TAVR, CAD, HTN, depression, dementia, NIDDM2, hypothyroid, presenting from NH s/p fall 1 week ago and agitation, found to have IVH on CTH. No focal neuro deficits. Seen by NSx, recs appreciated. Patient pleasantly confused, used [facility]  number documented above (patient speaks some English as well), patient without complaint, not answering questions appropriately, when asked his name he told me, "Eisenhower." Occasionally pointing to far end of room.

## 2021-04-25 NOTE — CONSULT NOTE ADULT - SUBJECTIVE AND OBJECTIVE BOX
Cardiovascular Disease Initial Evaluation    CHIEF COMPLAINT: Agitation    HISTORY OF PRESENT ILLNESS:  This is an 88 year old man with a-fib, aortic stenosis s/p TAVR 2017, CAD, and HTN who presented to [facility] on 4/24/2021 from [facility] for agitation.  The staff reported that the patient was recently discharged last week after a fall with right hip pain, no LOC. Patient in ED 4/12 with negative CT head and discharged to [facility].  The patient's agitation was worse and EMS was called.   Mr. Painter was found to have an intracranial hemorrhage.   Currently he is laying flat and comfortably in bed in no distress.         Allergies  No Known Allergies      MEDICATIONS:  digoxin     Tablet 125 MICROGram(s) Oral daily  metoprolol tartrate 100 milliGRAM(s) Oral two times a day  tamsulosin 0.4 milliGRAM(s) Oral at bedtime  PARoxetine 20 milliGRAM(s) Oral daily    polyethylene glycol 3350 17 Gram(s) Oral daily  senna 2 Tablet(s) Oral at bedtime    atorvastatin 10 milliGRAM(s) Oral at bedtime  dextrose 40% Gel 15 Gram(s) Oral once  dextrose 50% Injectable 25 Gram(s) IV Push once  dextrose 50% Injectable 12.5 Gram(s) IV Push once  dextrose 50% Injectable 25 Gram(s) IV Push once  glucagon  Injectable 1 milliGRAM(s) IntraMuscular once  insulin lispro (ADMELOG) corrective regimen sliding scale   SubCutaneous three times a day before meals  insulin lispro (ADMELOG) corrective regimen sliding scale   SubCutaneous at bedtime  levothyroxine 50 MICROGram(s) Oral daily    dextrose 5%. 1000 milliLiter(s) IV Continuous <Continuous>  dextrose 5%. 1000 milliLiter(s) IV Continuous <Continuous>  latanoprost 0.005% Ophthalmic Solution 1 Drop(s) Right EYE at bedtime  mirabegron ER 25 milliGRAM(s) Oral <User Schedule>      PAST MEDICAL & SURGICAL HISTORY:  BPH (benign prostatic hypertrophy)    HTN - Hypertension    Hyperlipidemia    Congenital heart defect    Diabetes mellitus    Torn rotator cuff  Right    Spinal stenosis of lumbar region    Atrial fibrillation    Nonrheumatic aortic valve stenosis    Dementia    Aortic stenosis    Glaucoma    OAB (overactive bladder)    Arthritis  generalized medrol pack q 6 weeks    S/P TURP (status post transurethral resection of prostate)  2008    Hx of appendectomy  age 17    S/P lumbar laminectomy  2013    S/P cataract surgery    History of prior ablation treatment  cardiac        FAMILY HISTORY:  No pertinent family history in first degree relatives        SOCIAL HISTORY:    The patient is a nonsmoker       REVIEW OF SYSTEMS:  See HPI, otherwise complete 14 point review of systems negative      PHYSICAL EXAM:  T(C): 36.2 (04-25-21 @ 06:28), Max: 37.3 (04-24-21 @ 12:11)  HR: 99 (04-25-21 @ 06:28) (85 - 100)  BP: 127/66 (04-25-21 @ 06:28) (100/75 - 151/85)  RR: 18 (04-25-21 @ 06:28) (16 - 18)  SpO2: 99% (04-25-21 @ 06:28) (98% - 100%)  Wt(kg): --  I&O's Summary      Appearance: No Acute Distress; resting comfortably  HEENT:  Normal oral mucosa, PERRL, EOMI	  Cardiovascular: Normal S1 S2, No JVD, No murmurs/rubs/gallops  Respiratory: Normal respiratory effort; Lungs clear to auscultation bilaterally  Gastrointestinal:  Soft, Non-tender, + BS	  Skin: No rashes, No ecchymoses, No cyanosis	  Neurologic: Non-focal; no weakness  Extremities: No clubbing, cyanosis or edema  Vascular: Peripheral pulses palpable 2+ bilaterally  Psychiatry: Alert    Laboratory Data:	 	    CBC Full  -  ( 25 Apr 2021 09:20 )  WBC Count : 9.11 K/uL  Hemoglobin : 13.1 g/dL  Hematocrit : 37.9 %  Platelet Count - Automated : 223 K/uL  Mean Cell Volume : 98.7 fL  Mean Cell Hemoglobin : 34.1 pg  Mean Cell Hemoglobin Concentration : 34.6 gm/dL  Auto Neutrophil # : 5.97 K/uL  Auto Lymphocyte # : 1.79 K/uL  Auto Monocyte # : 0.97 K/uL  Auto Eosinophil # : 0.26 K/uL  Auto Basophil # : 0.09 K/uL  Auto Neutrophil % : 65.6 %  Auto Lymphocyte % : 19.6 %  Auto Monocyte % : 10.6 %  Auto Eosinophil % : 2.9 %  Auto Basophil % : 1.0 %    04-24    138  |  106  |  26<H>  ----------------------------<  200<H>  4.1   |  22  |  0.79    Ca    9.7      24 Apr 2021 13:25    TPro  7.0  /  Alb  4.1  /  TBili  1.4<H>  /  DBili  x   /  AST  19  /  ALT  15  /  AlkPhos  61  04-24    Interpretation of Telemetry: n/a	    ECG:  	A-fib      Assessment: 88 year old man with a-fib, aortic stenosis s/p TAVR 2017, CAD, and HTN presents with intracranial hemorrhage.     Plan of Care:    #Atrial Fibrillation-  Rate controlled on Lopressor and Digoxin.   I agree with holding anticoagulation in the setting of ICH.    #Aortic Stenosis-  S/P JOSIAH.  No significant murmur noted on exam.     #HTN-  BP controlled on current regimen.    #ACP (advance care planning)-  Advanced care planning was addressed.   No plan for invasive cardiac procedures given advanced dementia.     52 minutes spent on total encounter; more than 50% of the visit was spent counseling and/or coordinating care by the attending physician.   	  Ricky Davila MD [facility]  Cardiovascular Diseases  [phone]

## 2021-04-25 NOTE — PATIENT PROFILE ADULT - PUBLIC BENEFITS
Admit navigator done in the ED.  
Ambulating in hallway steady gait present. Denies pain at this time  
Discharged at this time.  
Patient transported back to unit, no apparent distress, pain or SOB.  
Patient transported to US via wheelchair, no apparent distress, pain or distress noted.   
Retroperitoneal US results called to Dr. Proctor.  
Rocephin infusing as ordered. Denies pain or discomfort  
no

## 2021-04-26 NOTE — PROGRESS NOTE ADULT - ASSESSMENT
87yo M with hx of BPH, overactive bladder, Afib, AS s/p TAVR 2017, CAD, HTN, Dementia, DM, hypothyroidism presents with 1 week of agitation admitted for intraventricular bleed left occipital in the setting of recent fall 1 week ago.      Problem/Plan - 1:  ·  Problem: Intraventricular hemorrhage.  Plan: Recent Fall 4/12 with initial CT negative on coumadin. New intraventricular hemorrhage occipital region on CT 4/24.   Repeat CTH stable and rpting today .  -neurosurgery recs appreciated: no intervention  -per neurosurgery hold coumadin for 2 weeks (risk of thrombus given TAVR).      Problem/Plan - 2:  ·  Problem: Agitation.  Plan: worsening agitation over 1 week after discharge. Baseline pleasantly confused. Likely delirium vs ICH. Less likely infectious, no fevers or leukocytosis  -bladder scan q8   -order UA with culture  -reorient.     Problem/Plan - 3:  ·  Problem: Dementia.  Plan: -cw home paroxetine for depression   -reorient.      Problem/Plan - 4:  ·  Problem: Atrial fibrillation.  Plan: hold coumadin, monitor INR daily in setting of ICH  -cw with metoprolol 100mg BID  -digoxin 125 daily    #AS s/p TAVR 2017  -hold coumadin given ICH.   cardiology consult noted.      Problem/Plan - 5:  ·  Problem: BPH (benign prostatic hyperplasia).  Plan: -cw with flomax  -bladder scan  -cw with m.      Problem/Plan - 6:  Problem: Diabetes mellitus. Plan: NIDDM2  -hold metformin  -SSI and FS  -check A1c.     Problem/Plan - 7:  ·  Problem: Hypothyroid. Plan: c/w levothyroxine.     Problem/Plan - 8:  ·  Problem: Glaucoma.  Plan: c/w eye gtt.      Problem/Plan - 9:  ·  Problem: CAD (coronary artery disease).  Plan: c/w statin, BB.  warfarin and APT on hold in setting of IVH, would f/u with NSX when to restart ASA.      Problem/Plan - 10:  Problem: Preventive measure. Plan; DVT: SCDs  Diet: Consistent carb  Dispo: likely [facility]      Disposition : DC planning back to [facility] tomorrow if rpt CT head stable. D/W wife in detail. 
89yo M with hx of BPH, overactive bladder, Afib, AS s/p TAVR 2017, CAD, HTN, Dementia, DM, hypothyroidism presents with 1 week of agitation admitted for intraventricular bleed left occipital in the setting of recent fall 1 week ago.      Problem/Plan - 1:  ·  Problem: Intraventricular hemorrhage.  Plan: Recent Fall 4/12 with initial CT negative on coumadin. New intraventricular hemorrhage occipital region on CT 4/24.   Repeat CTH stable  -neurosurgery recs appreciated: no intervention  -per neurosurgery hold coumadin for 2 weeks (risk of thrombus given TAVR).      Problem/Plan - 2:  ·  Problem: Agitation.  Plan: worsening agitation over 1 week after discharge. Baseline pleasantly confused. Likely delirium vs ICH. Less likely infectious, no fevers or leukocytosis  -bladder scan q8   -order UA with culture  -reorient.     Problem/Plan - 3:  ·  Problem: Dementia.  Plan: -cw home paroxetine for depression   -reorient.      Problem/Plan - 4:  ·  Problem: Atrial fibrillation.  Plan: hold coumadin, monitor INR daily in setting of ICH  -cw with metoprolol 100mg BID  -digoxin 125 daily    #AS s/p TAVR 2017  -hold coumadin given ICH.   cardiology consult noted.      Problem/Plan - 5:  ·  Problem: BPH (benign prostatic hyperplasia).  Plan: -cw with flomax  -bladder scan  -cw with m.      Problem/Plan - 6:  Problem: Diabetes mellitus. Plan: NIDDM2  -hold metformin  -SSI and FS  -check A1c.     Problem/Plan - 7:  ·  Problem: Hypothyroid. Plan: c/w levothyroxine.     Problem/Plan - 8:  ·  Problem: Glaucoma.  Plan: c/w eye gtt.      Problem/Plan - 9:  ·  Problem: CAD (coronary artery disease).  Plan: c/w statin, BB.  warfarin and APT on hold in setting of IVH, would f/u with NSX when to restart ASA.      Problem/Plan - 10:  Problem: Preventive measure. Plan; DVT: SCDs  Diet: Consistent carb  Dispo: likely [facility]

## 2021-04-27 NOTE — DISCHARGE NOTE PROVIDER - NSDCCPCAREPLAN_GEN_ALL_CORE_FT
PRINCIPAL DISCHARGE DIAGNOSIS  Diagnosis: Intraventricular hemorrhage  Assessment and Plan of Treatment: You had a new intraventricular hemorrhage in occipital region on CT 4/24. Your repeat CT scan 4/26 showed: No significant interval change compared with the prior. Trace intraventricular hemorrhage layering in the bilateral occipital horns of the lateral ventricle. No change in the ventricular size compared with the prior. Per neuro surgery on 4/27, should hold ALL anticoagulation including antiplatelets until follow up with dr. turner in office and cleared to resume taking these medications by Dr. Turner neurosurgeon. Patient should follow up within 1 week post discharge, please call for an appointment.  Your coumadin and aspirin was DISCONTNIUED - do not take until cleared to do so by neuro surgery as outpatient.      SECONDARY DISCHARGE DIAGNOSES  Diagnosis: Dementia  Assessment and Plan of Treatment: You were noted with worsening Agitation which is now resolved.  Continue with home paroxetine for depression. Follow up with your outpatient provider for further management.    Diagnosis: Atrial fibrillation  Assessment and Plan of Treatment: Your coumadin is DISCONTINUED - do not resume taking until cleared to do so by neuro surgery doctor as outpatient. Contniue with your metoprolol and digoxin as prescribed.  You will need outpatient follow up with cardiology upon discharge, please call for an appointment.    Diagnosis: BPH (benign prostatic hyperplasia)  Assessment and Plan of Treatment: Continue flomax and mirabegron as prescribed. Outpatient follow up as needed. Monitor urine output at [facility].  Follow up with your primary care provider upon discharge.    Diagnosis: Diabetes mellitus  Assessment and Plan of Treatment: Continue your medication regimen and a consistent carbohydrate diet (Meaning eating the same amount of carbohydrates at the same time each day). Monitor blood glucose levels throughout the day before meals and at bedtime. Record blood sugars and bring to outpatient providers appointment in order to be reviewed by your doctor for management modifications. If your sugars are more than 400 or less than 70 you should contact your PCP immediately. Monitor for signs/symptoms of low blood glucose, such as, dizziness, altered mental status, or cool/clammy skin. In addition, monitor for signs/symptoms of high blood glucose, such as, feeling hot, dry, fatigued, or with increased thirst/urination. Make regular podiatry appointments in order to have feet checked for wounds and uncontrolled toe nail growth to prevent infections, as well as, appointments with an ophthalmologist to monitor your vision.    Diagnosis: CAD (coronary artery disease)  Assessment and Plan of Treatment: Continue with your statin, betablocker upon discharge. Your Aspirin was DISCONTINUED - do not resume taking until cleared to do so by neuro surgery. Outpatient follow up with cardiology advised.    Diagnosis: AS (aortic stenosis)  Assessment and Plan of Treatment: s/p TAVR 2017: Coumadin discontinued as above. Follow up with cardiology as outpatient.

## 2021-04-27 NOTE — DISCHARGE NOTE PROVIDER - NSDCMRMEDTOKEN_GEN_ALL_CORE_FT
digoxin 125 mcg (0.125 mg) oral tablet: 1 tab(s) orally once a day  latanoprost 0.005% ophthalmic solution: 1 drop(s) to each affected eye once a day (in the evening) right eye  levothyroxine 50 mcg (0.05 mg) oral tablet: 1 tab(s) orally once a day  lovastatin 40 mg oral tablet: 1 tab(s) orally once a day (at bedtime)  metFORMIN 500 mg oral tablet: 1 tab(s) orally 2 times a day  metoprolol tartrate 100 mg oral tablet: 1 tab(s) orally 2 times a day  MiraLax oral powder for reconstitution: 1 packet(s) orally once a day, As Needed  Myrbetriq 25 mg oral tablet, extended release: 1 tab(s) orally once a day PM  PARoxetine 20 mg oral tablet: 1 tab(s) orally once a day  senna oral tablet: 2 tab(s) orally once a day (at bedtime)  tamsulosin 0.4 mg oral capsule: 1 cap(s) orally once a day (at bedtime)

## 2021-04-27 NOTE — DISCHARGE NOTE PROVIDER - CARE PROVIDER_API CALL
Tom Rios)  Neurosurgery  General  [address], [address]  [place] [address]  Phone: [phone]  Fax: [phone]  Follow Up Time:     Ricky Davila)  Internal Medicine  [address] [place] [address]  Phone: [phone]  Fax: [phone]  Follow Up Time:

## 2021-04-27 NOTE — DISCHARGE NOTE PROVIDER - NSFOLLOWUPCLINICS_GEN_ALL_ED_FT
[facility] [place]  Internal Medicine  [address] [place] [address]  Phone: [phone]  Fax: [phone]

## 2021-04-27 NOTE — DISCHARGE NOTE PROVIDER - HOSPITAL COURSE
89yo M with hx of BPH, overactive bladder, Afib, AS s/p TAVR 2017, CAD, HTN, Dementia, DM, hypothyroidism presents with 1 week of agitation admitted for intraventricular bleed left occipital in the setting of recent fall 1 week ago.     Intraventricular hemorrhage:  - Recent Fall 4/12 with initial CT negative on coumadin. New intraventricular hemorrhage occipital region on CT 4/24.   - Repeat CTH stable x 2 and neuro surgery recs appreciated   - CT scan 4/26: No significant interval change compared with the prior. Trace intraventricular hemorrhage layering in the bilateral occipital horns of the lateral ventricle. No change in the ventricular size compared with the prior.  - As per my discussion with neuro surgery on day of discharge, patient is cleared for discharge from neurosurgical perspective  - Per neuro surgery on 4/27: Repeat CTH yesterday read as stable. Patient seen w dr. turner. Should hold all full dose AC until follow up with dr. turner in office. Patient should follow up 1 week post discharge.   - Per neuro surgery - hold ALL AC/antiplatelets - NO coumadin or asa on discharge until cleared by neuro surgery as outpatient   - Follow up with neuro surgery within 1 week as above     Agitation:  - worsening agitation over 1 week  - Baseline pleasantly confused. Likely delirium vs ICH. Less likely infectious, no fevers or leukocytosis  - resolved  - stable for DC back to [facility] with outpatient follow up    Dementia:  -cw home paroxetine for depression   -reorient as needed     Atrial fibrillation:  - Holding coumadin as above until cleared to restart by neuro surgery as outpatient   - cw with metoprolol 100mg BID  - digoxin 125 daily  - outpatient follow up with cardiology upon discharge     AS s/p TAVR 2017  - hold coumadin given ICH.   - cardiology consult noted, outpatient follow up     BPH:  -continue flomax  -continue mirabegron   -outpatient follow up    Diabetes mellitus:  - On sliding scale insulin in hospital  - monitor FS  - Resume home metformin upon discharge     Hypothyroid:  - c/w levothyroxine.    Glaucoma:  - c/w home eye gtt.     CAD:  - c/w statin, BB.  HOLD ASA per neuro surgery until cleared to resume by neuro surgery     Dispo back to [facility]    On 4/27/21 this case was reviewed with Dr. Cabrera, the patient is medically stable and optimized for discharge. All medications were reviewed and prescriptions were sent to mutually agreed upon pharmacy.

## 2021-04-27 NOTE — PROGRESS NOTE ADULT - SUBJECTIVE AND OBJECTIVE BOX
Cardiovascular Disease Progress Note    Overnight events: No acute events overnight. Mr. Painter is laying flat and comfortably in no distress.     Otherwise review of systems negative    Objective Findings:  T(C): 36.3 (04-25-21 @ 22:04), Max: 36.7 (04-25-21 @ 09:15)  HR: 92 (04-25-21 @ 22:04) (68 - 99)  BP: 131/73 (04-25-21 @ 22:04) (127/66 - 148/81)  RR: 18 (04-25-21 @ 22:04) (18 - 18)  SpO2: 98% (04-25-21 @ 22:04) (98% - 100%)  Wt(kg): --  Daily     Daily       Physical Exam:  Gen: NAD; Patient resting comfortably  HEENT: EOMI, Normocephalic/ atraumatic  CV: RRR, normal S1 + S2, no m/r/g  Lungs:  Normal respiratory effort; clear to auscultation bilaterally  Abd: soft, non-tender; bowel sounds present  Ext: No edema; warm and well perfused    Telemetry: n/a    Laboratory Data:                        13.1   9.11  )-----------( 223      ( 25 Apr 2021 09:20 )             37.9     04-25    139  |  105  |  22  ----------------------------<  136<H>  3.9   |  21<L>  |  0.67    Ca    9.1      25 Apr 2021 09:20  Phos  2.3     04-25  Mg     1.8     04-25    TPro  6.7  /  Alb  3.9  /  TBili  2.3<H>  /  DBili  x   /  AST  16  /  ALT  14  /  AlkPhos  59  04-25    PT/INR - ( 25 Apr 2021 09:20 )   PT: 27.5 sec;   INR: 2.50 ratio         PTT - ( 25 Apr 2021 09:20 )  PTT:37.0 sec  CARDIAC MARKERS ( 24 Apr 2021 13:25 )  x     / x     / 36 U/L / x     / x              Inpatient Medications:  MEDICATIONS  (STANDING):  atorvastatin 10 milliGRAM(s) Oral at bedtime  dextrose 40% Gel 15 Gram(s) Oral once  dextrose 5%. 1000 milliLiter(s) (50 mL/Hr) IV Continuous <Continuous>  dextrose 5%. 1000 milliLiter(s) (100 mL/Hr) IV Continuous <Continuous>  dextrose 50% Injectable 25 Gram(s) IV Push once  dextrose 50% Injectable 12.5 Gram(s) IV Push once  dextrose 50% Injectable 25 Gram(s) IV Push once  digoxin     Tablet 125 MICROGram(s) Oral daily  glucagon  Injectable 1 milliGRAM(s) IntraMuscular once  insulin lispro (ADMELOG) corrective regimen sliding scale   SubCutaneous three times a day before meals  insulin lispro (ADMELOG) corrective regimen sliding scale   SubCutaneous at bedtime  latanoprost 0.005% Ophthalmic Solution 1 Drop(s) Right EYE at bedtime  levothyroxine 50 MICROGram(s) Oral daily  metoprolol tartrate 100 milliGRAM(s) Oral two times a day  mirabegron ER 25 milliGRAM(s) Oral <User Schedule>  PARoxetine 20 milliGRAM(s) Oral daily  polyethylene glycol 3350 17 Gram(s) Oral daily  senna 2 Tablet(s) Oral at bedtime  tamsulosin 0.4 milliGRAM(s) Oral at bedtime      Assessment: 88 year old man with a-fib, aortic stenosis s/p TAVR 2017, CAD, and HTN presents with intracranial hemorrhage.     Plan of Care:    #Atrial Fibrillation-  Rate controlled on Lopressor and Digoxin.   I agree with holding anticoagulation in the setting of ICH.    #Aortic Stenosis-  S/P JOSIAH.  No significant murmur noted on exam.   Continue current cardiac management.     #HTN-  BP controlled on current regimen.      Over 25 minutes spent on total encounter; more than 50% of the visit was spent counseling and/or coordinating care by the attending physician.      Ricky Davila MD [facility]  Cardiovascular Disease  [phone]
Date of Service  : 04-25-21     INTERVAL HPI/OVERNIGHT EVENTS:   Vital Signs Last 24 Hrs  T(C): 36.7 (25 Apr 2021 17:00), Max: 36.7 (25 Apr 2021 09:15)  T(F): 98 (25 Apr 2021 17:00), Max: 98 (25 Apr 2021 09:15)  HR: 68 (25 Apr 2021 17:00) (68 - 100)  BP: 146/74 (25 Apr 2021 17:00) (100/75 - 151/85)  BP(mean): --  RR: 18 (25 Apr 2021 17:00) (18 - 18)  SpO2: 98% (25 Apr 2021 17:00) (98% - 100%)  I&O's Summary    MEDICATIONS  (STANDING):  atorvastatin 10 milliGRAM(s) Oral at bedtime  dextrose 40% Gel 15 Gram(s) Oral once  dextrose 5%. 1000 milliLiter(s) (50 mL/Hr) IV Continuous <Continuous>  dextrose 5%. 1000 milliLiter(s) (100 mL/Hr) IV Continuous <Continuous>  dextrose 50% Injectable 25 Gram(s) IV Push once  dextrose 50% Injectable 12.5 Gram(s) IV Push once  dextrose 50% Injectable 25 Gram(s) IV Push once  digoxin     Tablet 125 MICROGram(s) Oral daily  glucagon  Injectable 1 milliGRAM(s) IntraMuscular once  insulin lispro (ADMELOG) corrective regimen sliding scale   SubCutaneous three times a day before meals  insulin lispro (ADMELOG) corrective regimen sliding scale   SubCutaneous at bedtime  latanoprost 0.005% Ophthalmic Solution 1 Drop(s) Right EYE at bedtime  levothyroxine 50 MICROGram(s) Oral daily  metoprolol tartrate 100 milliGRAM(s) Oral two times a day  mirabegron ER 25 milliGRAM(s) Oral <User Schedule>  PARoxetine 20 milliGRAM(s) Oral daily  polyethylene glycol 3350 17 Gram(s) Oral daily  senna 2 Tablet(s) Oral at bedtime  tamsulosin 0.4 milliGRAM(s) Oral at bedtime    MEDICATIONS  (PRN):    LABS:                        13.1   9.11  )-----------( 223      ( 25 Apr 2021 09:20 )             37.9     04-25    139  |  105  |  22  ----------------------------<  136<H>  3.9   |  21<L>  |  0.67    Ca    9.1      25 Apr 2021 09:20  Phos  2.3     04-25  Mg     1.8     04-25    TPro  6.7  /  Alb  3.9  /  TBili  2.3<H>  /  DBili  x   /  AST  16  /  ALT  14  /  AlkPhos  59  04-25    PT/INR - ( 25 Apr 2021 09:20 )   PT: 27.5 sec;   INR: 2.50 ratio         PTT - ( 25 Apr 2021 09:20 )  PTT:37.0 sec    CAPILLARY BLOOD GLUCOSE      POCT Blood Glucose.: 133 mg/dL (25 Apr 2021 17:22)  POCT Blood Glucose.: 131 mg/dL (25 Apr 2021 12:16)  POCT Blood Glucose.: 122 mg/dL (25 Apr 2021 08:54)  POCT Blood Glucose.: 121 mg/dL (24 Apr 2021 22:45)          REVIEW OF SYSTEMS:  CONSTITUTIONAL: No fever, weight loss, or fatigue  EYES: No eye pain, visual disturbances, or discharge  ENMT:  No difficulty hearing, tinnitus, vertigo; No sinus or throat pain  NECK: No pain or stiffness  RESPIRATORY: No cough, wheezing, chills or hemoptysis; No shortness of breath  CARDIOVASCULAR: No chest pain, palpitations, dizziness, or leg swelling  GASTROINTESTINAL: No abdominal or epigastric pain. No nausea, vomiting, or hematemesis; No diarrhea or constipation. No melena or hematochezia.  GENITOURINARY: No dysuria, frequency, hematuria, or incontinence  NEUROLOGICAL: No headaches, memory loss, loss of strength, numbness, or tremors  SKIN: No itching, burning, rashes, or lesions   ENDOCRINE: No heat or cold intolerance; No hair loss  MUSCULOSKELETAL: No joint pain or swelling; No muscle, back, or extremity pain  PSYCHIATRIC: No depression, anxiety, mood swings, or difficulty sleeping  HEME/LYMPH: No easy bruising, or bleeding gums  ALLERY AND IMMUNOLOGIC: No hives or eczema    RADIOLOGY & ADDITIONAL TESTS:    Consultant(s) Notes Reviewed:  [x ] YES  [ ] NO    PHYSICAL EXAM:  GENERAL: NAD, well-groomed, well-developed,not in any distress ,  HEAD:  Atraumatic, Normocephalic  EYES: EOMI, PERRLA, conjunctiva and sclera clear  ENMT: No tonsillar erythema, exudates, or enlargement; Moist mucous membranes, Good dentition, No lesions  NECK: Supple, No JVD, Normal thyroid  NERVOUS SYSTEM:  Alert & Oriented X3, No focal deficit   CHEST/LUNG: Good air entry bilateral with no  rales, rhonchi, wheezing, or rubs  HEART: Regular rate and rhythm; No murmurs, rubs, or gallops  ABDOMEN: Soft, Nontender, Nondistended; Bowel sounds present  EXTREMITIES:  2+ Peripheral Pulses, No clubbing, cyanosis, or edema  SKIN: No rashes or lesions    Care Discussed with Consultants/Other Providers [ x] YES  [ ] NO
Cardiovascular Disease Progress Note    Overnight events: No acute events overnight. Mr. Painter denies chest pain. He is in no apparent distress.    Otherwise review of systems negative    Objective Findings:  T(C): 36.7 (04-27-21 @ 06:58), Max: 36.7 (04-26-21 @ 21:20)  HR: 83 (04-27-21 @ 06:58) (83 - 96)  BP: 122/73 (04-27-21 @ 06:58) (114/63 - 146/65)  RR: 18 (04-27-21 @ 06:58) (16 - 18)  SpO2: 100% (04-27-21 @ 06:58) (100% - 100%)  Wt(kg): --  Daily     Daily       Physical Exam:  Gen: NAD; Patient resting comfortably  HEENT: EOMI, Normocephalic/ atraumatic  CV: RRR, normal S1 + S2, no m/r/g  Lungs:  Normal respiratory effort; clear to auscultation bilaterally  Abd: soft, non-tender; bowel sounds present  Ext: No edema; warm and well perfused    Telemetry: n/a    Laboratory Data:                        13.4   10.48 )-----------( 226      ( 26 Apr 2021 07:01 )             41.2     04-26    137  |  105  |  23  ----------------------------<  131<H>  3.8   |  21<L>  |  0.73    Ca    9.0      26 Apr 2021 07:01  Phos  2.3     04-25  Mg     1.8     04-25    TPro  7.2  /  Alb  4.0  /  TBili  2.6<H>  /  DBili  x   /  AST  19  /  ALT  12  /  AlkPhos  60  04-26    PT/INR - ( 26 Apr 2021 07:01 )   PT: 23.2 sec;   INR: 2.11 ratio         PTT - ( 26 Apr 2021 07:01 )  PTT:39.5 sec          Inpatient Medications:  MEDICATIONS  (STANDING):  atorvastatin 10 milliGRAM(s) Oral at bedtime  dextrose 40% Gel 15 Gram(s) Oral once  dextrose 5%. 1000 milliLiter(s) (50 mL/Hr) IV Continuous <Continuous>  dextrose 5%. 1000 milliLiter(s) (100 mL/Hr) IV Continuous <Continuous>  dextrose 50% Injectable 25 Gram(s) IV Push once  dextrose 50% Injectable 12.5 Gram(s) IV Push once  dextrose 50% Injectable 25 Gram(s) IV Push once  digoxin     Tablet 125 MICROGram(s) Oral daily  glucagon  Injectable 1 milliGRAM(s) IntraMuscular once  insulin lispro (ADMELOG) corrective regimen sliding scale   SubCutaneous three times a day before meals  insulin lispro (ADMELOG) corrective regimen sliding scale   SubCutaneous at bedtime  latanoprost 0.005% Ophthalmic Solution 1 Drop(s) Right EYE at bedtime  levothyroxine 50 MICROGram(s) Oral daily  metoprolol tartrate 100 milliGRAM(s) Oral two times a day  mirabegron ER 25 milliGRAM(s) Oral <User Schedule>  PARoxetine 20 milliGRAM(s) Oral daily  polyethylene glycol 3350 17 Gram(s) Oral daily  senna 2 Tablet(s) Oral at bedtime  tamsulosin 0.4 milliGRAM(s) Oral at bedtime      Assessment: 88 year old man with a-fib, aortic stenosis s/p TAVR 2017, CAD, and HTN presents with intracranial hemorrhage.     Plan of Care:    #Atrial Fibrillation-  Rate controlled on Lopressor and Digoxin.   I agree with holding anticoagulation in the setting of ICH.    #Aortic Stenosis-  S/P JOSIAH.  No significant murmur noted on exam.   Continue current cardiac management.     #HTN-  BP controlled on current regimen.    Over 25 minutes spent on total encounter; more than 50% of the visit was spent counseling and/or coordinating care by the attending physician.      Ricky Davila MD [facility]  Cardiovascular Disease  [phone]
Date of Service  : 04-26-21     INTERVAL HPI/OVERNIGHT EVENTS: No new concerns.   Vital Signs Last 24 Hrs  T(C): 36.4 (26 Apr 2021 12:40), Max: 36.7 (26 Apr 2021 06:26)  T(F): 97.6 (26 Apr 2021 12:40), Max: 98 (26 Apr 2021 06:26)  HR: 96 (26 Apr 2021 12:40) (82 - 96)  BP: 114/63 (26 Apr 2021 12:40) (114/61 - 131/73)  BP(mean): --  RR: 16 (26 Apr 2021 12:40) (16 - 18)  SpO2: 100% (26 Apr 2021 12:40) (98% - 100%)  I&O's Summary    MEDICATIONS  (STANDING):  atorvastatin 10 milliGRAM(s) Oral at bedtime  dextrose 40% Gel 15 Gram(s) Oral once  dextrose 5%. 1000 milliLiter(s) (50 mL/Hr) IV Continuous <Continuous>  dextrose 5%. 1000 milliLiter(s) (100 mL/Hr) IV Continuous <Continuous>  dextrose 50% Injectable 25 Gram(s) IV Push once  dextrose 50% Injectable 12.5 Gram(s) IV Push once  dextrose 50% Injectable 25 Gram(s) IV Push once  digoxin     Tablet 125 MICROGram(s) Oral daily  glucagon  Injectable 1 milliGRAM(s) IntraMuscular once  insulin lispro (ADMELOG) corrective regimen sliding scale   SubCutaneous three times a day before meals  insulin lispro (ADMELOG) corrective regimen sliding scale   SubCutaneous at bedtime  latanoprost 0.005% Ophthalmic Solution 1 Drop(s) Right EYE at bedtime  levothyroxine 50 MICROGram(s) Oral daily  metoprolol tartrate 100 milliGRAM(s) Oral two times a day  mirabegron ER 25 milliGRAM(s) Oral <User Schedule>  PARoxetine 20 milliGRAM(s) Oral daily  polyethylene glycol 3350 17 Gram(s) Oral daily  senna 2 Tablet(s) Oral at bedtime  tamsulosin 0.4 milliGRAM(s) Oral at bedtime    MEDICATIONS  (PRN):    LABS:                        13.4   10.48 )-----------( 226      ( 26 Apr 2021 07:01 )             41.2     04-26    137  |  105  |  23  ----------------------------<  131<H>  3.8   |  21<L>  |  0.73    Ca    9.0      26 Apr 2021 07:01  Phos  2.3     04-25  Mg     1.8     04-25    TPro  7.2  /  Alb  4.0  /  TBili  2.6<H>  /  DBili  x   /  AST  19  /  ALT  12  /  AlkPhos  60  04-26    PT/INR - ( 26 Apr 2021 07:01 )   PT: 23.2 sec;   INR: 2.11 ratio         PTT - ( 26 Apr 2021 07:01 )  PTT:39.5 sec    CAPILLARY BLOOD GLUCOSE      POCT Blood Glucose.: 132 mg/dL (26 Apr 2021 17:53)  POCT Blood Glucose.: 154 mg/dL (26 Apr 2021 12:25)  POCT Blood Glucose.: 140 mg/dL (26 Apr 2021 08:23)  POCT Blood Glucose.: 107 mg/dL (25 Apr 2021 22:00)              Consultant(s) Notes Reviewed:  [x ] YES  [ ] NO    PHYSICAL EXAM:  GENERAL: NAD, well-groomed, well-developed,not in any distress ,  HEAD:  Atraumatic, Normocephalic  EYES: EOMI, PERRLA, conjunctiva and sclera clear  ENMT: No tonsillar erythema, exudates, or enlargement; Moist mucous membranes, Good dentition, No lesions  NECK: Supple, No JVD, Normal thyroid  NERVOUS SYSTEM:  Alert & , No focal deficit   CHEST/LUNG: Good air entry bilateral with no  rales, rhonchi, wheezing, or rubs  HEART: Regular rate and rhythm; No murmurs, rubs, or gallops  ABDOMEN: Soft, Nontender, Nondistended; Bowel sounds present  EXTREMITIES:  2+ Peripheral Pulses, No clubbing, cyanosis, or edema  SKIN: No rashes or lesions    Care Discussed with Consultants/Other Providers [ x] YES  [ ] NO

## 2021-04-27 NOTE — DISCHARGE NOTE PROVIDER - CARE PROVIDERS DIRECT ADDRESSES
[email or web address]

## 2021-04-27 NOTE — PHYSICAL THERAPY INITIAL EVALUATION ADULT - DISCHARGE DISPOSITION, PT EVAL
anticipate return to assisted living facility with home physical therapy for home safety evaluation and new to rolling walker

## 2021-04-27 NOTE — DISCHARGE NOTE PROVIDER - NSDCFUADDAPPT_GEN_ALL_CORE_FT
Follow up with your primary care Dr. Louise upon discharge [phone] within 1 week of discharge. If you dot not have a primary care and would like to establish primary care at [facility] please call [phone] for an appointment.     IMPORTANT: Follow up with Dr. Rios  neuro surgery within 1 week, please call to arrange an appointment.     Please call to arrange cardiology follow up with Dr. Davila for within 1-2 weeks upon discharge.

## 2021-04-27 NOTE — DISCHARGE NOTE NURSING/CASE MANAGEMENT/SOCIAL WORK - NSDCFUADDAPPT_GEN_ALL_CORE_FT
Follow up with your primary care Dr. Louise upon discharge [phone] within 1 week of discharge. If you dot not have a primary care and would like to establish primary care at [facility] please call [phone] for an appointment.     IMPORTANT: Follow up with Dr. Rios  neuro surgery within 1 week, please call to arrange an appointment.     Please call to arrange cardiology follow up with Dr. Davila for within 1-2 weeks upon discharge.

## 2021-04-27 NOTE — DISCHARGE NOTE NURSING/CASE MANAGEMENT/SOCIAL WORK - PATIENT PORTAL LINK FT
You can access the FollowMyHealth Patient Portal offered by [facility] by registering at the following website: [email or web address]. By joining [employer]’s FollowMyHealth portal, you will also be able to view your health information using other applications (apps) compatible with our system.

## 2021-04-27 NOTE — CHART NOTE - NSCHARTNOTEFT_GEN_A_CORE
Repeat CTH yesterday read as stable. Patient seen w dr. turner. Should hold all full dose AC until follow up with dr. turner in office. Patient should follow up 1 week post discharge.     < from: CT Head No Cont (04.26.21 @ 20:35) >    FINDINGS:  VENTRICLES AND SULCI:  Prominent involutional changes as recognized on the prior study. In addition, there is evidence of intraventricular hemorrhage layering in the bilateral occipital horns of the lateral ventricle as seen on the prior imaging study unchanged in appearance. No evidence of rehemorrhage.  INTRA-AXIAL:  Extensive microvascular ischemic changes involving the periventricular and subcortical white matter..  EXTRA-AXIAL:  No mass or collection is seen.  VISUALIZED SINUSES:  Clear.  VISUALIZED MASTOIDS:  Clear.  CALVARIUM:  Normal.  MISCELLANEOUS:  None.    IMPRESSION:  No significant interval change compared with the prior. Trace intraventricular hemorrhage layering in the bilateral occipital horns of the lateral ventricle. No change in the ventricular size compared with the prior.

## 2021-04-27 NOTE — PHYSICAL THERAPY INITIAL EVALUATION ADULT - PERTINENT HX OF CURRENT PROBLEM, REHAB EVAL
89 y/o Male with hx of BPH, overactive bladder, Afib, AS s/p TAVR 2017, CAD, HTN, Dementia, DM, hypothyroidism presents with 1 week of agitation admitted for intraventricular bleed left occipital in the setting of recent fall 1 week ago.

## 2021-05-01 NOTE — H&P ADULT - PROBLEM SELECTOR PLAN 1
- Unclear etiology. Will consult ENT and GI in the am for dysphagia.  - Keep NPO for now.  - Maintenance IVF.

## 2021-05-01 NOTE — ED ADULT NURSE NOTE - OBJECTIVE STATEMENT
Pt presents to ED, sent in from rehab center for difficulty swallowing and being unable to eat as well as not speaking as much. Pt had a stroke 5 days ago was dc'd from hospital and sent to rehab. Pt is AxOx0 at this time. Pt says some words but is unable to answer questions, but is able to follow some commands. right sided facial droop noted. Bilateral upper and lower extremities strength equal. Abd soft but tender, pt grimmaces in pain when palpating abdomen. Breathing even and unlabored. o2 sat 100% on room air. Pt afib on the monitor. Scab noted to R elbow, redness noted to left side of abdomen.18g IVL placed in the L AC. Will continue to monitor.

## 2021-05-01 NOTE — PATIENT PROFILE ADULT - NSPROGENSOURCEINFO_GEN_A_NUR
Patient is a poor historian and is unable to answer questions Patient is a poor historian and is unable to answer questions/health record

## 2021-05-01 NOTE — ED ADULT TRIAGE NOTE - CHIEF COMPLAINT QUOTE
had recent hemorraghic stroke 5 days ago has had confusion since stroke 5 days ago no drooling noted at triage time  f/s in field 208

## 2021-05-01 NOTE — H&P ADULT - PROBLEM SELECTOR PLAN 8
DVT: SCDs  Diet: NPO  Colitis vs. constipation noted on imaging. No additional GI symptoms. Will treat constipation for now. Serial abd exam.

## 2021-05-01 NOTE — ED PROVIDER NOTE - PHYSICAL EXAMINATION
CONSTITUTIONAL: elderly male, resting comfortably in no acute distress  HEAD: Normocephalic; atraumatic  EYES: Normal inspection, EOMI, PERRLA  ENMT: External appears normal; normal oropharynx, dry MM, no pooling of secretions, no tonsilar swelling or deviation   NECK: Supple; non-tender; no cervical lymphadenopathy  CARD: irrregular; no audible murmurs, rubs, or gallops  RESP: No respiratory distress, lungs ctab/l  ABD: Soft, non-distended; TTP diffusely; no rebound or guarding  EXT: No LE pitting edema or calf tenderness; distal pulses intact with good capillary refill  SKIN: Warm, dry, intact  NEURO: aaox0, mild R lip droop, 5/5 strength b/l UE and LE, no pronator drift

## 2021-05-01 NOTE — ED PROVIDER NOTE - CLINICAL SUMMARY MEDICAL DECISION MAKING FREE TEXT BOX
Gisella Rivera MD: 89yo M with PMH of Afib off AC on digoxin, AS s/p TAVR, DM, HTN, CAD, recent intraventricular hemorrhage in occipital region on 4/24 and discharged on 4/24 to [facility] returning to the ER for difficulty swallowing and decreased appetite. Pt hemodynamically stable, afebrile. EKG shows scooped ST segment in V4-V6 in setting of digoxin use. R lip droop, unknown chronic. Abd is tender. Will get labs, including troponin and digoxin, CXR, CT head to eval for rebleed or other intracranial issues, CT A/P, UA

## 2021-05-01 NOTE — ED ADULT NURSE NOTE - AGENT'S NAME
patient w/ AMS / DNR/DNI signed from earlier today, pts daughter called at change of shift to request pt be transferred to telemetry to receive cardiac medications. Brandi Mcguire

## 2021-05-01 NOTE — H&P ADULT - NSHPPHYSICALEXAM_GEN_ALL_CORE
T(C): 36.5 (05-01-21 @ 13:25), Max: 36.5 (05-01-21 @ 13:25)  HR: 93 (05-01-21 @ 19:20) (89 - 93)  BP: 176/88 (05-01-21 @ 19:20) (127/58 - 176/88)  RR: 16 (05-01-21 @ 19:20) (14 - 20)  SpO2: 100% (05-01-21 @ 19:20) (100% - 100%)  Wt(kg): --  GENERAL: NAD, well-developed  HEAD:  Atraumatic, Normocephalic  EYES: EOMI, PERRLA, conjunctiva and sclera clear  NECK: Supple, No JVD  CHEST/LUNG: Clear to auscultation bilaterally; No wheeze  HEART: Regular rate and rhythm; No murmurs, rubs, or gallops  ABDOMEN: Soft, Nontender, Nondistended; Bowel sounds present  EXTREMITIES:  2+ Peripheral Pulses, No clubbing, cyanosis, or edema  PSYCH: AAOx0  NEUROLOGY: non-focal  SKIN: No rashes or lesions

## 2021-05-01 NOTE — H&P ADULT - HISTORY OF PRESENT ILLNESS
87yo M with hx of BPH, overactive bladder, Afib no on AC due to recent intracranial hemorrage, AS s/p TAVR 2017, CAD, HTN, Dementia, DM, hypothyroidism presents with dysphagia. Patient was recently admitted for fall and intraventricular bleeding. Patient was noted after discharge to have significant dysphagia and unable to tolerate po intake. Patient was noted been having difficulty swallowing for past few days and has not been eating. Food/water would drip from his mouth. Patient otherwise at baseline mental status AOX0. No complaints of additional GI /  complaints.

## 2021-05-01 NOTE — H&P ADULT - NSHPLABSRESULTS_GEN_ALL_CORE
( @ 13:44)                      13.7  9.18 )-----------( 243                 41.5    Neutrophils = 6.06 (66.0%)  Lymphocytes = 1.75 (19.1%)  Eosinophils = 0.25 (2.7%)  Basophils = 0.10 (1.1%)  Monocytes = 0.98 (10.7%)  Bands = --%        142  |  105  |  27<H>  ----------------------------<  160<H>  4.6   |  27  |  0.84    Ca    10.0      01 May 2021 13:44  Phos  3.5       Mg     1.9         TPro  7.2  /  Alb  4.2  /  TBili  1.1  /  DBili  x   /  AST  10  /  ALT  9   /  AlkPhos  59      Urinalysis Basic - ( 01 May 2021 15:06 )    Color: Yellow / Appearance: Clear / S.036 / pH: x  Gluc: x / Ketone: Negative  / Bili: Negative / Urobili: 3 mg/dL   Blood: x / Protein: Trace / Nitrite: Negative   Leuk Esterase: Large / RBC: 7 /HPF / WBC Many /HPF   Sq Epi: x / Non Sq Epi: 0 /HPF / Bacteria: Few    Imaging reviewed: CT colitis vs. constipation.

## 2021-05-01 NOTE — ED PROVIDER NOTE - ATTENDING CONTRIBUTION TO CARE
MD Alcantar:  patient seen and evaluated with the resident.  I was present for key portions of the History & Physical, and I agree with the Impression & Plan.  MD Alcantar:  89 yo M, sent to ED from [facility] for evaluation of difficulty with POs and swallowing.  MHx significant for recent interventricular hemorrhage 1 WEEK AGO.   Duration of difficulty swallowing and taking POs = 2+ days.    Associated Sx: unable to provide additional Hx due to mental status.  Able to verbalize, "I hate this $hit." (in regard to attempts at catching a urinalysis).  VS: wnl.  Physical Exam: elderly M, slow to respond, NAD, NCAT, PERRL, EOMI, neck supple, RRR, CTA B, Abd: s/nd/nt, Ext: no edema or deformities.  Neuro: AAOx1, moving all 4 extremities strength seems 5/5.  Impression:  Elderly M, recent interventricular hemorrhage, with mental status off baseline, and level of energy off baseline.  Physical exam is unremarkable, but unreliable in this age group, and given his recent MHx, will need aggressive ED workup for a cause of his difficulty tolerating POs.    Plan:  labs, UA, CXR, ECG, CT head, hydrate, reassess.

## 2021-05-01 NOTE — PATIENT PROFILE ADULT - SAFE PLACE TO LIVE
Mom reports that Kyler continues to have a rash on his bottom and now has spread to the other side.  Mom reports that the hydrocortisone cream is not working.  She states that the other creams that she used were Triamcinolone acetonide 0.025 % and Micoazole nitrate 2 %.  She reports that she did stop using after she spoke with Dr Flood.  Mom questioning if she can continue to use the cream or would she like to change the medications.  Mom requesting if sending script it be sent to [facility].      Mom reports that Ligia started to complain of a sore throat last night. No fevers.  Mom states that his voice sounds different.  Mom states that he is currently on 5 ml of Zyrtec and Singulair.  Mom states that he does go to school and she will check to see if any of the kids are sick.  Discussed with mom that she can monitor, but if he continue to complain of a sore throat or has fevers or other symptoms then he should be seen.  Mom verbalized understanding.   no

## 2021-05-01 NOTE — ED ADULT NURSE NOTE - INTERVENTIONS DEFINITIONS
[place] to call system/Call bell, personal items and telephone within reach/Instruct patient to call for assistance/Room bathroom lighting operational/Non-slip footwear when patient is off stretcher/Physically safe environment: no spills, clutter or unnecessary equipment/Stretcher in lowest position, wheels locked, appropriate side rails in place/Monitor gait and stability/Monitor for mental status changes and reorient to person, place, and time/Review medications for side effects contributing to fall risk/Reinforce activity limits and safety measures with patient and family/Provide visual clues: red socks

## 2021-05-01 NOTE — ED PROVIDER NOTE - OBJECTIVE STATEMENT
87yo M with PMH of Afib off AC, AS s/p TAVR, DM, HTN, CAD, recent intraventricular hemorrhage in occipital region on 4/24 and discharged on 4/24 to [facility] returning to the ER for difficulty swallowing. As per his sister Brandi over the phone, she states he has been having difficulty swallowing for past few days and has not been eating. Food/water would drip from his mouth. Baseline confused, AAOx0, but able to converse. 87yo M with PMH of Afib off AC on digoxin, AS s/p TAVR, DM, HTN, CAD, recent intraventricular hemorrhage in occipital region on 4/24 and discharged on 4/24 to [facility] returning to the ER for difficulty swallowing. As per his sister Brandi over the phone, she states he has been having difficulty swallowing for past few days and has not been eating. Food/water would drip from his mouth. Baseline confused, AAOx0, but able to converse.

## 2021-05-01 NOTE — H&P ADULT - ASSESSMENT
87yo M with hx of BPH, overactive bladder, Afib no on AC due to recent intracranial hemorrage, AS s/p TAVR 2017, CAD, HTN, Dementia, DM, hypothyroidism presents with dysphagia.

## 2021-05-01 NOTE — ED ADULT NURSE REASSESSMENT NOTE - NS ED NURSE REASSESS COMMENT FT1
pt able to state full name and date of birth at this time. Unable to tell RN where he is and why he is in the hospital at this time. Pt awaiting bed assignment on floor at this time. Will continue to monitor. pt able to state full name and date of birth at this time. Unable to tell RN where he is and why he is in the hospital. Pt awaiting bed assignment. Will continue to monitor.

## 2021-05-01 NOTE — H&P ADULT - PROBLEM SELECTOR PLAN 4
hold coumadin, monitor INR daily in setting of ICH  -cw with metoprolol 100mg BID  -digoxin 125 daily    #AS s/p TAVR 2017  -hold coumadin given ICH -restart home paroxetine for depression once tolerating po  -reorient

## 2021-05-01 NOTE — PATIENT PROFILE ADULT - NSPROSPHOSPCHAPLAINYN_GEN_A_NUR
unable to assess due to patient cognitive impairment unable to assess due to patient cognitive impairment/no

## 2021-05-01 NOTE — ED PROVIDER NOTE - PROGRESS NOTE DETAILS
Gisella Rivera MD: CT head unremarkable. CT abd with stercoral colitis. Neurology consults for dysphagia, R lip droop. Will admit with medicine to Dr. Cabrera

## 2021-05-02 NOTE — BH CONSULTATION LIAISON ASSESSMENT NOTE - CASE SUMMARY
Met with the patient on 5/3/2021. Case discussed with Mary Matta NP, impression and plan discussed and agreed upon. Patient was confused, disoriented this am. No agitation or aggression

## 2021-05-02 NOTE — CONSULT NOTE ADULT - ATTENDING COMMENTS
Neurology consult: 87yo man of unknown handedness with PMH of IVH 04/24/21 (likely traumatic from fall 04/12/21), afib (not currently on AC), HTN, CAD, AS (s/p TAVR), DM, and dementia presents from [facility] with difficulty swallowing for several days. patient found to have a bleed    MRI brain    infection work up    PT standing/walking/toileting

## 2021-05-02 NOTE — BH CONSULTATION LIAISON ASSESSMENT NOTE - CURRENT MEDICATION
MEDICATIONS  (STANDING):  cefTRIAXone   IVPB 1000 milliGRAM(s) IV Intermittent every 24 hours  dextrose 5% + sodium chloride 0.45%. 1000 milliLiter(s) (50 mL/Hr) IV Continuous <Continuous>  digoxin  Injectable 100 MICROGram(s) IV Push daily  glycerin Suppository - Adult 1 Suppository(s) Rectal at bedtime  levothyroxine Injectable 40 MICROGram(s) IV Push at bedtime  metoprolol tartrate IVPB 10 milliGRAM(s) IV Intermittent every 6 hours    MEDICATIONS  (PRN):

## 2021-05-02 NOTE — PROGRESS NOTE ADULT - SUBJECTIVE AND OBJECTIVE BOX
Date of Service  : 21 @ 10:43    INTERVAL HPI/OVERNIGHT EVENTS: Getting agitated.    Vital Signs Last 24 Hrs  T(C): 36.4 (02 May 2021 09:09), Max: 36.5 (01 May 2021 13:25)  T(F): 97.6 (02 May 2021 09:09), Max: 97.7 (01 May 2021 13:25)  HR: 73 (02 May 2021 09:09) (73 - 93)  BP: 139/81 (02 May 2021 09:09) (127/58 - 176/88)  BP(mean): --  RR: 18 (02 May 2021 09:09) (14 - 20)  SpO2: 97% (02 May 2021 09:09) (95% - 100%)  I&O's Summary    MEDICATIONS  (STANDING):  cefTRIAXone   IVPB 1000 milliGRAM(s) IV Intermittent every 24 hours  dextrose 5% + sodium chloride 0.45%. 1000 milliLiter(s) (50 mL/Hr) IV Continuous <Continuous>  digoxin  Injectable 100 MICROGram(s) IV Push daily  haloperidol    Injectable 0.5 milliGRAM(s) IntraMuscular once  levothyroxine Injectable 40 MICROGram(s) IV Push at bedtime  metoprolol tartrate IVPB 10 milliGRAM(s) IV Intermittent every 6 hours    MEDICATIONS  (PRN):    LABS:                        13.9   9.38  )-----------( 222      ( 02 May 2021 08:43 )             39.9     05-02    138  |  102  |  19  ----------------------------<  125<H>  3.6   |  19<L>  |  0.70    Ca    8.9      02 May 2021 08:43  Phos  1.8     05-02  Mg     1.6     05-02    TPro  6.9  /  Alb  3.9  /  TBili  2.4<H>  /  DBili  x   /  AST  13  /  ALT  8   /  AlkPhos  60  05-02      Urinalysis Basic - ( 01 May 2021 15:06 )    Color: Yellow / Appearance: Clear / S.036 / pH: x  Gluc: x / Ketone: Negative  / Bili: Negative / Urobili: 3 mg/dL   Blood: x / Protein: Trace / Nitrite: Negative   Leuk Esterase: Large / RBC: 7 /HPF / WBC Many /HPF   Sq Epi: x / Non Sq Epi: 0 /HPF / Bacteria: Few      CAPILLARY BLOOD GLUCOSE      POCT Blood Glucose.: 111 mg/dL (02 May 2021 09:25)  POCT Blood Glucose.: 164 mg/dL (01 May 2021 12:59)        Urinalysis Basic - ( 01 May 2021 15:06 )    Color: Yellow / Appearance: Clear / S.036 / pH: x  Gluc: x / Ketone: Negative  / Bili: Negative / Urobili: 3 mg/dL   Blood: x / Protein: Trace / Nitrite: Negative   Leuk Esterase: Large / RBC: 7 /HPF / WBC Many /HPF   Sq Epi: x / Non Sq Epi: 0 /HPF / Bacteria: Few      REVIEW OF SYSTEMS:  CONSTITUTIONAL: No fever, weight loss, or fatigue  EYES: No eye pain, visual disturbances, or discharge  ENMT:  No difficulty hearing, tinnitus, vertigo; No sinus or throat pain  NECK: No pain or stiffness  RESPIRATORY: No cough, wheezing, chills or hemoptysis; No shortness of breath  CARDIOVASCULAR: No chest pain, palpitations, dizziness, or leg swelling  GASTROINTESTINAL: No abdominal or epigastric pain. No nausea, vomiting, or hematemesis; No diarrhea or constipation. No melena or hematochezia.  GENITOURINARY: No dysuria, frequency, hematuria, or incontinence  NEUROLOGICAL: No headaches, memory loss, loss of strength, numbness, or tremors      Consultant(s) Notes Reviewed:  [x ] YES  [ ] NO    PHYSICAL EXAM:  GENERAL: NAD, cachetic   HEAD:  Atraumatic, Normocephalic  EYES: EOMI, PERRLA, conjunctiva and sclera clear  ENMT: No tonsillar erythema, exudates, or enlargement; Moist mucous membranes, Good dentition, No lesions  NECK: Supple, No JVD, Normal thyroid  NERVOUS SYSTEM:  Alert & Oriented X1, No focal deficit   CHEST/LUNG: Good air entry bilateral with no  rales, rhonchi, wheezing, or rubs  HEART: Regular rate and rhythm; No murmurs, rubs, or gallops  ABDOMEN: Soft, Nontender, Nondistended; Bowel sounds present  EXTREMITIES:  2+ Peripheral Pulses, No clubbing, cyanosis, or edema      Care Discussed with Consultants/Other Providers [ x] YES  [ ] NO

## 2021-05-02 NOTE — BH CONSULTATION LIAISON ASSESSMENT NOTE - NSBHADMITCOUNSEL_PSY_A_CORE
risks and benefits of treatment options/instructions for management, treatment and follow up/importance of adherence to chosen treatment/risk factor reduction/client/family/caregiver education

## 2021-05-02 NOTE — BH CONSULTATION LIAISON ASSESSMENT NOTE - NSBHCHARTREVIEWVS_PSY_A_CORE FT
Vital Signs Last 24 Hrs  T(C): 36.4 (02 May 2021 09:09), Max: 36.5 (01 May 2021 21:06)  T(F): 97.6 (02 May 2021 09:09), Max: 97.7 (01 May 2021 21:06)  HR: 73 (02 May 2021 09:09) (73 - 93)  BP: 139/81 (02 May 2021 09:09) (131/74 - 176/88)  BP(mean): --  RR: 18 (02 May 2021 09:09) (14 - 18)  SpO2: 97% (02 May 2021 09:09) (95% - 100%)

## 2021-05-02 NOTE — BH CONSULTATION LIAISON ASSESSMENT NOTE - NSBHCHARTREVIEWLAB_PSY_A_CORE FT
13.9   9.38  )-----------( 222      ( 02 May 2021 08:43 )             39.9   05-02    138  |  102  |  19  ----------------------------<  125<H>  3.6   |  19<L>  |  0.70    Ca    8.9      02 May 2021 08:43  Phos  1.8     05-02  Mg     1.6     05-02    TPro  6.9  /  Alb  3.9  /  TBili  2.4<H>  /  DBili  x   /  AST  13  /  ALT  8   /  AlkPhos  60  05-02

## 2021-05-02 NOTE — BH CONSULTATION LIAISON ASSESSMENT NOTE - SUMMARY
Patient is an 89yo M with hx of BPH, overactive bladder, Afib no on AC due to recent intracranial hemorrhage, AS s/p TAVR 2017, CAD, HTN, Dementia, DM, hypothyroidism presents with dysphagia. Patient dx with dementia approx 4 year ago as per sister. MRI was done due to a fall at that time and doctor stated patient would decline over the next few years, which he did.  Patient has intermittent agitation, at baseline in Aox1. Brandi notes, patient stated "I don't know" often, and feels he is rapidly declining as she cannot converse with him anymore.  He used to enjoy learning about animals and [employer].     PLAN  - currently patient NPO, awaiting speech and swallow. home meds held including paxil   -- paxil with higher rate of occurrence for withdrawal than other SSRIs (nausea, irritability, dizziness, shock sensations)   -- can use supportive management for withdrawal symptoms   -- after s/s eval - CL will follow for further recs  - PRN for agitation haldol 0.5mg q6hrs IV/IM with additional 0.5mg for refractory agitation - ensure qtc < 500  - tx uti as per medical team as well as recent ICH - cl will follow and can alter med regime as needed   - b12, tsh, folate

## 2021-05-02 NOTE — PROGRESS NOTE ADULT - ASSESSMENT
89yo M with hx of BPH, overactive bladder, Afib no on AC due to recent intracranial hemorrage, AS s/p TAVR 2017, CAD, HTN, Dementia, DM, hypothyroidism presents with dysphagia.     Problem/Plan - 1:  ·  Problem: Dysphagia.  Plan: - Awaiting  ENT and GI in the am for dysphagia.  - Keep NPO for now.  - Maintenance IVF.      Problem/Plan - 2:  ·  Problem: Cystitis.  Plan: - Treat with ceftriaxone.  - Monitor culture.      Problem/Plan - 3:  ·  Problem: Intracranial bleed. Plan: - Hx of recent bleeding. Repeat CT shows no new bleeding.  - Continue holding AC for Afib.- Neurology consult noted and awaiting MRI .      Problem/Plan - 4:  ·  Problem: Dementia. Plan: -restart home paroxetine for depression once tolerating po  -reorient.     Problem/Plan - 5:  ·  Problem: Atrial fibrillation.  Plan: hold AC  - Lopressor IVP q6h ordered for rate control  -digoxin 125 daily.      Problem/Plan - 6:  Problem: Diabetes mellitus. Plan: NIDDM2  -hold metformin  -SSI and FS.   Problem/Plan - 7:  ·  Problem: Hypothyroid.  Plan: c/w levothyroxine.      Problem/Plan - 8:  ·  Problem: Preventive measure.  Plan: DVT: SCDs  Diet: NPO  Colitis vs. constipation noted on imaging. No additional GI symptoms. Will treat constipation for now. Serial abd exam.

## 2021-05-02 NOTE — BH CONSULTATION LIAISON ASSESSMENT NOTE - HPI (INCLUDE ILLNESS QUALITY, SEVERITY, DURATION, TIMING, CONTEXT, MODIFYING FACTORS, ASSOCIATED SIGNS AND SYMPTOMS)
Patient is an 87yo M with hx of BPH, overactive bladder, Afib no on AC due to recent intracranial hemorrhage, AS s/p TAVR 2017, CAD, HTN, Dementia, DM, hypothyroidism presents with dysphagia. Patient dx with dementia approx 4 year ago as per sister. MRI was done due to a fall at that time and doctor stated patient would decline over the next few years, which he did.  Patient has intermittent agitation, at baseline in Aox1. Brandi notes, patient stated "I don't know" often, and feels he is rapidly declining as she cannot converse with him anymore.  He used to enjoy learning about animals and [employer].     Patient was seen and assessed at bedside. Patient is alert, confused, laying sideways in bed with legs over the side rails now allowing this provider to reposition him.  Patient was asked his name, where he was, and further orientation questions however he only replied with "I don't know anymore"  Patient was loose of psychical exam. Interview limited.     Patient with + leukocytes on UA  home medication - paxil 20

## 2021-05-03 NOTE — SWALLOW BEDSIDE ASSESSMENT ADULT - COMMENTS
H&P: 89yo M with hx of BPH, overactive bladder, Afib no on AC due to recent intracranial hemorrage, AS s/p TAVR 2017, CAD, HTN, Dementia, DM, hypothyroidism presents with dysphagia.    Pt received asleep but aroused to verbal/tactile stimuli. Pt awake, alert, in NAD. Pt a/ox0. Pt unable to follow commands but receptive to PO trials. Pt communicated verbally but nonsensical speech.

## 2021-05-03 NOTE — PROVIDER CONTACT NOTE (OTHER) - ACTION/TREATMENT ORDERED:
Abdiel aware. no EKG at this time. cardiology consulted. will continue to monitor. and will notify RN for any further interventions after speaking to cardiology.
Cardiology consult, Hold Metoprolol and Digoxin

## 2021-05-03 NOTE — PROGRESS NOTE ADULT - SUBJECTIVE AND OBJECTIVE BOX
Date of Service  : 05-03-21 @ 16:51    INTERVAL HPI/OVERNIGHT EVENTS: No new concerns.   Vital Signs Last 24 Hrs  T(C): 36.6 (03 May 2021 12:53), Max: 37.2 (03 May 2021 11:33)  T(F): 97.9 (03 May 2021 12:53), Max: 98.9 (03 May 2021 11:33)  HR: 81 (03 May 2021 12:53) (81 - 107)  BP: 134/80 (03 May 2021 12:53) (130/79 - 151/68)  BP(mean): --  RR: 18 (03 May 2021 12:53) (18 - 18)  SpO2: 98% (03 May 2021 12:53) (98% - 100%)  I&O's Summary    MEDICATIONS  (STANDING):  cefTRIAXone   IVPB 1000 milliGRAM(s) IV Intermittent every 24 hours  dextrose 5% + sodium chloride 0.45%. 1000 milliLiter(s) (50 mL/Hr) IV Continuous <Continuous>  digoxin     Tablet 125 MICROGram(s) Oral daily  glycerin Suppository - Adult 1 Suppository(s) Rectal at bedtime  levothyroxine 50 MICROGram(s) Oral daily  metoprolol tartrate 100 milliGRAM(s) Oral two times a day  PARoxetine 20 milliGRAM(s) Oral daily    MEDICATIONS  (PRN):  haloperidol     Tablet 0.5 milliGRAM(s) Oral every 6 hours PRN agitation  haloperidol    Injectable 0.5 milliGRAM(s) IntraMuscular every 6 hours PRN Agitation  haloperidol    Injectable 0.5 milliGRAM(s) IV Push every 6 hours PRN Agitation    LABS:                        13.3   8.36  )-----------( 212      ( 03 May 2021 06:50 )             38.4     05-03    136  |  104  |  12  ----------------------------<  178<H>  3.7   |  19<L>  |  0.67    Ca    8.7      03 May 2021 06:50  Phos  2.9     05-03  Mg     1.6     05-03    TPro  6.4  /  Alb  3.4  /  TBili  1.8<H>  /  DBili  x   /  AST  17  /  ALT  9   /  AlkPhos  54  05-03        CAPILLARY BLOOD GLUCOSE      POCT Blood Glucose.: 164 mg/dL (03 May 2021 12:48)  POCT Blood Glucose.: 157 mg/dL (03 May 2021 05:42)  POCT Blood Glucose.: 154 mg/dL (02 May 2021 23:43)  POCT Blood Glucose.: 159 mg/dL (02 May 2021 18:40)              Consultant(s) Notes Reviewed:  [x ] YES  [ ] NO    PHYSICAL EXAM:  GENERAL: NAD, well-groomed, well-developed,not in any distress ,  HEAD:  Atraumatic, Normocephalic  EYES: EOMI, PERRLA, conjunctiva and sclera clear  ENMT: No tonsillar erythema, exudates, or enlargement; Moist mucous membranes, Good dentition, No lesions  NECK: Supple, No JVD, Normal thyroid  NERVOUS SYSTEM:  Alert & , No focal deficit   CHEST/LUNG: Good air entry bilateral with no  rales, rhonchi, wheezing, or rubs  HEART: Regular rate and rhythm; No murmurs, rubs, or gallops  ABDOMEN: Soft, Nontender, Nondistended; Bowel sounds present  EXTREMITIES:  2+ Peripheral Pulses, No clubbing, cyanosis, or edema  SKIN: No rashes or lesions    Care Discussed with Consultants/Other Providers [ x] YES  [ ] NO

## 2021-05-03 NOTE — SWALLOW BEDSIDE ASSESSMENT ADULT - SWALLOW EVAL: DIAGNOSIS
Pt presents with oropharyngeal dysphagia. The oral stage was marked by total refusal of solid trials despite maximum prompting/support and acceptance of only puree and liquids at this time. Oral stage for puree marked by adequate jaw grading and lip closure with good oral containment, reduced bolus formation and piecemeal deglutition of 2-3 per bolus, reduced and delayed AP transport. Suspect premature spillage to the pharynx for thin liquids. Pharyngeal stage was marked by +hyolaryngeal elevation and suspected delay in pharyngeal swallow trigger. There was coughing with wet vocal quality following thin liquid trials suggesting possible aspiration vs. airway penetration for said consistency. No overt s/s of penetration/aspiration demonstrated for puree and nectar thick fluids.

## 2021-05-03 NOTE — SWALLOW BEDSIDE ASSESSMENT ADULT - ORAL PHASE
Decreased anterior-posterior movement of the bolus/Delayed oral transit time Rapid AP transport and suspect premature spillage to the hypopharynx Within functional limits

## 2021-05-03 NOTE — PROGRESS NOTE ADULT - SUBJECTIVE AND OBJECTIVE BOX
INTERVAL HPI/OVERNIGHT EVENTS:    passed swallow test this morning   per PCA bedside, no acute gi events this morning   appears comfortable   (+) 2 large bm's yesterday post enemas    MEDICATIONS  (STANDING):  cefTRIAXone   IVPB 1000 milliGRAM(s) IV Intermittent every 24 hours  dextrose 5% + sodium chloride 0.45%. 1000 milliLiter(s) (50 mL/Hr) IV Continuous <Continuous>  digoxin  Injectable 100 MICROGram(s) IV Push daily  glycerin Suppository - Adult 1 Suppository(s) Rectal at bedtime  levothyroxine Injectable 40 MICROGram(s) IV Push at bedtime  metoprolol tartrate IVPB 10 milliGRAM(s) IV Intermittent every 6 hours    MEDICATIONS  (PRN):  haloperidol     Tablet 0.5 milliGRAM(s) Oral every 6 hours PRN agitation  haloperidol    Injectable 0.5 milliGRAM(s) IntraMuscular every 6 hours PRN Agitation  haloperidol    Injectable 0.5 milliGRAM(s) IV Push every 6 hours PRN Agitation      Allergies    No Known Allergies    Intolerances        Review of Systems: *unobtainable, does not participate in ROS        Vital Signs Last 24 Hrs  T(C): 36.7 (03 May 2021 05:29), Max: 36.8 (02 May 2021 21:52)  T(F): 98 (03 May 2021 05:29), Max: 98.3 (03 May 2021 01:20)  HR: 99 (03 May 2021 05:29) (82 - 107)  BP: 148/95 (03 May 2021 05:29) (138/68 - 151/68)  BP(mean): --  RR: 18 (03 May 2021 05:29) (18 - 18)  SpO2: 100% (03 May 2021 05:29) (100% - 100%)    PHYSICAL EXAM:    Constitutional: NAD  HEENT: EOMI, throat clear  Neck: No LAD, supple  Respiratory: CTA and P  Cardiovascular: S1 and S2, RRR, no M  Gastrointestinal: BS+, soft, NT/ND, neg HSM,  Extremities: No peripheral edema, neg clubbing, cyanosis  Vascular: 2+ peripheral pulses  Neurological: A/O x 0 no focal deficits  Psychiatric: confused  Skin: No rashes      LABS:                        13.3   8.36  )-----------( 212      ( 03 May 2021 06:50 )             38.4     05-03    136  |  104  |  12  ----------------------------<  178<H>  3.7   |  19<L>  |  0.67    Ca    8.7      03 May 2021 06:50  Phos  2.9     05-  Mg     1.6     -    TPro  6.4  /  Alb  3.4  /  TBili  1.8<H>  /  DBili  x   /  AST  17  /  ALT  9   /  AlkPhos  54  -      Urinalysis Basic - ( 01 May 2021 15:06 )    Color: Yellow / Appearance: Clear / S.036 / pH: x  Gluc: x / Ketone: Negative  / Bili: Negative / Urobili: 3 mg/dL   Blood: x / Protein: Trace / Nitrite: Negative   Leuk Esterase: Large / RBC: 7 /HPF / WBC Many /HPF   Sq Epi: x / Non Sq Epi: 0 /HPF / Bacteria: Few        RADIOLOGY & ADDITIONAL TESTS:

## 2021-05-03 NOTE — SWALLOW BEDSIDE ASSESSMENT ADULT - MODE OF PRESENTATION
Call Type: Triage Call    Presenting Problem: Mom reports that  child has  both strep throat  and influenza and is on amoxicillin; tonight they noticed bilateral  puffiness to eyelids without drainage;  Advised to observe for rash  or other swelling indicative of allergic reaction;  Call if  symptoms increase or  worsen.  Triage Note:  Guideline Title: Eye - Swelling (Pediatric)  Recommended Disposition: Provide Home/Self Care  Original Inclination: Wanted to speak with a nurse  Override Disposition:  Intended Action: Follow Selfcare / Homecare  Physician Contacted: No  Eyelid swelling from suspected mild irritant (all triage questions negative) ?  YES  Child sounds very sick or weak to the triager ? NO  Fever ? NO  Recent injury to the eye ? NO  Small, red lump present on lid margin ? NO  Eyelid is painful or very tender ? NO  Difficulty breathing or wheezing ? NO  Entire face is swollen ? NO  Yellow or green discharge (pus) in the eye ? NO  Sounds like a life-threatening emergency to the triager ? NO  Unresponsive, passed out or very weak ? NO  Contact with pollen, other allergic substance or eyedrops ? NO  [1] Eyelid is both very swollen and very red BUT [2] no fever ? NO  Redness of sclera (white of eye) ? NO  [1] Swelling of ankles or feet AND [2] bilateral ? NO  [1] Difficulty swallowing, drooling or slurred speech AND [2] sudden onset ? NO  [1] Eyelid swelling is a chronic problem (recurrent or ongoing AND present > 4  weeks) AND [2] cause unknown ? NO  Cloudy spot or haziness of cornea (clear part of eye) ? NO  Sacs of clear fluid (blisters) on whites of eyes (allergic cysts) ? NO  [1] Eyelid (outer) is very red AND [2] fever ? NO  [1] MILD swelling (puffiness) AND [2] persists > 3 days (Exception: suspect  mosquito or insect bites) ? NO  Eyelid swelling from suspected mosquito or insect bite (all triage questions  negative) ? NO  [1] Small lump in eyelid AND [2] chronic problem ? NO  [1] MODERATE redness on  one side (Exception: due to mosquito or insect bite) AND  [2] no pain ? NO  [1] SEVERE swelling (shut or almost) AND [2] involves BOTH eyes AND [3] itchy ? NO  [1] SEVERE swelling (shut or almost) AND [2] involves BOTH eyes(Exception: itchy  eyes, which are probably an allergic reaction) ? NO  [1] SEVERE swelling (shut or almost) on one side AND [2] painful or tender to  touch ? NO  [1] SEVERE swelling AND [2] fever ? NO  [1] Sinus pain or pressure AND [2] MILD swelling ? NO  MODERATE swelling on one side (Exception: due to mosquito or insect bite) ? NO  Physician Instructions:  Care Advice: HOME CARE: You should be able to treat this at home.  CARE ADVICE given per Eye - Swelling (Pediatric) guideline.  REASSURANCE AND EDUCATION: * It sounds like a mild reaction that we can  treat at home. * Rubbing from any cause will make the eyelids puffy. Often,  it starts from getting an irritant in the eye. * Young children often touch  their eyes with dirty hands. * They also may get food in the eye.  USE A COLD COMPRESS FOR SWELLING: * Apply a cool, wet washcloth or ice in a  wet washcloth for 20 minutes.   spoon

## 2021-05-03 NOTE — SWALLOW BEDSIDE ASSESSMENT ADULT - PHARYNGEAL PHASE
No overt s/s of penetration/aspiration demonstrated/Delayed pharyngeal swallow Delayed pharyngeal swallow/Wet vocal quality post oral intake/Cough post oral intake

## 2021-05-03 NOTE — PROGRESS NOTE ADULT - ASSESSMENT
87yo M with hx of BPH, overactive bladder, Afib no on AC due to recent intracranial hemorrage, AS s/p TAVR 2017, CAD, HTN, Dementia, DM, hypothyroidism presents with dysphagia.     Problem/Plan - 1:  ·  Problem: Dysphagia.  Plan: - Dysphagia diet .   - GI helping. .  - Maintenance IVF     Problem/Plan - 2:  ·  Problem: Cystitis.  Plan: - Treat with ceftriaxone.  - Monitor culture.      Problem/Plan - 3:  ·  Problem: Intracranial bleed. Plan: - Hx of recent bleeding. Repeat CT shows no new bleeding.  - Continue holding AC for Afib.- Neurology consult noted and awaiting MRI .      Problem/Plan - 4:  ·  Problem: Dementia. Plan: -restart home paroxetine for depression once tolerating po  -reorient.     Problem/Plan - 5:  ·  Problem: Atrial fibrillation.  Plan: hold AC  - Lopressor IVP q6h ordered for rate control  -digoxin 125 daily.      Problem/Plan - 6:  Problem: Diabetes mellitus. Plan: NIDDM2  -hold metformin  -SSI and FS.     Problem/Plan - 7:  ·  Problem: Hypothyroid.  Plan: c/w levothyroxine.      Problem/Plan - 8:  ·  Problem: Stercolitis .  Plan: GI helping.

## 2021-05-03 NOTE — SWALLOW BEDSIDE ASSESSMENT ADULT - ORAL PREPARATORY PHASE
reduced bolus formation and piecemeal deglutition of 2-3 per bolus Within functional limits Reduced bolus formation

## 2021-05-03 NOTE — PROGRESS NOTE ADULT - ASSESSMENT
89yo M with hx of BPH, overactive bladder, Afib no on AC due to recent intracranial hemorrage, AS s/p TAVR 2017, CAD, HTN, Dementia, DM, hypothyroidism presents with dysphagia.    Dysphagia  -aspiration precautions   -passed S&S for Puree consistency and nectar thick fluids  -pt will need nearly full assistance and encouragement w/feeds    Stercoral Colitis:  -2/2 significant constipation as noted on CT  -senna qhs with miralax bid   -enema prn   -monitor stool output     Advanced care planning was discussed with patient and family.  Advanced care planning forms were reviewed and discussed.  Risks, benefits and alternatives of gastroenterologic procedures were discussed in detail and all questions were answered.  30 minutes spent.

## 2021-05-04 NOTE — PROGRESS NOTE ADULT - SUBJECTIVE AND OBJECTIVE BOX
Date of Service  : 05-04-21 @ 13:47    INTERVAL HPI/OVERNIGHT EVENTS: No new concerns.   Vital Signs Last 24 Hrs  T(C): 36.5 (04 May 2021 12:02), Max: 37.1 (03 May 2021 21:33)  T(F): 97.7 (04 May 2021 12:02), Max: 98.8 (03 May 2021 21:33)  HR: 98 (04 May 2021 12:02) (90 - 106)  BP: 122/74 (04 May 2021 12:02) (122/74 - 152/93)  BP(mean): --  RR: 18 (04 May 2021 12:02) (18 - 18)  SpO2: 98% (04 May 2021 12:02) (98% - 99%)  I&O's Summary    04 May 2021 07:01  -  04 May 2021 13:47  --------------------------------------------------------  IN: 200 mL / OUT: 0 mL / NET: 200 mL      MEDICATIONS  (STANDING):  cefTRIAXone   IVPB 1000 milliGRAM(s) IV Intermittent every 24 hours  dextrose 5% + sodium chloride 0.45%. 1000 milliLiter(s) (50 mL/Hr) IV Continuous <Continuous>  enoxaparin Injectable 40 milliGRAM(s) SubCutaneous daily  glycerin Suppository - Adult 1 Suppository(s) Rectal at bedtime  levothyroxine 50 MICROGram(s) Oral daily  metoprolol tartrate 50 milliGRAM(s) Oral two times a day  PARoxetine 20 milliGRAM(s) Oral daily  polyethylene glycol 3350 17 Gram(s) Oral two times a day  senna 2 Tablet(s) Oral at bedtime    MEDICATIONS  (PRN):  haloperidol     Tablet 0.5 milliGRAM(s) Oral every 6 hours PRN agitation  haloperidol    Injectable 0.5 milliGRAM(s) IntraMuscular every 6 hours PRN Agitation  haloperidol    Injectable 0.5 milliGRAM(s) IV Push every 6 hours PRN Agitation    LABS:                        14.1   9.08  )-----------( 197      ( 04 May 2021 05:52 )             40.4     05-04    139  |  106  |  11  ----------------------------<  116<H>  3.6   |  19<L>  |  0.64    Ca    9.0      04 May 2021 05:52  Phos  3.0     05-04  Mg     1.8     05-04    TPro  6.4  /  Alb  3.4  /  TBili  1.8<H>  /  DBili  x   /  AST  17  /  ALT  9   /  AlkPhos  54  05-03        CAPILLARY BLOOD GLUCOSE      POCT Blood Glucose.: 219 mg/dL (03 May 2021 17:34)          REVIEW OF SYSTEMS:  CONSTITUTIONAL: No fever, weight loss, or fatigue  EYES: No eye pain, visual disturbances, or discharge  ENMT:  No difficulty hearing, tinnitus, vertigo; No sinus or throat pain  NECK: No pain or stiffness  RESPIRATORY: No cough, wheezing, chills or hemoptysis; No shortness of breath  CARDIOVASCULAR: No chest pain, palpitations, dizziness, or leg swelling  GASTROINTESTINAL: No abdominal or epigastric pain. No nausea, vomiting, or hematemesis; No diarrhea or constipation. No melena or hematochezia.  GENITOURINARY: No dysuria, frequency, hematuria, or incontinence  NEUROLOGICAL: No headaches, memory loss, loss of strength, numbness, or tremors    Consultant(s) Notes Reviewed:  [x ] YES  [ ] NO    PHYSICAL EXAM:  GENERAL: NAD, well-groomed, well-developed,not in any distress ,  HEAD:  Atraumatic, Normocephalic  EYES: EOMI, PERRLA, conjunctiva and sclera clear  ENMT: No tonsillar erythema, exudates, or enlargement; Moist mucous membranes, Good dentition, No lesions  NECK: Supple, No JVD, Normal thyroid  NERVOUS SYSTEM:  Alert & Oriented X0 to 1, No focal deficit   CHEST/LUNG: Good air entry bilateral with no  rales, rhonchi, wheezing, or rubs  HEART: Regular rate and rhythm; No murmurs, rubs, or gallops  ABDOMEN: Soft, Nontender, Nondistended; Bowel sounds present  EXTREMITIES:  2+ Peripheral Pulses, No clubbing, cyanosis, or edema  SKIN: No rashes or lesions    Care Discussed with Consultants/Other Providers [ x] YES  [ ] NO

## 2021-05-04 NOTE — DIETITIAN INITIAL EVALUATION ADULT. - OTHER INFO
Patient is a 87yo M with hx of BPH, overactive bladder, Afib no on AC due to recent intracranial hemorrhage, AS s/p TAVR 2017, CAD, HTN, Dementia, DM, hypothyroidism presents with dysphagia. RDN met with patient at bedside, he is disoriented x4 on 1:1. He is with reports of poor PO intake. No GI distress (nausea/vomiting/diarrhea/constipation.) He is currently on  a Purred diet with nectar thick liquids per speech and swallow evaluation performed on 5/3.  Per PCA, patient consumed 50% of the [employer] Vital Cuisine 500 Shake (520kcal and 22 grams of protein/8.48 oz) and nothing else from tray.  Weight Hx: 62.6kg 8/29/2017, 58.8 4/24, currently 56.6 5/1. Admission weight is suggestive of 2.2kg (3.7% ) weight loss in < 2 weeks.  Noted HbA1c 7.0%, would suggest the provision of Magic Cup x 1daily (290kcal, 9gm pro) + [employer] Vital Cuisine 500 Shake (520kcal and 22 grams of protein/8.48 oz) - monitor POCT glucose.

## 2021-05-04 NOTE — PROGRESS NOTE ADULT - SUBJECTIVE AND OBJECTIVE BOX
INTERVAL HPI/OVERNIGHT EVENTS:    pleasantly confused   aide bedside   pt drinking nutritional shake w/o difficulties  no abd pain    MEDICATIONS  (STANDING):  cefTRIAXone   IVPB 1000 milliGRAM(s) IV Intermittent every 24 hours  dextrose 5% + sodium chloride 0.45%. 1000 milliLiter(s) (50 mL/Hr) IV Continuous <Continuous>  enoxaparin Injectable 40 milliGRAM(s) SubCutaneous daily  glycerin Suppository - Adult 1 Suppository(s) Rectal at bedtime  levothyroxine 50 MICROGram(s) Oral daily  metoprolol tartrate 50 milliGRAM(s) Oral two times a day  PARoxetine 20 milliGRAM(s) Oral daily  polyethylene glycol 3350 17 Gram(s) Oral two times a day  senna 2 Tablet(s) Oral at bedtime    MEDICATIONS  (PRN):  haloperidol     Tablet 0.5 milliGRAM(s) Oral every 6 hours PRN agitation  haloperidol    Injectable 0.5 milliGRAM(s) IntraMuscular every 6 hours PRN Agitation  haloperidol    Injectable 0.5 milliGRAM(s) IV Push every 6 hours PRN Agitation      Allergies    No Known Allergies    Intolerances        Review of Systems: *confused,           Vital Signs Last 24 Hrs  T(C): 36.4 (04 May 2021 07:55), Max: 37.1 (03 May 2021 21:33)  T(F): 97.5 (04 May 2021 07:55), Max: 98.8 (03 May 2021 21:33)  HR: 106 (04 May 2021 07:55) (81 - 106)  BP: 148/93 (04 May 2021 07:55) (134/80 - 152/93)  BP(mean): --  RR: 18 (04 May 2021 07:55) (18 - 18)  SpO2: 98% (04 May 2021 07:55) (98% - 99%)    PHYSICAL EXAM:    Constitutional: NAD  HEENT: EOMI, throat clear  Neck: No LAD, supple  Respiratory: CTA and P  Cardiovascular: S1 and S2, RRR, no M  Gastrointestinal: BS+, soft, NT/ND, neg HSM,  Extremities: No peripheral edema, neg clubbing, cyanosis  Vascular: 2+ peripheral pulses  Neurological: A/O x 1, no focal deficits  Psychiatric: Normal mood, normal affect  Skin: No rashes      LABS:                        14.1   9.08  )-----------( 197      ( 04 May 2021 05:52 )             40.4     05-04    139  |  106  |  11  ----------------------------<  116<H>  3.6   |  19<L>  |  0.64    Ca    9.0      04 May 2021 05:52  Phos  3.0     05-04  Mg     1.8     05-04    TPro  6.4  /  Alb  3.4  /  TBili  1.8<H>  /  DBili  x   /  AST  17  /  ALT  9   /  AlkPhos  54  05-03          RADIOLOGY & ADDITIONAL TESTS:

## 2021-05-04 NOTE — DIETITIAN INITIAL EVALUATION ADULT. - ORAL INTAKE PTA/DIET HISTORY
Per H&P, patient was noted been having difficulty swallowing for past few days and has not been eating. Food/water would drip from his mouth.

## 2021-05-04 NOTE — CONSULT NOTE ADULT - SUBJECTIVE AND OBJECTIVE BOX
Cardiovascular Disease Initial Evaluation    CHIEF COMPLAINT: Poor oral intake    HISTORY OF PRESENT ILLNESS:  This is an 88 year old man with a-fib, aortic stenosis s/p TAVR 2017, CAD, and HTN who initially presented to [facility] on 4/24/2021 from [facility] for agitation.  Mr. Painter was found to have an intracranial hemorrhage.   Anticoagulation was held and he was discharged.  Mr. Painter returned on 5/1/2021 with poor PO intake.  On telemetry he was found to have bradycardia with pauses and therefore cardiology is consulted.   Currently he is laying flat and comfortably in bed in no distress.         Allergies  No Known Allergies        MEDICATIONS:    cefTRIAXone   IVPB 1000 milliGRAM(s) IV Intermittent every 24 hours      haloperidol     Tablet 0.5 milliGRAM(s) Oral every 6 hours PRN  haloperidol    Injectable 0.5 milliGRAM(s) IntraMuscular every 6 hours PRN  haloperidol    Injectable 0.5 milliGRAM(s) IV Push every 6 hours PRN  PARoxetine 20 milliGRAM(s) Oral daily    glycerin Suppository - Adult 1 Suppository(s) Rectal at bedtime  polyethylene glycol 3350 17 Gram(s) Oral two times a day  senna 2 Tablet(s) Oral at bedtime    levothyroxine 50 MICROGram(s) Oral daily    dextrose 5% + sodium chloride 0.45%. 1000 milliLiter(s) IV Continuous <Continuous>      PAST MEDICAL & SURGICAL HISTORY:  BPH (benign prostatic hypertrophy)    HTN - Hypertension    Hyperlipidemia    Congenital heart defect    Diabetes mellitus    Torn rotator cuff  Right    Spinal stenosis of lumbar region    Atrial fibrillation    Nonrheumatic aortic valve stenosis    Dementia    Aortic stenosis    Glaucoma    OAB (overactive bladder)    Arthritis  generalized medrol pack q 6 weeks    S/P TURP (status post transurethral resection of prostate)  2008    Hx of appendectomy  age 17    S/P lumbar laminectomy  2013    S/P cataract surgery    History of prior ablation treatment  cardiac        FAMILY HISTORY:  No pertinent family history in first degree relatives        SOCIAL HISTORY:    The patient is a nonsmoker       REVIEW OF SYSTEMS:  See HPI, otherwise complete 14 point review of systems negative      PHYSICAL EXAM:  T(C): 36.4 (05-04-21 @ 07:55), Max: 37.2 (05-03-21 @ 11:33)  HR: 106 (05-04-21 @ 07:55) (81 - 106)  BP: 148/93 (05-04-21 @ 07:55) (130/79 - 152/93)  RR: 18 (05-04-21 @ 07:55) (18 - 18)  SpO2: 98% (05-04-21 @ 07:55) (98% - 99%)  Wt(kg): --  I&O's Summary      Appearance: No Acute Distress; resting comfortably  HEENT:  Normal oral mucosa, PERRL, EOMI	  Cardiovascular: Normal S1 S2, No JVD, No murmurs/rubs/gallops  Respiratory: Normal respiratory effort; Lungs clear to auscultation bilaterally  Gastrointestinal:  Soft, Non-tender, + BS	  Skin: No rashes, No ecchymoses, No cyanosis	  Neurologic: Non-focal; no weakness  Extremities: No clubbing, cyanosis or edema  Vascular: Peripheral pulses palpable 2+ bilaterally  Psychiatry: Mood & affect appropriate    Laboratory Data:	 	    CBC Full  -  ( 04 May 2021 05:52 )  WBC Count : 9.08 K/uL  Hemoglobin : 14.1 g/dL  Hematocrit : 40.4 %  Platelet Count - Automated : 197 K/uL  Mean Cell Volume : 99.8 fL  Mean Cell Hemoglobin : 34.8 pg  Mean Cell Hemoglobin Concentration : 34.9 gm/dL  Auto Neutrophil # : x  Auto Lymphocyte # : x  Auto Monocyte # : x  Auto Eosinophil # : x  Auto Basophil # : x  Auto Neutrophil % : x  Auto Lymphocyte % : x  Auto Monocyte % : x  Auto Eosinophil % : x  Auto Basophil % : x    05-04    139  |  106  |  11  ----------------------------<  116<H>  3.6   |  19<L>  |  0.64  05-03    136  |  104  |  12  ----------------------------<  178<H>  3.7   |  19<L>  |  0.67    Ca    9.0      04 May 2021 05:52  Ca    8.7      03 May 2021 06:50  Phos  3.0     05-04  Phos  2.9     05-03  Mg     1.8     05-04  Mg     1.6     05-03    TPro  6.4  /  Alb  3.4  /  TBili  1.8<H>  /  DBili  x   /  AST  17  /  ALT  9   /  AlkPhos  54  05-03  TPro  6.9  /  Alb  3.9  /  TBili  2.4<H>  /  DBili  x   /  AST  13  /  ALT  8   /  AlkPhos  60  05-02      Interpretation of Telemetry: a-fib    ECG:  	A-fib 80s      Assessment: 88 year old man with a-fib, aortic stenosis s/p TAVR 2017, CAD, and HTN presents with intracranial hemorrhage noted to have bradycardia.      Plan of Care:    #Atrial Fibrillation-  Intermittent bradycardia with less than 3 second pause noted on telemetry.   Of note, Mr. Painter was on digoxin 125 mcg daily and Metoprolol 100 mg BID.  I will resume Lopressor at 50 mg PO BID, as he will likely develop RVR soon.   Hold digoxin for now.   No indication for PPM at this time.   I agree with holding anticoagulation in the setting of ICH.  Awaiting MRI.     #Aortic Stenosis-  S/P JOSIAH.  No significant murmur noted on exam.   Continue current cardiac management.     #HTN-  BP controlled on current regimen.    #ACP (advance care planning)-  Advanced care planning was addressed.   No plans for invasive cardiac procedures given co-morbidities.       52 minutes spent on total encounter; more than 50% of the visit was spent counseling and/or coordinating care by the attending physician.   	  Ricky Davila MD [facility]  Cardiovascular Diseases  [phone]    
Neurology  Consult Note  05-02-21    Name:  RICKEY SPRINGER; 88y (18-Jun-1932)    Reason for Admission: Dysphagia    Neurology consult: 89yo man of unknown handedness with PMH of IVH 04/24/21 (likely traumatic from fall 04/12/21), afib (not currently on AC), HTN, CAD, AS (s/p TAVR), DM, and dementia presents from [facility] with difficulty swallowing for several days. Patient is a limited historian. As per EMR, patient was having difficulty swallowing with food/water dripping from his mouth but it is unclear if the dysphagia started suddenly. Patient reports pain "sometimes" but is not able to indicate where. He denies acute weakness or numbness, and is at at baseline mental status.      Review of Systems:  As states in HPI.        PMHx:   BPH (benign prostatic hypertrophy)    HTN - Hypertension    Hyperlipidemia    Congenital heart defect    Diabetes mellitus    Arthropathy associated with bacterial disease, shoulder region    Arthritis, shoulder region    Torn rotator cuff    Spinal stenosis of lumbar region    Atrial fibrillation    Nonrheumatic aortic valve stenosis    Dementia    Aortic stenosis    Glaucoma    OAB (overactive bladder)    Arthritis      PFHx:   No pertinent family history in first degree relatives    No pertinent family history in first degree relatives      PSuHx:   S/P TURP (status post transurethral resection of prostate)    Hx of appendectomy    S/P lumbar laminectomy    S/P cataract surgery    History of prior ablation treatment        Medications:  MEDICATIONS  (STANDING):  cefTRIAXone   IVPB 1000 milliGRAM(s) IV Intermittent every 24 hours  digoxin  Injectable 100 MICROGram(s) IV Push daily  levothyroxine Injectable 40 MICROGram(s) IV Push at bedtime  metoprolol tartrate IVPB 10 milliGRAM(s) IV Intermittent every 6 hours  mineral oil enema 133 milliLiter(s) Rectal once    Vitals:  T(C): 36.2 (05-02-21 @ 05:26), Max: 36.5 (05-01-21 @ 13:25)  HR: 75 (05-02-21 @ 05:26) (73 - 93)  BP: 141/77 (05-02-21 @ 05:26) (127/58 - 176/88)  RR: 18 (05-02-21 @ 05:26) (14 - 20)  SpO2: 95% (05-02-21 @ 05:26) (95% - 100%)    Physical Examination:  Neurologic:  - Mental Status: Alert, awake, oriented to person 9only able to state first name) but not place or time; Speech is fluent with impaired naming and repetition. Follows some simple 1-step commands but often requires repetition.  - Cranial Nerves: Visual fields are full to confrontation; Pupils are equal, round, and reactive to light (sluggish on the R); Extraocular movements are intact without nystagmus. Facial sensation is intact in the V1-V3 distribution bilaterally. Face shows mild R facial flattening. Head turning and shoulder shrug are intact. Patient unable to complete tongue protrusion, possibly due to comprehension barrier.  - Motor: Moving all extremities antigravity. Pronator drift could not be adequately assessed due to difficulty with comprehension. Intention tremors noted throughout extremities.  - Reflexes: 1+ and symmetric at the brachioradialis and knees. Plantar responses equivocal bilaterally.  - Sensory: Intact throughout to light touch  - Coordination: Finger-nose-finger seemingly without dysmetria, however difficult to adequately assess as patient kept repeating "my tongue" and would reach for his tongue with the R hand. When asked to complete task with L hand he repeatedly attempted with his R hand.  - Gait: Deferred    Labs:                        13.7   9.18  )-----------( 243      ( 01 May 2021 13:44 )             41.5     05-01    142  |  105  |  27<H>  ----------------------------<  160<H>  4.6   |  27  |  0.84    Ca    10.0      01 May 2021 13:44  Phos  3.5     05-01  Mg     1.9     05-01    TPro  7.2  /  Alb  4.2  /  TBili  1.1  /  DBili  x   /  AST  10  /  ALT  9   /  AlkPhos  59  05-01    CAPILLARY BLOOD GLUCOSE      POCT Blood Glucose.: 164 mg/dL (01 May 2021 12:59)    LIVER FUNCTIONS - ( 01 May 2021 13:44 )  Alb: 4.2 g/dL / Pro: 7.2 g/dL / ALK PHOS: 59 U/L / ALT: 9 U/L / AST: 10 U/L / GGT: x             Radiology:  CT Head No Cont:  (01 May 2021 16:47)  IMPRESSION:  Redemonstrated trace intraventricular hemorrhage layering in the occipital horn of the left lateral ventricle. Interval resolution of the right-sided intraventricular hemorrhage since 4/26/2021. Unchanged size/appearance of the ventricular system.  No new acute intracranial hemorrhage, mass effect or midline shift.    CT Head No Cont:  (26 Apr 2021 20:35)  IMPRESSION:  No significant interval change compared with the prior. Trace intraventricular hemorrhage layering in the bilateral occipital horns of the lateral ventricle. No change in the ventricular size compared with the prior.    CT Head No Cont (04.24.21 @ 17:01)  IMPRESSION:  Small remaining intraventricular hemorrhage, unchanged since 4/24/2021, 12:56 PM exam.    CT Head No Cont (04.24.21 @ 12:39)  IMPRESSION:  Intraventricular hemorrhage more conspicuous in the left occipital horn. Underlying advanced chronic ischemic changes with moderate to severe atrophy. No intraparenchymal hematoma seen.  Further correlation and follow-up recommended.        
  Chief Complaint:  Patient is a 88y old  Male who presents with a chief complaint of dysphagia (02 May 2021 10:43)    BPH (benign prostatic hypertrophy)    HTN - Hypertension    Hyperlipidemia    Congenital heart defect    Diabetes mellitus    Arthropathy associated with bacterial disease, shoulder region    Arthritis, shoulder region    Torn rotator cuff    Spinal stenosis of lumbar region    Atrial fibrillation    Nonrheumatic aortic valve stenosis    Dementia    Aortic stenosis    Glaucoma    OAB (overactive bladder)    Arthritis    S/P TURP (status post transurethral resection of prostate)    Hx of appendectomy    S/P lumbar laminectomy    S/P cataract surgery    History of prior ablation treatment       HPI:  89yo M with hx of BPH, overactive bladder, Afib no on AC due to recent intracranial hemorrage, AS s/p TAVR 2017, CAD, HTN, Dementia, DM, hypothyroidism presents with dysphagia. Patient was recently admitted for fall and intraventricular bleeding. Patient was noted after discharge to have significant dysphagia and unable to tolerate po intake. Patient was noted been having difficulty swallowing for past few days and has not been eating. Food/water would drip from his mouth. Patient otherwise at baseline mental status AOX0. No complaints of additional GI /  complaints. (01 May 2021 20:30). GI consulted for dysphagia and stercoral colitis. Pt confused at baseline, unable to participate in exam. Collateral information obtained per staff and chart review      No Known Allergies      cefTRIAXone   IVPB 1000 milliGRAM(s) IV Intermittent every 24 hours  dextrose 5% + sodium chloride 0.45%. 1000 milliLiter(s) IV Continuous <Continuous>  digoxin  Injectable 100 MICROGram(s) IV Push daily  glycerin Suppository - Adult 1 Suppository(s) Rectal at bedtime  levothyroxine Injectable 40 MICROGram(s) IV Push at bedtime  metoprolol tartrate IVPB 10 milliGRAM(s) IV Intermittent every 6 hours  saline laxative (FLEET) Rectal Enema 1 Enema Rectal once        FAMILY HISTORY:  No pertinent family history in first degree relatives          Review of Systems: not obtainable, pt agitated and confused at baseline      Relevant Family History:   n/c    Relevant Social History: n/c      Physical Exam:    Vital Signs:  Vital Signs Last 24 Hrs  T(C): 36.4 (02 May 2021 09:09), Max: 36.5 (01 May 2021 13:25)  T(F): 97.6 (02 May 2021 09:09), Max: 97.7 (01 May 2021 13:25)  HR: 73 (02 May 2021 09:09) (73 - 93)  BP: 139/81 (02 May 2021 09:09) (127/58 - 176/88)  BP(mean): --  RR: 18 (02 May 2021 09:09) (14 - 20)  SpO2: 97% (02 May 2021 09:09) (95% - 100%)  Daily Height in cm: 165.1 (01 May 2021 21:06)    Daily     General:  Appears stated age, well-groomed, nad  HEENT:  NC/AT,  conjunctivae clear and pink, no thyromegaly, nodules, adenopathy, no JVD  Chest:  Full & symmetric excursion, no increased effort, breath sounds clear  Cardiovascular:  Regular rhythm, S1, S2, no murmur/rub/S3/S4, no abdominal bruit, no edema  Abdomen:  Soft, non-tender, non-distended, normoactive bowel sounds,  no masses ,no hepatosplenomeagaly, no signs of chronic liver disease  Extremities:  no cyanosis,clubbing or edema  Skin:  No rash/erythema/ecchymoses/petechiae/wounds/abscess/warm/dry  Neuro/Psych:  A&Ox0 , no asterixis, no tremor, no encephalopathy    Laboratory:                            13.9   9.38  )-----------( 222      ( 02 May 2021 08:43 )             39.9     05-02    138  |  102  |  19  ----------------------------<  125<H>  3.6   |  19<L>  |  0.70    Ca    8.9      02 May 2021 08:43  Phos  1.8     05-02  Mg     1.6     05-02    TPro  6.9  /  Alb  3.9  /  TBili  2.4<H>  /  DBili  x   /  AST  13  /  ALT  8   /  AlkPhos  60  05-02    LIVER FUNCTIONS - ( 02 May 2021 08:43 )  Alb: 3.9 g/dL / Pro: 6.9 g/dL / ALK PHOS: 60 U/L / ALT: 8 U/L / AST: 13 U/L / GGT: x             Urinalysis Basic - ( 01 May 2021 15:06 )    Color: Yellow / Appearance: Clear / S.036 / pH: x  Gluc: x / Ketone: Negative  / Bili: Negative / Urobili: 3 mg/dL   Blood: x / Protein: Trace / Nitrite: Negative   Leuk Esterase: Large / RBC: 7 /HPF / WBC Many /HPF   Sq Epi: x / Non Sq Epi: 0 /HPF / Bacteria: Few        Imaging:    < from: CT Abdomen and Pelvis w/ IV Cont (21 @ 16:50) >    EXAM:  CT ABDOMEN AND PELVIS IC        PROCEDURE DATE:  May  1 2021         INTERPRETATION:  CLINICAL INFORMATION: Abdominal tenderness and difficulty swallowing.    COMPARISON: Visualized abdomen on CT chest 2017.    CONTRAST/COMPLICATIONS:  IV Contrast: Omnipaque 350  90 cc administered   10 cc discarded  Oral Contrast: NONE  Complications: None reported at time of study completion    PROCEDURE:  CT of the Abdomen and Pelvis was performed.  Sagittal and coronal reformats were performed.    FINDINGS:  LOWER CHEST: Status post TAVR. Coronary artery calcifications. Mild bibasilar subsegmental atelectasis.    LIVER: Within normal limits.  BILE DUCTS: Normal caliber.  GALLBLADDER: Within normal limits.  SPLEEN: Within normal limits.  PANCREAS: Several pancreatic cystic lesions measuring up to 1.2 cm at the body (3:27). No main duct dilatation. Scattered coarse calcifications.  ADRENALS: Within normal limits.  KIDNEYS/URETERS: A left renal cyst and right renal subcentimeter hypodensities that are too small to characterize.    BLADDER: Within normal limits.  REPRODUCTIVE ORGANS: Mild prostatomegaly measuring 4.9 cm in transverse dimension.    BOWEL: Moderate stool in the rectal vault with mild associated rectal wall thickening and surrounding edema, concerning for stercoral colitis. Small to moderate stool throughout the remainder of the colon.  No bowel obstruction.  PERITONEUM: No ascites.  VESSELS: Atherosclerotic changes.  RETROPERITONEUM/LYMPH NODES: No lymphadenopathy.  ABDOMINAL WALL: Small bilateral fat-containing inguinal hernias.  BONES: Degenerative changes with grade 1 anterolisthesis of L4 over L5 and mild compression deformities of T12 and T10.    IMPRESSION:  Moderate stool in the rectal vault with associatedwall thickening and regional edema, concerning for stercoral colitis. Small to moderate stool burden throughout the remainder of the colon.    Several pancreatic cystic lesions measuring up to 1.2 cm in the body. No main duct dilatation.            MERRICK HERNADEZ MD; Resident Radiology  This document has been electronically signed.  GONZALO GARCIA M.D., ATTENDING RADIOLOGIST  This document has been electronically signed. May  1 2021  6:13PM    < end of copied text >        
Patient seen and evaluated at bedside    Chief Complaint: AF    HPI:  89yo M with hx of BPH, overactive bladder, Afib no on AC due to recent intracranial hemorrhage AS s/p TAVR 2017, CAD, HTN, Dementia (AAOx0), DM, hypothyroidism presents with dysphagia, decreased PO. Course complicated by AF.     PMHx:   BPH (benign prostatic hypertrophy)    HTN - Hypertension    Hyperlipidemia    Congenital heart defect    Diabetes mellitus    Arthropathy associated with bacterial disease, shoulder region    Arthritis, shoulder region    Torn rotator cuff    Spinal stenosis of lumbar region    Atrial fibrillation    Nonrheumatic aortic valve stenosis    Dementia    Aortic stenosis    Glaucoma    OAB (overactive bladder)    Arthritis      PSHx:   S/P TURP (status post transurethral resection of prostate)    Hx of appendectomy    S/P lumbar laminectomy    S/P cataract surgery    History of prior ablation treatment      FAMILY HISTORY:  No pertinent family history in first degree relatives      Allergies:  No Known Allergies    Home Medications:  digoxin 125 mcg (0.125 mg) oral tablet: 1 tab(s) orally once a day (01 May 2021 20:04)  latanoprost 0.005% ophthalmic solution: 1 drop(s) to each affected eye once a day (in the evening) right eye (01 May 2021 20:04)  levothyroxine 50 mcg (0.05 mg) oral tablet: 1 tab(s) orally once a day (01 May 2021 20:04)  lovastatin 40 mg oral tablet: 1 tab(s) orally once a day (at bedtime) (01 May 2021 20:04)  metFORMIN 500 mg oral tablet: 1 tab(s) orally 2 times a day (01 May 2021 20:04)  metoprolol tartrate 100 mg oral tablet: 1 tab(s) orally 2 times a day (01 May 2021 20:04)  MiraLax oral powder for reconstitution: 1 packet(s) orally once a day, As Needed (01 May 2021 20:04)  PARoxetine 20 mg oral tablet: 1 tab(s) orally once a day (01 May 2021 20:04)  tamsulosin 0.4 mg oral capsule: 1 cap(s) orally once a day (at bedtime) (01 May 2021 20:04)    Current Medications:   cefTRIAXone   IVPB 1000 milliGRAM(s) IV Intermittent every 24 hours  dextrose 5% + sodium chloride 0.45%. 1000 milliLiter(s) IV Continuous <Continuous>  enoxaparin Injectable 40 milliGRAM(s) SubCutaneous daily  glycerin Suppository - Adult 1 Suppository(s) Rectal at bedtime  haloperidol     Tablet 0.5 milliGRAM(s) Oral every 6 hours PRN  haloperidol    Injectable 0.5 milliGRAM(s) IntraMuscular every 6 hours PRN  haloperidol    Injectable 0.5 milliGRAM(s) IV Push every 6 hours PRN  levothyroxine 50 MICROGram(s) Oral daily  metoprolol tartrate 50 milliGRAM(s) Oral two times a day  PARoxetine 20 milliGRAM(s) Oral daily  polyethylene glycol 3350 17 Gram(s) Oral two times a day  senna 2 Tablet(s) Oral at bedtime    Social History  Smoking History: denies  Alcohol Use: denies  Drug Use: denies    REVIEW OF SYSTEMS:  Constitutional:     [ ] negative [ ] fevers [ ] chills [ ] weight loss [ ] weight gain  HEENT:                  [ ] negative [ ] dry eyes [ ] eye irritation [ ] postnasal drip [ ] nasal congestion  CV:                         [ ] negative  [ ] chest pain [ ] orthopnea [ ] palpitations [ ] murmur  Resp:                     [ ] negative [ ] cough [ ] shortness of breath [ ] wheezing [ ] sputum [ ] hemoptysis  GI:                          [ ] negative [ ] nausea [ ] vomiting [ ] diarrhea [ ] constipation [ ] abd pain [ ] dysphagia   :                        [ ] negative [ ] dysuria [ ] nocturia [ ] hematuria [ ] increased urinary frequency  MSK:                      [ ] negative [ ] back pain [ ] myalgias [ ] arthralgias [ ] fracture  Skin:                       [ ] negative [ ] rash [ ] itch  Neuro:                   [ ] negative [ ] headache [ ] dizziness [ ] syncope [ ] weakness [ ] numbness  Psych:                    [ ] negative [ ] anxiety [ ] depression  Endo:                     [ ] negative [ ] diabetes [ ] thyroid problem  Heme/Lymph:      [ ] negative [ ] anemia [ ] bleeding problem  Allergic/Immune: [ ] negative [ ] itchy eyes [ ] nasal discharge [ ] hives [ ] angioedema    [ ] All other systems negative or otherwise described above.  [X] Unable to assess ROS because dementia.    ICU Vital Signs Last 24 Hrs  T(C): 36.5 (04 May 2021 12:02), Max: 37.1 (03 May 2021 21:33)  T(F): 97.7 (04 May 2021 12:02), Max: 98.8 (03 May 2021 21:33)  HR: 98 (04 May 2021 12:02) (90 - 106)  BP: 122/74 (04 May 2021 12:02) (122/74 - 152/93)  BP(mean): --  ABP: --  ABP(mean): --  RR: 18 (04 May 2021 12:02) (18 - 18)  SpO2: 98% (04 May 2021 12:02) (98% - 99%)    Daily     Daily     Physical Exam:  GENERAL: cachectic  HEAD:  Atraumatic, Normocephalic  ENT: EOMI, conjunctiva and sclera clear, Neck supple, No JVD, dry mucosa  CHEST/LUNG: Clear to auscultation bilaterally; No wheeze, equal breath sounds bilaterally   BACK: No spinal tenderness  HEART: Irregular rate and rhythm; radial and DP 2+ b/l, hypovolemic  ABDOMEN: Soft, Nontender, Nondistended  EXTREMITIES:  No clubbing, cyanosis, or edema  NEUROLOGY: non-focal  SKIN: Normal color, No rashes or lesions  LINES: no central lines present    Cardiovascular Diagnostic Testing    ECG: Personally reviewed    Echo: Personally reviewed  < from: Transthoracic Echocardiogram (12.04.17 @ 11:56) >  Conclusions:  1. Transcatheter aortic valve replacement. Peak transaortic  valve gradient equals 14 mm Hg, mean transaortic valve  gradient equals 9 mm Hg, which is probably normal in the  presence of a transcatheter aortic valve replacement. Mild  paravalvular aortic regurgitation.  2. Increased relative wall thickness with normal left  ventricular mass index, consistent with concentric left  ventricular remodeling.  3. Normal left ventricular systolic function. No segmental  wall motion abnormalities.  ------------------------------------------------------------------------  Confirmed on  12/4/2017 - 15:46:52 by Rolan Car M.D.  ---------------------------------------------------------------------    < end of copied text >    Stress Testing: none    Cath:   < from: Cardiac Cath Lab - Adult (10.18.17 @ 13:11) >  VENTRICLES: No left ventriculogram was performed.  VALVES: AORTIC VALVE: The aortic valve was evaluated by hemodynamic  measurement, fluoroscopy, and aortography. The aortic valve leaflets  exhibited markedly reduced excursion and calcification.  AORTA: The root exhibited normal size.  LEFT LOWER EXTREMITY VESSELS: Left common iliac: The vessel was patent.  Left internal iliac: The vessel was patent. Left external iliac: The  vessel was patent. Left common femoral: The vessel was patent. Proximal  left superficial femoral: The vessel was patent. Proximal left deep  femoral: The vessel was patent.  RIGHT LOWER EXTREMITY VESSELS: Right common iliac: The vessel was patent.  Right internal iliac: The vessel was patent. Right external iliac: The  vessel was patent and tortuous. Right common femoral: The vessel was  patent. Angiography showed mild atherosclerosis. Proximal right  superficial femoral: The vessel was patent. Proximal right deep femoral:  The vessel was patent.  COMPLICATIONS: There were no complications.  DIAGNOSTIC RECOMMENDATIONS: Proceed with TAVR with a 26mm MICHAEL 3 THV via  right TF access in context of symptomatic severe AS.  INTERVENTIONAL IMPRESSIONS: Successful TAVR with a 26mm MICHAEL 3 THV via  right TF access; there was trace PVL post TAVR.  INTERVENTIONAL RECOMMENDATIONS: Post TAVR management in the CTU; TTE in the  morning; continue aspirin and warfarin as prior to admission; EPS  evaluation as indicated (the patient was on Digoxin 0.125mg QD and  Metoprolol 100mg BID prior to admission for AFIB).  Prepared and signed by  Jose D Quintanilla M.D.  Signed 10/18/2017 16:20:36    < end of copied text >      Imaging: none    CXR: Personally reviewed    Labs: Personally reviewed                        14.1   9.08  )-----------( 197      ( 04 May 2021 05:52 )             40.4     05-04    139  |  106  |  11  ----------------------------<  116<H>  3.6   |  19<L>  |  0.64    Ca    9.0      04 May 2021 05:52  Phos  3.0     05-04  Mg     1.8     05-04    TPro  6.4  /  Alb  3.4  /  TBili  1.8<H>  /  DBili  x   /  AST  17  /  ALT  9   /  AlkPhos  54  05-03            Thyroid Stimulating Hormone, Serum: 0.24 uIU/mL (05-03 @ 06:50)  Thyroid Stimulating Hormone, Serum: 0.17 uIU/mL (05-02 @ 08:43)

## 2021-05-04 NOTE — CONSULT NOTE ADULT - ATTENDING COMMENTS
87yo M with hx of BPH, overactive bladder, Afib no on AC due to recent intracranial hemorrhage AS s/p TAVR 2017, CAD, HTN, Dementia (AAOx0), DM, hypothyroidism presents with dysphagia, decreased PO. Course complicated by AF. Cont rate control. Will follow.

## 2021-05-04 NOTE — CONSULT NOTE ADULT - ASSESSMENT
87yo M with hx of BPH, overactive bladder, Afib no on AC due to recent intracranial hemorrage, AS s/p TAVR 2017, CAD, HTN, Dementia, DM, hypothyroidism presents with dysphagia.    Dysphagia  -aspiration precautions   -fu speech and swallow   -pending s&s may need cine vs esophagram    -goc with family if role for peg; would need cardiology clearance    Stercoral Colitis:  -2/2 significant constipation as noted on CT  -fleet enema vs tap water enema daily  x 2 days   -senna qhs with miralax bid   -monitor stool output     Advanced care planning was discussed with patient and family.  Advanced care planning forms were reviewed and discussed.  Risks, benefits and alternatives of gastroenterologic procedures were discussed in detail and all questions were answered.  30 minutes spent.
89yo M with hx of BPH, overactive bladder, Afib no on AC due to recent intracranial hemorrhage AS s/p TAVR 2017, CAD, HTN, Dementia (AAOx0), DM, hypothyroidism presents with dysphagia, decreased PO. Course complicated by AF.     Plan  -currently rate-controlled  -c/w lopressor 50mg bid  -CHADSVASC: 4; holding AC 2/2 recent intracranial bleed    CALVIN Carroll MD  Cardiology Fellow  Text or Call: [phone]  For all New Consults and Questions:  [email or web address]   Login: [email or web address]
89yo man of unknown handedness with PMH of IVH 04/24/21 (likely traumatic from fall 04/12/21), afib (not currently on AC), HTN, CAD, AS (s/p TAVR), DM, and dementia presents from [facility] with difficulty swallowing for several days. Physical exam remarkable for poor baseline mental status. Labs remarkable for macrocytosis and UTI. CTH shows trace IVH in the occipital horn of the L lateral ventricle, with resolution in the R lateral ventricle. No new acute ICH.    Impression: Dysphagia possibly due to cerebral cortex or brainstem dysfunction from course progression of recent traumatic IVH, r/o structural lesion.    Recommendations:  [] MRI brain w/o contrast  [] GI consult  [] Nutrition consult  [] Monitor CBC, BMP, B12, folate  [] Rest of infectious and metabolic workup as per primary team  [] PT/OT  [] S/S  [] BP goal normotension  [] BG     Case to be seen and discussed with neurology attending Dr. Schoenberg.

## 2021-05-04 NOTE — DIETITIAN INITIAL EVALUATION ADULT. - ORAL NUTRITION SUPPLEMENTS
1- [employer] Vital Cuisine 500 Shake (520kcal and 22 grams of protein/8.48 oz) 2- Magic Cup x 1daily (290kcal, 9gm pro)

## 2021-05-04 NOTE — DIETITIAN INITIAL EVALUATION ADULT. - PERTINENT LABORATORY DATA
05-04 Na139 mmol/L Glu 116 mg/dL<H> K+ 3.6 mmol/L Cr  0.64 mg/dL BUN 11 mg/dL 05-04 Phos 3.0 mg/dL 05-03 Alb 3.4 g/dL

## 2021-05-04 NOTE — PROGRESS NOTE ADULT - ASSESSMENT
87yo M with hx of BPH, overactive bladder, Afib no on AC due to recent intracranial hemorrage, AS s/p TAVR 2017, CAD, HTN, Dementia, DM, hypothyroidism presents with dysphagia.    Dysphagia  -aspiration precautions   -passed S&S for Puree consistency and nectar thick fluids  -pt will need assistance and encouragement w/feeds    Stercoral Colitis:  -2/2 significant constipation as noted on CT; improving  -senna qhs with miralax bid   -enema prn   -monitor stool output     Advanced care planning was discussed with patient and family.  Advanced care planning forms were reviewed and discussed.  Risks, benefits and alternatives of gastroenterologic procedures were discussed in detail and all questions were answered.  30 minutes spent.

## 2021-05-05 NOTE — PROGRESS NOTE ADULT - ASSESSMENT
Patient is a 89yo man of unknown handedness with PMH of IVH 04/24/21 (likely traumatic from fall 04/12/21), afib (not currently on AC), HTN, CAD, AS (s/p TAVR), DM, and dementia presents from [facility] with difficulty swallowing for several days. Physical exam remarkable for poor baseline mental status. CTH shows trace IVH in the occipital horn of the L lateral ventricle, with resolution in the R lateral ventricle. No new acute ICH. MRI head w/o contrast was noted to show acute Rt frontal lobe infarct, likely in the setting of known Afib. Patient can be started on ASA 81mg. Recommend Eliquis in 6-7 weeks from initial insult and have a repeat CT scan at that time. Would If echo does not show mitral valve stenosis then can use Doacs. Could consider Watchman, if concern for gait stability.    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     Recommendations:   Start ASA 81mg Qday   Lipitor 80mg QHS titrate ldl less than 70.   Eliquis could be started 6-7 weeks from insult, with repeat head imaging   If there is no indication of Jessica valve stenosis, then no C/I for DOAcs.   Could consider watchman if there is concern for gait instability.   Continue DVT ppx     Patient can follow-up with Stroke Neurology 1 week upon discharge with She Rosado NP. [address], [address]. [place]. [address].  [phone].   Email: [email or web address]     Case was discussed with Neurology Attending.  Patient is a 89yo man of unknown handedness with PMH of IVH 04/24/21 (likely traumatic from fall 04/12/21), afib (not currently on AC), HTN, CAD, AS (s/p TAVR), DM, and dementia presents from [facility] with difficulty swallowing for several days. Physical exam remarkable for poor baseline mental status. CTH shows trace IVH in the occipital horn of the L lateral ventricle, with resolution in the R lateral ventricle. No new acute ICH. MRI head w/o contrast was noted to show acute Rt frontal lobe infarct, likely in the setting of known Afib. Patient can be started on ASA 81mg. Recommend Eliquis (if no significant mitral stenosis) in 6-7 weeks from initial insult and have a repeat CT scan at that time prior to starting anticoagulation (and stopping ASA). Could consider Watchman as an alternative.    Impression: Dysphagia likely 2/2 to Acute frontal lobe infarct in the insular region probably cardioembolic in the setting of his known Afib.     Recommendations:   Start ASA 81mg Qday   Lipitor 80mg QHS titrate LDL less than 70.   Eliquis could be started 6-7 weeks from insult, with repeat head imaging   If there is no significant mitral valve stenosis, then no C/I for DOAcs.   Could consider watchman   Continue DVT ppx     Patient can follow-up with Stroke Neurology 1 week upon discharge with She Rosado NP. [address], [address]. [place]. [address]. Ph [phone].   Email: [email or web address]     Case was discussed with Neurology Attending.

## 2021-05-05 NOTE — PROGRESS NOTE ADULT - TIME BILLING
Diagnosis and treatment plan; counselling for secondary stroke prevention  Agree with above; ROS otherwise negative

## 2021-05-05 NOTE — PROGRESS NOTE ADULT - SUBJECTIVE AND OBJECTIVE BOX
Cardiovascular Disease Progress Note    Overnight events: No acute events overnight.  Mr. Painter is on his way down to MRI. He is in no apparent distress.   Otherwise review of systems negative    Objective Findings:  T(C): 36.7 (05-05-21 @ 04:58), Max: 36.7 (05-05-21 @ 04:58)  HR: 99 (05-05-21 @ 04:58) (86 - 99)  BP: 156/81 (05-05-21 @ 04:58) (122/74 - 156/81)  RR: 18 (05-05-21 @ 04:58) (18 - 18)  SpO2: 98% (05-05-21 @ 04:58) (98% - 98%)  Wt(kg): --  Daily     Daily       Physical Exam:  Gen: NAD; Patient resting comfortably  HEENT: EOMI, Normocephalic/ atraumatic  CV: IIR, normal S1 + S2, no m/r/g  Lungs:  Normal respiratory effort; clear to auscultation bilaterally  Abd: soft, non-tender; bowel sounds present  Ext: No edema; warm and well perfused    Telemetry: a-fib 80-90s    Laboratory Data:                        14.8   9.63  )-----------( 251      ( 05 May 2021 06:56 )             42.6     05-05    139  |  103  |  18  ----------------------------<  135<H>  3.6   |  20<L>  |  0.68    Ca    9.2      05 May 2021 06:56  Phos  2.3     05-05  Mg     1.8     05-05                Inpatient Medications:  MEDICATIONS  (STANDING):  dextrose 5% + sodium chloride 0.45%. 1000 milliLiter(s) (50 mL/Hr) IV Continuous <Continuous>  enoxaparin Injectable 40 milliGRAM(s) SubCutaneous daily  glycerin Suppository - Adult 1 Suppository(s) Rectal at bedtime  levothyroxine 50 MICROGram(s) Oral daily  metoprolol tartrate 50 milliGRAM(s) Oral two times a day  PARoxetine 20 milliGRAM(s) Oral daily  polyethylene glycol 3350 17 Gram(s) Oral two times a day  senna 2 Tablet(s) Oral at bedtime      Assessment: 88 year old man with a-fib, aortic stenosis s/p TAVR 2017, CAD, and HTN presents with intracranial hemorrhage noted to have bradycardia.      Plan of Care:    #Atrial Fibrillation-  Intermittent bradycardia with less than 3 second pause noted on telemetry.   Of note, Mr. Painter was on digoxin 125 mcg daily and Metoprolol 100 mg BID.  Monitor on Lopressor at 50 mg PO BID.  No indication for PPM at this time.   I agree with holding anticoagulation in the setting of ICH.  MRI this morning.     #Aortic Stenosis-  S/P JOSIAH.  No significant murmur noted on exam.   Continue current cardiac management.     #HTN-  BP controlled on current regimen.    Over 25 minutes spent on total encounter; more than 50% of the visit was spent counseling and/or coordinating care by the attending physician.      Ricky Davila MD [facility]  Cardiovascular Disease  [phone]

## 2021-05-05 NOTE — PROGRESS NOTE ADULT - ASSESSMENT
87yo M with hx of BPH, overactive bladder, Afib no on AC due to recent intracranial hemorrhage AS s/p TAVR 2017, CAD, HTN, Dementia (AAOx0), DM, hypothyroidism presents with dysphagia, decreased PO. Course complicated by AF.     Plan  -currently rate-controlled  -c/w lopressor 50mg bid  -CHADSVASC: 4; holding AC 2/2 recent intracranial bleed    CALVIN Carroll MD  Cardiology Fellow  Text or Call: [phone]  For all New Consults and Questions:  [email or web address]   Login: [email or web address]

## 2021-05-05 NOTE — PROGRESS NOTE ADULT - SUBJECTIVE AND OBJECTIVE BOX
MRN-[id number]  Objective: Patien states he is feeling fine.           Vital Signs Last 24 Hrs  T(C): 36.7 (05 May 2021 04:58), Max: 36.7 (05 May 2021 04:58)  T(F): 98 (05 May 2021 04:58), Max: 98 (05 May 2021 04:58)  HR: 99 (05 May 2021 04:58) (86 - 99)  BP: 116/65 (05 May 2021 11:21) (116/65 - 156/81)  BP(mean): --  RR: 18 (05 May 2021 11:21) (18 - 18)  SpO2: 98% (05 May 2021 11:21) (98% - 98%)    GENERAL EXAM:  Constitutional: awake and alert. NAD  HEENT: PERRL, EOMI      NEUROLOGICAL EXAM:  MS: AAOx to person. Baseline has dementia.   Speech is fluent, Patient tries to follow simple commands, by asking to show two fingers will grab two fingers on his hand.   CN: EOMI, PERRL. pupils symmetric b/l.   V1-3 intact, no facial asymmetry, t/p midline.   Motor: Strength: 5/5 in the UE and LE b/l.   no pronation noted.   Tone: normal. Bulk: normal.  Sensation: intact to LT/Temperature 4x.   Coordination: intact FTN b/l.    Gait: unable to assess.     NIHSS 0    Labs:   cbc                      14.8   9.63  )-----------( 251      ( 05 May 2021 06:56 )             42.6     [id number]    139  |  103  |  18  ----------------------------<  135<H>  3.6   |  20<L>  |  0.68    Ca    9.2      05 May 2021 06:56  Phos  2.3     05-05  Mg     1.8     05-05          Radiology:  MRI Head w/o contrast: IMPRESSION:    A small subcentimeter acute right frontal lobe infarct is present, without hemorrhagic transformation.    Stable small intraventricular hemorrhage and stable ventricular size. Serial imaging follow-up over time is recommended to monitor for stability.   MRN-[id number]  Objective: Patient states he is feeling fine.           Vital Signs Last 24 Hrs  T(C): 36.7 (05 May 2021 04:58), Max: 36.7 (05 May 2021 04:58)  T(F): 98 (05 May 2021 04:58), Max: 98 (05 May 2021 04:58)  HR: 99 (05 May 2021 04:58) (86 - 99)  BP: 116/65 (05 May 2021 11:21) (116/65 - 156/81)  BP(mean): --  RR: 18 (05 May 2021 11:21) (18 - 18)  SpO2: 98% (05 May 2021 11:21) (98% - 98%)    GENERAL EXAM:  Constitutional: awake and alert. NAD  HEENT: PERRL, EOMI      NEUROLOGICAL EXAM:  MS: AAOx to person. Baseline has dementia.   Speech is fluent, Patient tries to follow simple commands, by asking to show two fingers will grab two fingers on his hand.   CN: EOMI, PERRL. pupils symmetric b/l.   V1-3 intact, no facial asymmetry, t/p midline.   Motor: Strength: 5/5 in the UE and LE b/l.   no pronation noted.   Tone: normal. Bulk: normal.  Sensation: intact to LT/Temperature 4x.   Coordination: intact FTN b/l.    Gait: unable to assess.     NIHSS 0    Labs:   cbc                      14.8   9.63  )-----------( 251      ( 05 May 2021 06:56 )             42.6     [id number]    139  |  103  |  18  ----------------------------<  135<H>  3.6   |  20<L>  |  0.68    Ca    9.2      05 May 2021 06:56  Phos  2.3     05-05  Mg     1.8     05-05          Radiology:  MRI Head w/o contrast: IMPRESSION:    A small subcentimeter acute right frontal lobe infarct is present, without hemorrhagic transformation.    Stable small intraventricular hemorrhage and stable ventricular size. Serial imaging follow-up over time is recommended to monitor for stability.

## 2021-05-05 NOTE — PROGRESS NOTE ADULT - SUBJECTIVE AND OBJECTIVE BOX
INTERVAL HPI/OVERNIGHT EVENTS:    pleasantly confused  PCA bedside pt ate a bit this morning, no coughing   last bm yesterday       MEDICATIONS  (STANDING):  dextrose 5% + sodium chloride 0.45%. 1000 milliLiter(s) (50 mL/Hr) IV Continuous <Continuous>  enoxaparin Injectable 40 milliGRAM(s) SubCutaneous daily  glycerin Suppository - Adult 1 Suppository(s) Rectal at bedtime  levothyroxine 50 MICROGram(s) Oral daily  magnesium sulfate  IVPB 1 Gram(s) IV Intermittent once  metoprolol tartrate 50 milliGRAM(s) Oral two times a day  PARoxetine 20 milliGRAM(s) Oral daily  polyethylene glycol 3350 17 Gram(s) Oral two times a day  potassium phosphate IVPB 30 milliMole(s) IV Intermittent once  senna 2 Tablet(s) Oral at bedtime    MEDICATIONS  (PRN):  haloperidol     Tablet 0.5 milliGRAM(s) Oral every 6 hours PRN agitation  haloperidol    Injectable 0.5 milliGRAM(s) IntraMuscular every 6 hours PRN Agitation  haloperidol    Injectable 0.5 milliGRAM(s) IV Push every 6 hours PRN Agitation      Allergies    No Known Allergies    Intolerances        Review of Systems: *confused, unobtainable          Vital Signs Last 24 Hrs  T(C): 36.7 (05 May 2021 04:58), Max: 36.7 (05 May 2021 04:58)  T(F): 98 (05 May 2021 04:58), Max: 98 (05 May 2021 04:58)  HR: 99 (05 May 2021 04:58) (86 - 99)  BP: 116/65 (05 May 2021 11:21) (116/65 - 156/81)  BP(mean): --  RR: 18 (05 May 2021 11:21) (18 - 18)  SpO2: 98% (05 May 2021 11:21) (98% - 98%)    PHYSICAL EXAM:    Constitutional: NAD  HEENT: EOMI, throat clear  Neck: No LAD, supple  Respiratory: CTA and P  Cardiovascular: S1 and S2, RRR, no M  Gastrointestinal: BS+, soft, NT/ND, neg HSM,  Extremities: No peripheral edema, neg clubbing, cyanosis  Vascular: 2+ peripheral pulses  Neurological: A/O x 0-1, no focal deficits  Psychiatric: Normal mood, normal affect  Skin: No rashes      LABS:                        14.8   9.63  )-----------( 251      ( 05 May 2021 06:56 )             42.6     05-05    139  |  103  |  18  ----------------------------<  135<H>  3.6   |  20<L>  |  0.68    Ca    9.2      05 May 2021 06:56  Phos  2.3     05-05  Mg     1.8     05-05            RADIOLOGY & ADDITIONAL TESTS:

## 2021-05-05 NOTE — PROGRESS NOTE ADULT - SUBJECTIVE AND OBJECTIVE BOX
Date of Service  : 05-05-21     INTERVAL HPI/OVERNIGHT EVENTS: No new concerns.   Vital Signs Last 24 Hrs  T(C): 36.7 (05 May 2021 04:58), Max: 36.7 (05 May 2021 04:58)  T(F): 98 (05 May 2021 04:58), Max: 98 (05 May 2021 04:58)  HR: 99 (05 May 2021 04:58) (86 - 99)  BP: 116/65 (05 May 2021 11:21) (116/65 - 156/81)  BP(mean): --  RR: 18 (05 May 2021 11:21) (18 - 18)  SpO2: 98% (05 May 2021 11:21) (98% - 98%)  I&O's Summary    04 May 2021 07:01  -  05 May 2021 07:00  --------------------------------------------------------  IN: 450 mL / OUT: 0 mL / NET: 450 mL      MEDICATIONS  (STANDING):  aspirin enteric coated 81 milliGRAM(s) Oral daily  dextrose 5% + sodium chloride 0.45%. 1000 milliLiter(s) (50 mL/Hr) IV Continuous <Continuous>  enoxaparin Injectable 40 milliGRAM(s) SubCutaneous daily  glycerin Suppository - Adult 1 Suppository(s) Rectal at bedtime  levothyroxine 50 MICROGram(s) Oral daily  metoprolol tartrate 50 milliGRAM(s) Oral two times a day  PARoxetine 20 milliGRAM(s) Oral daily  polyethylene glycol 3350 17 Gram(s) Oral two times a day  senna 2 Tablet(s) Oral at bedtime    MEDICATIONS  (PRN):  haloperidol     Tablet 0.5 milliGRAM(s) Oral every 6 hours PRN agitation  haloperidol    Injectable 0.5 milliGRAM(s) IntraMuscular every 6 hours PRN Agitation  haloperidol    Injectable 0.5 milliGRAM(s) IV Push every 6 hours PRN Agitation    LABS:                        14.8   9.63  )-----------( 251      ( 05 May 2021 06:56 )             42.6     05-05    139  |  103  |  18  ----------------------------<  135<H>  3.6   |  20<L>  |  0.68    Ca    9.2      05 May 2021 06:56  Phos  2.3     05-05  Mg     1.8     05-05          CAPILLARY BLOOD GLUCOSE              Consultant(s) Notes Reviewed:  [x ] YES  [ ] NO    PHYSICAL EXAM:  GENERAL: NAD, well-groomed, well-developed,not in any distress ,  HEAD:  Atraumatic, Normocephalic  EYES: EOMI, PERRLA, conjunctiva and sclera clear  ENMT: No tonsillar erythema, exudates, or enlargement; Moist mucous membranes, Good dentition, No lesions  NECK: Supple, No JVD, Normal thyroid  NERVOUS SYSTEM:  Alert & Oriented X1 , No focal deficit   CHEST/LUNG: Good air entry bilateral with no  rales, rhonchi, wheezing, or rubs  HEART: Regular rate and rhythm; No murmurs, rubs, or gallops  ABDOMEN: Soft, Nontender, Nondistended; Bowel sounds present  EXTREMITIES:  2+ Peripheral Pulses, No clubbing, cyanosis, or edema  SKIN: No rashes or lesions    Care Discussed with Consultants/Other Providers [ x] YES  [ ] NO

## 2021-05-05 NOTE — PROGRESS NOTE ADULT - ASSESSMENT
89yo M with hx of BPH, overactive bladder, Afib no on AC due to recent intracranial hemorrage, AS s/p TAVR 2017, CAD, HTN, Dementia, DM, hypothyroidism presents with dysphagia.    Dysphagia  -aspiration precautions   -passed S&S for Puree consistency and nectar thick fluids  -pt will need assistance and encouragement w/feeds    Stercoral Colitis:  -2/2 significant constipation as noted on CT; improving  -senna qhs with miralax bid   -enema prn   -monitor stool output     Advanced care planning was discussed with patient and family.  Advanced care planning forms were reviewed and discussed.  Risks, benefits and alternatives of gastroenterologic procedures were discussed in detail and all questions were answered.  30 minutes spent.

## 2021-05-05 NOTE — PROGRESS NOTE ADULT - SUBJECTIVE AND OBJECTIVE BOX
INTERVAL EVENTS: No o/n events.      REVIEW OF SYSTEMS:  Constitutional:     [ ] negative [ ] fevers [ ] chills [ ] weight loss [ ] weight gain  HEENT:                  [ ] negative [ ] dry eyes [ ] eye irritation [ ] postnasal drip [ ] nasal congestion  CV:                         [ ] negative  [ ] chest pain [ ] orthopnea [ ] palpitations [ ] murmur  Resp:                     [ ] negative [ ] cough [ ] shortness of breath [ ] wheezing [ ] sputum [ ] hemoptysis  GI:                          [ ] negative [ ] nausea [ ] vomiting [ ] diarrhea [ ] constipation [ ] abd pain [ ] dysphagia   :                        [ ] negative [ ] dysuria [ ] nocturia [ ] hematuria [ ] increased urinary frequency  MSK:                      [ ] negative [ ] back pain [ ] myalgias [ ] arthralgias [ ] fracture  Skin:                       [ ] negative [ ] rash [ ] itch  Neuro:                   [ ] negative [ ] headache [ ] dizziness [ ] syncope [ ] weakness [ ] numbness  Psych:                    [ ] negative [ ] anxiety [ ] depression  Endo:                     [ ] negative [ ] diabetes [ ] thyroid problem  Heme/Lymph:      [ ] negative [ ] anemia [ ] bleeding problem  Allergic/Immune: [ ] negative [ ] itchy eyes [ ] nasal discharge [ ] hives [ ] angioedema    [ ] All other systems negative or otherwise described above.  [X] Unable to assess ROS because dementia.    PAST MEDICAL & SURGICAL HISTORY:  BPH (benign prostatic hypertrophy)    HTN - Hypertension    Hyperlipidemia    Congenital heart defect    Diabetes mellitus    Arthropathy associated with bacterial disease, shoulder region    Arthritis, shoulder region  Right    Torn rotator cuff  Right    Spinal stenosis of lumbar region    Atrial fibrillation    Nonrheumatic aortic valve stenosis    Dementia    Aortic stenosis    Glaucoma    OAB (overactive bladder)    Arthritis  generalized medrol pack q 6 weeks    S/P TURP (status post transurethral resection of prostate)  2008    Hx of appendectomy  age 17    S/P lumbar laminectomy  2013    S/P cataract surgery    History of prior ablation treatment  cardiac      MEDICATIONS  (STANDING):  dextrose 5% + sodium chloride 0.45%. 1000 milliLiter(s) (50 mL/Hr) IV Continuous <Continuous>  enoxaparin Injectable 40 milliGRAM(s) SubCutaneous daily  glycerin Suppository - Adult 1 Suppository(s) Rectal at bedtime  levothyroxine 50 MICROGram(s) Oral daily  metoprolol tartrate 50 milliGRAM(s) Oral two times a day  PARoxetine 20 milliGRAM(s) Oral daily  polyethylene glycol 3350 17 Gram(s) Oral two times a day  senna 2 Tablet(s) Oral at bedtime    MEDICATIONS  (PRN):  haloperidol     Tablet 0.5 milliGRAM(s) Oral every 6 hours PRN agitation  haloperidol    Injectable 0.5 milliGRAM(s) IntraMuscular every 6 hours PRN Agitation  haloperidol    Injectable 0.5 milliGRAM(s) IV Push every 6 hours PRN Agitation    ICU Vital Signs Last 24 Hrs  T(C): 36.7 (05 May 2021 04:58), Max: 36.7 (05 May 2021 04:58)  T(F): 98 (05 May 2021 04:58), Max: 98 (05 May 2021 04:58)  HR: 99 (05 May 2021 04:58) (86 - 99)  BP: 116/65 (05 May 2021 11:21) (116/65 - 156/81)  BP(mean): --  ABP: --  ABP(mean): --  RR: 18 (05 May 2021 11:21) (18 - 18)  SpO2: 98% (05 May 2021 11:21) (98% - 98%)    Daily     Daily     PHYSICAL EXAM:  GENERAL: cachectic  HEAD:  Atraumatic, Normocephalic  ENT: EOMI, conjunctiva and sclera clear, Neck supple, No JVD, dry mucosa  CHEST/LUNG: Clear to auscultation bilaterally; No wheeze, equal breath sounds bilaterally   BACK: No spinal tenderness  HEART: Irregular rate and rhythm; radial and DP 2+ b/l, hypovolemic  ABDOMEN: Soft, Nontender, Nondistended  EXTREMITIES:  No clubbing, cyanosis, or edema  NEUROLOGY: non-focal  SKIN: Normal color, No rashes or lesions  LINES: no central lines present    LABS:                        14.8   9.63  )-----------( 251      ( 05 May 2021 06:56 )             42.6     05-05    139  |  103  |  18  ----------------------------<  135<H>  3.6   |  20<L>  |  0.68    Ca    9.2      05 May 2021 06:56  Phos  2.3     05-05  Mg     1.8     05-05              I&O's Summary    04 May 2021 07:01  -  05 May 2021 07:00  --------------------------------------------------------  IN: 450 mL / OUT: 0 mL / NET: 450 mL      BNP    RADIOLOGY & ADDITIONAL STUDIES:    TELEMETRY: reviewed    EKG: reviewed      Echo: Personally reviewed  < from: Transthoracic Echocardiogram (12.04.17 @ 11:56) >  Conclusions:  1. Transcatheter aortic valve replacement. Peak transaortic  valve gradient equals 14 mm Hg, mean transaortic valve  gradient equals 9 mm Hg, which is probably normal in the  presence of a transcatheter aortic valve replacement. Mild  paravalvular aortic regurgitation.  2. Increased relative wall thickness with normal left  ventricular mass index, consistent with concentric left  ventricular remodeling.  3. Normal left ventricular systolic function. No segmental  wall motion abnormalities.  ------------------------------------------------------------------------  Confirmed on  12/4/2017 - 15:46:52 by Rolan Car M.D.  ---------------------------------------------------------------------    < end of copied text >    Stress Testing: none    Cath:   < from: Cardiac Cath Lab - Adult (10.18.17 @ 13:11) >  VENTRICLES: No left ventriculogram was performed.  VALVES: AORTIC VALVE: The aortic valve was evaluated by hemodynamic  measurement, fluoroscopy, and aortography. The aortic valve leaflets  exhibited markedly reduced excursion and calcification.  AORTA: The root exhibited normal size.  LEFT LOWER EXTREMITY VESSELS: Left common iliac: The vessel was patent.  Left internal iliac: The vessel was patent. Left external iliac: The  vessel was patent. Left common femoral: The vessel was patent. Proximal  left superficial femoral: The vessel was patent. Proximal left deep  femoral: The vessel was patent.  RIGHT LOWER EXTREMITY VESSELS: Right common iliac: The vessel was patent.  Right internal iliac: The vessel was patent. Right external iliac: The  vessel was patent and tortuous. Right common femoral: The vessel was  patent. Angiography showed mild atherosclerosis. Proximal right  superficial femoral: The vessel was patent. Proximal right deep femoral:  The vessel was patent.  COMPLICATIONS: There were no complications.  DIAGNOSTIC RECOMMENDATIONS: Proceed with TAVR with a 26mm MICHAEL 3 THV via  right TF access in context of symptomatic severe AS.  INTERVENTIONAL IMPRESSIONS: Successful TAVR with a 26mm MICHAEL 3 THV via  right TF access; there was trace PVL post TAVR.  INTERVENTIONAL RECOMMENDATIONS: Post TAVR management in the CTU; TTE in the  morning; continue aspirin and warfarin as prior to admission; EPS  evaluation as indicated (the patient was on Digoxin 0.125mg QD and  Metoprolol 100mg BID prior to admission for AFIB).  Prepared and signed by  Jose D Quintanilla M.D.  Signed 10/18/2017 16:20:36    < end of copied text >

## 2021-05-05 NOTE — PROGRESS NOTE ADULT - ASSESSMENT
89yo M with hx of BPH, overactive bladder, Afib no on AC due to recent intracranial hemorrage, AS s/p TAVR 2017, CAD, HTN, Dementia, DM, hypothyroidism presents with dysphagia.     Problem/Plan - 1:  ·  Problem: Dysphagia.  Plan: - Dysphagia diet .   - GI helping. .  - Maintenance IVF     Problem/Plan - 2:  ·  Problem: Cystitis.  Plan: - Treat with ceftriaxone.  - Monitor culture.      Problem/Plan - 3:  ·  Problem: Intracranial bleed. Plan: - Hx of recent bleeding. Repeat CT shows no new bleeding.  - Continue holding AC for Afib.- Neurology helping.  < from: MR Head w/wo IV Cont (05.05.21 @ 09:22) >  IMPRESSION:    A small subcentimeter acute right frontal lobe infarct is present, without hemorrhagic transformation.    Stable small intraventricular hemorrhage and stable ventricular size. Serial imaging follow-up over time is recommended to monitor for stability.    < end of copied text >       Problem/Plan - 4:  ·  Problem: Dementia. Plan: -restart home paroxetine for depression once tolerating po  -reorient.     Problem/Plan - 5:  ·  Problem: Atrial fibrillation.  Plan: hold AC. Per Neurology can start 6 weeks from bleed.   - Lopressor IVP q6h ordered for rate control  -digoxin 125 daily.      Problem/Plan - 6:  Problem: Diabetes mellitus. Plan: NIDDM2  -hold metformin  -SSI and FS.     Problem/Plan - 7:  ·  Problem: Hypothyroid.  Plan: c/w levothyroxine.      Problem/Plan - 8:  ·  Problem: Stercolitis .  Plan: GI helping.     Disposition : DC planning to follow up with Neurology and card/EP .  .

## 2021-05-06 NOTE — PHYSICAL THERAPY INITIAL EVALUATION ADULT - LIVES WITH, PROFILE
please see EMR for social history. patient unable to follow commands
came from assisted living facility

## 2021-05-06 NOTE — PROGRESS NOTE ADULT - NUTRITIONAL ASSESSMENT
This patient has been assessed with a concern for Malnutrition and has been determined to have a diagnosis/diagnoses of Severe protein-calorie malnutrition.    This patient is being managed with:   Diet Dysphagia 1 Pureed-Nectar Consistency Fluid-  Entered: May  3 2021 11:08AM    

## 2021-05-06 NOTE — OCCUPATIONAL THERAPY INITIAL EVALUATION ADULT - GENERAL OBSERVATIONS, REHAB EVAL
Patient received semisupine in bed in NAD. Per RN Rad, patient okay to participate in OT evaluation. +tele. A&Ox1, able to follow simple commands.

## 2021-05-06 NOTE — PROGRESS NOTE ADULT - ATTENDING COMMENTS
89yo M with hx of BPH, overactive bladder, Afib not on anticoagulation due to recent intracranial hemorrhage, acute right frontal infarct, AS s/p TAVR 2017, CAD, HTN, Dementia (AAOx0), DM, hypothyroidism presents with dysphagia, decreased PO. Course complicated by AF that is now rate controlled. Will follow.

## 2021-05-06 NOTE — PHYSICAL THERAPY INITIAL EVALUATION ADULT - PERTINENT HX OF CURRENT PROBLEM, REHAB EVAL
patient presents with dysphagia. PMH includes BPH, overactive bladder, Afib no on AC due to recent intracranial hemorrage, AS s/p TAVR 2017, CAD, HTN, Dementia, DM, hypothyroidism
This is an 89yo M with recent intracranial hemorrhage, presents with dysphagia. MRI Head- small subcentimeter acute right frontal lobe infarct present, without hemorrhagic transformation. Stable small intraventricular hemorrhage and stable ventricular size.

## 2021-05-06 NOTE — OCCUPATIONAL THERAPY INITIAL EVALUATION ADULT - DIAGNOSIS, OT EVAL
s/p dysphagia, s/p acute right frontal lobe infarct, s/p ICH; decreased functional mobility, decreased ADL performance, decreased sitting balance, decreased cognition

## 2021-05-06 NOTE — PROGRESS NOTE ADULT - SUBJECTIVE AND OBJECTIVE BOX
Cardiovascular Disease Progress Note    Overnight events: No acute events overnight. Mr. Painter is laying flat and comfortably in no distress.     Otherwise review of systems negative    Objective Findings:  T(C): 36.7 (05-06-21 @ 05:43), Max: 36.8 (05-05-21 @ 17:04)  HR: 91 (05-06-21 @ 05:43) (79 - 92)  BP: 139/78 (05-06-21 @ 05:43) (116/65 - 147/65)  RR: 18 (05-06-21 @ 05:43) (18 - 18)  SpO2: 100% (05-06-21 @ 05:43) (97% - 100%)  Wt(kg): --  Daily     Daily       Physical Exam:  Gen: NAD; Patient resting comfortably  HEENT: EOMI, Normocephalic/ atraumatic  CV: IIR, normal S1 + S2, no m/r/g  Lungs:  Normal respiratory effort; clear to auscultation bilaterally  Abd: soft, non-tender; bowel sounds present  Ext: No edema; warm and well perfused    Telemetry: A-fib 50s    Laboratory Data:                        14.3   8.85  )-----------( 244      ( 06 May 2021 07:06 )             41.4     05-06    140  |  105  |  20  ----------------------------<  138<H>  4.1   |  25  |  0.77    Ca    9.0      06 May 2021 07:06  Phos  3.3     05-06  Mg     2.0     05-06                Inpatient Medications:  MEDICATIONS  (STANDING):  aspirin enteric coated 81 milliGRAM(s) Oral daily  dextrose 5% + sodium chloride 0.45%. 1000 milliLiter(s) (50 mL/Hr) IV Continuous <Continuous>  enoxaparin Injectable 40 milliGRAM(s) SubCutaneous daily  glycerin Suppository - Adult 1 Suppository(s) Rectal at bedtime  levothyroxine 50 MICROGram(s) Oral daily  metoprolol tartrate 50 milliGRAM(s) Oral two times a day  PARoxetine 20 milliGRAM(s) Oral daily  polyethylene glycol 3350 17 Gram(s) Oral two times a day  senna 2 Tablet(s) Oral at bedtime      Assessment: 88 year old man with a-fib, aortic stenosis s/p TAVR 2017, CAD, and HTN presents with intracranial hemorrhage noted to have bradycardia.      Plan of Care:    #Atrial Fibrillation-  Intermittent bradycardia with less than 3 second pause noted on telemetry.   Of note, Mr. Painter was on digoxin 125 mcg daily and Metoprolol 100 mg BID.  HRs now controlled on Lopressor at 50 mg PO BID.  No indication for PPM at this time.   MRI noted.  Resume anticoagulation if no objection from neurology.     #Aortic Stenosis-  S/P JOSIAH.  No significant murmur noted on exam.   Continue current cardiac management.     #HTN-  BP controlled on current regimen.      Over 25 minutes spent on total encounter; more than 50% of the visit was spent counseling and/or coordinating care by the attending physician.      Ricky Davila MD [facility]  Cardiovascular Disease  [phone]

## 2021-05-06 NOTE — PHYSICAL THERAPY INITIAL EVALUATION ADULT - PATIENT PROFILE REVIEW, REHAB EVAL
yes
activity order- ambulate as tolerated with assistance , ok to perform PT evaluation as per RN Rad./yes

## 2021-05-06 NOTE — OCCUPATIONAL THERAPY INITIAL EVALUATION ADULT - ADDITIONAL COMMENTS
Patient unable to provide social history due to cognitive status. Per care coordination assessment, patient lives in an [facility]. Patient ambulates with a rolling walker. Patient has home health aide services ~13 hours per day to assist with ADLs.

## 2021-05-06 NOTE — PHYSICAL THERAPY INITIAL EVALUATION ADULT - THERAPY FREQUENCY, PT EVAL
patient unable to follow commands, combative at times. not appropriate for skilled PT services at this time
3-5x/week

## 2021-05-06 NOTE — PHYSICAL THERAPY INITIAL EVALUATION ADULT - LEVEL OF INDEPENDENCE, REHAB EVAL
dependent (less than 25% patients effort)
moderate assist (50% patients effort)/maximum assist (25% patients effort)

## 2021-05-06 NOTE — PROGRESS NOTE ADULT - SUBJECTIVE AND OBJECTIVE BOX
Date of Service  : 05-06-21 @ 15:01    INTERVAL HPI/OVERNIGHT EVENTS: No new concerns.   Vital Signs Last 24 Hrs  T(C): 36.4 (06 May 2021 11:21), Max: 36.8 (05 May 2021 17:04)  T(F): 97.5 (06 May 2021 11:21), Max: 98.2 (05 May 2021 17:04)  HR: 65 (06 May 2021 11:21) (65 - 92)  BP: 132/68 (06 May 2021 11:21) (126/83 - 147/65)  BP(mean): --  RR: 18 (06 May 2021 11:21) (18 - 18)  SpO2: 100% (06 May 2021 11:21) (97% - 100%)  I&O's Summary    05 May 2021 07:01  -  06 May 2021 07:00  --------------------------------------------------------  IN: 350 mL / OUT: 0 mL / NET: 350 mL      MEDICATIONS  (STANDING):  aspirin enteric coated 81 milliGRAM(s) Oral daily  dextrose 5% + sodium chloride 0.45%. 1000 milliLiter(s) (50 mL/Hr) IV Continuous <Continuous>  enoxaparin Injectable 40 milliGRAM(s) SubCutaneous daily  glycerin Suppository - Adult 1 Suppository(s) Rectal at bedtime  levothyroxine 50 MICROGram(s) Oral daily  metoprolol tartrate 50 milliGRAM(s) Oral two times a day  PARoxetine 20 milliGRAM(s) Oral daily  polyethylene glycol 3350 17 Gram(s) Oral two times a day  senna 2 Tablet(s) Oral at bedtime    MEDICATIONS  (PRN):  haloperidol     Tablet 0.5 milliGRAM(s) Oral every 6 hours PRN agitation  haloperidol    Injectable 0.5 milliGRAM(s) IntraMuscular every 6 hours PRN Agitation  haloperidol    Injectable 0.5 milliGRAM(s) IV Push every 6 hours PRN Agitation    LABS:                        14.3   8.85  )-----------( 244      ( 06 May 2021 07:06 )             41.4     05-06    140  |  105  |  20  ----------------------------<  138<H>  4.1   |  25  |  0.77    Ca    9.0      06 May 2021 07:06  Phos  3.3     05-06  Mg     2.0     05-06          CAPILLARY BLOOD GLUCOSE                    Consultant(s) Notes Reviewed:  [x ] YES  [ ] NO    PHYSICAL EXAM:  GENERAL: NAD, well-groomed, well-developed, not in any distress ,  HEAD:  Atraumatic, Normocephalic  EYES: EOMI, PERRLA, conjunctiva and sclera clear  ENMT: No tonsillar erythema, exudates, or enlargement; Moist mucous membranes, Good dentition, No lesions  NECK: Supple, No JVD, Normal thyroid  NERVOUS SYSTEM:  Alert & Oriented X0 to 1, No focal deficit   CHEST/LUNG: Good air entry bilateral with no  rales, rhonchi, wheezing, or rubs  HEART: Regular rate and rhythm; No murmurs, rubs, or gallops  ABDOMEN: Soft, Nontender, Nondistended; Bowel sounds present  EXTREMITIES:  2+ Peripheral Pulses, No clubbing, cyanosis, or edema      Care Discussed with Consultants/Other Providers [ x] YES  [ ] NO

## 2021-05-06 NOTE — PROGRESS NOTE ADULT - SUBJECTIVE AND OBJECTIVE BOX
INTERVAL HPI/OVERNIGHT EVENTS:    without new gi events   resting comfortably     MEDICATIONS  (STANDING):  aspirin enteric coated 81 milliGRAM(s) Oral daily  dextrose 5% + sodium chloride 0.45%. 1000 milliLiter(s) (50 mL/Hr) IV Continuous <Continuous>  enoxaparin Injectable 40 milliGRAM(s) SubCutaneous daily  glycerin Suppository - Adult 1 Suppository(s) Rectal at bedtime  levothyroxine 50 MICROGram(s) Oral daily  metoprolol tartrate 50 milliGRAM(s) Oral two times a day  PARoxetine 20 milliGRAM(s) Oral daily  polyethylene glycol 3350 17 Gram(s) Oral two times a day  senna 2 Tablet(s) Oral at bedtime    MEDICATIONS  (PRN):  haloperidol     Tablet 0.5 milliGRAM(s) Oral every 6 hours PRN agitation  haloperidol    Injectable 0.5 milliGRAM(s) IntraMuscular every 6 hours PRN Agitation  haloperidol    Injectable 0.5 milliGRAM(s) IV Push every 6 hours PRN Agitation      Allergies    No Known Allergies    Intolerances        Review of Systems: *confused, unobatinable        Vital Signs Last 24 Hrs  T(C): 36.4 (06 May 2021 11:21), Max: 36.8 (05 May 2021 17:04)  T(F): 97.5 (06 May 2021 11:21), Max: 98.2 (05 May 2021 17:04)  HR: 65 (06 May 2021 11:21) (65 - 92)  BP: 132/68 (06 May 2021 11:21) (126/83 - 147/65)  BP(mean): --  RR: 18 (06 May 2021 11:21) (18 - 18)  SpO2: 100% (06 May 2021 11:21) (97% - 100%)    PHYSICAL EXAM:    Constitutional: NAD  HEENT: EOMI, throat clear  Neck: No LAD, supple  Respiratory: CTA and P  Cardiovascular: S1 and S2, RRR, no M  Gastrointestinal: BS+, soft, NT/ND, neg HSM,  Extremities: No peripheral edema, neg clubbing, cyanosis  Vascular: 2+ peripheral pulses  Neurological: A/O x 1, no focal deficits  Psychiatric: Normal mood, normal affect/confused  Skin: No rashes      LABS:                        14.3   8.85  )-----------( 244      ( 06 May 2021 07:06 )             41.4     05-06    140  |  105  |  20  ----------------------------<  138<H>  4.1   |  25  |  0.77    Ca    9.0      06 May 2021 07:06  Phos  3.3     05-06  Mg     2.0     05-06            RADIOLOGY & ADDITIONAL TESTS:

## 2021-05-06 NOTE — OCCUPATIONAL THERAPY INITIAL EVALUATION ADULT - PLANNED THERAPY INTERVENTIONS, OT EVAL
ADL retraining/balance training/bed mobility training/cognitive, visual perceptual/fine motor coordination training/neuromuscular re-education/ROM/strengthening/transfer training

## 2021-05-06 NOTE — PHYSICAL THERAPY INITIAL EVALUATION ADULT - GENERAL OBSERVATIONS, REHAB EVAL
Patient received semi supine in bed ,bed alarm , on observation, PCA at bedside.
patient received with legs out of bed, 1:1 constant observation present at bedside

## 2021-05-06 NOTE — PHYSICAL THERAPY INITIAL EVALUATION ADULT - ADDITIONAL COMMENTS
As per SW note :[facility]. Pt's HCP Brandi reports patient mostly uses RW, and owns a cane. Pt has Home Health Aide from 9am-10:30pm.
please see EMR for prior level of function. patient unable to follow commands

## 2021-05-06 NOTE — PROGRESS NOTE ADULT - ASSESSMENT
89yo M with hx of BPH, overactive bladder, Afib not on anticoagulation due to recent intracranial hemorrhage, acute right frontal infarct, AS s/p TAVR 2017, CAD, HTN, Dementia (AAOx0), DM, hypothyroidism presents with dysphagia, decreased PO. Course complicated by AF that is now rate controlled    -Continue lopressor 50mg po BID  -CHADSVASC: 4; holding AC 2/2 recent intracranial bleed, continue aspirin if no contraindication

## 2021-05-06 NOTE — PHYSICAL THERAPY INITIAL EVALUATION ADULT - DISCHARGE DISPOSITION, PT EVAL
rehabilitation facility
patient unable to follow commands, combative at times. not appropriate for skilled PT services at this time

## 2021-05-06 NOTE — OCCUPATIONAL THERAPY INITIAL EVALUATION ADULT - MD ORDER
Occupational therapy to evaluate and treat. Occupational therapy to evaluate and treat.  Ambulate as tolerated. Ambulate with assistance.

## 2021-05-06 NOTE — OCCUPATIONAL THERAPY INITIAL EVALUATION ADULT - PERTINENT HX OF CURRENT PROBLEM, REHAB EVAL
88 year old male with history of BPH, overactive bladder, Afib, AS s/p TAVR 2017, CAD, HTN, Dementia, DM, hypothyroidism presents with dysphagia. MRI brain revealing small acute right frontal lobe infarct and stable small intraventricular hemorrhage.

## 2021-05-06 NOTE — PROGRESS NOTE ADULT - SUBJECTIVE AND OBJECTIVE BOX
Patient seen this am, lying in bed, comfortable in NAD. Patient reports feeling ok this morning. He is sleepy and answers questions with eyes closed during exam. Opens them briefly on request. He denies chest pain, shortness of breath, palpitations, lightheadedness, near syncope or syncope overnight. Telemetry during rounds revealed AF with ventricular rates 60s, occasional PVCs. Overnight tele review revealed AF with V rates 40s-90s. HR 40s noted during non-waking hours and pauses less than 3 seconds. No significant tachy/kassandra/pauses noted overnight. HR: 91 (05-06-21 @ 05:43) (79 - 92). MRI yesterday revealed: small subcentimeter acute right frontal lobe infarct present, without hemorrhagic transformation. Stable small intraventricular hemorrhage and stable ventricular size. Serial imaging follow-up over time is recommended to monitor for stability.      MEDICATIONS:  aspirin enteric coated 81 milliGRAM(s) Oral daily  dextrose 5% + sodium chloride 0.45%. 1000 milliLiter(s) IV Continuous <Continuous>  enoxaparin Injectable 40 milliGRAM(s) SubCutaneous daily  glycerin Suppository - Adult 1 Suppository(s) Rectal at bedtime  haloperidol     Tablet 0.5 milliGRAM(s) Oral every 6 hours PRN  haloperidol    Injectable 0.5 milliGRAM(s) IntraMuscular every 6 hours PRN  haloperidol    Injectable 0.5 milliGRAM(s) IV Push every 6 hours PRN  levothyroxine 50 MICROGram(s) Oral daily  metoprolol tartrate 50 milliGRAM(s) Oral two times a day  PARoxetine 20 milliGRAM(s) Oral daily  polyethylene glycol 3350 17 Gram(s) Oral two times a day  senna 2 Tablet(s) Oral at bedtime      REVIEW OF SYSTEMS:  CONSTITUTIONAL: No fever  NECK: No pain or stiffness  RESPIRATORY: No cough, wheezing, chills or hemoptysis; No Shortness of Breath  CARDIOVASCULAR: No chest pain, palpitations, passing out, dizziness, or leg swelling  GASTROINTESTINAL: No abdominal or epigastric pain. No nausea, vomiting, or hematemesis; No diarrhea or constipation. No melena or hematochezia.  NEUROLOGICAL: No headaches or tremors  SKIN: No itching, burning, rashes  PSYCHIATRIC: No depression, anxiety, mood swings, or difficulty sleeping  HEME/LYMPH: No easy bruising, or bleeding gums  ALLERGY AND IMMUNOLOGIC: No hives or eczema	  All others negative    PHYSICAL EXAM:  T(C): 36.7 (05-06-21 @ 05:43), Max: 36.8 (05-05-21 @ 17:04)  HR: 91 (05-06-21 @ 05:43) (79 - 92)  BP: 139/78 (05-06-21 @ 05:43) (116/65 - 147/65)  RR: 18 (05-06-21 @ 05:43) (18 - 18)  SpO2: 100% (05-06-21 @ 05:43) (97% - 100%)  I&O's Summary    05 May 2021 07:01  -  06 May 2021 07:00  --------------------------------------------------------  IN: 350 mL / OUT: 0 mL / NET: 350 mL       Appearance: Normal	  HEENT: NC/AT	  Lymphatic: No lymphadenopathy  Cardiovascular: Normal S1 S2, No JVD, No edema  Respiratory: Lungs clear to auscultation	  Psychiatry: Sleepy but answers questions appropriately  Gastrointestinal:  Soft, Non-tender, + BS	  Skin: No rashes, No ecchymoses, No cyanosis	  Neurologic: Non-focal  Extremities: No clubbing, cyanosis or edema  Vascular: Peripheral pulses intact bilaterally       TELEMETRY: 	  as above    LABS:	 	                          14.3   8.85  )-----------( 244      ( 06 May 2021 07:06 )             41.4     05-06    140  |  105  |  20  ----------------------------<  138<H>  4.1   |  25  |  0.77    Ca    9.0      06 May 2021 07:06  Phos  3.3     05-06  Mg     2.0     05-06

## 2021-05-06 NOTE — PHYSICAL THERAPY INITIAL EVALUATION ADULT - MANUAL MUSCLE TESTING RESULTS, REHAB EVAL
cognitive impairment/not tested due to
appears to be atleast 3-/5 on observation , not formally assessed due to cognitive status/grossly assessed due to

## 2021-05-07 NOTE — CHART NOTE - NSCHARTNOTEFT_GEN_A_CORE
Holding metoprolol and dig-episodes of pauses 2.5secs and HR 40's when sleeping and occasionally HR goes up to 170's-pt. is completely asymptomatic.  Cards and EP were called by attending for further recommendations.  D/W attending.
Recommended by pharmacy to decrease doses of IV meds for PO -> IV conversion for IV Digoxin and IV Synthroid    A/P:  Digoxin IV decreased to 100mcg daily  Levothyroxin decreased to 40mcg IV daily
Tele showed frequent pauses, all <3 seconds from today. Pt remained asx. EP reviewed tele and cleared pt for discharge. Spoke with cardio Dr. Davila who recommended to continue with Metoprolol @ lower dose 50 BID. Stable for discharge to rehab. Discussed with Dr. Cabrera, medically stable for discharge to rehab.
Called patients sister Brandi at [phone] to review plan of care- all questions answered and update provided. Patient is s/p TAVR 10/18/2017 w/ Dr. Elias Kee at [facility]. Card information as follows:    Invarium implanted device ID card  bovine transcatheter heart valve  serial # [id number]  model # [id number]  position aortic  size 26 mm    Will reach out to MRI regarding compatibility. Will Continue to closely monitor.    Claudia Maddox NP [facility] Medicine   Pager [phone]
MRI head reviewed: A small subcentimeter acute right frontal lobe infarct is present, without hemorrhagic transformation.    Stable small intraventricular hemorrhage and stable ventricular size. Serial imaging follow-up over time is recommended to monitor for stability.    Discussed findings with neuro - resident will speak with attending for further recs. OK to c/w Lovenox 40mg daily for DVT PPx per neuro. Will continue to monitor and f/u neuro attending recs.

## 2021-05-07 NOTE — DISCHARGE NOTE PROVIDER - CARE PROVIDER_API CALL
Joe Louise (DO)  Medicine  [address] [facility], [address]  [place] [address]  Phone: [phone]  Fax: [phone]  Follow Up Time:     Hossein Moseley)  Cardiac Electrophysiology; Cardiology; Internal Medicine  [address] [place] [address]  Phone: [phone]  Fax: [phone]  Follow Up Time:     She Rosado (NP; RN)  NP in Family Health  [address] [facility], [address]  [place] [address]  Phone: [phone]  Fax: [phone]  Follow Up Time:

## 2021-05-07 NOTE — DISCHARGE NOTE PROVIDER - NSDCFUADDAPPT_GEN_ALL_CORE_FT
Please follow up with Electrophysiologist Dr. Moseley.  Follow up with She Rosado, Stroke NP, located at [address] [place] [address], [address], [place] [address]; the office will call you for an appointment. If you do not hear from them within 1 week then please call [phone] for appointment.  Please follow up with your PCP.

## 2021-05-07 NOTE — PROGRESS NOTE ADULT - SUBJECTIVE AND OBJECTIVE BOX
Cardiovascular Disease Progress Note    4.7 second pause noted yesterday evening. Mr. Painter is in no apparent distress.   Otherwise review of systems negative    Objective Findings:  T(C): 36.5 (05-07-21 @ 05:23), Max: 36.7 (05-06-21 @ 21:45)  HR: 82 (05-07-21 @ 05:23) (65 - 99)  BP: 137/78 (05-07-21 @ 05:23) (123/66 - 137/78)  RR: 18 (05-07-21 @ 05:23) (18 - 18)  SpO2: 100% (05-07-21 @ 05:23) (100% - 100%)  Wt(kg): --  Daily     Daily       Physical Exam:  Gen: NAD; Patient resting comfortably  HEENT: EOMI, Normocephalic/ atraumatic  CV: RRR, normal S1 + S2, no m/r/g  Lungs:  Normal respiratory effort; clear to auscultation bilaterally  Abd: soft, non-tender; bowel sounds present  Ext: No edema; warm and well perfused    Telemetry: A-fib; 4.7 second pause on 5/6    Laboratory Data:                        14.3   8.85  )-----------( 244      ( 06 May 2021 07:06 )             41.4     05-06    140  |  105  |  20  ----------------------------<  138<H>  4.1   |  25  |  0.77    Ca    9.0      06 May 2021 07:06  Phos  3.3     05-06  Mg     2.0     05-06                Inpatient Medications:  MEDICATIONS  (STANDING):  aspirin enteric coated 81 milliGRAM(s) Oral daily  dextrose 5% + sodium chloride 0.45%. 1000 milliLiter(s) (50 mL/Hr) IV Continuous <Continuous>  enoxaparin Injectable 40 milliGRAM(s) SubCutaneous daily  levothyroxine 50 MICROGram(s) Oral daily  metoprolol tartrate 50 milliGRAM(s) Oral two times a day  PARoxetine 20 milliGRAM(s) Oral daily  polyethylene glycol 3350 17 Gram(s) Oral two times a day  senna 2 Tablet(s) Oral at bedtime      Assessment: 88 year old man with a-fib, aortic stenosis s/p TAVR 2017, CAD, and HTN presents with intracranial hemorrhage noted to have bradycardia.      Plan of Care:    #Atrial Fibrillation-  4.7 second pause noted on telemetry the evening of 5/6. .   Of note, Mr. Painter was on digoxin 125 mcg daily and Metoprolol 100 mg BID prior to admission.  Continue lower dose Lopressor for now.   EP f/u today.   Resume anticoagulation 6 weeks post ICH, as per neurology.     #Aortic Stenosis-  S/P JOSIAH.  No significant murmur noted on exam.   Continue current cardiac management.     #HTN-  BP controlled on current regimen.        Over 25 minutes spent on total encounter; more than 50% of the visit was spent counseling and/or coordinating care by the attending physician.      Ricky Davila MD [facility]  Cardiovascular Disease  [phone] Cardiovascular Disease Progress Note    4.7 second pause noted yesterday evening. Mr. Painter is in no apparent distress.   Otherwise review of systems negative    Objective Findings:  T(C): 36.5 (05-07-21 @ 05:23), Max: 36.7 (05-06-21 @ 21:45)  HR: 82 (05-07-21 @ 05:23) (65 - 99)  BP: 137/78 (05-07-21 @ 05:23) (123/66 - 137/78)  RR: 18 (05-07-21 @ 05:23) (18 - 18)  SpO2: 100% (05-07-21 @ 05:23) (100% - 100%)  Wt(kg): --  Daily     Daily       Physical Exam:  Gen: NAD; Patient resting comfortably  HEENT: EOMI, Normocephalic/ atraumatic  CV: RRR, normal S1 + S2, no m/r/g  Lungs:  Normal respiratory effort; clear to auscultation bilaterally  Abd: soft, non-tender; bowel sounds present  Ext: No edema; warm and well perfused    Telemetry: A-fib; 4.7 second pause on 5/6    Laboratory Data:                        14.3   8.85  )-----------( 244      ( 06 May 2021 07:06 )             41.4     05-06    140  |  105  |  20  ----------------------------<  138<H>  4.1   |  25  |  0.77    Ca    9.0      06 May 2021 07:06  Phos  3.3     05-06  Mg     2.0     05-06                Inpatient Medications:  MEDICATIONS  (STANDING):  aspirin enteric coated 81 milliGRAM(s) Oral daily  dextrose 5% + sodium chloride 0.45%. 1000 milliLiter(s) (50 mL/Hr) IV Continuous <Continuous>  enoxaparin Injectable 40 milliGRAM(s) SubCutaneous daily  levothyroxine 50 MICROGram(s) Oral daily  metoprolol tartrate 50 milliGRAM(s) Oral two times a day  PARoxetine 20 milliGRAM(s) Oral daily  polyethylene glycol 3350 17 Gram(s) Oral two times a day  senna 2 Tablet(s) Oral at bedtime      Assessment: 88 year old man with a-fib, aortic stenosis s/p TAVR 2017, CAD, and HTN presents with intracranial hemorrhage noted to have bradycardia.      Plan of Care:    #Atrial Fibrillation-  4.7 second pause noted on telemetry the evening of 5/6. .   Of note, Mr. Painter was on digoxin 125 mcg daily and Metoprolol 100 mg BID prior to admission.  Continue lower dose Lopressor for now given RVR earlier on admission.   EP f/u today.   Resume anticoagulation 6 weeks post ICH, as per neurology.     #Aortic Stenosis-  S/P JOSIAH.  No significant murmur noted on exam.   Continue current cardiac management.     #HTN-  BP controlled on current regimen.        Over 25 minutes spent on total encounter; more than 50% of the visit was spent counseling and/or coordinating care by the attending physician.      iRcky Davila MD [facility]  Cardiovascular Disease  [phone]

## 2021-05-07 NOTE — PROGRESS NOTE ADULT - PROVIDER SPECIALTY LIST ADULT
Cardiology
Gastroenterology
Internal Medicine
Cardiology
Cardiology
Gastroenterology
Electrophysiology
Internal Medicine
Neurology
Gastroenterology
Internal Medicine
Electrophysiology

## 2021-05-07 NOTE — DISCHARGE NOTE PROVIDER - NSDCCPCAREPLAN_GEN_ALL_CORE_FT
PRINCIPAL DISCHARGE DIAGNOSIS  Diagnosis: Cerebrovascular accident (CVA)  Assessment and Plan of Treatment: You were admitted with trouble swallowing. You were found to have a stroke. You had a MRI on 5/5 that showed a small stroke in your right frontal lobe area. Your brain bleed was stable on imaging. You were seen by Speech&Swallow specialist who recommended Pureed with nectar diet. Neurology was consulted and stated your trouble swallowing was most likely due to your stroke due to your atrial fibrillation. You were started on Aspirin and Lipitor. Your blood thinner is on hold for now. You will need repeat head imaging in 6-7 weeks from insult and your blood thinner can be started then if bleed remains stable.   Follow up with She Rosado Stroke NP, located at [address] [place] [address], [address], [place] [address]; the office will call you for an appointment. If you do not hear from them within 1 week then please call [phone] for appointment.      SECONDARY DISCHARGE DIAGNOSES  Diagnosis: Cystitis  Assessment and Plan of Treatment: You were found to have a urinary tract infection for which you were treated with antibiotics. You do not need anymore antibiotics.    Diagnosis: Diabetes mellitus  Assessment and Plan of Treatment: Your Hemoglobin A1C is 7. Target goal for hemoglobin A1C is <6.5. Monitor blood glucose levels throughout the day before meals and at bedtime. Record blood sugars and bring to outpatient providers appointment in order to be reviewed by your doctor for management modifications. If your sugars are more than 400 or less than 70 you should contact your PCP immediately. Monitor for signs/symptoms of low blood glucose, such as, dizziness, altered mental status, or cool/clammy skin. In addition, monitor for signs/symptoms of high blood glucose, such as, feeling hot, dry, fatigued, or with increased thirst/urination. Make regular podiatry appointments in order to have feet checked for wounds and uncontrolled toe nail growth to prevent infections, as well as, appointments with an ophthalmologist to monitor your vision.    Diagnosis: Atrial fibrillation  Assessment and Plan of Treatment: You were seen by Electrophysiology for uncontrolled heart rates on heart monitor. Your digoxin was discontinued due to low heart rates and your Metoprolol was decreased to 50mg twice a day. You do not need a pacemaker at this time. Please follow up with Electrophysiologist Dr. Moseley.    Diagnosis: Constipation  Assessment and Plan of Treatment: You were seen by Gastroenterology for constipation vs inflammation of your colon found on CT of your abdomen. You had laxatives in the hospital and started moving your bowels. You finished glycerin suppository x 5days. Please continue with Senna at night and miralax twice a day.

## 2021-05-07 NOTE — PROGRESS NOTE ADULT - REASON FOR ADMISSION
dysphagia

## 2021-05-07 NOTE — PROGRESS NOTE ADULT - SUBJECTIVE AND OBJECTIVE BOX
INTERVAL HPI/OVERNIGHT EVENTS:    pleasantly confused  without c/o abd pain   tolerating po without cough  no new gi events per pca; no bm on her shift yet     MEDICATIONS  (STANDING):  aspirin enteric coated 81 milliGRAM(s) Oral daily  dextrose 5% + sodium chloride 0.45%. 1000 milliLiter(s) (50 mL/Hr) IV Continuous <Continuous>  enoxaparin Injectable 40 milliGRAM(s) SubCutaneous daily  levothyroxine 50 MICROGram(s) Oral daily  metoprolol tartrate 50 milliGRAM(s) Oral two times a day  PARoxetine 20 milliGRAM(s) Oral daily  polyethylene glycol 3350 17 Gram(s) Oral two times a day  senna 2 Tablet(s) Oral at bedtime    MEDICATIONS  (PRN):  haloperidol     Tablet 0.5 milliGRAM(s) Oral every 6 hours PRN agitation  haloperidol    Injectable 0.5 milliGRAM(s) IntraMuscular every 6 hours PRN Agitation  haloperidol    Injectable 0.5 milliGRAM(s) IV Push every 6 hours PRN Agitation      Allergies    No Known Allergies    Intolerances        Review of Systems: *confused, not participating much    General:  No wt loss, fevers, chills, night sweats, fatigue   Eyes:  Good vision, no reported pain  ENT:  No sore throat, pain, runny nose, dysphagia  CV:  No pain, palpitations, hypo/hypertension  Resp:  No dyspnea, cough, tachypnea, wheezing  GI:  No pain, No nausea, No vomiting, No diarrhea, No constipation, No weight loss, No fever, No pruritis, No rectal bleeding, No melena, No dysphagia  :  No pain, bleeding, incontinence, nocturia  Muscle:  No pain, weakness  Neuro:  No weakness, tingling, memory problems  Psych:  No fatigue, insomnia, mood problems, depression  Endocrine:  No polyuria, polydypsia, cold/heat intolerance  Heme:  No petechiae, ecchymosis, easy bruisability  Skin:  No rash, tattoos, scars, edema      Vital Signs Last 24 Hrs  T(C): 36.5 (07 May 2021 05:23), Max: 36.7 (06 May 2021 21:45)  T(F): 97.7 (07 May 2021 05:23), Max: 98.1 (06 May 2021 23:31)  HR: 82 (07 May 2021 05:23) (65 - 99)  BP: 137/78 (07 May 2021 05:23) (123/66 - 137/78)  BP(mean): --  RR: 18 (07 May 2021 05:23) (18 - 18)  SpO2: 100% (07 May 2021 05:23) (100% - 100%)    PHYSICAL EXAM:    Constitutional: NAD  HEENT: EOMI, throat clear  Neck: No LAD, supple  Respiratory: CTA and P  Cardiovascular: S1 and S2, RRR, no M  Gastrointestinal: BS+, soft, NT/ND, neg HSM,  Extremities: No peripheral edema, neg clubbing, cyanosis  Vascular: 2+ peripheral pulses  Neurological: A/O x 1, no focal deficits  Psychiatric: Normal mood, normal affect  Skin: No rashes      LABS:                        14.3   8.85  )-----------( 244      ( 06 May 2021 07:06 )             41.4     05-06    140  |  105  |  20  ----------------------------<  138<H>  4.1   |  25  |  0.77    Ca    9.0      06 May 2021 07:06  Phos  3.3     05-06  Mg     2.0     05-06            RADIOLOGY & ADDITIONAL TESTS:

## 2021-05-07 NOTE — PROGRESS NOTE ADULT - NSICDXPILOT_GEN_ALL_CORE
[place]

## 2021-05-07 NOTE — PROGRESS NOTE ADULT - ASSESSMENT
87yo M with hx of BPH, overactive bladder, Afib no on AC due to recent intracranial hemorrage, AS s/p TAVR 2017, CAD, HTN, Dementia, DM, hypothyroidism presents with dysphagia.    Dysphagia  -aspiration precautions   -passed S&S for Puree consistency and nectar thick fluids  -pt will need assistance and encouragement w/feeds    Stercoral Colitis:  -2/2 significant constipation as noted on CT; improving  -senna qhs with miralax bid   -enema prn   -monitor stool output; pt to have bm every other day at least     Advanced care planning was discussed with patient and family.  Advanced care planning forms were reviewed and discussed.  Risks, benefits and alternatives of gastroenterologic procedures were discussed in detail and all questions were answered.  30 minutes spent. 87yo M with hx of BPH, overactive bladder, Afib no on AC due to recent intracranial hemorrage, AS s/p TAVR 2017, CAD, HTN, Dementia, DM, hypothyroidism presents with dysphagia.    Dysphagia  -aspiration precautions   -passed S&S for Puree consistency and nectar thick fluids  -pt will need assistance and encouragement w/feeds    Stercoral Colitis:  -2/2 significant constipation as noted on CT; improving  -senna qhs with miralax bid   -enema prn   -monitor stool output; pt to have bm every other day at least

## 2021-05-07 NOTE — DISCHARGE NOTE PROVIDER - NSDCMRMEDTOKEN_GEN_ALL_CORE_FT
digoxin 125 mcg (0.125 mg) oral tablet: 1 tab(s) orally once a day  latanoprost 0.005% ophthalmic solution: 1 drop(s) to each affected eye once a day (in the evening) right eye  levothyroxine 50 mcg (0.05 mg) oral tablet: 1 tab(s) orally once a day  lovastatin 40 mg oral tablet: 1 tab(s) orally once a day (at bedtime)  metFORMIN 500 mg oral tablet: 1 tab(s) orally 2 times a day  metoprolol tartrate 100 mg oral tablet: 1 tab(s) orally 2 times a day  MiraLax oral powder for reconstitution: 1 packet(s) orally once a day, As Needed  PARoxetine 20 mg oral tablet: 1 tab(s) orally once a day  tamsulosin 0.4 mg oral capsule: 1 cap(s) orally once a day (at bedtime)   aspirin 81 mg oral delayed release tablet: 1 tab(s) orally once a day  atorvastatin 80 mg oral tablet: 1 tab(s) orally once a day (at bedtime)  enoxaparin: 40 milligram(s) subcutaneous once a day  latanoprost 0.005% ophthalmic solution: 1 drop(s) to each affected eye once a day (in the evening) right eye  levothyroxine 50 mcg (0.05 mg) oral tablet: 1 tab(s) orally once a day  metFORMIN 500 mg oral tablet: 1 tab(s) orally 2 times a day  metoprolol tartrate 50 mg oral tablet: 1 tab(s) orally 2 times a day  PARoxetine 20 mg oral tablet: 1 tab(s) orally once a day  polyethylene glycol 3350 oral powder for reconstitution: 17 gram(s) orally 2 times a day  senna oral tablet: 2 tab(s) orally once a day (at bedtime)

## 2021-05-07 NOTE — DISCHARGE NOTE PROVIDER - DETAILS OF MALNUTRITION DIAGNOSIS/DIAGNOSES
This patient has been assessed with a concern for Malnutrition and was treated during this hospitalization for the following Nutrition diagnosis/diagnoses:     -  05/04/2021: Severe protein-calorie malnutrition

## 2021-05-07 NOTE — DISCHARGE NOTE PROVIDER - PROVIDER TOKENS
PROVIDER:[TOKEN:[[id number]]],PROVIDER:[TOKEN:[[id number]]],PROVIDER:[TOKEN:[[id number]]]

## 2021-05-07 NOTE — DISCHARGE NOTE NURSING/CASE MANAGEMENT/SOCIAL WORK - PATIENT PORTAL LINK FT
You can access the FollowMyHealth Patient Portal offered by [facility] by registering at the following website: [email or web address]. By joining [email or web address] FollowMyHealth portal, you will also be able to view your health information using other applications (apps) compatible with our system.

## 2021-05-07 NOTE — DISCHARGE NOTE PROVIDER - HOSPITAL COURSE
88y M PMhx BPH, overactive bladder, Afib not on AC due to recent ICH, AS s/p TAVR 2017, CAD, HTN, Dementia, DM, hypothyroidism presents with dysphagia.    Hospital course:  Pt was admitted with dysphagia. S&S recommended Puree with nectar. Pt had a CTH on admission that shows no new bleeding. Pt has MRI on 5/5 that showed small subcentimeter acute right frontal lobe infarct. Stable small intraventricular hemorrhage. Neuro was following and stated dysphagia likely 2/2 to Acute frontal lobe infarct in the insular region probably cardioembolic in the setting of his known Afib. Started aspirin and Lipitor. Eliquis could be started 6-7 weeks from insult, with repeat head imaging. If there is no significant mitral valve stenosis, then no C/I for DOAcs. Could consider watchman. Course c/b uncontrolled HR's on tele. EP was following and BB dose lowered to 50 BID since pt had frequent pauses on tele. Digoxin DC'ed. EP recommended no indication for PPM. Patient was being followed by GI for stercoral colitis. CT abdomen showed colitis vs constipation. No additional GI symptoms. S/p fleet enema x2. Pt finished glycerin suppository x 5days per GI recs. Bowels now regular- pt will continue with Senna at night and miralax twice a day. Course c/b UTI for which he completed a course of abx.    Case discussed with EP and Dr. Cabrera on 5/7, pt medically stable for discharge to rehab. 88y M PMhx BPH, overactive bladder, Afib not on AC due to recent ICH, AS s/p TAVR 2017, CAD, HTN, Dementia, DM, hypothyroidism presents with dysphagia.    Hospital course:  Pt was admitted with dysphagia. S&S recommended Puree with nectar. Pt had a CTH on admission that shows no new bleeding. Pt has MRI on 5/5 that showed small subcentimeter acute right frontal lobe infarct. Stable small intraventricular hemorrhage. Neuro was following and stated dysphagia likely 2/2 to Acute frontal lobe infarct in the insular region probably cardioembolic in the setting of his known Afib. Started aspirin and Lipitor. Eliquis could be started 6-7 weeks from insult, with repeat head imaging. If there is no significant mitral valve stenosis, then no C/I for DOAcs. Could consider watchman. Course c/b uncontrolled HR's on tele. EP was following and BB dose lowered to 50 BID since pt had frequent pauses on tele. Digoxin DC'ed. EP recommended no indication for PPM. Patient was being followed by GI for stercoral colitis. CT abdomen showed colitis vs constipation. No additional GI symptoms. S/p fleet enema x2. Pt finished glycerin suppository x 5days per GI recs. Bowels now regular- pt will continue with Senna at night and miralax twice a day. Course c/b UTI for which he completed a course of abx.    Case discussed with EP, Dr. Davila and Dr. Cabrera on 5/7, pt medically stable for discharge to rehab.

## 2021-10-08 NOTE — CONSULT NOTE ADULT - CONSULT REASON
HUMIRA PEN 40MG/0.8ML      Last Written Prescription Date:  2/22/21  Last Fill Quantity: 6 each,   # refills: 0  Last Office Visit : 8/24/20 recommended 6 month follow up  Future Office visit:  None scheduled    Routing refill request to provider for review/approval because:  Failed protocol, > 6 months since last visit  No current rheumatology provider  Gap in therapy?  How taking?       Syncope

## 2021-10-19 NOTE — ED PROVIDER NOTE - CLINICAL SUMMARY MEDICAL DECISION MAKING FREE TEXT BOX
Carlito Garcia MD. pt dnr, dementia, afib not on ac due to ich in past, presents for cough/sob from SNF. unable to obtain more hx. pt is tachypneick coarse bs, tachy, rectally febrile. concern for uti vs asp pna as pt has hx of dysphagia. will give fluids, abx, will likely need admission.

## 2021-10-19 NOTE — ED PROVIDER NOTE - PROGRESS NOTE DETAILS
David HENDERSON (PGY-3): Patient signed out to me, will dose 500cc fluid bolus, total 1.5L, as pt has elevated pro-bnp and Afib suspected driven by underlying sepsis. Discussion with Dr. Cabrera for admission resulted in adding on CTA to r/o PE in setting of tachycardia, although doubt. CXR with R lower lobe infiltrate, likely pneumonia, but CTA ordered, will follow-up results, TBA telemetry. Carlito Garcia MD. going through pt's chart, found a DNR order signed by pt's HCP Sean (no actual MOLST however). dr. hart spoke w/ sean and confirmed DNR/DNI. MOLST form was filled out. placed in pt's chart

## 2021-10-19 NOTE — ED PROVIDER NOTE - PHYSICAL EXAMINATION
PHYSICAL EXAM:  GENERAL: non-toxic appearing; in no respiratory distress  HEAD Atraumatic, Normocephalic  NECK: No JVD; trachea midline  EYES: PERRL, EOMs intact b/l w/out deficits; normal conjunctiva  CHEST/LUNG: crackles b/l; coarse BS;   HEART: tachycardic; no murmur/gallops/rubs  ABDOMEN: +BS, soft, NT, ND  EXTREMITIES: No LE edema, +2 radial pulses b/l, +2 DP/PT pulses b/l  MUSCULOSKELETAL: FROM of all 4 extremities;  NERVOUS SYSTEM:  A&Ox1; follows commands; no gross deficits;   SKIN:  Warm and dry as visualized

## 2021-10-19 NOTE — H&P ADULT - ASSESSMENT
88 yo M PMHx BPH, AFib not on AC due to ICH, AS s/p TAVR 2017, CAD, Dementia, DM, hypothyroidism, HTN, Dysphagia, [facility]. Pt unable to provide a history. Per paper work from [facility], pt c/o SOB and cough.

## 2021-10-19 NOTE — H&P ADULT - NSHPOUTPATIENTPROVIDERS_GEN_ALL_CORE
DNR (per paperwork from [facility]; Brandi Mcguire, significant other H: [phone]; Cell [phone]) but no MOLST came w/ chart  PMD @ [facility] is Derrell Hsu ([phone])

## 2021-10-19 NOTE — CHART NOTE - NSCHARTNOTEFT_GEN_A_CORE
[facility] NIGHT MEDICINE COVERAGE.    Called by RN, regarding patient clinical status. As per RN, pt s/p RRT for A fib w/ RVR driven by Duoneb when patient baseline tachycardic. As per RRT, Lopressor 5mg IVP x 1 given, however HR remains in 120-140s. Concern for respiratory status, as patient with coarse breath sounds.     Pt seen and examined at bedside, upon arrival, patient noted to repeatedly be stating "please mommy", pt A&Ox1, only responds to his name. VS at bedside monitor showing HR between 113-145, O2 stable at % on 4LNC, with RR of 18-19.     Pulmonary exam: clear to auscultation on b/l apices, however noted to have mildly decreased breath sounds on RLL>>LLL. Mildly coarse breath sounds throughout.   Cardiac Exam: Tachycardic with irregular rate.     TONG personally reviewed [facility] NIGHT MEDICINE COVERAGE.    Called by RN, regarding patient clinical status. As per RN, pt s/p RRT for A fib w/ RVR possibly driven by Duoneb/?PNA. As per RRT note, Lopressor 5mg IVP x 1 given, however HR remains in 120-140s. Concern for respiratory status, as patient with coarse breath sounds however satting well. Pt seen and examined at bedside, upon arrival, patient noted to repeatedly state "please mommy", pt A&Ox1, only responds to his name, unable to report location/year/ as patient not-easily re-oriented. VS at bedside monitor showing HR between 113-145, O2 stable at % on 4LNC, with RR of 18-19. Pt does not appear to be in acute distress.     General: NAD, laying supine in bed, with brice showing clear yellow urine.  Pulmonary exam: clear to auscultation on b/l apices, however noted to have mildly decreased breath sounds on RLL>>LLL. Mildly coarse breath sounds throughout.   Cardiac Exam: Tachycardic with irregular rate.  Extremities: without pitting edema, 2+ radial pulses intact bilaterally.    Neuro/Psych: A&Ox1 (recognizes name), awake and alert, at times tracks provider, does not follow simple commands, repeatedly asking for his mom.    TONG personally reviewed, pt DNR/DNI signed by wife earlier today. As per chart review, pts wife wanting to speak with palliative regarding further GOC.   In addition, CTA performed to rule out PE-- preliminary report--NO PE's, however with b/l patchy opacities in bilateral lower lobes and RUL/RML.     Vital Signs Last 24 Hrs  T(C): 38.1 (19 Oct 2021 21:00), Max: 39 (19 Oct 2021 14:10)  T(F): 100.5 (19 Oct 2021 21:00), Max: 102.2 (19 Oct 2021 14:10)  HR: 120 (19 Oct 2021 21:00) (99 - 170)  BP: 109/78 (19 Oct 2021 21:00) (100/70 - 149/92)  BP(mean): 104 (19 Oct 2021 18:25) (104 - 104)  RR: 18 (19 Oct 2021 21:00) (18 - 30)  SpO2: 98% (19 Oct 2021 21:00) (95% - 100%)    - c/w IV Abx- Zosyn for ?PNA  - continue to have GOC with pts significant other/HCP Brandi Mcguire (significant other H: [phone]; Cell [phone])   - c/w PO Lopressor for A fib    Alka Pacheco [facility] [id number]

## 2021-10-19 NOTE — RAPID RESPONSE TEAM SUMMARY - NSSITUATIONBACKGROUNDRRT_GEN_ALL_CORE
88 yo M PMHx BPH, AFib not on AC due to ICH, AS s/p TAVR 2017, CAD, Dementia, DM, hypothyroidism, HTN, Dysphagia, presents from [facility] for concern for pneumonia, in the ED found to have +Enterovirus, empirically treated w/ cefepime and vanc, 1.5L IVF, and IV tylenol for high fever. RRT called for tachycardia and increased work of breathing. Prior to arrival, pt had received chest PT and suction as well as a neb treatment. Patient found to have increased work of breathing, however was satting in the high 90s. Lopressor 5IVP was given w/ HRs returning to the 110s-120s. Blood pressure was transiently low however recovered without any further fluids, pressors, or midodrine. MOLST reviewed (DNR/DNI) and goals of care discussion had with family who would like to speak with palliative. Given improved heart rate, good O2 sat, and adequate blood pressure w/ MAPs>65 RRT was ended.

## 2021-10-19 NOTE — ED ADULT TRIAGE NOTE - CHIEF COMPLAINT QUOTE
pt coming from [facility].  pt sent to ED for possible PNA.  pt arrives on 100% NRB.  pt was suctioned via yankaur prior to arrival to ED. + cough noted

## 2021-10-19 NOTE — ED ADULT NURSE NOTE - OBJECTIVE STATEMENT
Received pt to bed 4, A+Ox1, non-ambulatory. arrives to ED for possible pneumonia, cough noted, pt on 4L NC. pt poor historian. Respirations even and unlabored, normal work of breathing, no accessory muscle use, crackles noted. ABD is soft, non tender, non distended. Pt denies any chest pain, SOB, headache, dizziness, N/V/D, fever, chills.   20G to Left forearm, Labs sent, Medicated as per MD, will continue to monitor.

## 2021-10-19 NOTE — ED PROVIDER NOTE - OBJECTIVE STATEMENT
89 yo M PMHx BPH, AFib not on AC due to ICH, AS s/p TAVR 2017, CAD, Dementia, DM, hypothyroidism, HTN, Dysphagia, presents from [facility] for concern for pneumonia. Pt unable to provide a history. Per paper work from [facility], pt c/o SOB and cough.     Chart review shows: vaccinated against COVID-19  DNR (per paperwork from [facility]; Brandi Mcguire, significant other H: [phone]; Cell [phone]) but no MOLST came w/ chart  PMD @ [facility] is Derrell Hsu ([phone])

## 2021-10-19 NOTE — H&P ADULT - PROBLEM SELECTOR PLAN 2
Not on AC . Rate control . Cards and EP consulted . Not on AC . Rate control . Cards and EP consulted . IV Digoxin 125mcg x 1 .

## 2021-10-19 NOTE — ED ADULT NURSE REASSESSMENT NOTE - NS ED NURSE REASSESS COMMENT FT1
16 Setswana indwelling catheter placed, with clear yellow urine output.
BREAK RN: Pt noted to be tachypneic, noted audible adventitious breath sounds. DHARMESH Norman paged and made aware. Instructed to do chest PT and suction patient. Oxygen saturation at 95% on 4L NC. DHARMESH Norman paged again to make aware interventions were not successful. Pt noted to become tachycardic to the 150's. Instructed to give medications as per order. VS as noted. Will reassess.
Break RN: Chest PT continued. Pt continues to have difficulty breathing. Oxygen saturation at 100% on 4L. Noted HR to the 180-190's. Mar called, no response. Rapid response initiated. Charge RN and LINDA Spence aware.
Patient has been in bed suctioned per RN. Patient received bolus and repeat blood gas to see lactate. Patient is still screaming mama. Patient on monitor.
Patient in room with MAR and rapid response team. Patient is in bed still tachy in the 120's. Patient is being stabilized by team.
Patient needs to be sent to cath lab. Patient needs to be on a zoll to go to cath lab, called ACP. Will call back ACP to take patient on zoll when transporter returns to take patient.
Received report from day shift RN. Patient received A&Ox1 to self with 20G IV. Pt offering no complaints and is in no acute distress at this time.  Respirations even and unlabored, remains on 4LNC at the present satting WNL.  Stretcher in lowest position, wheels locked, side rails up as per protocol. Vital signs are per flowsheet. ACP WARNER Rucker at bedside and aware of HR, remains on continuous cardiac monitor. Awaiting further orders.
Patient lactate came back 10.6, WARNER oquendo @[id number] made aware. Patient is still very tachycardic with some work of breathing. Patient is in bed, mental status Is still a&oxo, constantly yelling mommy. Patient gets combative with staff when touching, unintentionally. ACP will come to bedside.

## 2021-10-19 NOTE — H&P ADULT - HISTORY OF PRESENT ILLNESS
89 yo M PMHx BPH, AFib not on AC due to ICH, AS s/p TAVR 2017, CAD, Dementia, DM, hypothyroidism, HTN, Dysphagia, [facility]. Pt unable to provide a history. Per paper work from [facility], pt c/o SOB and cough.

## 2021-10-19 NOTE — ED PROVIDER NOTE - ATTENDING CONTRIBUTION TO CARE
I (Geovanny) agree with above, I performed a history and physical. Counseled [facility]enson medical staff, physician assistant, and/or medical student on medical decision making as documented. Medical decisions and treatment interventions were made in real time during the patient encounter. Additionally and/or with the following exceptions: patient presented with septic shock, history limited given non verbal status, transferred for possible PNA ||| not hypoxic, pressures stable ||| elderly appearing, toxic male, moderate respiratory distress, moving all 4's, otherwise exam non focal ||| ttreated for PNA empirically, rvp eventually positive for enterovirus. Case discussed with Din prior to admission who agreed with plan, but requested CTA. EKG atrial fibrillation with rvr, suspect sepsis as cause. LA elevated to 7.2, indicating septic shock. Sepsis re-evaluation was performed. Additional time as above spent by myself, separate from billable procedures, included coordination of patient care with consultants/admitting team, performing reassessments on the patient, docuemntation, and counseling patient/family members on the care provided. I had an extensive conversation with the patient/family member to establish goals of care, please refer to corresponding MOLST form. The patient's condition was not ammenable to outpatient treatment due either the lack of feasability of outpatient care coordination, possibility for further decompensation with adverse outcome if discharge, or treatments and diagnostic  modalities only available during an inpatient hospitalization.

## 2021-10-20 NOTE — PROGRESS NOTE ADULT - SUBJECTIVE AND OBJECTIVE BOX
Date of Service  : 10-20-21     INTERVAL HPI/OVERNIGHT EVENTS: no new concerns.   Vital Signs Last 24 Hrs  T(C): 38.7 (20 Oct 2021 18:00), Max: 38.7 (20 Oct 2021 18:00)  T(F): 101.7 (20 Oct 2021 18:00), Max: 101.7 (20 Oct 2021 18:00)  HR: 119 (20 Oct 2021 18:00) (115 - 134)  BP: 136/82 (20 Oct 2021 18:00) (97/67 - 136/82)  BP(mean): --  RR: 18 (20 Oct 2021 18:00) (17 - 20)  SpO2: 98% (20 Oct 2021 18:00) (98% - 100%)  I&O's Summary    20 Oct 2021 07:01  -  20 Oct 2021 21:46  --------------------------------------------------------  IN: 118 mL / OUT: 0 mL / NET: 118 mL      MEDICATIONS  (STANDING):  aspirin  chewable 81 milliGRAM(s) Oral daily  atorvastatin 80 milliGRAM(s) Oral at bedtime  dextrose 40% Gel 15 Gram(s) Oral once  dextrose 5%. 1000 milliLiter(s) (50 mL/Hr) IV Continuous <Continuous>  dextrose 5%. 1000 milliLiter(s) (100 mL/Hr) IV Continuous <Continuous>  dextrose 50% Injectable 25 Gram(s) IV Push once  dextrose 50% Injectable 12.5 Gram(s) IV Push once  dextrose 50% Injectable 25 Gram(s) IV Push once  glucagon  Injectable 1 milliGRAM(s) IntraMuscular once  heparin   Injectable 5000 Unit(s) SubCutaneous every 12 hours  insulin lispro (ADMELOG) corrective regimen sliding scale   SubCutaneous three times a day before meals  insulin lispro (ADMELOG) corrective regimen sliding scale   SubCutaneous at bedtime  latanoprost 0.005% Ophthalmic Solution 1 Drop(s) Both EYES at bedtime  levothyroxine Injectable 40 MICROGram(s) IV Push at bedtime  metoprolol tartrate Injectable 5 milliGRAM(s) IV Push every 6 hours  PARoxetine 20 milliGRAM(s) Oral daily  piperacillin/tazobactam IVPB.. 3.375 Gram(s) IV Intermittent every 8 hours  senna 2 Tablet(s) Oral at bedtime    MEDICATIONS  (PRN):  levalbuterol Inhalation 0.63 milliGRAM(s) Inhalation every 6 hours PRN SOB    LABS:                        15.3   8.89  )-----------( 184      ( 19 Oct 2021 14:31 )             45.0     10-20    137  |  102  |  24<H>  ----------------------------<  170<H>  4.3   |  20<L>  |  0.70    Ca    9.0      20 Oct 2021 06:24  Phos  2.7     10-20  Mg     1.60     10-20    TPro  6.4  /  Alb  3.7  /  TBili  2.6<H>  /  DBili  x   /  AST  16  /  ALT  11  /  AlkPhos  60  10-20    PT/INR - ( 19 Oct 2021 14:31 )   PT: 14.5 sec;   INR: 1.28 ratio         PTT - ( 19 Oct 2021 14:31 )  PTT:31.2 sec  Urinalysis Basic - ( 19 Oct 2021 15:19 )    Color: Light Yellow / Appearance: Clear / S.012 / pH: x  Gluc: x / Ketone: Negative  / Bili: Negative / Urobili: <2 mg/dL   Blood: x / Protein: Negative / Nitrite: Negative   Leuk Esterase: Negative / RBC: x / WBC x   Sq Epi: x / Non Sq Epi: x / Bacteria: x      CAPILLARY BLOOD GLUCOSE      POCT Blood Glucose.: 100 mg/dL (20 Oct 2021 20:53)  POCT Blood Glucose.: 105 mg/dL (20 Oct 2021 16:42)  POCT Blood Glucose.: 128 mg/dL (20 Oct 2021 11:28)  POCT Blood Glucose.: 145 mg/dL (20 Oct 2021 07:38)  POCT Blood Glucose.: 233 mg/dL (20 Oct 2021 03:24)        Urinalysis Basic - ( 19 Oct 2021 15:19 )    Color: Light Yellow / Appearance: Clear / S.012 / pH: x  Gluc: x / Ketone: Negative  / Bili: Negative / Urobili: <2 mg/dL   Blood: x / Protein: Negative / Nitrite: Negative   Leuk Esterase: Negative / RBC: x / WBC x   Sq Epi: x / Non Sq Epi: x / Bacteria: x        Consultant(s) Notes Reviewed:  [x ] YES  [ ] NO    PHYSICAL EXAM:  GENERAL: NAD, well-groomed, well-developed,not in any distress ,  HEAD:  Atraumatic, Normocephalic  NECK: Supple, No JVD, Normal thyroid  NERVOUS SYSTEM:  Alert   CHEST/LUNG: Good air entry bilateral with no  rales, rhonchi, wheezing, or rubs  HEART: Irregular rate and rhythm; No murmurs, rubs, or gallops  ABDOMEN: Soft, Nontender, Nondistended; Bowel sounds present  EXTREMITIES:  2+ Peripheral Pulses, No clubbing, cyanosis, or edema    Care Discussed with Consultants/Other Providers [ x] YES  [ ] NO

## 2021-10-20 NOTE — SWALLOW BEDSIDE ASSESSMENT ADULT - ASR SWALLOW RECOMMEND DIAG
MD to consider cinesophagram given inconsistent coughing on thin liquids, recent chest imaging and pneumonia, and current fever./VFSS/MBS

## 2021-10-20 NOTE — CONSULT NOTE ADULT - SUBJECTIVE AND OBJECTIVE BOX
Patient is a 89y old  Male who presents with a chief complaint of SOB and low oxygen (20 Oct 2021 13:47)      HPI:    88 yo M PMHx BPH, AFib not on AC due to ICH, AS s/p TAVR 2017, CAD, Dementia, DM, hypothyroidism, HTN, Dysphagia, presents from [facility] for concern for pneumonia. Pt unable to provide a history. Per paper work from [facility], pt c/o SOB and cough.   (19 Oct 2021 18:25)    ER vs:  Tmax 102.2, P 133.  /66.  Pt initially on NRB --> 3L NC.  WBC 7.2 -->10.6.  CTA chest no PE.  Multifocal peribronchial groundglass abnormalities seen with multiple acinar nodules, s/o infection and endobronchial spread.  Also with mucoid impacted airways. Pt given vanco/cefepime, now on zosyn.  s/p RRT for tachycardia and increased work of breathing, s/p Xopenex and beta blockers with clinical improvement.   ID consulted for further abx managment.       REVIEW OF SYSTEMS:    CONSTITUTIONAL: No fever, weight loss, or fatigue  EYES: No eye pain, visual disturbances, or discharge  ENMT:  No sore throat  NECK: No pain or stiffness  RESPIRATORY: No cough, wheezing, chills or hemoptysis; No shortness of breath  CARDIOVASCULAR: No chest pain, palpitations, dizziness, or leg swelling  GASTROINTESTINAL: No abdominal or epigastric pain. No nausea, vomiting, or hematemesis; No diarrhea or constipation. No melena or hematochezia.  GENITOURINARY: No dysuria, frequency, hematuria, or incontinence  NEUROLOGICAL: No headaches, memory loss, loss of strength, numbness, or tremors  SKIN: No itching, burning, rashes, or lesions   LYMPH NODES: No enlarged glands  MUSCULOSKELETAL: No joint pain or swelling; No muscle, back, or extremity pain      PAST MEDICAL & SURGICAL HISTORY:  BPH (benign prostatic hypertrophy)    HTN - Hypertension    Hyperlipidemia    Congenital heart defect    Diabetes mellitus    Torn rotator cuff  Right    Spinal stenosis of lumbar region    Atrial fibrillation    Nonrheumatic aortic valve stenosis    Dementia    Aortic stenosis    Glaucoma    OAB (overactive bladder)    Arthritis  generalized medrol pack q 6 weeks    S/P TURP (status post transurethral resection of prostate)      Hx of appendectomy  age 17    S/P lumbar laminectomy      S/P cataract surgery    History of prior ablation treatment  cardiac        Allergies    No Known Allergies    Intolerances        FAMILY HISTORY:  No pertinent family history in first degree relatives        SOCIAL HISTORY:    No reported etoh, ivdu, smoking      MEDICATIONS  (STANDING):  aspirin  chewable 81 milliGRAM(s) Oral daily  atorvastatin 80 milliGRAM(s) Oral at bedtime  dextrose 40% Gel 15 Gram(s) Oral once  dextrose 5%. 1000 milliLiter(s) (50 mL/Hr) IV Continuous <Continuous>  dextrose 5%. 1000 milliLiter(s) (100 mL/Hr) IV Continuous <Continuous>  dextrose 50% Injectable 25 Gram(s) IV Push once  dextrose 50% Injectable 12.5 Gram(s) IV Push once  dextrose 50% Injectable 25 Gram(s) IV Push once  digoxin  Injectable 250 MICROGram(s) IV Push once  digoxin  Injectable 250 MICROGram(s) IV Push once  glucagon  Injectable 1 milliGRAM(s) IntraMuscular once  heparin   Injectable 5000 Unit(s) SubCutaneous every 12 hours  insulin lispro (ADMELOG) corrective regimen sliding scale   SubCutaneous three times a day before meals  insulin lispro (ADMELOG) corrective regimen sliding scale   SubCutaneous at bedtime  latanoprost 0.005% Ophthalmic Solution 1 Drop(s) Both EYES at bedtime  levothyroxine Injectable 40 MICROGram(s) IV Push at bedtime  metoprolol tartrate Injectable 5 milliGRAM(s) IV Push every 6 hours  PARoxetine 20 milliGRAM(s) Oral daily  piperacillin/tazobactam IVPB.. 3.375 Gram(s) IV Intermittent every 8 hours  senna 2 Tablet(s) Oral at bedtime    MEDICATIONS  (PRN):  levalbuterol Inhalation 0.63 milliGRAM(s) Inhalation every 6 hours PRN SOB      Vital Signs Last 24 Hrs  T(C): 36.9 (20 Oct 2021 12:00), Max: 39 (19 Oct 2021 18:25)  T(F): 98.5 (20 Oct 2021 12:00), Max: 102.2 (19 Oct 2021 18:25)  HR: 115 (20 Oct 2021 12:00) (115 - 170)  BP: 126/80 (20 Oct 2021 12:00) (97/67 - 149/92)  BP(mean): 104 (19 Oct 2021 18:25) (104 - 104)  RR: 18 (20 Oct 2021 12:00) (17 - 30)  SpO2: 98% (20 Oct 2021 12:00) (95% - 100%)    PHYSICAL EXAM:    GENERAL: NAD, well-groomed  HEAD:  Atraumatic, Normocephalic  EYES: EOMI, PERRLA, conjunctiva and sclera clear  ENMT: No tonsillar erythema, exudates, or enlargement; Moist mucous membranes  NECK: Supple, No JVD  CHEST/LUNG: Clear to percussion bilaterally; No rales, rhonchi, wheezing, or rubs  HEART: Regular rate and rhythm; No murmurs, rubs, or gallops  ABDOMEN: Soft, Nontender, Nondistended; Bowel sounds present  EXTREMITIES:  2+ Peripheral Pulses, No clubbing, cyanosis, or edema  LYMPH: No lymphadenopathy noted  SKIN: No rashes or lesions    LABS:  CBC Full  -  ( 19 Oct 2021 14:31 )  WBC Count : 8.89 K/uL  RBC Count : 4.45 M/uL  Hemoglobin : 15.3 g/dL  Hematocrit : 45.0 %  Platelet Count - Automated : 184 K/uL  Mean Cell Volume : 101.1 fL  Mean Cell Hemoglobin : 34.4 pg  Mean Cell Hemoglobin Concentration : 34.0 gm/dL  Auto Neutrophil # : 7.82 K/uL  Auto Lymphocyte # : 0.38 K/uL  Auto Monocyte # : 0.53 K/uL  Auto Eosinophil # : 0.00 K/uL  Auto Basophil # : 0.00 K/uL  Auto Neutrophil % : 78.5 %  Auto Lymphocyte % : 4.3 %  Auto Monocyte % : 6.0 %  Auto Eosinophil % : 0.0 %  Auto Basophil % : 0.0 %      10-20    137  |  102  |  24<H>  ----------------------------<  170<H>  4.3   |  20<L>  |  0.70    Ca    9.0      20 Oct 2021 06:24  Phos  2.7     10-20  Mg     1.60     10-20    TPro  6.4  /  Alb  3.7  /  TBili  2.6<H>  /  DBili  x   /  AST  16  /  ALT  11  /  AlkPhos  60  10-20      LIVER FUNCTIONS - ( 20 Oct 2021 06:24 )  Alb: 3.7 g/dL / Pro: 6.4 g/dL / ALK PHOS: 60 U/L / ALT: 11 U/L / AST: 16 U/L / GGT: x                               MICROBIOLOGY:        Urinalysis Basic - ( 19 Oct 2021 15:19 )    Color: Light Yellow / Appearance: Clear / S.012 / pH: x  Gluc: x / Ketone: Negative  / Bili: Negative / Urobili: <2 mg/dL   Blood: x / Protein: Negative / Nitrite: Negative   Leuk Esterase: Negative / RBC: x / WBC x   Sq Epi: x / Non Sq Epi: x / Bacteria: x        Respiratory Viral Panel with COVID-19 by [facility] (10.19.21 @ 14:59)   Rapid RVP Result: Detected   SARS-CoV-2: NotDetec: This Respiratory Panel uses polymerase chain reaction (PCR) to detect for   adenovirus; coronavirus (HKU1, NL63, 229E, OC43); human metapneumovirus   (hMPV); human enterovirus/rhinovirus (Entero/RV); influenza A; influenza   A/H1; influenza A/H3; influenza A/H1-2009; influenza B; parainfluenza   viruses 1, 2, 3, 4; respiratory syncytial virus; Mycoplasma pneumoniae;   Chlamydophila pneumoniae; and SARS-CoV-2.   Adenovirus (RapRVP): NotDetec   Influenza A (RapRVP): NotDetec   Influenza B (RapRVP): NotDetec   Parainfluenza 1 (RapRVP): NotDetec   Parainfluenza 2 (RapRVP): NotDetec   Parainfluenza 3 (RapRVP): NotDetec   Parainfluenza 4 (RapRVP): NotDetec   Resp Syncytial Virus (RapRVP): NotDetec   Bordetella pertussis (RapRVP): NotDetec   Bordetella parapertussis (RapRVP): NotDetec   Chlamydia pneumoniae (RapRVP): NotDetec   Mycoplasma pneumoniae (RapRVP): NotDetec   Entero/Rhinovirus (RapRVP): Detected   HKU1 Coronavirus (RapRVP): NotDetec   NL63 Coronavirus (RapRVP): NotDetec   229E Coronavirus (RapRVP): NotDetec   OC43 Coronavirus (RapRVP): NotDetec   hMPV (RapRVP): NotDetec       RADIOLOGY:    < from: CT Angio Chest PE Protocol w/ IV Cont (10.19.21 @ 22:15) >    IMPRESSION:    No pulmonary embolus.    Multifocal infection as delineated above. Follow-up recommended to ensure resolution.    < end of copied text >      < from: Xray Chest 1 View- PORTABLE-Urgent (10.19.21 @ 15:06) >  IMPRESSION:  No focal consolidation to account for sepsis.    < end of copied text >

## 2021-10-20 NOTE — CONSULT NOTE ADULT - SUBJECTIVE AND OBJECTIVE BOX
Patient unable to provide history Information obtained from chart.   88 yo M PMHx BPH, AFib not on AC due to ICH, AS s/p TAVR 2017, CAD, dementia, diabetes T2 hypothyroidism, HTN, dysphagia, presents from [facility] with shortness of breath, cough and hypoxia and concern for pneumonia. Found to have enterovirus.  Started on Zosyn. Hospital course complicated by atrial fibrillation w/RVR (120's - 160's).  RRT called overnight.   Per conversation with long-term partner and HCP, Brandi Mcguire, patient had been living at assisted living facility, [facility], until he was hospitalized in May for failure to thrive.  He was diagnosed with a small R frontal stroke attributed to atrial fibrillation, and noted to have dysphagia. He was discharged to [facility] rehab, which was transitioned to full-time, long-term care -3. Ms. Mcguire notes that he has been slowly losing his memory but became significantly more confused since July, crying, asking for his mother, not understanding where he was. She also notes that he has been coughing for a couple weeks with productive cough, and was told by the staff at [facility] that he had an episode of emesis prior to becoming short of breath and exacerbation of coughing. He has not required oxygen prior to this hospitalization, to her knowledge.    Palliative consulted for further discussion of GOC. Evaluated at bedside. Patient lying in bed, confused, asking "what is happening to me?" Anxious, but easily redirected. Denies appetite at this time, currently on dysphagia 1 pureed diet with nectar thick liquids. Per significant other and documented healthcare proxy, Brandi Darrioneran, baseline can possibly stand up but unable to walk, confused at nursing home, does not require oxygen. Started on Zosyn for pneumonia.           PAST MEDICAL & SURGICAL HISTORY:  BPH (benign prostatic hypertrophy)  HTN - Hypertension  Hyperlipidemia  Congenital heart defect  Diabetes mellitus T2  Torn rotator cuff, right  Spinal stenosis of lumbar region  Atrial fibrillation, not on anticoagulation  Nonrheumatic aortic valve stenosis  Dementia  Aortic stenosis  Glaucoma  OAB (overactive bladder)  Arthritis generalized medrol pack q 6 weeks    S/P TURP (status post transurethral resection of prostate)    Hx of appendectomy  age 17  S/P lumbar laminectomy    S/P cataract surgery  History of prior ablation treatment  cardiac                          MEDICATIONS  (STANDING):  aspirin  chewable 81 milliGRAM(s) Oral daily  atorvastatin 80 milliGRAM(s) Oral at bedtime  dextrose 40% Gel 15 Gram(s) Oral once  dextrose 5%. 1000 milliLiter(s) (50 mL/Hr) IV Continuous <Continuous>  dextrose 5%. 1000 milliLiter(s) (100 mL/Hr) IV Continuous <Continuous>  dextrose 50% Injectable 25 Gram(s) IV Push once  dextrose 50% Injectable 12.5 Gram(s) IV Push once  dextrose 50% Injectable 25 Gram(s) IV Push once  digoxin  Injectable 250 MICROGram(s) IV Push once  glucagon  Injectable 1 milliGRAM(s) IntraMuscular once  heparin   Injectable 5000 Unit(s) SubCutaneous every 12 hours  insulin lispro (ADMELOG) corrective regimen sliding scale   SubCutaneous three times a day before meals  insulin lispro (ADMELOG) corrective regimen sliding scale   SubCutaneous at bedtime  latanoprost 0.005% Ophthalmic Solution 1 Drop(s) Both EYES at bedtime  levothyroxine Injectable 40 MICROGram(s) IV Push at bedtime  metoprolol tartrate Injectable 5 milliGRAM(s) IV Push every 6 hours  PARoxetine 20 milliGRAM(s) Oral daily  piperacillin/tazobactam IVPB.. 3.375 Gram(s) IV Intermittent every 8 hours  senna 2 Tablet(s) Oral at bedtime    MEDICATIONS  (PRN):  levalbuterol Inhalation 0.63 milliGRAM(s) Inhalation every 6 hours PRN SOB      FAMILY HISTORY:  No pertinent family history in first degree relatives        SOCIAL HISTORY:    CIGARETTES:    ALCOHOL:    REVIEW OF SYSTEMS:    CONSTITUTIONAL: No fever, weight loss, chills, shakes, or fatigue  EYES: No eye pain, visual disturbances, or discharge  ENMT:  No difficulty hearing, tinnitus, vertigo; No sinus or throat pain  NECK: No pain or stiffness  BREASTS: No pain, masses, or nipple discharge  RESPIRATORY: No cough, wheezing, hemoptysis, or shortness of breath  CARDIOVASCULAR: No chest pain, dyspnea, palpitations, dizziness, syncope, paroxysmal nocturnal dyspnea, orthopnea, or arm or leg swelling  GASTROINTESTINAL: No abdominal  or epigastric pain, nausea, vomiting, hematemesis, diarrhea, constipation, melena or bright red blood.  GENITOURINARY: No dysuria, nocturia, hematuria, or urinary incontinence  NEUROLOGICAL: No headaches, memory loss, slurred speech, limb weakness, loss of strength, numbness, or tremors  SKIN: No itching, burning, rashes, or lesions   LYMPH NODES: No enlarged glands  ENDOCRINE: No heat or cold intolerance, or hair loss  MUSCULOSKELETAL: No joint pain or swelling, muscle, back, or extremity pain  PSYCHIATRIC: No depression, anxiety, or difficulty sleeping  HEME/LYMPH: No easy bruising or bleeding gums  ALLERY AND IMMUNOLOGIC: No hives or rash.      Vital Signs Last 24 Hrs  T(C): 36.9 (20 Oct 2021 12:00), Max: 39 (19 Oct 2021 18:25)  T(F): 98.5 (20 Oct 2021 12:00), Max: 102.2 (19 Oct 2021 18:25)  HR: 115 (20 Oct 2021 12:00) (115 - 136)  BP: 126/80 (20 Oct 2021 12:00) (97/67 - 136/78)  BP(mean): 104 (19 Oct 2021 18:) (104 - 104)  RR: 18 (20 Oct 2021 12:00) (17 - 20)  SpO2: 98% (20 Oct 2021 12:00) (97% - 100%)    PHYSICAL EXAM:    GENERAL: In no apparent distress, well nourished, and hydrated.  HEAD:  Atraumatic, Normocephalic  EYES: EOMI, PERRLA, conjunctiva and sclera clear  ENMT: No tonsillar erythema, exudates, or enlargement; Moist mucous membranes, Good dentition, No lesions  NECK: Supple and normal thyroid.  No JVD or carotid bruit.  Carotid pulse is 2+ bilaterally.  HEART: Regular rate and rhythm; No murmurs, rubs, or gallops.  PULMONARY: Clear to auscultation and perfusion.  No rales, wheezing, or rhonchi bilaterally.  ABDOMEN: Soft, Nontender, Nondistended; Bowel sounds present  EXTREMITIES:  2+ Peripheral Pulses, No clubbing, cyanosis, or edema  LYMPH: No lymphadenopathy noted  NEUROLOGICAL: Grossly nonfocal          INTERPRETATION OF TELEMETRY:    ECG:    I&O's Detail    20 Oct 2021 07:01  -  20 Oct 2021 17:44  --------------------------------------------------------  IN:    Oral Fluid: 118 mL  Total IN: 118 mL    OUT:  Total OUT: 0 mL    Total NET: 118 mL          LABS:                        15.3   8.89  )-----------( 184      ( 19 Oct 2021 14:31 )             45.0     10-20    137  |  102  |  24<H>  ----------------------------<  170<H>  4.3   |  20<L>  |  0.70    Ca    9.0      20 Oct 2021 06:24  Phos  2.7     10-20  Mg     1.60     10-20    TPro  6.4  /  Alb  3.7  /  TBili  2.6<H>  /  DBili  x   /  AST  16  /  ALT  11  /  AlkPhos  60  10-20        PT/INR - ( 19 Oct 2021 14:31 )   PT: 14.5 sec;   INR: 1.28 ratio         PTT - ( 19 Oct 2021 14:31 )  PTT:31.2 sec  Urinalysis Basic - ( 19 Oct 2021 15:19 )    Color: Light Yellow / Appearance: Clear / S.012 / pH: x  Gluc: x / Ketone: Negative  / Bili: Negative / Urobili: <2 mg/dL   Blood: x / Protein: Negative / Nitrite: Negative   Leuk Esterase: Negative / RBC: x / WBC x   Sq Epi: x / Non Sq Epi: x / Bacteria: x      BNP  I&O's Detail    20 Oct 2021 07:01  -  20 Oct 2021 17:44  --------------------------------------------------------  IN:    Oral Fluid: 118 mL  Total IN: 118 mL    OUT:  Total OUT: 0 mL    Total NET: 118 mL        Daily     Daily     RADIOLOGY & ADDITIONAL STUDIES:  PREVIOUS DIAGNOSTIC TESTING:      ECHO  FINDINGS:    STRESS  FINDINGS:    CATHETERIZATION  FINDINGS:      ASSESSMENT:        RECOMMENDATIONS: Patient unable to provide history Information obtained from chart.   89 yo M PMHx BPH, AFib not on AC due to ICH, AS s/p TAVR 2017, CAD, dementia, diabetes T2 hypothyroidism, HTN, dysphagia, presents from [facility] with shortness of breath, cough and hypoxia and concern for pneumonia. Started on Zosyn. Hospital course complicated by atrial fibrillation w/RVR (120's - 160's).  RRT called overnight. Given IV Lopressor with improvement of heart rate to 120's. Now on lopressor 5mg q 6 hours. Currently in no distress but confused.  Using 3L NC O2. Breathing unlabored.  Remains in AFib w/RVR to low 100's. Patient now DNR/DNI.  Palliative on board for [facility].           PAST MEDICAL & SURGICAL HISTORY:  BPH (benign prostatic hypertrophy)  HTN - Hypertension  Hyperlipidemia  Congenital heart defect  Diabetes mellitus T2  Torn rotator cuff, right  Spinal stenosis of lumbar region  Atrial fibrillation, not on anticoagulation  Nonrheumatic aortic valve stenosis  Dementia  Aortic stenosis  Glaucoma  OAB (overactive bladder)  Arthritis generalized medrol pack q 6 weeks    S/P TURP (status post transurethral resection of prostate)    Hx of appendectomy  age 17  S/P lumbar laminectomy    S/P cataract surgery  History of prior ablation treatment  cardiac      MEDICATIONS  (STANDING):  aspirin  chewable 81 milliGRAM(s) Oral daily  atorvastatin 80 milliGRAM(s) Oral at bedtime  dextrose 40% Gel 15 Gram(s) Oral once  dextrose 5%. 1000 milliLiter(s) (50 mL/Hr) IV Continuous <Continuous>  dextrose 5%. 1000 milliLiter(s) (100 mL/Hr) IV Continuous <Continuous>  dextrose 50% Injectable 25 Gram(s) IV Push once  dextrose 50% Injectable 12.5 Gram(s) IV Push once  dextrose 50% Injectable 25 Gram(s) IV Push once  digoxin  Injectable 250 MICROGram(s) IV Push once  glucagon  Injectable 1 milliGRAM(s) IntraMuscular once  heparin   Injectable 5000 Unit(s) SubCutaneous every 12 hours  insulin lispro (ADMELOG) corrective regimen sliding scale   SubCutaneous three times a day before meals  insulin lispro (ADMELOG) corrective regimen sliding scale   SubCutaneous at bedtime  latanoprost 0.005% Ophthalmic Solution 1 Drop(s) Both EYES at bedtime  levothyroxine Injectable 40 MICROGram(s) IV Push at bedtime  metoprolol tartrate Injectable 5 milliGRAM(s) IV Push every 6 hours  PARoxetine 20 milliGRAM(s) Oral daily  piperacillin/tazobactam IVPB.. 3.375 Gram(s) IV Intermittent every 8 hours  senna 2 Tablet(s) Oral at bedtime    MEDICATIONS  (PRN):  levalbuterol Inhalation 0.63 milliGRAM(s) Inhalation every 6 hours PRN SOB      FAMILY HISTORY:  No pertinent family history in first degree relatives        SOCIAL HISTORY: Resides in [facility]    CIGARETTES: unknown hisotry  ALCOHOL: unknown hisotry    REVIEW OF SYSTEMS: as per chart    CONSTITUTIONAL: No fever,+weight loss and  fatigue  EYES: decreased visual acuity in both eyes.   ENMT:  No difficulty hearing, tinnitus, vertigo; No sinus or throat pain  NECK: No complaints of pain  BREASTS: No pain, masses, or nipple discharge  RESPIRATORY: + cough + progressive  shortness of breath  CARDIOVASCULAR: No c/o chest pain, palpitations, dizziness, syncope, paroxysmal nocturnal dyspnea, orthopnea, or arm or leg swelling  GASTROINTESTINAL: No abdominal  or epigastric pain, + constipation. + emesis + dysphagia since CVA in May  GENITOURINARY: No dysuria + nocturia,  NEUROLOGICAL:  memory loss. generalized weakness  SKIN: No known itching, burning, rashes, or lesions   LYMPH NODES: No enlarged glands  ENDOCRINE: No heat or cold intolerance, or hair loss  MUSCULOSKELETAL: + back pain  PSYCHIATRIC: +  depression, anxiety,   HEME/LYMPH: No easy bruising or bleeding gums  ALLERGY AND IMMUNOLOGIC: No hives or rash.      Vital Signs Last 24 Hrs  T(C): 36.9 (20 Oct 2021 12:00), Max: 39 (19 Oct 2021 18:25)  T(F): 98.5 (20 Oct 2021 12:00), Max: 102.2 (19 Oct 2021 18:25)  HR: 115 (20 Oct 2021 12:00) (115 - 136)  BP: 126/80 (20 Oct 2021 12:00) (97/67 - 136/78)  BP(mean): 104 (19 Oct 2021 18:25) (104 - 104)  RR: 18 (20 Oct 2021 12:00) (17 - 20)  SpO2: 98% (20 Oct 2021 12:00) (97% - 100%)    PHYSICAL EXAM:    GENERAL: Currently in NAD. Breathing comfortably w/ NC Oxygen  HEAD:  Atraumatic, Normocephalic  EYES: EOMI, PERRLA, conjunctiva and sclera clear  ENMT: No tonsillar erythema, exudates, or enlargement; Moist mucous membranes, Poor dentition,  NECK: Supple and normal thyroid.  No JVD or carotid bruit.   HEART: Irregular rate and rhythm; No murmurs, rubs, or gallops appreciated.   PULMONARY: Poor effort. Course breath sounds in anterior lobes.   ABDOMEN: Soft, + diffuse tenderness  Nondistended; Bowel sounds present  EXTREMITIES: No edema   LYMPH: No lymphadenopathy noted  NEUROLOGICAL: Grossly nonfocal          INTERPRETATION OF TELEMETRY: atrial fibrillation w/RVR to 160 overnight.  Now to 115 bpm  + PVC's.     ECG: Atrial fibrillation 153 bpm QRSd 90ms. + PVC's    I&O's Detail    20 Oct 2021 07:01  -  20 Oct 2021 17:44  --------------------------------------------------------  IN:    Oral Fluid: 118 mL  Total IN: 118 mL    OUT:  Total OUT: 0 mL    Total NET: 118 mL          LABS:                        15.3   8.89  )-----------( 184      ( 19 Oct 2021 14:31 )             45.0     10-20    137  |  102  |  24<H>  ----------------------------<  170<H>  4.3   |  20<L>  |  0.70    Ca    9.0      20 Oct 2021 06:24  Phos  2.7     10-20  Mg     1.60     10-20    TPro  6.4  /  Alb  3.7  /  TBili  2.6<H>  /  DBili  x   /  AST  16  /  ALT  11  /  AlkPhos  60  10-20        PT/INR - ( 19 Oct 2021 14:31 )   PT: 14.5 sec;   INR: 1.28 ratio         PTT - ( 19 Oct 2021 14:31 )  PTT:31.2 sec  Urinalysis Basic - ( 19 Oct 2021 15:19 )    Color: Light Yellow / Appearance: Clear / S.012 / pH: x  Gluc: x / Ketone: Negative  / Bili: Negative / Urobili: <2 mg/dL   Blood: x / Protein: Negative / Nitrite: Negative   Leuk Esterase: Negative / RBC: x / WBC x   Sq Epi: x / Non Sq Epi: x / Bacteria: x      BNP  I&O's Detail    20 Oct 2021 07:01  -  20 Oct 2021 17:44  --------------------------------------------------------  IN:    Oral Fluid: 118 mL  Total IN: 118 mL    OUT:  Total OUT: 0 mL    Total NET: 118 mL        RADIOLOGY & ADDITIONAL STUDIES:  PREVIOUS DIAGNOSTIC TESTING:      < from: CT Angio Chest PE Protocol w/ IV Cont (10.19.21 @ 22:15) >  EXAM:  CT ANGIO CHEST PULM ART WAWIC        PROCEDURE DATE:  Oct 19 2021         INTERPRETATION:  Reason for Exam: Shortness of breath. chest pain.    CTA of the chest was performed from the thoracic inlet to the level of the adrenal glands following IV contrast injection of  80 cc of Omnipaque 350. No immediate complications were reported.  MIP images were also created and reviewed.    Comparison: 2017  Tubes/Lines: None  Mediastinum and Heart: Aorta and pulmonary arteries are normal in size. Thyroid gland is unremarkable. No lymphadenopathy. No pericardial effusion. Post transcatheter aortic valve replacement.  Lungs, Pleura, and Airways: There is no pulmonary embolus. Multifocal peribronchovascular groundglass abnormality seen with multiple acinar nodules suggestive of infection with endobronchial spread. Numerous mucoid impacted bronchi are also noted.  Visualized Abdomen: Left renal cyst noted. Upper abdomen is otherwise unremarkable.  Bones and soft tissues: Multiple mild compression deformities within the thoracic spine    IMPRESSION:  No pulmonary embolus.  Multifocal infection as delineated above. Follow-up recommended to ensure resolution.          ECHO  FINDINGS:    < from: Transthoracic Echocardiogram (17 @ 11:56) >  Patient name: RICKEY SPRINGER  YOB: 1932   Age: 85 (M)   MR#: [id number]  Study Date: 2017  Location: O/PSonographer: Michelle Jerez RDCS  Study quality: Technically good  Referring Physician: BARBARA YODER MD  Blood Pressure: 122/94 mmHg  Height: 168 cm  Weight: 70 kg  BSA: 1.8 m2  ------------------------------------------------------------------------  PROCEDURE: Transthoracic echocardiogram with 2-D, M-Mode  and complete spectral and color flow Doppler.  INDICATION: Presence of prosthetic heart valve (Z95.2)  ------------------------------------------------------------------------  Dimensions:    Normal Values:  LA:     5.0    2.0 - 4.0 cm  Ao:     3.8    2.0 - 3.8 cm  SEPTUM: 1.1    0.6 - 1.2 cm  PWT:    1.1    0.6 -1.1 cm  LVIDd:  3.8    3.0 - 5.6 cm  LVIDs:  2.5    1.8 - 4.0 cm  Derived variables:  LVMI: 75 g/m2  RWT: 0.57  Fractional short: 34 %  Doppler Peak Velocity (m/sec): AoV=1.9  ------------------------------------------------------------------------  Observations:  Mitral Valve: Mitral annular calcification, otherwise  normal mitral valve. Mild mitral regurgitation.  Aortic Valve/Aorta: Transcatheter aortic valve replacement.  Peak transaortic valve gradient equals 14 mm Hg, mean  transaortic valve gradient equals 9 mm Hg, which is  probably normal in the presence of a transcatheter aortic  valve replacement. Mild paravalvular aortic regurgitation.  Peak left ventricular outflow tract gradient equals 3 mm  Hg, mean gradient is equal to 2 mm Hg, LVOT velocity time  integral equals 13 cm.  Aortic Root: 3.8 cm.  LVOT diameter: 2.1 cm.  Left Atrium: Moderately dilated left atrium.  LA volume  index = 46 cc/m2.  Left Ventricle: Normal left ventricular systolic function.  No segmental wall motion abnormalities. Increased relative  wall thickness with normal left ventricular mass index,  consistent with concentric left ventricular remodeling.  Right Heart: Normal right atrium. Normal right ventricular  size and function. Normal tricuspid valve. Mild tricuspid  regurgitation. Normal pulmonic valve. Minimal pulmonic  regurgitation.  Pericardium/Pleura: Normal pericardium with no pericardial  effusion.  Right pleural effusion.  Hemodynamic: Estimated right atrial pressure is 8 mm Hg.  Estimated right ventricular systolic pressure equals 35 mm  Hg, assuming right atrial pressure equals 8 mm Hg,  consistent with borderline pulmonary hypertension.  ------------------------------------------------------------------------  Conclusions:  1. Transcatheter aortic valve replacement. Peak transaortic  valve gradient equals 14 mm Hg, mean transaortic valve  gradient equals 9 mm Hg, which is probably normal in the  presence of a transcatheter aortic valve replacement. Mild  paravalvular aortic regurgitation.  2. Increased relative wall thickness with normal left  ventricular mass index, consistent with concentric left  ventricular remodeling.  3. Normal left ventricular systolic function. No segmental  wall motion abnormalities.  ------------------------------------------------------------------------  Confirmed on  2017 - 15:46:52 by Rolan Car M.D.  ------------------------------------------------------------------------

## 2021-10-20 NOTE — CONSULT NOTE ADULT - PROBLEM SELECTOR RECOMMENDATION 4
- Per speech and swallow eval patient cleared for dysphagia 1 diet with pureed foods and nectar thick liquids  - Per SO/HCP, dysphagia began after R frontal stroke in 5/2021 and patient has been on pureed diet since then - Per speech and swallow eval patient cleared for dysphagia 1 diet with pureed foods and nectar thick liquids  - Per HCP, dysphagia began after R frontal stroke in 5/2021 and patient has been on pureed diet since then

## 2021-10-20 NOTE — CONSULT NOTE ADULT - SUBJECTIVE AND OBJECTIVE BOX
HPI:  88 yo M PMHx BPH, AFib not on AC due to ICH, AS s/p TAVR 2017, CAD, Dementia, DM, hypothyroidism, HTN, Dysphagia, presents from [facility] for concern for pneumonia. Pt unable to provide a history. Per paper work from SNF, pt c/o SOB and cough. Per conversation with long-term partner and HCP, Brandi Mcguire, patient had been living at assisted living facility, [facility], until he was hospitalized in May for not eating. He was diagnosed with a small R frontal stroke attributed to atrial fibrillation, and noted to have dysphagia. He was discharged to [facility] rehab, which was transitioned to full-time, long-term care -3. Ms. Mcguire notes that he has been slowly losing his memory but became significantly more confused since July, crying, asking for his mother, not understanding where he was. She also notes that he has been coughing for a couple weeks with productive cough, and was told by the staff at [facility] that he had an episode of emesis prior to becoming short of breath and exacerbation of coughing. He has not required oxygen prior to this hospitalization, to her knowledge.   (19 Oct 2021 18:25)    PERTINENT PM/SXH:   BPH (benign prostatic hypertrophy)    HTN - Hypertension    Hyperlipidemia    Congenital heart defect    Diabetes mellitus    Arthropathy associated with bacterial disease, shoulder region    Arthritis, shoulder region    Torn rotator cuff    Spinal stenosis of lumbar region    Atrial fibrillation    Nonrheumatic aortic valve stenosis    Dementia    Aortic stenosis    Glaucoma    OAB (overactive bladder)    Arthritis      S/P TURP (status post transurethral resection of prostate)    Hx of appendectomy    S/P lumbar laminectomy    S/P cataract surgery    History of prior ablation treatment      FAMILY HISTORY:  No pertinent family history in first degree relatives      ITEMS NOT CHECKED ARE NOT PRESENT    SOCIAL HISTORY:   Significant other/partner[X ]  Children[ ]  Protestant/Spirituality: Not Mosque  Substance hx:  [ ]   Tobacco hx:  [ ]   Alcohol hx: [ ]   Home Opioid hx:  [ ] I-Stop Reference No:  Living Situation: [ ]Home  [X ]Long term care  [ ]Rehab [ ]Other    ADVANCE DIRECTIVES:    DNR  MOLST  [X ]  Living Will  [ ]   DECISION MAKER(s):  [X ] Health Care Proxy(s)  [ ] Surrogate(s)  [ ] Guardian           Name(s): Phone Number(s):  Brandi Mcguire H: [phone] C: [phone]    BASELINE (I)ADL(s) (prior to admission):  [place]: [ ]Total  [ ] Moderate [X ]Dependent  Unable to walk, able to sit up.     Allergies    No Known Allergies    Intolerances    MEDICATIONS  (STANDING):  aspirin  chewable 81 milliGRAM(s) Oral daily  atorvastatin 80 milliGRAM(s) Oral at bedtime  dextrose 40% Gel 15 Gram(s) Oral once  dextrose 5%. 1000 milliLiter(s) (50 mL/Hr) IV Continuous <Continuous>  dextrose 5%. 1000 milliLiter(s) (100 mL/Hr) IV Continuous <Continuous>  dextrose 50% Injectable 25 Gram(s) IV Push once  dextrose 50% Injectable 12.5 Gram(s) IV Push once  dextrose 50% Injectable 25 Gram(s) IV Push once  digoxin  Injectable 250 MICROGram(s) IV Push once  digoxin  Injectable 250 MICROGram(s) IV Push once  glucagon  Injectable 1 milliGRAM(s) IntraMuscular once  heparin   Injectable 5000 Unit(s) SubCutaneous every 12 hours  insulin lispro (ADMELOG) corrective regimen sliding scale   SubCutaneous three times a day before meals  insulin lispro (ADMELOG) corrective regimen sliding scale   SubCutaneous at bedtime  latanoprost 0.005% Ophthalmic Solution 1 Drop(s) Both EYES at bedtime  levothyroxine Injectable 40 MICROGram(s) IV Push at bedtime  metoprolol tartrate Injectable 5 milliGRAM(s) IV Push every 6 hours  PARoxetine 20 milliGRAM(s) Oral daily  piperacillin/tazobactam IVPB.. 3.375 Gram(s) IV Intermittent every 8 hours  senna 2 Tablet(s) Oral at bedtime    MEDICATIONS  (PRN):  levalbuterol Inhalation 0.63 milliGRAM(s) Inhalation every 6 hours PRN SOB    PRESENT SYMPTOMS: [ ]Unable to obtain due to poor mentation   Source if other than patient:  [ ]Family   [ ]Team     Pain: [ ]yes [X ]no  QOL impact -   Location -                    Aggravating factors -  Quality -  Radiation -  Timing-  Severity (0-10 scale):  Minimal acceptable level (0-10 scale):     CPOT:    https://www.[facility].org/getattachment/[email or web address]/Critical-Care-Pain-Observation-Tool-(CPOT)      PAIN AD Score:     http://[email or web address].pdf (press ctrl +  left click to view)    Dyspnea:                           [ ]Mild [ ]Moderate [ ]Severe  Anxiety:                             [ ]Mild [ ]Moderate [ ]Severe  Fatigue:                             [ ]Mild [ ]Moderate [ ]Severe  Nausea:                             [ ]Mild [ ]Moderate [ ]Severe  Loss of appetite:              [ ]Mild [ ]Moderate [ ]Severe  Constipation:                    [ ]Mild [ ]Moderate [ ]Severe    Other Symptoms:  [ ]All other review of systems negative     Palliative Performance Status Version 2:         %    http://Psychiatric.org/files/news/palliative_performance_scale_ppsv2.pdf  PHYSICAL EXAM:  Vital Signs Last 24 Hrs  T(C): 36.9 (20 Oct 2021 12:00), Max: 39 (19 Oct 2021 14:10)  T(F): 98.5 (20 Oct 2021 12:00), Max: 102.2 (19 Oct 2021 14:10)  HR: 115 (20 Oct 2021 12:00) (115 - 170)  BP: 126/80 (20 Oct 2021 12:00) (97/67 - 149/92)  BP(mean): 104 (19 Oct 2021 18:25) (104 - 104)  RR: 18 (20 Oct 2021 12:00) (17 - 30)  SpO2: 98% (20 Oct 2021 12:00) (95% - 100%) I&O's Summary    GENERAL:  [X ]Alert  [ ]Oriented x   [ ]Lethargic  [ ]Cachexia  [ ]Unarousable  [X ]Verbal  [ ]Non-Verbal [x] Patient asks "  Behavioral:   [ ] Anxiety  [ ] Delirium [X ] Agitation [ ] Other  HEENT:  [X ]Normal   [ ]Dry mouth   [ ]ET Tube/Trach  [ ]Oral lesions  PULMONARY:   [ ]Clear [ ]Tachypnea  [X ]Audible excessive secretions, productive cough   [ ]Rhonchi        [ ]Right [ ]Left [ ]Bilateral  [ ]Crackles        [ ]Right [ ]Left [ ]Bilateral  [ ]Wheezing     [ ]Right [ ]Left [ ]Bilateral  [ ]Diminished breath sounds [ ]right [ ]left [ ]bilateral  CARDIOVASCULAR:    [x] well perfused  [ ]Regular [ ]Irregular [ ]Tachy  [ ]Juaquin [ ]Murmur [ ]Other  GASTROINTESTINAL:  [x] decreased appetite  [ ]Soft  [ ]Distended   [ ]+BS  [ ]Non tender [ ]Tender  [ ]PEG [ ]OGT/ NGT  Last BM:   GENITOURINARY:  [ ]Normal [ ] Incontinent   [ ]Oliguria/Anuria   [ ]Green  MUSCULOSKELETAL:   [ ]Normal   [ ]Weakness  [ ]Bed/Wheelchair bound [ ]Edema  NEUROLOGIC:   [ ]No focal deficits  [ ]Cognitive impairment  [ ]Dysphagia [ ]Dysarthria [ ]Paresis [ ]Other   SKIN:   [ ]Normal    [ ]Rash  [ ]Pressure ulcer(s)       Present on admission [ ]y [ ]n    CRITICAL CARE:  [ ] Shock Present  [ ]Septic [ ]Cardiogenic [ ]Neurologic [ ]Hypovolemic  [ ]  Vasopressors [ ]  Inotropes   [ ]Respiratory failure present [ ]Mechanical ventilation [ ]Non-invasive ventilatory support [ ]High flow    [ ]Acute  [ ]Chronic [ ]Hypoxic  [ ]Hypercarbic [ ]Other  [ ]Other organ failure     LABS:                        15.3   8.89  )-----------( 184      ( 19 Oct 2021 14:31 )             45.0   10-20    137  |  102  |  24<H>  ----------------------------<  170<H>  4.3   |  20<L>  |  0.70    Ca    9.0      20 Oct 2021 06:24  Phos  2.7     10-20  Mg     1.60     10-20    TPro  6.4  /  Alb  3.7  /  TBili  2.6<H>  /  DBili  x   /  AST  16  /  ALT  11  /  AlkPhos  60  10-20  PT/INR - ( 19 Oct 2021 14:31 )   PT: 14.5 sec;   INR: 1.28 ratio         PTT - ( 19 Oct 2021 14:31 )  PTT:31.2 sec    Urinalysis Basic - ( 19 Oct 2021 15:19 )    Color: Light Yellow / Appearance: Clear / S.012 / pH: x  Gluc: x / Ketone: Negative  / Bili: Negative / Urobili: <2 mg/dL   Blood: x / Protein: Negative / Nitrite: Negative   Leuk Esterase: Negative / RBC: x / WBC x   Sq Epi: x / Non Sq Epi: x / Bacteria: x      RADIOLOGY & ADDITIONAL STUDIES:    PROTEIN CALORIE MALNUTRITION PRESENT: [ ]mild [ ]moderate [ ]severe [ ]underweight [ ]morbid obesity  https://www.andeal.org/vault/3450/web/files/ONC/Table_Clinical%20Characteristics%20to%20Document%20Malnutrition-White%20JV%20et%20al%2020.pdf    Height (cm): 165.1 (10-19-21 @ 13:37), 165.1 (21 @ 21:06), 165.1 (21 @ 02:34)  Weight (kg): 56.6 (21 @ 21:06), 58.8 (21 @ :34)  BMI (kg/m2): 20.8 (10-19-21 @ 13:37), 20.8 (21 @ 21:06), 21.6 (21 @ 02:34)    [ ]PPSV2 < or = to 30% [ ]significant weight loss  [ ]poor nutritional intake  [ ]anasarca      [ ]Artificial Nutrition      REFERRALS:   [ ]Chaplaincy  [ ]Hospice  [ ]Child Life  [ ]Social Work  [ ]Case management [ ]Holistic Therapy     Goals of Care Document:  HPI:  88 yo M PMHx BPH, AFib not on AC due to ICH, AS s/p TAVR 2017, CAD, Dementia, DM, hypothyroidism, HTN, Dysphagia, presents from [facility] for concern for pneumonia. Pt unable to provide a history. Per paper work from SNF, pt c/o SOB and cough. Per conversation with long-term partner and HCP, Brandi Mcguire, patient had been living at assisted living facility, [facility], until he was hospitalized in May for not eating. He was diagnosed with a small R frontal stroke attributed to atrial fibrillation, and noted to have dysphagia. He was discharged to [facility] rehab, which was transitioned to full-time, long-term care -3. Ms. Mcguire notes that he has been slowly losing his memory but became significantly more confused since July, crying, asking for his mother, not understanding where he was. She also notes that he has been coughing for a couple weeks with productive cough, and was told by the staff at [facility] that he had an episode of emesis prior to becoming short of breath and exacerbation of coughing. He has not required oxygen prior to this hospitalization, to her knowledge.   (19 Oct 2021 18:25)    PERTINENT PM/SXH:   BPH (benign prostatic hypertrophy)    HTN - Hypertension    Hyperlipidemia    Congenital heart defect    Diabetes mellitus    Arthropathy associated with bacterial disease, shoulder region    Arthritis, shoulder region    Torn rotator cuff    Spinal stenosis of lumbar region    Atrial fibrillation    Nonrheumatic aortic valve stenosis    Dementia    Aortic stenosis    Glaucoma    OAB (overactive bladder)    Arthritis      S/P TURP (status post transurethral resection of prostate)    Hx of appendectomy    S/P lumbar laminectomy    S/P cataract surgery    History of prior ablation treatment      FAMILY HISTORY:  No pertinent family history in first degree relatives      ITEMS NOT CHECKED ARE NOT PRESENT    SOCIAL HISTORY:   Significant other/partner[X ]  Children[ ]  Evangelical/Spirituality: Not Hoahaoism  Substance hx:  [ ]   Tobacco hx:  [ ]   Alcohol hx: [ ]   Home Opioid hx:  [ ] I-Stop Reference No:  Living Situation: [ ]Home  [X ]Long term care  [ ]Rehab [ ]Other    ADVANCE DIRECTIVES:    DNR  MOLST  [ ]  Living Will  [ ]   DECISION MAKER(s):  [X ] Health Care Proxy(s)  [ ] Surrogate(s)  [ ] Guardian           Name(s): Phone Number(s):  Brandi Mcguire H: [phone] C: [phone]  Power of  Form in Alpha Tab under prior [facility] admission, form completed 3/29/2011    BASELINE (I)ADL(s) (prior to admission):  [place]: [ ]Total  [ ] Moderate [X ]Dependent  Unable to walk, able to sit up. Confused as to where he was    Allergies    No Known Allergies    Intolerances    MEDICATIONS  (STANDING):  aspirin  chewable 81 milliGRAM(s) Oral daily  atorvastatin 80 milliGRAM(s) Oral at bedtime  dextrose 40% Gel 15 Gram(s) Oral once  dextrose 5%. 1000 milliLiter(s) (50 mL/Hr) IV Continuous <Continuous>  dextrose 5%. 1000 milliLiter(s) (100 mL/Hr) IV Continuous <Continuous>  dextrose 50% Injectable 25 Gram(s) IV Push once  dextrose 50% Injectable 12.5 Gram(s) IV Push once  dextrose 50% Injectable 25 Gram(s) IV Push once  digoxin  Injectable 250 MICROGram(s) IV Push once  digoxin  Injectable 250 MICROGram(s) IV Push once  glucagon  Injectable 1 milliGRAM(s) IntraMuscular once  heparin   Injectable 5000 Unit(s) SubCutaneous every 12 hours  insulin lispro (ADMELOG) corrective regimen sliding scale   SubCutaneous three times a day before meals  insulin lispro (ADMELOG) corrective regimen sliding scale   SubCutaneous at bedtime  latanoprost 0.005% Ophthalmic Solution 1 Drop(s) Both EYES at bedtime  levothyroxine Injectable 40 MICROGram(s) IV Push at bedtime  metoprolol tartrate Injectable 5 milliGRAM(s) IV Push every 6 hours  PARoxetine 20 milliGRAM(s) Oral daily  piperacillin/tazobactam IVPB.. 3.375 Gram(s) IV Intermittent every 8 hours  senna 2 Tablet(s) Oral at bedtime    MEDICATIONS  (PRN):  levalbuterol Inhalation 0.63 milliGRAM(s) Inhalation every 6 hours PRN SOB    PRESENT SYMPTOMS: [ ]Unable to obtain due to poor mentation   Source if other than patient:  [ ]Family   [ ]Team     Pain: [ ]yes [X ]no  QOL impact -   Location -                    Aggravating factors -  Quality -  Radiation -  Timing-  Severity (0-10 scale):  Minimal acceptable level (0-10 scale):     CPOT:    https://www.[facility].org/getattachment/[email or web address]/Critical-Care-Pain-Observation-Tool-(CPOT)      PAIN AD Score:     http://[email or web address]/painad.pdf (press ctrl +  left click to view)    Dyspnea:                           [ ]Mild [ ]Moderate [ ]Severe  Anxiety:                             [ ]Mild [ X]Moderate [ ]Severe  Fatigue:                             [ ]Mild [ ]Moderate [ ]Severe  Nausea:                             [ ]Mild [ ]Moderate [ ]Severe  Loss of appetite:              [ ]Mild [ X]Moderate [ ]Severe  Constipation:                    [ ]Mild [ ]Moderate [ ]Severe  [X] Productive cough  Other Symptoms:  [ X]All other review of systems negative     Palliative Performance Status Version 2:   30-40      %    http://npcrc.org/files/news/palliative_performance_scale_ppsv2.pdf  PHYSICAL EXAM:  Vital Signs Last 24 Hrs  T(C): 36.9 (20 Oct 2021 12:00), Max: 39 (19 Oct 2021 14:10)  T(F): 98.5 (20 Oct 2021 12:00), Max: 102.2 (19 Oct 2021 14:10)  HR: 115 (20 Oct 2021 12:00) (115 - 170)  BP: 126/80 (20 Oct 2021 12:00) (97/67 - 149/92)  BP(mean): 104 (19 Oct 2021 18:25) (104 - 104)  RR: 18 (20 Oct 2021 12:00) (17 - 30)  SpO2: 98% (20 Oct 2021 12:00) (95% - 100%) I&O's Summary    GENERAL:  [X ]Alert  [ ]Oriented x   [ ]Lethargic  [ ]Cachexia  [ ]Unarousable  [X ]Verbal  [ ]Non-Verbal [X] Patient asks "what is happening to me," says "no mama"  Behavioral:   [ ] Anxiety  [ ] Delirium [X ] Agitation [ ] Other  HEENT:  [X ]Normal   [ ]Dry mouth   [ ]ET Tube/Trach  [ ]Oral lesions  PULMONARY:   [ ]Clear [ ]Tachypnea  [X ]Audible excessive secretions, productive cough   [ ]Rhonchi        [ ]Right [ ]Left [ ]Bilateral  [ ]Crackles        [ ]Right [ ]Left [ ]Bilateral  [ ]Wheezing     [ ]Right [ ]Left [ ]Bilateral  [ ]Diminished breath sounds [ ]right [ ]left [ ]bilateral  CARDIOVASCULAR:    [x] well perfused  [ ]Regular [ ]Irregular [ ]Tachy  [ ]Juaquin [ ]Murmur [ ]Other  GASTROINTESTINAL:  [x] decreased appetite  [ ]Soft  [ ]Distended   [ ]+BS  [ ]Non tender [ ]Tender  [ ]PEG [ ]OGT/ NGT  Last BM:   GENITOURINARY:  [ ]Normal [ ] Incontinent   [ ]Oliguria/Anuria   [ ]Green  MUSCULOSKELETAL:   [ ]Normal   [ ]Weakness  [ ]Bed/Wheelchair bound [ ]Edema  NEUROLOGIC:   [ ]No focal deficits  [ ]Cognitive impairment  [ ]Dysphagia [ ]Dysarthria [ ]Paresis [ ]Other   SKIN:   [ ]Normal    [ ]Rash  [ ]Pressure ulcer(s)       Present on admission [ ]y [ ]n    CRITICAL CARE:  [ ] Shock Present  [ ]Septic [ ]Cardiogenic [ ]Neurologic [ ]Hypovolemic  [ ]  Vasopressors [ ]  Inotropes   [ ]Respiratory failure present [ ]Mechanical ventilation [ ]Non-invasive ventilatory support [ ]High flow    [ ]Acute  [ ]Chronic [ ]Hypoxic  [ ]Hypercarbic [ ]Other  [ ]Other organ failure     LABS:                        15.3   8.89  )-----------( 184      ( 19 Oct 2021 14:31 )             45.0   10-20    137  |  102  |  24<H>  ----------------------------<  170<H>  4.3   |  20<L>  |  0.70    Ca    9.0      20 Oct 2021 06:24  Phos  2.7     10-20  Mg     1.60     10-20    TPro  6.4  /  Alb  3.7  /  TBili  2.6<H>  /  DBili  x   /  AST  16  /  ALT  11  /  AlkPhos  60  10-20  PT/INR - ( 19 Oct 2021 14:31 )   PT: 14.5 sec;   INR: 1.28 ratio         PTT - ( 19 Oct 2021 14:31 )  PTT:31.2 sec    Urinalysis Basic - ( 19 Oct 2021 15:19 )    Color: Light Yellow / Appearance: Clear / S.012 / pH: x  Gluc: x / Ketone: Negative  / Bili: Negative / Urobili: <2 mg/dL   Blood: x / Protein: Negative / Nitrite: Negative   Leuk Esterase: Negative / RBC: x / WBC x   Sq Epi: x / Non Sq Epi: x / Bacteria: x      RADIOLOGY & ADDITIONAL STUDIES:  < from: CT Angio Chest PE Protocol w/ IV Cont (10.19.21 @ 22:15) >  NTERPRETATION:  Reason for Exam: Shortness of breath. chest pain.    CTA of the chest was performed from the thoracic inlet to the level of the adrenal glands following IV contrast injection of  80 cc of Omnipaque 350. No immediate complications were reported.  MIP images were also created and reviewed.    Comparison: 2017    Tubes/Lines: None    Mediastinum and Heart: Aorta and pulmonary arteries are normal in size. Thyroid gland is unremarkable. No lymphadenopathy. No pericardial effusion. Post transcatheter aortic valve replacement.    Lungs, Pleura, and Airways: There is no pulmonary embolus. Multifocal peribronchovascular groundglass abnormality seen with multiple acinar nodules suggestive of infection with endobronchial spread. Numerous mucoid impacted bronchi are also noted.    Visualized Abdomen: Left renal cyst noted. Upper abdomen is otherwise unremarkable.    Bones and soft tissues: Multiple mild compression deformities within the thoracic spine    IMPRESSION:    No pulmonary embolus.    Multifocal infection as delineated above. Follow-up recommended to ensure resolution.    < end of copied text >      PROTEIN CALORIE MALNUTRITION PRESENT: [ ]mild [ ]moderate [ ]severe [ ]underweight [ ]morbid obesity  https://www.andeal.org/vault/2440/web/files/ONC/Table_Clinical%20Characteristics%20to%20Document%20Malnutrition-White%20JV%20et%20al%2020.pdf    Height (cm): 165.1 (10-19-21 @ 13:37), 165.1 (21:06), 165.1 (21 02:34)  Weight (kg): 56.6 (21 @ 21:06), 58.8 (21:34)  BMI (kg/m2): 20.8 (10-19-21 @ 13:37), 20.8 (21 @ 21:06), 21.6 (04-25-21 @ 02:34)    [ ]PPSV2 < or = to 30% [ ]significant weight loss  [ ]poor nutritional intake  [ ]anasarca      [ ]Artificial Nutrition      REFERRALS:   [ ]Chaplaincy  [ ]Hospice  [ ]Child Life  [ ]Social Work  [ ]Case management [ ]Holistic Therapy     Goals of Care Document:  HPI:  89 yo M PMHx BPH, AFib not on AC due to ICH, AS s/p TAVR 2017, CAD, Dementia, DM, hypothyroidism, HTN, Dysphagia, presents from [facility] for concern for pneumonia. Pt unable to provide a history. Per paper work from SNF, pt c/o SOB and cough. Per conversation with long-term partner and HCP, Brandi Helderbebeto, patient had been living at assisted living facility, [facility], until he was hospitalized in May for not eating. He was diagnosed with a small R frontal stroke attributed to atrial fibrillation, and noted to have dysphagia. He was discharged to [facility] rehab, which was transitioned to full-time, long-term care -3. Ms. Mcguire notes that he has been slowly losing his memory but became significantly more confused since July, crying, asking for his mother, not understanding where he was. She also notes that he has been coughing for a couple weeks with productive cough, and was told by the staff at [facility] that he had an episode of emesis prior to becoming short of breath and exacerbation of coughing. He has not required oxygen prior to this hospitalization, to her knowledge.   (19 Oct 2021 18:25)    Palliative consulted for further discussion of GOC. Evaluated at bedside. Patient lying in bed, confused, asking "what is happening to me?" Anxious, but easily redirected. Denies appetite at this time, currently on dysphagia 1 pureed diet with nectar thick liquids. Per partner, Brandi Mcguire, baseline can possibly stand up but unable to walk, confused at nursing home, does not require oxygen. Started on Zosyn for pneumonia.     Contacts: HCP confirmed with power of  form on Alpha tab, signed 2011, Brandi Mcguire H [phone] C: [phone]    PERTINENT PM/SXH:   BPH (benign prostatic hypertrophy)    HTN - Hypertension    Hyperlipidemia    Congenital heart defect    Diabetes mellitus    Arthropathy associated with bacterial disease, shoulder region    Arthritis, shoulder region    Torn rotator cuff    Spinal stenosis of lumbar region    Atrial fibrillation    Nonrheumatic aortic valve stenosis    Dementia    Aortic stenosis    Glaucoma    OAB (overactive bladder)    Arthritis      S/P TURP (status post transurethral resection of prostate)    Hx of appendectomy    S/P lumbar laminectomy    S/P cataract surgery    History of prior ablation treatment      FAMILY HISTORY:  No pertinent family history in first degree relatives      ITEMS NOT CHECKED ARE NOT PRESENT    SOCIAL HISTORY:   Significant other/partner[X ]  Children[ ]  Mandaeism/Spirituality: Not Anabaptist  Substance hx:  [ ]   Tobacco hx:  [ ]   Alcohol hx: [ ]   Home Opioid hx:  [ ] I-Stop Reference No:  Living Situation: [ ]Home  [X ]Long term care  [ ]Rehab [ ]Other    ADVANCE DIRECTIVES:    DNR  MOLST  [ ]  Living Will  [ ]   DECISION MAKER(s):  [X ] Health Care Proxy(s)  [ ] Surrogate(s)  [ ] Guardian           Name(s): Phone Number(s):  Brandi Mcguire H: [phone] C: [phone]  Power of  Form in Alpha Tab under prior J admission, form completed 3/29/2011    BASELINE (I)ADL(s) (prior to admission):  [place]: [ ]Total  [ ] Moderate [X ]Dependent  Unable to walk, able to sit up. Confused as to where he was    Allergies    No Known Allergies    Intolerances    MEDICATIONS  (STANDING):  aspirin  chewable 81 milliGRAM(s) Oral daily  atorvastatin 80 milliGRAM(s) Oral at bedtime  dextrose 40% Gel 15 Gram(s) Oral once  dextrose 5%. 1000 milliLiter(s) (50 mL/Hr) IV Continuous <Continuous>  dextrose 5%. 1000 milliLiter(s) (100 mL/Hr) IV Continuous <Continuous>  dextrose 50% Injectable 25 Gram(s) IV Push once  dextrose 50% Injectable 12.5 Gram(s) IV Push once  dextrose 50% Injectable 25 Gram(s) IV Push once  digoxin  Injectable 250 MICROGram(s) IV Push once  digoxin  Injectable 250 MICROGram(s) IV Push once  glucagon  Injectable 1 milliGRAM(s) IntraMuscular once  heparin   Injectable 5000 Unit(s) SubCutaneous every 12 hours  insulin lispro (ADMELOG) corrective regimen sliding scale   SubCutaneous three times a day before meals  insulin lispro (ADMELOG) corrective regimen sliding scale   SubCutaneous at bedtime  latanoprost 0.005% Ophthalmic Solution 1 Drop(s) Both EYES at bedtime  levothyroxine Injectable 40 MICROGram(s) IV Push at bedtime  metoprolol tartrate Injectable 5 milliGRAM(s) IV Push every 6 hours  PARoxetine 20 milliGRAM(s) Oral daily  piperacillin/tazobactam IVPB.. 3.375 Gram(s) IV Intermittent every 8 hours  senna 2 Tablet(s) Oral at bedtime    MEDICATIONS  (PRN):  levalbuterol Inhalation 0.63 milliGRAM(s) Inhalation every 6 hours PRN SOB    PRESENT SYMPTOMS: [ ]Unable to obtain due to poor mentation   Source if other than patient:  [ ]Family   [ ]Team     Pain: [ ]yes [X ]no  QOL impact -   Location -                    Aggravating factors -  Quality -  Radiation -  Timing-  Severity (0-10 scale):  Minimal acceptable level (0-10 scale):     CPOT:    https://www.[facility].org/getattachment/[email or web address]/Critical-Care-Pain-Observation-Tool-(CPOT)      PAIN AD Score:     [email or web address]/[email or web address] (press ctrl +  left click to view)    Dyspnea:                           [ ]Mild [ ]Moderate [ ]Severe  Anxiety:                             [ ]Mild [ X]Moderate [ ]Severe  Fatigue:                             [ ]Mild [ ]Moderate [ ]Severe  Nausea:                             [ ]Mild [ ]Moderate [ ]Severe  Loss of appetite:              [ ]Mild [ X]Moderate [ ]Severe  Constipation:                    [ ]Mild [ ]Moderate [ ]Severe  [X] Productive cough  Other Symptoms:  [ X]All other review of systems negative     Palliative Performance Status Version 2:   40      %    http://[facility].org/files/news/palliative_performance_scale_[email or web address].pdf  PHYSICAL EXAM:  Vital Signs Last 24 Hrs  T(C): 36.9 (20 Oct 2021 12:00), Max: 39 (19 Oct 2021 14:10)  T(F): 98.5 (20 Oct 2021 12:00), Max: 102.2 (19 Oct 2021 14:10)  HR: 115 (20 Oct 2021 12:00) (115 - 170)  BP: 126/80 (20 Oct 2021 12:00) (97/67 - 149/92)  BP(mean): 104 (19 Oct 2021 18:25) (104 - 104)  RR: 18 (20 Oct 2021 12:00) (17 - 30)  SpO2: 98% (20 Oct 2021 12:00) (95% - 100%) I&O's Summary    GENERAL:  [X ]Alert  [ ]Oriented x   [ ]Lethargic  [ ]Cachexia  [ ]Unarousable  [X ]Verbal  [ ]Non-Verbal [X] Patient asks "what is happening to me," says "no mama"  Behavioral:   [ ] Anxiety  [ ] Delirium [X ] Agitation [ ] Other  HEENT:  [X ]Normal   [ ]Dry mouth   [ ]ET Tube/Trach  [ ]Oral lesions  PULMONARY:   [ ]Clear [ ]Tachypnea  [X ]Audible excessive secretions, productive cough   [ ]Rhonchi        [ ]Right [ ]Left [ ]Bilateral  [ ]Crackles        [ ]Right [ ]Left [ ]Bilateral  [ ]Wheezing     [ ]Right [ ]Left [ ]Bilateral  [ ]Diminished breath sounds [ ]right [ ]left [ ]bilateral  CARDIOVASCULAR:    [x] well perfused  [ ]Regular [ ]Irregular [ ]Tachy  [ ]Juaquin [ ]Murmur [ ]Other  GASTROINTESTINAL:  [x] decreased appetite  [ ]Soft  [ ]Distended   [ ]+BS  [ ]Non tender [ ]Tender  [ ]PEG [ ]OGT/ NGT  Last BM:   GENITOURINARY:  [ ]Normal [ ] Incontinent   [ ]Oliguria/Anuria   [ ]Green  MUSCULOSKELETAL:   [ ]Normal   [ ]Weakness  [ ]Bed/Wheelchair bound [ ]Edema  NEUROLOGIC:   [ ]No focal deficits  [ ]Cognitive impairment  [ ]Dysphagia [ ]Dysarthria [ ]Paresis [ ]Other   SKIN:   [ ]Normal    [ ]Rash  [ ]Pressure ulcer(s)       Present on admission [ ]y [ ]n    CRITICAL CARE:  [ ] Shock Present  [ ]Septic [ ]Cardiogenic [ ]Neurologic [ ]Hypovolemic  [ ]  Vasopressors [ ]  Inotropes   [ ]Respiratory failure present [ ]Mechanical ventilation [ ]Non-invasive ventilatory support [ ]High flow    [ ]Acute  [ ]Chronic [ ]Hypoxic  [ ]Hypercarbic [ ]Other  [ ]Other organ failure     LABS:                        15.3   8.89  )-----------( 184      ( 19 Oct 2021 14:31 )             45.0   10-20    137  |  102  |  24<H>  ----------------------------<  170<H>  4.3   |  20<L>  |  0.70    Ca    9.0      20 Oct 2021 06:24  Phos  2.7     10-20  Mg     1.60     10-20    TPro  6.4  /  Alb  3.7  /  TBili  2.6<H>  /  DBili  x   /  AST  16  /  ALT  11  /  AlkPhos  60  10-20  PT/INR - ( 19 Oct 2021 14:31 )   PT: 14.5 sec;   INR: 1.28 ratio         PTT - ( 19 Oct 2021 14:31 )  PTT:31.2 sec    Urinalysis Basic - ( 19 Oct 2021 15:19 )    Color: Light Yellow / Appearance: Clear / S.012 / pH: x  Gluc: x / Ketone: Negative  / Bili: Negative / Urobili: <2 mg/dL   Blood: x / Protein: Negative / Nitrite: Negative   Leuk Esterase: Negative / RBC: x / WBC x   Sq Epi: x / Non Sq Epi: x / Bacteria: x      RADIOLOGY & ADDITIONAL STUDIES:  < from: CT Angio Chest PE Protocol w/ IV Cont (10.19.21 @ 22:15) >  NTERPRETATION:  Reason for Exam: Shortness of breath. chest pain.    CTA of the chest was performed from the thoracic inlet to the level of the adrenal glands following IV contrast injection of  80 cc of Omnipaque 350. No immediate complications were reported.  MIP images were also created and reviewed.    Comparison: 2017    Tubes/Lines: None    Mediastinum and Heart: Aorta and pulmonary arteries are normal in size. Thyroid gland is unremarkable. No lymphadenopathy. No pericardial effusion. Post transcatheter aortic valve replacement.    Lungs, Pleura, and Airways: There is no pulmonary embolus. Multifocal peribronchovascular groundglass abnormality seen with multiple acinar nodules suggestive of infection with endobronchial spread. Numerous mucoid impacted bronchi are also noted.    Visualized Abdomen: Left renal cyst noted. Upper abdomen is otherwise unremarkable.    Bones and soft tissues: Multiple mild compression deformities within the thoracic spine    IMPRESSION:    No pulmonary embolus.    Multifocal infection as delineated above. Follow-up recommended to ensure resolution.    < end of copied text >      PROTEIN CALORIE MALNUTRITION PRESENT: [ ]mild [ ]moderate [ ]severe [ ]underweight [ ]morbid obesity  https://www.andeal.org/vault/2440/web/files/ONC/Table_Clinical%20Characteristics%20to%20Document%20Malnutrition-White%20JV%20et%20al%2020.pdf    Height (cm): 165.1 (10-19-21 @ 13:37), 165.1 (21 @ 21:06), 165.1 (21 @ 02:34)  Weight (kg): 56.6 (21 @ 21:06), 58.8 (21 @ 02:34)  BMI (kg/m2): 20.8 (10-19-21 @ 13:37), 20.8 (21 @ 21:06), 21.6 (21 @ 02:34)    [ ]PPSV2 < or = to 30% [ ]significant weight loss  [ ]poor nutritional intake  [ ]anasarca      [ ]Artificial Nutrition      REFERRALS:   [ ]Chaplaincy  [ ]Hospice  [ ]Child Life  [ ]Social Work  [ ]Case management [ ]Holistic Therapy     Goals of Care Document:  HPI:  88 yo M PMHx BPH, AFib not on AC due to ICH, AS s/p TAVR 2017, CAD, Dementia, DM, hypothyroidism, HTN, Dysphagia, presents from [facility] for concern for pneumonia. Pt unable to provide a history. Per paper work from SNF, pt c/o SOB and cough. Per conversation with long-term partner and HCP, Brandi Mcguire, patient had been living at assisted living facility, [facility], until he was hospitalized in May for not eating. He was diagnosed with a small R frontal stroke attributed to atrial fibrillation, and noted to have dysphagia. He was discharged to [facility] rehab, which was transitioned to full-time, long-term care -3. Ms. Mcguire notes that he has been slowly losing his memory but became significantly more confused since July, crying, asking for his mother, not understanding where he was. She also notes that he has been coughing for a couple weeks with productive cough, and was told by the staff at [facility] that he had an episode of emesis prior to becoming short of breath and exacerbation of coughing. He has not required oxygen prior to this hospitalization, to her knowledge.    Palliative consulted for further discussion of GOC. Evaluated at bedside. Patient lying in bed, confused, asking "what is happening to me?" Anxious, but easily redirected. Denies appetite at this time, currently on dysphagia 1 pureed diet with nectar thick liquids. Per significant other and documented healthcare proxy, Brandi Mcguire, baseline can possibly stand up but unable to walk, confused at nursing home, does not require oxygen. Started on Zosyn for pneumonia.     Contacts: HCP confirmed with power of  form on Alpha tab, signed 2011, Brandi MOORE [phone] C: [phone]    PERTINENT PM/SXH:   BPH (benign prostatic hypertrophy)    HTN - Hypertension    Hyperlipidemia    Congenital heart defect    Diabetes mellitus    Arthropathy associated with bacterial disease, shoulder region    Arthritis, shoulder region    Torn rotator cuff    Spinal stenosis of lumbar region    Atrial fibrillation    Nonrheumatic aortic valve stenosis    Dementia    Aortic stenosis    Glaucoma    OAB (overactive bladder)    Arthritis      S/P TURP (status post transurethral resection of prostate)    Hx of appendectomy    S/P lumbar laminectomy    S/P cataract surgery    History of prior ablation treatment      FAMILY HISTORY:  No pertinent family history in first degree relatives      ITEMS NOT CHECKED ARE NOT PRESENT    SOCIAL HISTORY:   Significant other/partner[X ]  Children[ ]  Yazidi/Spirituality: Not Mandaeism  Substance hx:  [ ]   Tobacco hx:  [ ]   Alcohol hx: [ ]   Home Opioid hx:  [ ] I-Stop Reference No:  Living Situation: [ ]Home  [X ]Long term care  [ ]Rehab [ ]Other    ADVANCE DIRECTIVES:    DNR  MOLST  [ ]  Living Will  [ ]   DECISION MAKER(s):  [X ] Health Care Proxy(s)  [ ] Surrogate(s)  [ ] Guardian           Name(s): Phone Number(s):  Brandi Mcguire H: [phone] C: [phone]  Power of  Form in Alpha Tab under prior J admission, form completed 3/29/2011    BASELINE (I)ADL(s) (prior to admission):  [place]: [ ]Total  [ ] Moderate [X ]Dependent  Unable to walk, able to sit up. Confused as to where he was    Allergies    No Known Allergies    Intolerances    MEDICATIONS  (STANDING):  aspirin  chewable 81 milliGRAM(s) Oral daily  atorvastatin 80 milliGRAM(s) Oral at bedtime  dextrose 40% Gel 15 Gram(s) Oral once  dextrose 5%. 1000 milliLiter(s) (50 mL/Hr) IV Continuous <Continuous>  dextrose 5%. 1000 milliLiter(s) (100 mL/Hr) IV Continuous <Continuous>  dextrose 50% Injectable 25 Gram(s) IV Push once  dextrose 50% Injectable 12.5 Gram(s) IV Push once  dextrose 50% Injectable 25 Gram(s) IV Push once  digoxin  Injectable 250 MICROGram(s) IV Push once  digoxin  Injectable 250 MICROGram(s) IV Push once  glucagon  Injectable 1 milliGRAM(s) IntraMuscular once  heparin   Injectable 5000 Unit(s) SubCutaneous every 12 hours  insulin lispro (ADMELOG) corrective regimen sliding scale   SubCutaneous three times a day before meals  insulin lispro (ADMELOG) corrective regimen sliding scale   SubCutaneous at bedtime  latanoprost 0.005% Ophthalmic Solution 1 Drop(s) Both EYES at bedtime  levothyroxine Injectable 40 MICROGram(s) IV Push at bedtime  metoprolol tartrate Injectable 5 milliGRAM(s) IV Push every 6 hours  PARoxetine 20 milliGRAM(s) Oral daily  piperacillin/tazobactam IVPB.. 3.375 Gram(s) IV Intermittent every 8 hours  senna 2 Tablet(s) Oral at bedtime    MEDICATIONS  (PRN):  levalbuterol Inhalation 0.63 milliGRAM(s) Inhalation every 6 hours PRN SOB    PRESENT SYMPTOMS: [ ]Unable to obtain due to poor mentation   Source if other than patient:  [ ]Family   [ ]Team     Pain: [ ]yes [X ]no  QOL impact -   Location -                    Aggravating factors -  Quality -  Radiation -  Timing-  Severity (0-10 scale):  Minimal acceptable level (0-10 scale):     CPOT:    https://www.[facility].org/getattachment/[email or web address]/Critical-Care-Pain-Observation-Tool-(CPOT)      PAIN AD Score:     http://geriatrictoolkit.[email or web address]/cog/painad.pdf (press ctrl +  left click to view)    Dyspnea:                           [ ]Mild [ ]Moderate [ ]Severe  Anxiety:                             [ ]Mild [ X]Moderate [ ]Severe  Fatigue:                             [ ]Mild [ ]Moderate [ ]Severe  Nausea:                             [ ]Mild [ ]Moderate [ ]Severe  Loss of appetite:              [ ]Mild [ X]Moderate [ ]Severe  Constipation:                    [ ]Mild [ ]Moderate [ ]Severe  [X] Productive cough  Other Symptoms:  [ X]All other review of systems negative     Palliative Performance Status Version 2:   40      %    http://[facility]/files/news/palliative_performance_scale_ppsv2.pdf  PHYSICAL EXAM:  Vital Signs Last 24 Hrs  T(C): 36.9 (20 Oct 2021 12:00), Max: 39 (19 Oct 2021 14:10)  T(F): 98.5 (20 Oct 2021 12:00), Max: 102.2 (19 Oct 2021 14:10)  HR: 115 (20 Oct 2021 12:00) (115 - 170)  BP: 126/80 (20 Oct 2021 12:00) (97/67 - 149/92)  BP(mean): 104 (19 Oct 2021 18:25) (104 - 104)  RR: 18 (20 Oct 2021 12:00) (17 - 30)  SpO2: 98% (20 Oct 2021 12:00) (95% - 100%) I&O's Summary    GENERAL:  [X ]Alert  [ ]Oriented x   [ ]Lethargic  [ ]Cachexia  [ ]Unarousable  [X ]Verbal  [ ]Non-Verbal [X] Patient asks "what is happening to me," says "no mama"  Behavioral:   [ ] Anxiety  [ ] Delirium [X ] Agitation [ ] Other  HEENT:  [X ]Normal   [ ]Dry mouth   [ ]ET Tube/Trach  [ ]Oral lesions  PULMONARY:   [ ]Clear [ ]Tachypnea  [X ]Audible excessive secretions, productive cough   [ ]Rhonchi        [ ]Right [ ]Left [ ]Bilateral  [ ]Crackles        [ ]Right [ ]Left [ ]Bilateral  [ ]Wheezing     [ ]Right [ ]Left [ ]Bilateral  [ ]Diminished breath sounds [ ]right [ ]left [ ]bilateral  CARDIOVASCULAR:    [x] well perfused  [ ]Regular [ ]Irregular [ ]Tachy  [ ]Juaquin [ ]Murmur [ ]Other  GASTROINTESTINAL:  [x] decreased appetite  [ ]Soft  [ ]Distended   [ ]+BS  [ ]Non tender [ ]Tender  [ ]PEG [ ]OGT/ NGT  Last BM:   GENITOURINARY:  [ ]Normal [ ] Incontinent   [ ]Oliguria/Anuria   [ ]Green  MUSCULOSKELETAL:   [ ]Normal   [ ]Weakness  [ ]Bed/Wheelchair bound [ ]Edema  NEUROLOGIC:   [ ]No focal deficits  [ ]Cognitive impairment  [ ]Dysphagia [ ]Dysarthria [ ]Paresis [ ]Other   SKIN:   [ ]Normal    [ ]Rash  [ ]Pressure ulcer(s)       Present on admission [ ]y [ ]n    CRITICAL CARE:  [ ] Shock Present  [ ]Septic [ ]Cardiogenic [ ]Neurologic [ ]Hypovolemic  [ ]  Vasopressors [ ]  Inotropes   [ ]Respiratory failure present [ ]Mechanical ventilation [ ]Non-invasive ventilatory support [ ]High flow    [ ]Acute  [ ]Chronic [ ]Hypoxic  [ ]Hypercarbic [ ]Other  [ ]Other organ failure     LABS:                        15.3   8.89  )-----------( 184      ( 19 Oct 2021 14:31 )             45.0   10-20    137  |  102  |  24<H>  ----------------------------<  170<H>  4.3   |  20<L>  |  0.70    Ca    9.0      20 Oct 2021 06:24  Phos  2.7     10-20  Mg     1.60     10-20    TPro  6.4  /  Alb  3.7  /  TBili  2.6<H>  /  DBili  x   /  AST  16  /  ALT  11  /  AlkPhos  60  10-20  PT/INR - ( 19 Oct 2021 14:31 )   PT: 14.5 sec;   INR: 1.28 ratio         PTT - ( 19 Oct 2021 14:31 )  PTT:31.2 sec    Urinalysis Basic - ( 19 Oct 2021 15:19 )    Color: Light Yellow / Appearance: Clear / S.012 / pH: x  Gluc: x / Ketone: Negative  / Bili: Negative / Urobili: <2 mg/dL   Blood: x / Protein: Negative / Nitrite: Negative   Leuk Esterase: Negative / RBC: x / WBC x   Sq Epi: x / Non Sq Epi: x / Bacteria: x      RADIOLOGY & ADDITIONAL STUDIES:  < from: CT Angio Chest PE Protocol w/ IV Cont (10.19.21 @ 22:15) >  NTERPRETATION:  Reason for Exam: Shortness of breath. chest pain.    CTA of the chest was performed from the thoracic inlet to the level of the adrenal glands following IV contrast injection of  80 cc of Omnipaque 350. No immediate complications were reported.  MIP images were also created and reviewed.    Comparison: 2017    Tubes/Lines: None    Mediastinum and Heart: Aorta and pulmonary arteries are normal in size. Thyroid gland is unremarkable. No lymphadenopathy. No pericardial effusion. Post transcatheter aortic valve replacement.    Lungs, Pleura, and Airways: There is no pulmonary embolus. Multifocal peribronchovascular groundglass abnormality seen with multiple acinar nodules suggestive of infection with endobronchial spread. Numerous mucoid impacted bronchi are also noted.    Visualized Abdomen: Left renal cyst noted. Upper abdomen is otherwise unremarkable.    Bones and soft tissues: Multiple mild compression deformities within the thoracic spine    IMPRESSION:    No pulmonary embolus.    Multifocal infection as delineated above. Follow-up recommended to ensure resolution.    < end of copied text >      PROTEIN CALORIE MALNUTRITION PRESENT: [ ]mild [ ]moderate [ ]severe [ ]underweight [ ]morbid obesity  https://www.andeal.org/vault/2440/web/files/ONC/Table_Clinical%20Characteristics%20to%20Document%20Malnutrition-White%20JV%20et%20al%2020.pdf    Height (cm): 165.1 (10-19-21 @ 13:37), 165.1 (21 @ 21:06), 165.1 (21 @ :34)  Weight (kg): 56.6 (21 @ 21:06), 58.8 (21 @ 02:34)  BMI (kg/m2): 20.8 (10-19-21 @ 13:37), 20.8 (21 @ 21:06), 21.6 (21 @ 02:34)    [ ]PPSV2 < or = to 30% [ ]significant weight loss  [ ]poor nutritional intake  [ ]anasarca      [ ]Artificial Nutrition      REFERRALS:   [ ]Chaplaincy  [ ]Hospice  [ ]Child Life  [ ]Social Work  [ ]Case management [ ]Holistic Therapy     Goals of Care Document:  HPI:  89 yo M PMHx BPH, AFib not on AC due to ICH, AS s/p TAVR 2017, CAD, Dementia, DM, hypothyroidism, HTN, Dysphagia, presents from [facility] for concern for pneumonia. Pt unable to provide a history. Per paper work from SNF, pt c/o SOB and cough. Per conversation with long-term partner and HCP, Brandi Mcguire, patient had been living at assisted living facility, [facility], until he was hospitalized in May for not eating. He was diagnosed with a small R frontal stroke attributed to atrial fibrillation, and noted to have dysphagia. He was discharged to [facility] rehab, which was transitioned to full-time, long-term care -3. Ms. Mcguire notes that he has been slowly losing his memory but became significantly more confused since July, crying, asking for his mother, not understanding where he was. She also notes that he has been coughing for a couple weeks with productive cough, and was told by the staff at [facility] that he had an episode of emesis prior to becoming short of breath and exacerbation of coughing. He has not required oxygen prior to this hospitalization, to her knowledge.    Palliative consulted for further discussion of GOC. Evaluated at bedside. Patient lying in bed, confused, asking "what is happening to me?" Anxious, but easily redirected. Denies appetite at this time, currently on dysphagia 1 pureed diet with nectar thick liquids. Per significant other and documented healthcare proxy, Brandi Mcguire, baseline can possibly stand up but unable to walk, confused at nursing home, does not require oxygen. Started on Zosyn for pneumonia.     Contacts: HCP confirmed with power of  form on Alpha tab, signed 2011, Brandi MOORE [phone] C: [phone]    PERTINENT PM/SXH:   BPH (benign prostatic hypertrophy)    HTN - Hypertension    Hyperlipidemia    Congenital heart defect    Diabetes mellitus    Arthropathy associated with bacterial disease, shoulder region    Arthritis, shoulder region    Torn rotator cuff    Spinal stenosis of lumbar region    Atrial fibrillation    Nonrheumatic aortic valve stenosis    Dementia    Aortic stenosis    Glaucoma    OAB (overactive bladder)    Arthritis      S/P TURP (status post transurethral resection of prostate)    Hx of appendectomy    S/P lumbar laminectomy    S/P cataract surgery    History of prior ablation treatment      FAMILY HISTORY:  No pertinent family history in first degree relatives      ITEMS NOT CHECKED ARE NOT PRESENT    SOCIAL HISTORY:   Significant other/partner[X ]  Children[ ]  Taoist/Spirituality: Not Moravian  Substance hx:  [ ]   Tobacco hx:  [ ]   Alcohol hx: [ ]   Home Opioid hx:  [ ] I-Stop Reference No:  Living Situation: [ ]Home  [X ]Long term care  [ ]Rehab [ ]Other    ADVANCE DIRECTIVES:    DNR  MOLST  [ ]  Living Will  [ ]   DECISION MAKER(s):  [X ] Health Care Proxy(s)  [ ] Surrogate(s)  [ ] Guardian           Name(s): Phone Number(s):  Brandi Mcguire H: [phone] C: [phone]  Power of  Form in Alpha Tab under prior J admission, form completed 3/29/2011    BASELINE (I)ADL(s) (prior to admission):  [place]: [ ]Total  [ ] Moderate [X ]Dependent  Unable to walk, able to sit up. Confused as to where he was    Allergies    No Known Allergies    Intolerances    MEDICATIONS  (STANDING):  aspirin  chewable 81 milliGRAM(s) Oral daily  atorvastatin 80 milliGRAM(s) Oral at bedtime  dextrose 40% Gel 15 Gram(s) Oral once  dextrose 5%. 1000 milliLiter(s) (50 mL/Hr) IV Continuous <Continuous>  dextrose 5%. 1000 milliLiter(s) (100 mL/Hr) IV Continuous <Continuous>  dextrose 50% Injectable 25 Gram(s) IV Push once  dextrose 50% Injectable 12.5 Gram(s) IV Push once  dextrose 50% Injectable 25 Gram(s) IV Push once  digoxin  Injectable 250 MICROGram(s) IV Push once  digoxin  Injectable 250 MICROGram(s) IV Push once  glucagon  Injectable 1 milliGRAM(s) IntraMuscular once  heparin   Injectable 5000 Unit(s) SubCutaneous every 12 hours  insulin lispro (ADMELOG) corrective regimen sliding scale   SubCutaneous three times a day before meals  insulin lispro (ADMELOG) corrective regimen sliding scale   SubCutaneous at bedtime  latanoprost 0.005% Ophthalmic Solution 1 Drop(s) Both EYES at bedtime  levothyroxine Injectable 40 MICROGram(s) IV Push at bedtime  metoprolol tartrate Injectable 5 milliGRAM(s) IV Push every 6 hours  PARoxetine 20 milliGRAM(s) Oral daily  piperacillin/tazobactam IVPB.. 3.375 Gram(s) IV Intermittent every 8 hours  senna 2 Tablet(s) Oral at bedtime    MEDICATIONS  (PRN):  levalbuterol Inhalation 0.63 milliGRAM(s) Inhalation every 6 hours PRN SOB    PRESENT SYMPTOMS: [ ]Unable to obtain due to poor mentation   Source if other than patient:  [ ]Family   [ ]Team     Pain: [ ]yes [X ]no  QOL impact -   Location -                    Aggravating factors -  Quality -  Radiation -  Timing-  Severity (0-10 scale):  Minimal acceptable level (0-10 scale):     CPOT:    https://www.[facility].org/getattachment/[email or web address]/Critical-Care-Pain-Observation-Tool-(CPOT)      PAIN AD Score:     http://geriatrictoolkit.[facility]/cog/painad.pdf (press ctrl +  left click to view)    Dyspnea:                           [ ]Mild [ ]Moderate [ ]Severe  Anxiety:                             [ ]Mild [ X]Moderate [ ]Severe  Fatigue:                             [ ]Mild [ ]Moderate [ ]Severe  Nausea:                             [ ]Mild [ ]Moderate [ ]Severe  Loss of appetite:              [ ]Mild [ X]Moderate [ ]Severe  Constipation:                    [ ]Mild [ ]Moderate [ ]Severe  [X] Productive cough  Other Symptoms:  [ X]All other review of systems negative     Palliative Performance Status Version 2:   40      %    http://[facility].org/files/news/palliative_performance_scale_ppsv2.pdf    PHYSICAL EXAM:  Vital Signs Last 24 Hrs  T(C): 36.9 (20 Oct 2021 12:00), Max: 39 (19 Oct 2021 14:10)  T(F): 98.5 (20 Oct 2021 12:00), Max: 102.2 (19 Oct 2021 14:10)  HR: 115 (20 Oct 2021 12:00) (115 - 170)  BP: 126/80 (20 Oct 2021 12:00) (97/67 - 149/92)  BP(mean): 104 (19 Oct 2021 18:25) (104 - 104)  RR: 18 (20 Oct 2021 12:00) (17 - 30)  SpO2: 98% (20 Oct 2021 12:00) (95% - 100%) I&O's Summary    GENERAL:  [X ]Alert  [ ]Oriented x   [ ]Lethargic  [ ]Cachexia  [ ]Unarousable  [X ]Verbal  [ ]Non-Verbal [X] Patient asks "what is happening to me," says "no mama"  Behavioral:   [ ] Anxiety  [ ] Delirium [X ] Agitation [ ] Other  HEENT:  [X ]Normal   [ ]Dry mouth   [ ]ET Tube/Trach  [ ]Oral lesions  PULMONARY:   [ ]Clear [ ]Tachypnea  [X ]Audible excessive secretions, productive cough   [ ]Rhonchi        [ ]Right [ ]Left [ ]Bilateral  [ ]Crackles        [ ]Right [ ]Left [ ]Bilateral  [ ]Wheezing     [ ]Right [ ]Left [ ]Bilateral  [ ]Diminished breath sounds [ ]right [ ]left [ ]bilateral  CARDIOVASCULAR:    [x] well perfused  [ ]Regular [ ]Irregular [ ]Tachy  [ ]Juaquin [ ]Murmur [ ]Other  GASTROINTESTINAL:  [x] decreased appetite  [ ]Soft  [ ]Distended   [ ]+BS  [ ]Non tender [ ]Tender  [ ]PEG [ ]OGT/ NGT  Last BM:   GENITOURINARY:  [ ]Normal [ ] Incontinent   [ ]Oliguria/Anuria   [ ]Green  MUSCULOSKELETAL:   [ ]Normal   [ ]Weakness  [ ]Bed/Wheelchair bound [ ]Edema  NEUROLOGIC:   [ ]No focal deficits  [ ]Cognitive impairment  [ ]Dysphagia [ ]Dysarthria [ ]Paresis [ ]Other   SKIN:   [ ]Normal    [ ]Rash  [ ]Pressure ulcer(s)       Present on admission [ ]y [ ]n    CRITICAL CARE:  [ ] Shock Present  [ ]Septic [ ]Cardiogenic [ ]Neurologic [ ]Hypovolemic  [ ]  Vasopressors [ ]  Inotropes   [ ]Respiratory failure present [ ]Mechanical ventilation [ ]Non-invasive ventilatory support [ ]High flow    [ ]Acute  [ ]Chronic [ ]Hypoxic  [ ]Hypercarbic [ ]Other  [ ]Other organ failure     LABS:                        15.3   8.89  )-----------( 184      ( 19 Oct 2021 14:31 )             45.0   10-20    137  |  102  |  24<H>  ----------------------------<  170<H>  4.3   |  20<L>  |  0.70    Ca    9.0      20 Oct 2021 06:24  Phos  2.7     10-20  Mg     1.60     10-20    TPro  6.4  /  Alb  3.7  /  TBili  2.6<H>  /  DBili  x   /  AST  16  /  ALT  11  /  AlkPhos  60  10-20  PT/INR - ( 19 Oct 2021 14:31 )   PT: 14.5 sec;   INR: 1.28 ratio       PTT - ( 19 Oct 2021 14:31 )  PTT:31.2 sec    Urinalysis Basic - ( 19 Oct 2021 15:19 )    Color: Light Yellow / Appearance: Clear / S.012 / pH: x  Gluc: x / Ketone: Negative  / Bili: Negative / Urobili: <2 mg/dL   Blood: x / Protein: Negative / Nitrite: Negative   Leuk Esterase: Negative / RBC: x / WBC x   Sq Epi: x / Non Sq Epi: x / Bacteria: x    RADIOLOGY & ADDITIONAL STUDIES:  < from: CT Angio Chest PE Protocol w/ IV Cont (10.19.21 @ 22:15) >  IMPRESSION:  No pulmonary embolus.  Multifocal infection as delineated above. Follow-up recommended to ensure resolution.    PROTEIN CALORIE MALNUTRITION PRESENT: [ ]mild [ ]moderate [ ]severe [ ]underweight [ ]morbid obesity  https://www.andeal.org/vault/2440/web/files/ONC/Table_Clinical%20Characteristics%20to%20Document%20Malnutrition-White%20JV%20et%20al%2020.pdf    Height (cm): 165.1 (10-19-21 @ 13:37), 165.1 (21 @ 21:06), 165.1 (21 @ 02:34)  Weight (kg): 56.6 (21 @ 21:06), 58.8 (21 @ 02:34)  BMI (kg/m2): 20.8 (10-19-21 @ 13:37), 20.8 (21 @ 21:06), 21.6 (21 @ 02:34)    [ ]PPSV2 < or = to 30% [ ]significant weight loss  [ ]poor nutritional intake  [ ]anasarca      [ ]Artificial Nutrition      REFERRALS:   [ ]Chaplaincy  [ ]Hospice  [ ]Child Life  [ ]Social Work  [ ]Case management [ ]Holistic Therapy     Goals of Care Document:

## 2021-10-20 NOTE — SWALLOW BEDSIDE ASSESSMENT ADULT - COMMENTS
As per H&P, pt is a "88 yo M PMHx BPH, AFib not on AC due to ICH, AS s/p TAVR 2017, CAD, Dementia, DM, hypothyroidism, HTN, Dysphagia, presents from [facility] for concern for pneumonia. Pt unable to provide a history. Per paper work from SNF, pt c/o SOB and cough."    WBC is WFL. CT Angio Chest 10/19 reveaeled "1. No pulmonary embolus. 2. There are patchy and nodular opacities in the right upper and lower lobes and the left lower lobe. These are of unclear etiology. A primary consideration includes infection. 3. Please f/u official read in the AM."    Pt received awake/alert, however confusion noted, during initial clinical swallow assessment this AM. Pt identified name, however difficulty with location/time. Pt followed simple directions given max SLP verbal/visual prompting/cues. Pt with difficulty answering questions appropriately. Pt was cooperative and accepted all PO trials. Pt is receiving supplemental O2 via nasal cannula. Pt noted with baseline cough.

## 2021-10-20 NOTE — CONSULT NOTE ADULT - ASSESSMENT
89 yo M PMHx BPH, AFib not on AC due to ICH, AS s/p TAVR 2017, CAD, dementia, diabetes T2 hypothyroidism, HTN, dysphagia, presents from [facility] with shortness of breath, cough and hypoxia and concern for pneumonia. Started on Zosyn. Hospital course complicated by atrial fibrillation w/RVR (120's - 160's).  RRT called overnight. Given IV Lopressor with improvement of heart rate to 120's. Now on lopressor 5mg q 6 hours. Currently in no distress but confused.  Using 3L NC O2. Breathing unlabored.  Remains in AFib w/RVR to low 100's. Patient now DNR/DNI.  Palliative on board for [facility].       Echocardiogram 12/2017: Normal LV systolic function.   Plan:  Continue rate control with IV Lopressor q 6 hours.  Patient  88 yo M PMHx BPH, AFib not on AC due to ICH, AS s/p TAVR 2017, CAD, dementia, diabetes T2 hypothyroidism, HTN, dysphagia, presents from [facility] with shortness of breath, cough and hypoxia due to pneumonia with infiltrates on CT scan.  Started on Zosyn. Hospital course complicated by atrial fibrillation w/RVR (120's - 160's).  RRT called overnight. Given IV Lopressor with improvement of heart rate to 120's. Now on lopressor 5mg q 6 hours. Currently in no distress but confused.  Using 3L NC O2. Breathing unlabored.  Remains in AFib w/RVR to low 100's. Patient now DNR/DNI.  Palliative on board for [facility]OC.       Echocardiogram 12/2017: Normal LV systolic function.   Plan:  Continue rate control with IV Lopressor q 6 hours.  Patient with somewhat improved rates.           Dig load ordered for better rate control due to borderline BP.           No anticoagulation given history of ICH in 5/2021.            Palliative on board for GOC.  Now DNR/DNI.

## 2021-10-20 NOTE — CONSULT NOTE ADULT - PROBLEM SELECTOR RECOMMENDATION 3
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

## 2021-10-20 NOTE — CONSULT NOTE ADULT - SUBJECTIVE AND OBJECTIVE BOX
Cardiovascular Disease Initial Evaluation    CHIEF COMPLAINT: SOB    HISTORY OF PRESENT ILLNESS:  This is an 89 year old man with a-fib not on AC, aortic stenosis s/p TAVR 2017, CAD, and HTN who presented to [facility] on 10/19/2021 due to concern for PNA.  On admission, Mr. Painter was found to have enterovirus with hypoxia and elevated heart rates.  An RRT was called last night for tachycardia.  He was given IV Lopressor.  Currently he is in no distress, but is still tachycardic.  He cannot provide a history.    Allergies  No Known Allergies        MEDICATIONS:  aspirin enteric coated 81 milliGRAM(s) Oral daily  heparin   Injectable 5000 Unit(s) SubCutaneous every 12 hours  metoprolol tartrate Injectable 5 milliGRAM(s) IV Push every 6 hours    piperacillin/tazobactam IVPB.. 3.375 Gram(s) IV Intermittent every 8 hours    levalbuterol Inhalation 0.63 milliGRAM(s) Inhalation every 6 hours PRN    PARoxetine 20 milliGRAM(s) Oral daily    senna 2 Tablet(s) Oral at bedtime    atorvastatin 80 milliGRAM(s) Oral at bedtime  dextrose 40% Gel 15 Gram(s) Oral once  dextrose 50% Injectable 25 Gram(s) IV Push once  dextrose 50% Injectable 12.5 Gram(s) IV Push once  dextrose 50% Injectable 25 Gram(s) IV Push once  glucagon  Injectable 1 milliGRAM(s) IntraMuscular once  insulin lispro (ADMELOG) corrective regimen sliding scale   SubCutaneous three times a day before meals  insulin lispro (ADMELOG) corrective regimen sliding scale   SubCutaneous at bedtime  levothyroxine Injectable 40 MICROGram(s) IV Push at bedtime    dextrose 5%. 1000 milliLiter(s) IV Continuous <Continuous>  dextrose 5%. 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    S/P TURP (status post transurethral resection of prostate)  2008    Hx of appendectomy  age 17    S/P lumbar laminectomy  2013    S/P cataract surgery    History of prior ablation treatment  cardiac        FAMILY HISTORY:  No pertinent family history in first degree relatives        SOCIAL HISTORY:    The patient is a nonsmoker       REVIEW OF SYSTEMS:  See HPI, otherwise complete 14 point review of systems negative      PHYSICAL EXAM:  T(C): 37.2 (10-20-21 @ 06:05), Max: 39 (10-19-21 @ 14:10)  HR: 115 (10-20-21 @ 06:05) (99 - 170)  BP: 105/70 (10-20-21 @ 06:05) (97/67 - 149/92)  RR: 18 (10-20-21 @ 06:05) (17 - 30)  SpO2: 98% (10-20-21 @ 06:05) (95% - 100%)  Wt(kg): --  I&O's Summary      Appearance: No Acute Distress; resting comfortably  HEENT:  Normal oral mucosa, PERRL, EOMI	  Cardiovascular: Normal S1 S2, No JVD, No murmurs/rubs/gallops  Respiratory: Normal respiratory effort; Lungs clear to auscultation bilaterally  Gastrointestinal:  Soft, Non-tender, + BS	  Skin: No rashes, No ecchymoses, No cyanosis	  Neurologic: Non-focal; no weakness  Extremities: No clubbing, cyanosis or edema  Vascular: Peripheral pulses palpable 2+ bilaterally  Psychiatry:  Mood & affect appropriate    Laboratory Data:	 	    CBC Full  -  ( 19 Oct 2021 14:31 )  WBC Count : 8.89 K/uL  Hemoglobin : 15.3 g/dL  Hematocrit : 45.0 %  Platelet Count - Automated : 184 K/uL  Mean Cell Volume : 101.1 fL  Mean Cell Hemoglobin : 34.4 pg  Mean Cell Hemoglobin Concentration : 34.0 gm/dL  Auto Neutrophil # : 7.82 K/uL  Auto Lymphocyte # : 0.38 K/uL  Auto Monocyte # : 0.53 K/uL  Auto Eosinophil # : 0.00 K/uL  Auto Basophil # : 0.00 K/uL  Auto Neutrophil % : 78.5 %  Auto Lymphocyte % : 4.3 %  Auto Monocyte % : 6.0 %  Auto Eosinophil % : 0.0 %  Auto Basophil % : 0.0 %    10-20    137  |  102  |  24<H>  ----------------------------<  170<H>  4.3   |  20<L>  |  0.70  10-19    139  |  100  |  21  ----------------------------<  242<H>  3.4<L>   |  17<L>  |  0.80    Ca    9.0      20 Oct 2021 06:24  Ca    8.6      19 Oct 2021 18:22  Phos  2.7     10-20  Mg     1.60     10-20  Mg     1.70     10-19    TPro  6.4  /  Alb  3.7  /  TBili  2.6<H>  /  DBili  x   /  AST  16  /  ALT  11  /  AlkPhos  60  10-20  TPro  7.1  /  Alb  4.1  /  TBili  2.6<H>  /  DBili  x   /  AST  22  /  ALT  10  /  AlkPhos  64  10-19      proBNP: Serum Pro-Brain Natriuretic Peptide: 3412 pg/mL (10-19 @ 14:31)      Interpretation of Telemetry: 	    ECG:  	      Assessment: 89 year old man with a-fib, aortic stenosis s/p TAVR 2017, CAD, and HTN presents with hypoxic respiratory failure, severe sepsis and a-fib with RVR.     Plan of Care:    #Atrial Fibrillation-  Rapid rates in the setting of severe sepsis and a rising lactate.  I agree with IV Lopressor as ordered.   I would challenge Mr. Painter with a 500 mL bolus given his septic/hypoperfused state.   No anticoagulation given history of intracranial hemorrhage and continued cognitive decline.     #Aortic Stenosis-  S/P JOSIAH.  No significant murmur noted on exam.   Continue current cardiac management.     #Hypoxic respiratory failure-  Due to PNA with infiltrates seen on CT scan.  F/U official CT report.  Management as per the primary team.     Patient critically ill with severe sepsis and rising lactate.  I agree with a palliative care team evaluation.     52 minutes spent on total encounter; more than 50% of the visit was spent counseling and/or coordinating care by the attending physician.   	  Ricky Davila MD [facility]  Cardiovascular Diseases  [phone]     Cardiovascular Disease Initial Evaluation    CHIEF COMPLAINT: SOB    HISTORY OF PRESENT ILLNESS:  This is an 89 year old man with a-fib not on AC, aortic stenosis s/p TAVR 2017, CAD, and HTN who presented to [facility] on 10/19/2021 due to concern for PNA.  On admission, Mr. Painter was found to have enterovirus with hypoxia and elevated heart rates.  An RRT was called last night for tachycardia.  He was given IV Lopressor.  Currently he is in no distress, but is still tachycardic.  He is diaphoretic and cannot provide a history.    Allergies  No Known Allergies        MEDICATIONS:  aspirin enteric coated 81 milliGRAM(s) Oral daily  heparin   Injectable 5000 Unit(s) SubCutaneous every 12 hours  metoprolol tartrate Injectable 5 milliGRAM(s) IV Push every 6 hours    piperacillin/tazobactam IVPB.. 3.375 Gram(s) IV Intermittent every 8 hours    levalbuterol Inhalation 0.63 milliGRAM(s) Inhalation every 6 hours PRN    PARoxetine 20 milliGRAM(s) Oral daily    senna 2 Tablet(s) Oral at bedtime    atorvastatin 80 milliGRAM(s) Oral at bedtime  dextrose 40% Gel 15 Gram(s) Oral once  dextrose 50% Injectable 25 Gram(s) IV Push once  dextrose 50% Injectable 12.5 Gram(s) IV Push once  dextrose 50% Injectable 25 Gram(s) IV Push once  glucagon  Injectable 1 milliGRAM(s) IntraMuscular once  insulin lispro (ADMELOG) corrective regimen sliding scale   SubCutaneous three times a day before meals  insulin lispro (ADMELOG) corrective regimen sliding scale   SubCutaneous at bedtime  levothyroxine Injectable 40 MICROGram(s) IV Push at bedtime    dextrose 5%. 1000 milliLiter(s) IV Continuous <Continuous>  dextrose 5%. 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    S/P TURP (status post transurethral resection of prostate)  2008    Hx of appendectomy  age 17    S/P lumbar laminectomy  2013    S/P cataract surgery    History of prior ablation treatment  cardiac        FAMILY HISTORY:  No pertinent family history in first degree relatives        SOCIAL HISTORY:    The patient is a nonsmoker       REVIEW OF SYSTEMS:  See HPI, otherwise complete 14 point review of systems negative      PHYSICAL EXAM:  T(C): 37.2 (10-20-21 @ 06:05), Max: 39 (10-19-21 @ 14:10)  HR: 115 (10-20-21 @ 06:05) (99 - 170)  BP: 105/70 (10-20-21 @ 06:05) (97/67 - 149/92)  RR: 18 (10-20-21 @ 06:05) (17 - 30)  SpO2: 98% (10-20-21 @ 06:05) (95% - 100%)  Wt(kg): --  I&O's Summary      Appearance: No Acute Distress; resting comfortably  HEENT:  Normal oral mucosa, PERRL, EOMI	  Cardiovascular: Normal S1 S2, No JVD, No murmurs/rubs/gallops  Respiratory: Normal respiratory effort; Lungs clear to auscultation bilaterally  Gastrointestinal:  Soft, Non-tender, + BS	  Skin: No rashes, No ecchymoses, No cyanosis	  Neurologic: Non-focal; no weakness  Extremities: No clubbing, cyanosis or edema  Vascular: Peripheral pulses palpable 2+ bilaterally  Psychiatry:  Mood & affect appropriate    Laboratory Data:	 	    CBC Full  -  ( 19 Oct 2021 14:31 )  WBC Count : 8.89 K/uL  Hemoglobin : 15.3 g/dL  Hematocrit : 45.0 %  Platelet Count - Automated : 184 K/uL  Mean Cell Volume : 101.1 fL  Mean Cell Hemoglobin : 34.4 pg  Mean Cell Hemoglobin Concentration : 34.0 gm/dL  Auto Neutrophil # : 7.82 K/uL  Auto Lymphocyte # : 0.38 K/uL  Auto Monocyte # : 0.53 K/uL  Auto Eosinophil # : 0.00 K/uL  Auto Basophil # : 0.00 K/uL  Auto Neutrophil % : 78.5 %  Auto Lymphocyte % : 4.3 %  Auto Monocyte % : 6.0 %  Auto Eosinophil % : 0.0 %  Auto Basophil % : 0.0 %    10-20    137  |  102  |  24<H>  ----------------------------<  170<H>  4.3   |  20<L>  |  0.70  10-19    139  |  100  |  21  ----------------------------<  242<H>  3.4<L>   |  17<L>  |  0.80    Ca    9.0      20 Oct 2021 06:24  Ca    8.6      19 Oct 2021 18:22  Phos  2.7     10-20  Mg     1.60     10-20  Mg     1.70     10-19    TPro  6.4  /  Alb  3.7  /  TBili  2.6<H>  /  DBili  x   /  AST  16  /  ALT  11  /  AlkPhos  60  10-20  TPro  7.1  /  Alb  4.1  /  TBili  2.6<H>  /  DBili  x   /  AST  22  /  ALT  10  /  AlkPhos  64  10-19      proBNP: Serum Pro-Brain Natriuretic Peptide: 3412 pg/mL (10-19 @ 14:31)      Interpretation of Telemetry: A-fib 130s	    ECG:  	A-fib with RVR      Assessment: 89 year old man with a-fib, aortic stenosis s/p TAVR 2017, CAD, and HTN presents with hypoxic respiratory failure, severe sepsis and a-fib with RVR.     Plan of Care:    #Atrial Fibrillation-  Rapid rates in the setting of severe sepsis and a rising lactate.  I agree with IV Lopressor as ordered.   I will order a digoxin load given borderline BP.   Consider challenging Mr. Painter with a 500 mL bolus given his septic/hypoperfused state.   No anticoagulation given history of intracranial hemorrhage and continued cognitive decline.     #Aortic Stenosis-  S/P JOSIAH.  No significant murmur noted on exam.   Continue current cardiac management.     #Hypoxic respiratory failure-  Due to PNA with infiltrates seen on CT scan.  F/U official CT report.  Management as per the primary team.     Patient critically ill with severe sepsis, encephalopathy and rising lactate.  I agree with a palliative care team evaluation.     52 minutes spent on total encounter; more than 50% of the visit was spent counseling and/or coordinating care by the attending physician.   	  Ricky Davila MD [facility]  Cardiovascular Diseases  [phone]

## 2021-10-20 NOTE — CONSULT NOTE ADULT - ATTENDING COMMENTS
89 yo M PMHx BPH, AFib not on AC due to ICH, AS s/p TAVR 2017, CAD, dementia, diabetes T2 hypothyroidism, HTN, dysphagia, presents from [facility] with shortness of breath, cough and hypoxia due to pneumonia with infiltrates on CT scan.  Started on Zosyn. Hospital course complicated by atrial fibrillation w/RVR (120's - 160's).  RRT called overnight. Given IV Lopressor with improvement of heart rate to 120's. Now on lopressor 5mg q 6 hours. Currently in no distress but confused.  Using 3L NC O2. Breathing unlabored.  Remains in AFib w/RVR to low 100's. Patient now DNR/DNI.  Palliative on board for [facility]. No AC given history of ICH- team aware. Cont rate control.
Pt seen with Fellow and medical student  Agree with above plan    88yo M with dementia, dysphagia now presents with pna  Already DNR/DNI, recommended dysphagia diet by SLP  HCP is partner as documented above  Consulted for GOC, ongoing

## 2021-10-20 NOTE — SWALLOW BEDSIDE ASSESSMENT ADULT - SWALLOW EVAL: DIAGNOSIS
1) Mild oral dysphagia for puree, honey thick fluids, nectar thick fluids, and thin fluids marked by reduced utensil stripping, prolonged bolus manipulation and coordination. Delayed oral transit and posterior transfer noted. Adequate oral clearance observed. 2) Functional pharyngeal stage of swallow for puree , honey thick fluids, and nectar thick fluids marked by adequate initiation of pharyngeal swallow trigger and hyolaryngeal elevation noted upon digital palpation. No overt signs/symptoms of penetration/aspiration for these consistencies. 3) Moderate to severe pharyngeal dysphagia for thin fluids marked by suspected delayed initiation of pharyngeal swallow trigger. Hyolaryngeal elevation noted upon digital palpation. Pt with inconsistent delayed cough following this consistency, suggestive of possible airway penetration/aspiration.

## 2021-10-20 NOTE — CONSULT NOTE ADULT - PROBLEM SELECTOR RECOMMENDATION 6
Discussed with Palliative Attending Dr. Kahn. Palliative Service will continue to follow. Thank you for allowing us to participate in your patient's care.     Muriel Escobedo, MS4    Please contact us via Microsoft Teams for any questions or concerns.  In the event of newly developing, evolving, or worsening symptoms, please contact the Palliative Medicine team via pager (if the patient is at [facility] #[phone] or if the patient is at [facility] #[phone]) The Geriatric and Palliative Medicine service has coverage 24 hours a day/ 7 days a week to provide medical recommendations regarding symptom management needs via telephone .

## 2021-10-20 NOTE — CONSULT NOTE ADULT - PROBLEM SELECTOR RECOMMENDATION 2
- History of afib s/p ablation  - Rapid response with heart rate in 120s-140s, responded to lopressor  - Cardiology on board, recommend IV lopressor 5 mg q6 and digoxin load, possible fluid bolus challenge  - Patient's SO/HCP, Brandi Mcguire, on board with medications to manage symptoms including tachycardia - History of afib s/p ablation  - Rapid response with heart rate in 120s-140s, responded to lopressor  - Cardiology on board, recommend IV lopressor 5 mg q6 and digoxin load, possible fluid bolus challenge  - Patient's significant other/HCP, Brandi Mcguire, on board with medications to manage symptoms including tachycardia

## 2021-10-20 NOTE — CONSULT NOTE ADULT - ASSESSMENT
88 yo M PMHx BPH, AFib not on AC due to ICH, AS s/p TAVR 2017, CAD, Dementia, DM, hypothyroidism, HTN, Dysphagia, presents from [facility] for concern for pneumonia. Pt unable to provide a history. Per paper work from [facility], pt c/o SOB and cough.   (19 Oct 2021 18:25)    ER vs:  Tmax 102.2, P 133.  /66.  Pt initially on NRB --> 3L NC.  RVP (+) enterovirus.  Covid pcr (-).   WBC 7.2 -->10.6.  CTA chest no PE.  Multifocal peribronchial groundglass abnormalities seen with multiple acinar nodules, s/o infection and endobronchial spread.    ID consulted for further abx managment.     Sepsis:    - Pt with fever, tachycardia on admission, requiring supp O2.  RVP (+) enterovirus, possible superimposed bacterial pna/aspiration.  - CTA chest results noted.  - Cont zosyn for now.  Monitor for clinical improvement.    - Swallow eval  - Check blood cx x 2, sputum cx.  Check procalcitonin      Viral URI:    - RVP (+) enterovirus.  - Cont supportive care.  Pt on supp O2, titrate down oxygen as tolerated.        Will follow,    Shirley Viveros  [phone]

## 2021-10-20 NOTE — CONSULT NOTE ADULT - PROBLEM SELECTOR RECOMMENDATION 5
Patient currently alert but confused, aware "something is wrong." Per power of  form signed March 29th 2011, HCP is long-term partner of 31 years, Brandi Mcguire. Ms. Mckeonwen notes patient would not want resuscitation/intubation or other extreme measures and would prefer to focus on symptom management and ensuring that patient is comfortable. She has signed MOLST during this admission. Patient currently alert but confused, aware "something is wrong." Per power of  form signed March 29th 2011, HCP is long-term partner of 31 years, Brandi Mcguire. Ms. Mcguire notes patient would not want resuscitation/intubation or other extreme measures and would prefer to focus on symptom management and ensuring that patient is comfortable. She has signed MOLST during this admission.    Brandi Mcguire H: [phone] C: [phone]

## 2021-10-20 NOTE — CONSULT NOTE ADULT - PROBLEM SELECTOR RECOMMENDATION 9
- CTA showing consolidations in R upper/lower and L lower lobes  - Entero/rhinovirus positive  - Per partner, may have had emesis/aspiration prior to admission  - Started on empiric IV Zosyn

## 2021-10-20 NOTE — CONSULT NOTE ADULT - ASSESSMENT
Mr. Painter is an 89 YO M with PMH of AFib not on AC, ICH, AS s/p TAVR, CAD, dementia, dysphagia, DM, L spinal stenosis, hypothyroid, HTN, BPH, who was brought in from long-term care facility for shortness of breath, cough and hypoxia. Shortly after admission, rapid response was called for tachycardia, increased work of breathing, improved with lopressor. Patient afebrile, remains tachycardic, saturating 98% on 3 L NC. He is entero/rhinovirus positive and CTA shows patchy nodular opacities in R upper/lower and L lower lobes. Patient DNR/DNI per healthcare proxy, Brandi Mcguire. Palliative care was consulted to discuss goals of care. Mr. Painter is an 88 YO M with PMH of AFib not on AC, ICH, AS s/p TAVR, CAD, dementia, dysphagia, DM, L spinal stenosis, hypothyroid, HTN, BPH, who was brought in from long-term care facility for shortness of breath, cough and hypoxia. Shortly after admission, rapid response was called for tachycardia, increased work of breathing, improved with lopressor. Patient afebrile, remains tachycardic, saturating 98% on 3 L NC. He is entero/rhinovirus positive and CTA shows patchy nodular opacities in R upper/lower and L lower lobes. Patient DNR/DNI per healthcare proxy, Brandi Mcguire. Palliative care team was consulted to discuss goals of care.

## 2021-10-21 NOTE — PROGRESS NOTE ADULT - PROBLEM SELECTOR PLAN 7
Discussed with Palliative Attending Dr. Kahn. Palliative Service will continue to follow. Thank you for allowing us to participate in your patient's care.     Muriel Escobedo, MS4    Please contact us via Microsoft Teams for any questions or concerns.  In the event of newly developing, evolving, or worsening symptoms, please contact the Palliative Medicine team via pager (if the patient is at [facility] #[phone] or if the patient is at [facility] #[phone]) The Geriatric and Palliative Medicine service has coverage 24 hours a day/ 7 days a week to provide medical recommendations regarding symptom management needs via telephone

## 2021-10-21 NOTE — SWALLOW VFSS/MBS ASSESSMENT ADULT - ADDITIONAL RECOMMENDATIONS
Subjective:      Patient ID: Katie Fuller is a 2 y.o. male     Chief Complaint: Well Child (vinny and bm- good.  whole milk and table foods. at home care.  brought in by parents edinson and gerald)    HPI    History was provided by the parents.  Katie Fuller is a 2 y.o. male who is brought in by his mother and father for this well child visit.    Current Issues:  Current concerns on the part of Katie's mother and father include none.  Katie has had recent OM. He is preparing for repeat PE tubes.    Review of Nutrition:  Current diet: cow's milk, water, juices, vegetables, fruit, beans, limited meat  Balanced diet? yes  Difficulties with feeding? no    Social Screening:  Current child-care arrangements: in the home  Secondhand smoke exposure? yes - mother smokes outside    Review of Systems   Constitutional: Negative for appetite change and fever.   HENT: Positive for rhinorrhea.    Gastrointestinal: Negative for constipation.   Genitourinary: Negative for decreased urine volume.   Skin: Positive for rash (diaper; improving).     Objective:   Physical Exam   Constitutional: No distress.   HENT:   Right Ear: A middle ear effusion is present.   Left Ear: A middle ear effusion is present.   Mouth/Throat: Oropharynx is clear.   Neck: Normal range of motion. Neck supple.   Cardiovascular: Normal rate and regular rhythm.    No murmur heard.  Pulmonary/Chest: Effort normal and breath sounds normal.   Abdominal: Soft. Bowel sounds are normal. He exhibits no distension. There is no tenderness.   Genitourinary: Penis normal. Circumcised.   Genitourinary Comments: Testes descended bilaterally   Musculoskeletal: Normal range of motion. He exhibits no edema or tenderness.   Neurological: He is alert. He exhibits normal muscle tone.   Skin: Rash (few erythematous papules on the buttocks) noted.      Wt Readings from Last 3 Encounters:   02/26/18 15.3 kg (33 lb 11.7 oz) (95 %, Z= 1.67)*   02/12/18 15 kg (33 lb) (97 %, Z= 1.83)   02/08/18  "15.1 kg (33 lb 2.9 oz) (97 %, Z= 1.90)     * Growth percentiles are based on CDC 2-20 Years data.      Growth percentiles are based on WHO (Boys, 0-2 years) data.     Ht Readings from Last 3 Encounters:   02/26/18 3' (0.914 m) (91 %, Z= 1.33)*   02/12/18 3' 0.25" (0.921 m) (92 %, Z= 1.42)   01/06/18 3' (0.914 m) (94 %, Z= 1.59)     * Growth percentiles are based on CDC 2-20 Years data.      Growth percentiles are based on WHO (Boys, 0-2 years) data.     Body mass index is 18.3 kg/m².  95 %ile (Z= 1.67) based on CDC 2-20 Years weight-for-age data using vitals from 2/26/2018.  91 %ile (Z= 1.33) based on CDC 2-20 Years stature-for-age data using vitals from 2/26/2018.   Assessment:     1. Encounter for routine child health examination without abnormal findings    2. Need for lead screening       Plan:   Encounter for routine child health examination without abnormal findings  -     Lead, blood; Future; Expected date: 02/26/2018  -     CBC auto differential; Future; Expected date: 02/26/2018    Need for lead screening  -     Lead, blood; Future; Expected date: 02/26/2018    Handout with anticipatory guidance pertinent to age provided.   Anticipatory guidance regarding feedings   Continue clotrimazole for diaper rash  F/u with ENT for OM/PE tubes   Follow-up in about 1 year (around 2/26/2019).        " Patient may benefit swallowing therapy pending discharge plans (e.g. Rehab Center vs Home Care vs OutPatient at [facility] [phone])

## 2021-10-21 NOTE — PROGRESS NOTE ADULT - PROBLEM SELECTOR PLAN 2
- History of afib s/p ablation  - Rapid response with heart rate in 120s-140s, responded to lopressor  - Cardiology/EP on board, recommend IV lopressor 5 mg q6 and daily digoxin  - Patient's significant other/HCP, Brandi Mcguire, on board with medications to manage symptoms including tachycardia.

## 2021-10-21 NOTE — PROGRESS NOTE ADULT - SUBJECTIVE AND OBJECTIVE BOX
Date of Service  : 10-21-21     INTERVAL HPI/OVERNIGHT EVENTS: More awake .   Vital Signs Last 24 Hrs  T(C): 37.2 (21 Oct 2021 12:00), Max: 38 (21 Oct 2021 04:05)  T(F): 98.9 (21 Oct 2021 12:00), Max: 100.4 (21 Oct 2021 04:05)  HR: 108 (21 Oct 2021 17:00) (105 - 131)  BP: 124/82 (21 Oct 2021 17:00) (106/71 - 136/78)  BP(mean): --  RR: 18 (21 Oct 2021 12:00) (18 - 19)  SpO2: 98% (21 Oct 2021 12:00) (95% - 99%)  I&O's Summary    20 Oct 2021 07:01  -  21 Oct 2021 07:00  --------------------------------------------------------  IN: 118 mL / OUT: 0 mL / NET: 118 mL    21 Oct 2021 07:01  -  21 Oct 2021 20:46  --------------------------------------------------------  IN: 236 mL / OUT: 0 mL / NET: 236 mL      MEDICATIONS  (STANDING):  aspirin  chewable 81 milliGRAM(s) Oral daily  atorvastatin 80 milliGRAM(s) Oral at bedtime  dextrose 40% Gel 15 Gram(s) Oral once  dextrose 5% + sodium chloride 0.45%. 1000 milliLiter(s) (60 mL/Hr) IV Continuous <Continuous>  dextrose 5%. 1000 milliLiter(s) (100 mL/Hr) IV Continuous <Continuous>  dextrose 5%. 1000 milliLiter(s) (50 mL/Hr) IV Continuous <Continuous>  dextrose 50% Injectable 25 Gram(s) IV Push once  dextrose 50% Injectable 12.5 Gram(s) IV Push once  dextrose 50% Injectable 25 Gram(s) IV Push once  digoxin  Injectable 125 MICROGram(s) IV Push daily  glucagon  Injectable 1 milliGRAM(s) IntraMuscular once  heparin   Injectable 5000 Unit(s) SubCutaneous every 12 hours  insulin lispro (ADMELOG) corrective regimen sliding scale   SubCutaneous three times a day before meals  insulin lispro (ADMELOG) corrective regimen sliding scale   SubCutaneous at bedtime  latanoprost 0.005% Ophthalmic Solution 1 Drop(s) Both EYES at bedtime  levothyroxine Injectable 40 MICROGram(s) IV Push at bedtime  metoprolol tartrate Injectable 5 milliGRAM(s) IV Push every 6 hours  PARoxetine 20 milliGRAM(s) Oral daily  piperacillin/tazobactam IVPB.. 3.375 Gram(s) IV Intermittent every 8 hours  senna 2 Tablet(s) Oral at bedtime    MEDICATIONS  (PRN):  levalbuterol Inhalation 0.63 milliGRAM(s) Inhalation every 6 hours PRN SOB    LABS:                        13.6   10.52 )-----------( 158      ( 21 Oct 2021 07:21 )             39.7     10-21    138  |  101  |  26<H>  ----------------------------<  87  3.9   |  24  |  0.72    Ca    9.4      21 Oct 2021 07:21  Phos  1.7     10-21  Mg     1.90     10-21    TPro  6.4  /  Alb  3.7  /  TBili  2.6<H>  /  DBili  x   /  AST  16  /  ALT  11  /  AlkPhos  60  10-20        CAPILLARY BLOOD GLUCOSE      POCT Blood Glucose.: 131 mg/dL (21 Oct 2021 17:01)  POCT Blood Glucose.: 163 mg/dL (21 Oct 2021 12:17)  POCT Blood Glucose.: 101 mg/dL (21 Oct 2021 09:08)  POCT Blood Glucose.: 100 mg/dL (20 Oct 2021 20:53)              Consultant(s) Notes Reviewed:  [x ] YES  [ ] NO    PHYSICAL EXAM:  GENERAL: NAD, cachetic , Oxygen NC  HEAD:  Atraumatic, Normocephalic  NECK: Supple, No JVD, Normal thyroid  NERVOUS SYSTEM:  Alert &  CHEST/LUNG: Good air entry bilateral  except bases   HEART: Regular rate and rhythm; No murmurs, rubs, or gallops  ABDOMEN: Soft, Nontender, Nondistended; Bowel sounds present  EXTREMITIES:  2+ Peripheral Pulses, No clubbing, cyanosis, or edema      Care Discussed with Consultants/Other Providers [ x] YES  [ ] NO

## 2021-10-21 NOTE — PROGRESS NOTE ADULT - SUBJECTIVE AND OBJECTIVE BOX
Cardiovascular Disease Progress Note    Overnight events: Tachycardia continues.  Mr. Painter is currently being washed by the nursing staff.  He is in no distress.      Objective Findings:  T(C): 38 (10-21-21 @ 04:05), Max: 38.7 (10-20-21 @ 18:00)  HR: 115 (10-21-21 @ 05:30) (106 - 131)  BP: 115/76 (10-21-21 @ 05:30) (106/71 - 136/82)  RR: 18 (10-21-21 @ 05:30) (18 - 19)  SpO2: 98% (10-21-21 @ 05:30) (95% - 99%)  Wt(kg): --  Daily     Daily       Physical Exam:  Gen: NAD; Patient resting comfortably  HEENT: EOMI, Normocephalic/ atraumatic  CV: IIR, normal S1 + S2, no m/r/g  Lungs:  Normal respiratory effort; crackles to auscultation bilaterally  Abd: soft, non-tender; bowel sounds present  Ext: No edema; warm and well perfused    Telemetry: A-fib 110s    Laboratory Data:                        13.6   10.52 )-----------( 158      ( 21 Oct 2021 07:21 )             39.7     10-21    138  |  101  |  26<H>  ----------------------------<  87  3.9   |  24  |  0.72    Ca    9.4      21 Oct 2021 07:21  Phos  1.7     10-21  Mg     1.90     10-21    TPro  6.4  /  Alb  3.7  /  TBili  2.6<H>  /  DBili  x   /  AST  16  /  ALT  11  /  AlkPhos  60  10-20    PT/INR - ( 19 Oct 2021 14:31 )   PT: 14.5 sec;   INR: 1.28 ratio         PTT - ( 19 Oct 2021 14:31 )  PTT:31.2 sec          Inpatient Medications:  MEDICATIONS  (STANDING):  aspirin  chewable 81 milliGRAM(s) Oral daily  atorvastatin 80 milliGRAM(s) Oral at bedtime  dextrose 40% Gel 15 Gram(s) Oral once  dextrose 5%. 1000 milliLiter(s) (50 mL/Hr) IV Continuous <Continuous>  dextrose 5%. 1000 milliLiter(s) (100 mL/Hr) IV Continuous <Continuous>  dextrose 50% Injectable 25 Gram(s) IV Push once  dextrose 50% Injectable 12.5 Gram(s) IV Push once  dextrose 50% Injectable 25 Gram(s) IV Push once  glucagon  Injectable 1 milliGRAM(s) IntraMuscular once  heparin   Injectable 5000 Unit(s) SubCutaneous every 12 hours  insulin lispro (ADMELOG) corrective regimen sliding scale   SubCutaneous three times a day before meals  insulin lispro (ADMELOG) corrective regimen sliding scale   SubCutaneous at bedtime  latanoprost 0.005% Ophthalmic Solution 1 Drop(s) Both EYES at bedtime  levothyroxine Injectable 40 MICROGram(s) IV Push at bedtime  metoprolol tartrate Injectable 5 milliGRAM(s) IV Push every 6 hours  PARoxetine 20 milliGRAM(s) Oral daily  piperacillin/tazobactam IVPB.. 3.375 Gram(s) IV Intermittent every 8 hours  potassium phosphate IVPB 15 milliMole(s) IV Intermittent once  senna 2 Tablet(s) Oral at bedtime      Assessment: 89 year old man with a-fib, aortic stenosis s/p TAVR 2017, CAD, and HTN presents with hypoxic respiratory failure, severe sepsis and a-fib with RVR.     Plan of Care:    #Atrial Fibrillation-  Rapid rates in the setting of severe sepsis.  Heart rates are better after the digoxin load, but still in the 110s-120s.  I will start digoxin 125 mcg IV daily.   Continue IV Lopressor as ordered.   No anticoagulation given history of intracranial hemorrhage and continued cognitive decline.     #Aortic Stenosis-  S/P JOSIAH.  No significant murmur noted on exam.   Continue current cardiac management.     #Hypoxic respiratory failure-  Due to PNA with infiltrates seen on CT scan.  Management as per the primary team.         Over 25 minutes spent on total encounter; more than 50% of the visit was spent counseling and/or coordinating care by the attending physician.      Ricky Davila MD [facility]  Cardiovascular Disease  [phone]

## 2021-10-21 NOTE — PROGRESS NOTE ADULT - PROBLEM SELECTOR PLAN 4
- Patient complaining of tooth pain on lower R side, in area of missing tooth  - Per HCP, patient has been intermittently complaining of tooth pain for the past couple of months  - Recommend starting Tylenol for pain management, possible dental consult

## 2021-10-21 NOTE — PROGRESS NOTE ADULT - ATTENDING COMMENTS
Pt seen with Fellow and medical student  Agree with above plan    88yo M with dementia, dysphagia now presents with pna  Already DNR/DNI, recommended dysphagia diet by SLP  HCP is partner as documented above  Consulted for GOC, ongoing

## 2021-10-21 NOTE — PROGRESS NOTE ADULT - SUBJECTIVE AND OBJECTIVE BOX
SUBJECTIVE AND OBJECTIVE:  INTERVAL HPI/OVERNIGHT EVENTS: Patient evaluated at bedside. Appears more calm today. Saturating well on room air. Notes tooth pain on lower R side, per his HCP, Brandi, he has been having this pain on-and-off. He states he does not feel hungry.     Per ID, continuing on IV Zosyn and monitoring for clinical improvement. Per cardiology/EP continuing metoprolol and adding daily digoxin to manage afib with RVR.     Contacts: HCP confirmed with power of  form on Alpha tab, signed 2011, Brandi Mcguire (long-term partner of over 30 years) H [phone] C: [phone]    Allergies    No Known Allergies    Intolerances    MEDICATIONS  (STANDING):  aspirin  chewable 81 milliGRAM(s) Oral daily  atorvastatin 80 milliGRAM(s) Oral at bedtime  dextrose 40% Gel 15 Gram(s) Oral once  dextrose 5%. 1000 milliLiter(s) (50 mL/Hr) IV Continuous <Continuous>  dextrose 5%. 1000 milliLiter(s) (100 mL/Hr) IV Continuous <Continuous>  dextrose 50% Injectable 25 Gram(s) IV Push once  dextrose 50% Injectable 12.5 Gram(s) IV Push once  dextrose 50% Injectable 25 Gram(s) IV Push once  digoxin  Injectable 125 MICROGram(s) IV Push daily  glucagon  Injectable 1 milliGRAM(s) IntraMuscular once  heparin   Injectable 5000 Unit(s) SubCutaneous every 12 hours  insulin lispro (ADMELOG) corrective regimen sliding scale   SubCutaneous three times a day before meals  insulin lispro (ADMELOG) corrective regimen sliding scale   SubCutaneous at bedtime  latanoprost 0.005% Ophthalmic Solution 1 Drop(s) Both EYES at bedtime  levothyroxine Injectable 40 MICROGram(s) IV Push at bedtime  metoprolol tartrate Injectable 5 milliGRAM(s) IV Push every 6 hours  PARoxetine 20 milliGRAM(s) Oral daily  piperacillin/tazobactam IVPB.. 3.375 Gram(s) IV Intermittent every 8 hours  senna 2 Tablet(s) Oral at bedtime    MEDICATIONS  (PRN):  levalbuterol Inhalation 0.63 milliGRAM(s) Inhalation every 6 hours PRN SOB      ITEMS UNCHECKED ARE NOT PRESENT    PRESENT SYMPTOMS: [ ]Unable to obtain due to poor mentation   Source if other than patient:  [ ]Family   [ ]Team     Pain:  [ X]yes [ ]no  QOL impact -   Location - Tooth pain in lower R                   Aggravating factors -  Quality -  Radiation -  Timing-  Severity (0-10 scale):  Minimal acceptable level (0-10 scale):     Dyspnea:                           [ ]Mild [ ]Moderate [ ]Severe  Anxiety:                             [ ]Mild [ ]Moderate [ ]Severe  Fatigue:                             [ ]Mild [ ]Moderate [ ]Severe  Nausea:                             [ ]Mild [ ]Moderate [ ]Severe  Loss of appetite:              [X ]Mild [ ]Moderate [ ]Severe  Constipation:                    [ ]Mild [ ]Moderate [ ]Severe  [x] Productive cough    CPOT:    https://www.[facility].org/getattachment/[email or web address]/Critical-Care-Pain-Observation-Tool-(CPOT)    PAIN AD Score:	  http://geriatrictoolkit.[email or web address]/cog/painad.pdf (Ctrl + left click to view)    Other Symptoms:  [ X]All other review of systems negative     Palliative Performance Status Version 2:       40  %      http://npcrc.org/files/news/palliative_performance_scale_ppsv2.pdf  PHYSICAL EXAM:  Vital Signs Last 24 Hrs  T(C): 37.2 (21 Oct 2021 12:00), Max: 38.7 (20 Oct 2021 18:00)  T(F): 98.9 (21 Oct 2021 12:00), Max: 101.7 (20 Oct 2021 18:00)  HR: 105 (21 Oct 2021 12:00) (105 - 131)  BP: 118/80 (21 Oct 2021 12:00) (106/71 - 136/82)  BP(mean): --  RR: 18 (21 Oct 2021 12:00) (18 - 19)  SpO2: 98% (21 Oct 2021 12:00) (95% - 99%) I&O's Summary    20 Oct 2021 07:01  -  21 Oct 2021 07:00  --------------------------------------------------------  IN: 118 mL / OUT: 0 mL / NET: 118 mL       GENERAL:  [ X]Alert  [ ]Oriented x   [ ]Lethargic  [ ]Cachexia  [ ]Unarousable  [X ]Verbal  [ ]Non-Verbal  Behavioral:   [ ]Anxiety  [ ]Delirium [ ]Agitation [ ]Other [x] calm, pleasant today, unable to tell us where he is  HEENT:  [ ]Normal   [ X]Dry mouth   [ ]ET Tube/Trach  [ ]Oral lesions [X] poor dentition  PULMONARY:   [ ]Clear [ ]Tachypnea  [ ]Audible excessive secretions [X] Productive cough  [ ]Rhonchi        [ ]Right [ ]Left [ ]Bilateral  [ ]Crackles        [ ]Right [ ]Left [ ]Bilateral  [ ]Wheezing     [ ]Right [ ]Left [ ]Bilateral  [ ]Diminished BS [ ] Right [ ]Left [ ]Bilateral  CARDIOVASCULAR:    [ ]Regular [ ]Irregular [ ]Tachy  [ ]Juaquin [ ]Murmur [ ]Other [X] Well-perfused  GASTROINTESTINAL:  [ ]Soft  [ ]Distended   [ ]+BS  [ ]Non tender [ ]Tender  [ ]PEG [ ]OGT/ NGT   Last BM:    GENITOURINARY:  [ ]Normal [ ]Incontinent   [ ]Oliguria/Anuria   [ ]Green  MUSCULOSKELETAL:   [ ]Normal   [ ]Weakness  [ ]Bed/Wheelchair bound [ ]Edema  NEUROLOGIC:   [ ]No focal deficits  [X ] Cognitive impairment (long-standing, patient currently at baseline)  [X ] Dysphagia [ ]Dysarthria [ ] Paresis [ ]Other   SKIN:   [ ]Normal  [ ]Rash   [ ]Pressure ulcer(s) [ ]y [ ]n present on admission    CRITICAL CARE:  [ ]Shock Present  [ ]Septic [ ]Cardiogenic [ ]Neurologic [ ]Hypovolemic  [ ]Vasopressors [ ]Inotropes  [ ]Respiratory failure present [ ]Mechanical Ventilation [ ]Non-invasive ventilatory support [ ]High-Flow   [ ]Acute  [ ]Chronic [ ]Hypoxic  [ ]Hypercarbic [ ]Other  [ ]Other organ failure     LABS:                        13.6   10.52 )-----------( 158      ( 21 Oct 2021 07:21 )             39.7   10-    138  |  101  |  26<H>  ----------------------------<  87  3.9   |  24  |  0.72    Ca    9.4      21 Oct 2021 07:21  Phos  1.7     10-21  Mg     1.90     10-21    TPro  6.4  /  Alb  3.7  /  TBili  2.6<H>  /  DBili  x   /  AST  16  /  ALT  11  /  AlkPhos  60  10-20  PT/INR - ( 19 Oct 2021 14:31 )   PT: 14.5 sec;   INR: 1.28 ratio         PTT - ( 19 Oct 2021 14:31 )  PTT:31.2 sec    Urinalysis Basic - ( 19 Oct 2021 15:19 )    Color: Light Yellow / Appearance: Clear / S.012 / pH: x  Gluc: x / Ketone: Negative  / Bili: Negative / Urobili: <2 mg/dL   Blood: x / Protein: Negative / Nitrite: Negative   Leuk Esterase: Negative / RBC: x / WBC x   Sq Epi: x / Non Sq Epi: x / Bacteria: x      RADIOLOGY & ADDITIONAL STUDIES:    Protein Calorie Malnutrition Present: [ ]mild [ ]moderate [ ]severe [ ]underweight [ ]morbid obesity  https://www.andeal.org/vault/2440/web/files/ONC/Table_Clinical%20Characteristics%20to%20Document%20Malnutrition-White%20JV%20et%20al%2020.pdf    Height (cm): 165.1 (10-19-21 @ 13:37), 165.1 (21 @ 21:06), 165.1 (21 @ 02:34)  Weight (kg): 55 (10-20-21 @ 17:58), 56.6 (21 @ 21:06), 58.8 (21 @ 02:34)  BMI (kg/m2): 20.2 (10-20-21 @ 17:58), 20.8 (10-19-21 @ 13:37), 20.8 (21 @ 21:06)    [ ]PPSV2 < or = 30%  [ ]significant weight loss [ ]poor nutritional intake [ ]anasarca    [ ]Artificial Nutrition    REFERRALS:   [ ]Chaplaincy  [ ]Hospice  [ ]Child Life  [ ]Social Work  [ ]Case management [ ]Holistic Therapy     Goals of Care Document:     SUBJECTIVE AND OBJECTIVE:  INTERVAL HPI/OVERNIGHT EVENTS: Patient evaluated at bedside. Appears more calm today. Saturating well on room air. Notes tooth pain on lower R side, per his HCP, Brandi, he has been having this pain on-and-off. He states he does not feel hungry.     Per ID, continuing on IV Zosyn and monitoring for clinical improvement. Per cardiology/EP continuing metoprolol and adding daily digoxin to manage afib with RVR. Spoke to HCP, Brandi, about escalation of care and she feels that if ICU level care, including pressors or invasive lines, is required that these interventions should be done. She reiterated that she does not want him to be intubated or resuscitated but that she does not feel ready to let him go yet.     Contacts: HCP confirmed with power of  form on Alpha tab, signed 2011, Brandi Maiercabebeto (long-term partner of over 30 years) H [phone] C: [phone]    Allergies    No Known Allergies    Intolerances    MEDICATIONS  (STANDING):  aspirin  chewable 81 milliGRAM(s) Oral daily  atorvastatin 80 milliGRAM(s) Oral at bedtime  dextrose 40% Gel 15 Gram(s) Oral once  dextrose 5%. 1000 milliLiter(s) (50 mL/Hr) IV Continuous <Continuous>  dextrose 5%. 1000 milliLiter(s) (100 mL/Hr) IV Continuous <Continuous>  dextrose 50% Injectable 25 Gram(s) IV Push once  dextrose 50% Injectable 12.5 Gram(s) IV Push once  dextrose 50% Injectable 25 Gram(s) IV Push once  digoxin  Injectable 125 MICROGram(s) IV Push daily  glucagon  Injectable 1 milliGRAM(s) IntraMuscular once  heparin   Injectable 5000 Unit(s) SubCutaneous every 12 hours  insulin lispro (ADMELOG) corrective regimen sliding scale   SubCutaneous three times a day before meals  insulin lispro (ADMELOG) corrective regimen sliding scale   SubCutaneous at bedtime  latanoprost 0.005% Ophthalmic Solution 1 Drop(s) Both EYES at bedtime  levothyroxine Injectable 40 MICROGram(s) IV Push at bedtime  metoprolol tartrate Injectable 5 milliGRAM(s) IV Push every 6 hours  PARoxetine 20 milliGRAM(s) Oral daily  piperacillin/tazobactam IVPB.. 3.375 Gram(s) IV Intermittent every 8 hours  senna 2 Tablet(s) Oral at bedtime    MEDICATIONS  (PRN):  levalbuterol Inhalation 0.63 milliGRAM(s) Inhalation every 6 hours PRN SOB      ITEMS UNCHECKED ARE NOT PRESENT    PRESENT SYMPTOMS: [ ]Unable to obtain due to poor mentation   Source if other than patient:  [ ]Family   [ ]Team     Pain:  [ X]yes [ ]no  QOL impact -   Location - Tooth pain in lower R                   Aggravating factors -  Quality -  Radiation -  Timing-  Severity (0-10 scale):  Minimal acceptable level (0-10 scale):     Dyspnea:                           [ ]Mild [ ]Moderate [ ]Severe  Anxiety:                             [ ]Mild [ ]Moderate [ ]Severe  Fatigue:                             [ ]Mild [ ]Moderate [ ]Severe  Nausea:                             [ ]Mild [ ]Moderate [ ]Severe  Loss of appetite:              [X ]Mild [ ]Moderate [ ]Severe  Constipation:                    [ ]Mild [ ]Moderate [ ]Severe  [x] Productive cough    CPOT:    https://www.[facility].org/getattachment/[email or web address]/Critical-Care-Pain-Observation-Tool-(CPOT)    PAIN AD Score:	  http://geriatrictoolkit.[facility]/cog/painad.pdf (Ctrl + left click to view)    Other Symptoms:  [ X]All other review of systems negative     Palliative Performance Status Version 2:       40  %      http://[facility].org/files/news/palliative_performance_scale_ppsv2.pdf  PHYSICAL EXAM:  Vital Signs Last 24 Hrs  T(C): 37.2 (21 Oct 2021 12:00), Max: 38.7 (20 Oct 2021 18:00)  T(F): 98.9 (21 Oct 2021 12:00), Max: 101.7 (20 Oct 2021 18:00)  HR: 105 (21 Oct 2021 12:00) (105 - 131)  BP: 118/80 (21 Oct 2021 12:00) (106/71 - 136/82)  BP(mean): --  RR: 18 (21 Oct 2021 12:00) (18 - 19)  SpO2: 98% (21 Oct 2021 12:00) (95% - 99%) I&O's Summary    20 Oct 2021 07:01  -  21 Oct 2021 07:00  --------------------------------------------------------  IN: 118 mL / OUT: 0 mL / NET: 118 mL       GENERAL:  [ X]Alert  [ ]Oriented x   [ ]Lethargic  [ ]Cachexia  [ ]Unarousable  [X ]Verbal  [ ]Non-Verbal  Behavioral:   [ ]Anxiety  [ ]Delirium [ ]Agitation [ ]Other [x] calm, pleasant today, unable to tell us where he is  HEENT:  [ ]Normal   [ X]Dry mouth   [ ]ET Tube/Trach  [ ]Oral lesions [X] poor dentition  PULMONARY:   [ ]Clear [ ]Tachypnea  [ ]Audible excessive secretions [X] Productive cough  [ ]Rhonchi        [ ]Right [ ]Left [ ]Bilateral  [ ]Crackles        [ ]Right [ ]Left [ ]Bilateral  [ ]Wheezing     [ ]Right [ ]Left [ ]Bilateral  [ ]Diminished BS [ ] Right [ ]Left [ ]Bilateral  CARDIOVASCULAR:    [ ]Regular [ ]Irregular [ ]Tachy  [ ]Juaquin [ ]Murmur [ ]Other [X] Well-perfused  GASTROINTESTINAL:  [ ]Soft  [ ]Distended   [ ]+BS  [ ]Non tender [ ]Tender  [ ]PEG [ ]OGT/ NGT   Last BM:    GENITOURINARY:  [ ]Normal [ ]Incontinent   [ ]Oliguria/Anuria   [ ]Green  MUSCULOSKELETAL:   [ ]Normal   [ ]Weakness  [ ]Bed/Wheelchair bound [ ]Edema  NEUROLOGIC:   [ ]No focal deficits  [X ] Cognitive impairment (long-standing, patient currently at baseline)  [X ] Dysphagia [ ]Dysarthria [ ] Paresis [ ]Other   SKIN:   [ ]Normal  [ ]Rash   [ ]Pressure ulcer(s) [ ]y [ ]n present on admission    CRITICAL CARE:  [ ]Shock Present  [ ]Septic [ ]Cardiogenic [ ]Neurologic [ ]Hypovolemic  [ ]Vasopressors [ ]Inotropes  [ ]Respiratory failure present [ ]Mechanical Ventilation [ ]Non-invasive ventilatory support [ ]High-Flow   [ ]Acute  [ ]Chronic [ ]Hypoxic  [ ]Hypercarbic [ ]Other  [ ]Other organ failure     LABS:                        13.6   10.52 )-----------( 158      ( 21 Oct 2021 07:21 )             39.7   10-21    138  |  101  |  26<H>  ----------------------------<  87  3.9   |  24  |  0.72    Ca    9.4      21 Oct 2021 07:21  Phos  1.7     10-21  Mg     1.90     10-21    TPro  6.4  /  Alb  3.7  /  TBili  2.6<H>  /  DBili  x   /  AST  16  /  ALT  11  /  AlkPhos  60  10-20  PT/INR - ( 19 Oct 2021 14:31 )   PT: 14.5 sec;   INR: 1.28 ratio         PTT - ( 19 Oct 2021 14:31 )  PTT:31.2 sec    Urinalysis Basic - ( 19 Oct 2021 15:19 )    Color: Light Yellow / Appearance: Clear / S.012 / pH: x  Gluc: x / Ketone: Negative  / Bili: Negative / Urobili: <2 mg/dL   Blood: x / Protein: Negative / Nitrite: Negative   Leuk Esterase: Negative / RBC: x / WBC x   Sq Epi: x / Non Sq Epi: x / Bacteria: x      RADIOLOGY & ADDITIONAL STUDIES:    Protein Calorie Malnutrition Present: [ ]mild [ ]moderate [ ]severe [ ]underweight [ ]morbid obesity  https://www.andeal.org/vault/2440/web/files/ONC/Table_Clinical%20Characteristics%20to%20Document%20Malnutrition-White%20JV%20et%20al%2020.pdf    Height (cm): 165.1 (10-19-21 @ 13:37), 165.1 (21 @ 21:06), 165.1 (21 @ 02:34)  Weight (kg): 55 (10-20-21 @ 17:58), 56.6 (21 @ 21:06), 58.8 (21 @ 02:34)  BMI (kg/m2): 20.2 (10-20-21 @ 17:58), 20.8 (10-19-21 @ 13:37), 20.8 (21 @ 21:06)    [ ]PPSV2 < or = 30%  [ ]significant weight loss [ ]poor nutritional intake [ ]anasarca    [ ]Artificial Nutrition    REFERRALS:   [ ]Chaplaincy  [ ]Hospice  [ ]Child Life  [ ]Social Work  [ ]Case management [ ]Holistic Therapy     Goals of Care Document:

## 2021-10-21 NOTE — SWALLOW VFSS/MBS ASSESSMENT ADULT - PHARYNGEAL PHASE COMMENTS
delayed initiation of the pharyngeal swallow, reduced laryngeal elevation, reduced tongue base retraction and reduced pharyngeal constriction; moderate to severe pharyngeal clearance deficits delayed initiation of the pharyngeal swallow, reduced laryngeal elevation, reduced tongue base retraction delayed initiation of the pharyngeal swallow, reduced laryngeal elevation, reduced tongue base retraction, reduced pharyngeal constriction

## 2021-10-21 NOTE — PROGRESS NOTE ADULT - PROBLEM SELECTOR PLAN 3
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

## 2021-10-21 NOTE — SWALLOW VFSS/MBS ASSESSMENT ADULT - RECOMMENDED CONSISTENCY
1.) Consider NPO with Consideration for Non Oral Means of Nutrition. Suggest discussion with Patient/Family regarding Plan of Care for Nutritional Goals of Care (Comfort Feeds vs Non Oral Means) given the oropharyngeal stage dysphagia aforementioned above.   2.) Aspiration Precautions  3.) Reflux Precautions  4.) Maintain Good Oral Hygiene Care

## 2021-10-21 NOTE — PROGRESS NOTE ADULT - PROBLEM SELECTOR PLAN 1
- CTA showing consolidations in R upper/lower and L lower lobes  - Entero/rhinovirus positive  - Per partner, may have had emesis/aspiration prior to admission  - Continuing empiric IV Zosyn  - Patient weaned off nasal cannula, saturating well on room air

## 2021-10-21 NOTE — SWALLOW VFSS/MBS ASSESSMENT ADULT - DIAGNOSTIC IMPRESSIONS
Patient presents with a Mild to Moderate Oral Stage and Moderate to Severe Pharyngeal Stage Dysphagia. The Oral Stage is characterized by adequate oral containment, slow bolus manipulation, slow/delayed tongue motion with slow/prolonged anterior to posterior transfer of the bolus for puree trials; premature spillage/loss to the hypopharynx for Thin Liquids/Nectar Thick Liquids due to reduced tongue to palate seal and reduced tongue control/coordination; with trace/mild oral clearance deficits located on tongue surface post swallow.  The Pharyngeal Stage is characterized by delayed initiation of the pharyngeal swallow (Bolus head varies to the vallecular and pyriforms for Thin Liquids), reduced laryngeal elevation with incomplete laryngeal vestibular closure, reduced tongue base retraction and reduced pharyngeal constriction. There is moderate to severe pharyngeal clearance deficits located primarily in the vallecular with trace/mild in the pyriforms and pharyngeal wall coating post primary swallow for Puree.  There was Laryngeal Penetration during the swallow for Thin Liquids and Nectar Thick Liquids without retrieval migrating down to the level of the vocal folds. There was Laryngeal Penetration after the swallow from pharyngeal residue (puree/honey)entering/spilling over into the laryngeal vestibule migrating down to the level of the vocal folds.  Patient is not sensate to the Deep Laryngeal Penetration given no reflexive cough response. Patient is verbally cued to cough but is unable to clear contrast from the upper airway.

## 2021-10-21 NOTE — PROGRESS NOTE ADULT - PROBLEM SELECTOR PLAN 5
- Per speech and swallow eval patient cleared for dysphagia 1 diet with pureed foods and nectar thick liquids  - Per HCP, dysphagia began after R frontal stroke in 5/2021 and patient has been on pureed diet since then

## 2021-10-21 NOTE — SWALLOW VFSS/MBS ASSESSMENT ADULT - COMMENTS
Medicine Note 10/20/2021 - 89 yo M PMHx BPH, AFib not on AC due to ICH, AS s/p TAVR 2017, CAD, Dementia, DM, hypothyroidism, HTN, Dysphagia, presents from [facility] for concern for pneumonia. Pt unable to provide a history. Per paper work from SNF, pt c/o SOB and cough.    Palliative Note 10/20/2021 - Mr. Painter is an 88 YO M with PMH of AFib not on AC, ICH, AS s/p TAVR, CAD, dementia, dysphagia, DM, L spinal stenosis, hypothyroid, HTN, BPH, who was brought in from long-term care facility for shortness of breath, cough and hypoxia. Shortly after admission, rapid response was called for tachycardia, increased work of breathing, improved with lopressor. Patient afebrile, remains tachycardic, saturating 98% on 3 L NC. He is entero/rhinovirus positive and CTA shows patchy nodular opacities in R upper/lower and L lower lobes. Patient DNR/DNI per healthcare proxy, Brandi Mcguire. Palliative care team was consulted to discuss goals of care.    Patient had a Clinical Swallow Evaluation on 10/20/2021 (See Consult).     Patient transferred to a specialized seating unit with lateral view projection. Patient with Nasal Cannula in place. Patient with baseline cough and wetness in vocal quality is noted.

## 2021-10-21 NOTE — PROGRESS NOTE ADULT - PROBLEM SELECTOR PLAN 6
Patient currently alert but confused, aware "something is wrong." Per power of  form signed March 29th 2011, HCP is long-term partner of 31 years, Brandi Mcguire. Ms. Mcguire notes patient would not want resuscitation/intubation or other extreme measures and would prefer to focus on symptom management and ensuring that patient is comfortable. She has signed MOLST during this admission.    Brandi Mcguire H: [phone] C: [phone]. Patient currently alert but confused, aware "something is wrong." Per power of  form signed March 29th 2011, HCP is long-term partner of 31 years, Brandi Mcguire. Ms. Mcguire notes patient would not want resuscitation/intubation or other extreme measures and would prefer to focus on symptom management and ensuring that patient is comfortable. She is willing to pursue ICU level care including pressors and more invasive lines if patient becomes hypotensive. She has signed MOLST during this admission.    Brandi Mcguire H: [phone] C: [phone].

## 2021-10-22 NOTE — CHART NOTE - NSCHARTNOTEFT_GEN_A_CORE
Patient s/p cinesophagram 10/21, with recommendations to keep NPO and consider non-oral means of nutrition. Discussed findings of cinesophagram with Brandi patient's HCP. Reviewed different types of tube feedings, including NGT, and PEG tube feedings. Discussed Mr Painter's advancing dementia, and that he will likely pull the feeding tube out, since he has pulled other tubes out previously as expressed by Brandi. This will likely increase his agitation. Brandi verbalized the patient would not want a feeding tube placed and requested that he receive pleasure/comfort feeds. Reiterated with Brandi patient is still at high risk for aspiration, but would remain on aspiration precautions and reflux precautions, and we will provide pleasure/comfort feeds as patient wants. Attending Physician Dr Cabrera, and Palliative Fellow updated.

## 2021-10-22 NOTE — PROGRESS NOTE ADULT - SUBJECTIVE AND OBJECTIVE BOX
Infectious Diseases progress note:    Subjective:  Events noted.  Pt on pleasure feeds.  Has occasional wet cough.      ROS:  CONSTITUTIONAL:  No fever, chills, rigors  CARDIOVASCULAR:  No chest pain or palpitations  RESPIRATORY:   No SOB, cough, dyspnea on exertion.  No wheezing  GASTROINTESTINAL:  No abd pain, N/V, diarrhea/constipation  EXTREMITIES:  No swelling or joint pain  GENITOURINARY:  No burning on urination, increased frequency or urgency.  No flank pain  NEUROLOGIC:  No HA, visual disturbances  SKIN: No rashes    Allergies    No Known Allergies    Intolerances        ANTIBIOTICS/RELEVANT:  antimicrobials  piperacillin/tazobactam IVPB.. 3.375 Gram(s) IV Intermittent every 8 hours    immunologic:    OTHER:  acetaminophen  Suppository .. 650 milliGRAM(s) Rectal every 6 hours PRN  aspirin  chewable 81 milliGRAM(s) Oral daily  atorvastatin 80 milliGRAM(s) Oral at bedtime  dextrose 40% Gel 15 Gram(s) Oral once  dextrose 5% + sodium chloride 0.45%. 1000 milliLiter(s) IV Continuous <Continuous>  dextrose 5%. 1000 milliLiter(s) IV Continuous <Continuous>  dextrose 5%. 1000 milliLiter(s) IV Continuous <Continuous>  dextrose 50% Injectable 25 Gram(s) IV Push once  dextrose 50% Injectable 12.5 Gram(s) IV Push once  dextrose 50% Injectable 25 Gram(s) IV Push once  digoxin  Injectable 125 MICROGram(s) IV Push daily  glucagon  Injectable 1 milliGRAM(s) IntraMuscular once  heparin   Injectable 5000 Unit(s) SubCutaneous every 12 hours  insulin lispro (ADMELOG) corrective regimen sliding scale   SubCutaneous every 6 hours  latanoprost 0.005% Ophthalmic Solution 1 Drop(s) Both EYES at bedtime  levalbuterol Inhalation 0.63 milliGRAM(s) Inhalation every 6 hours PRN  levothyroxine Injectable 40 MICROGram(s) IV Push at bedtime  metoprolol tartrate Injectable 5 milliGRAM(s) IV Push every 6 hours  PARoxetine 20 milliGRAM(s) Oral daily  potassium phosphate / sodium phosphate Powder (PHOS-NaK) 1 Packet(s) Oral four times a day  senna 2 Tablet(s) Oral at bedtime      Objective:  Vital Signs Last 24 Hrs  T(C): 37 (22 Oct 2021 12:16), Max: 37.1 (21 Oct 2021 20:50)  T(F): 98.6 (22 Oct 2021 12:16), Max: 98.7 (21 Oct 2021 20:50)  HR: 86 (22 Oct 2021 12:16) (86 - 126)  BP: 114/73 (22 Oct 2021 12:16) (114/73 - 137/74)  BP(mean): --  RR: 18 (22 Oct 2021 12:16) (17 - 18)  SpO2: 98% (22 Oct 2021 12:16) (97% - 98%)    PHYSICAL EXAM:  Constitutional:NAD  Eyes:LEATHA, EOMI  Ear/Nose/Throat: no thrush, mucositis.  Moist mucous membranes	  Neck:no JVD, no lymphadenopathy, supple  Respiratory: CTA dinesh  Cardiovascular: S1S2 RRR, no murmurs  Gastrointestinal:soft, nontender,  nondistended (+) BS  Extremities:no e/e/c  Skin:  no rashes, open wounds or ulcerations        LABS:                        11.3   7.67  )-----------( 148      ( 22 Oct 2021 08:29 )             33.0     10-22    135  |  100  |  20  ----------------------------<  216<H>  4.7   |  24  |  0.66    Ca    8.6      22 Oct 2021 08:29  Phos  1.6     10-22  Mg     1.90     10-22                      Rapid RVP Result: Detected          MICROBIOLOGY:    Culture - Blood (10.19.21 @ 18:41)   Specimen Source: .Blood Blood-Peripheral   Culture Results:   No growth to date.     Culture - Blood (10.19.21 @ 18:41)   Specimen Source: .Blood Blood-Peripheral   Culture Results:   No growth to date.       RADIOLOGY & ADDITIONAL STUDIES:    < from: Xray Cinesophagram Swallow Function w/ Contrast (10.21.21 @ 08:56) >    IMPRESSION:  Episodic penetration with trace aspiration was seen on large volume attempts and with thinner liquids.    For further information and recommendations, please refer to the speech pathologist found report which is available for review in the electronic medical record.    < end of copied text >

## 2021-10-22 NOTE — PROGRESS NOTE ADULT - SUBJECTIVE AND OBJECTIVE BOX
Date of Service  : 10-22-21     INTERVAL HPI/OVERNIGHT EVENTS: no new concerns per staff   Vital Signs Last 24 Hrs  T(C): 38.2 (22 Oct 2021 22:31), Max: 38.2 (22 Oct 2021 22:31)  T(F): 100.7 (22 Oct 2021 22:31), Max: 100.7 (22 Oct 2021 22:31)  HR: 106 (22 Oct 2021 22:31) (84 - 106)  BP: 156/77 (22 Oct 2021 22:31) (112/68 - 156/77)  BP(mean): --  RR: 19 (22 Oct 2021 22:31) (17 - 20)  SpO2: 98% (22 Oct 2021 22:31) (95% - 98%)  I&O's Summary    21 Oct 2021 07:01  -  22 Oct 2021 07:00  --------------------------------------------------------  IN: 236 mL / OUT: 0 mL / NET: 236 mL    22 Oct 2021 07:01  -  22 Oct 2021 22:39  --------------------------------------------------------  IN: 735 mL / OUT: 0 mL / NET: 735 mL      MEDICATIONS  (STANDING):  aspirin  chewable 81 milliGRAM(s) Oral daily  atorvastatin 80 milliGRAM(s) Oral at bedtime  dextrose 40% Gel 15 Gram(s) Oral once  dextrose 5% + sodium chloride 0.45%. 1000 milliLiter(s) (60 mL/Hr) IV Continuous <Continuous>  dextrose 5%. 1000 milliLiter(s) (50 mL/Hr) IV Continuous <Continuous>  dextrose 5%. 1000 milliLiter(s) (100 mL/Hr) IV Continuous <Continuous>  dextrose 50% Injectable 25 Gram(s) IV Push once  dextrose 50% Injectable 12.5 Gram(s) IV Push once  dextrose 50% Injectable 25 Gram(s) IV Push once  digoxin  Injectable 125 MICROGram(s) IV Push daily  glucagon  Injectable 1 milliGRAM(s) IntraMuscular once  heparin   Injectable 5000 Unit(s) SubCutaneous every 12 hours  insulin lispro (ADMELOG) corrective regimen sliding scale   SubCutaneous three times a day before meals  insulin lispro (ADMELOG) corrective regimen sliding scale   SubCutaneous at bedtime  latanoprost 0.005% Ophthalmic Solution 1 Drop(s) Both EYES at bedtime  levothyroxine Injectable 40 MICROGram(s) IV Push at bedtime  metoprolol tartrate Injectable 5 milliGRAM(s) IV Push every 6 hours  PARoxetine 20 milliGRAM(s) Oral daily  piperacillin/tazobactam IVPB.. 3.375 Gram(s) IV Intermittent every 8 hours  potassium phosphate / sodium phosphate Powder (PHOS-NaK) 1 Packet(s) Oral four times a day  senna 2 Tablet(s) Oral at bedtime    MEDICATIONS  (PRN):  acetaminophen  Suppository .. 650 milliGRAM(s) Rectal every 6 hours PRN Temp greater or equal to 38C (100.4F), Mild Pain (1 - 3), Moderate Pain (4 - 6), Severe Pain (7 - 10)  levalbuterol Inhalation 0.63 milliGRAM(s) Inhalation every 6 hours PRN SOB    LABS:                        11.3   7.67  )-----------( 148      ( 22 Oct 2021 08:29 )             33.0     10-22    135  |  100  |  20  ----------------------------<  216<H>  4.7   |  24  |  0.66    Ca    8.6      22 Oct 2021 08:29  Phos  1.6     10-22  Mg     1.90     10-22          CAPILLARY BLOOD GLUCOSE      POCT Blood Glucose.: 148 mg/dL (22 Oct 2021 21:39)  POCT Blood Glucose.: 128 mg/dL (22 Oct 2021 17:10)  POCT Blood Glucose.: 134 mg/dL (22 Oct 2021 11:22)  POCT Blood Glucose.: 151 mg/dL (22 Oct 2021 08:01)  POCT Blood Glucose.: 134 mg/dL (22 Oct 2021 05:59)  POCT Blood Glucose.: 147 mg/dL (22 Oct 2021 00:47)              Consultant(s) Notes Reviewed:  [x ] YES  [ ] NO    PHYSICAL EXAM:  GENERAL: NAD, Oxygen   HEAD:  Atraumatic, Normocephalic  NECK: Supple, No JVD, Normal thyroid  NERVOUS SYSTEM:  Alert & , No focal deficit   CHEST/LUNG: Good air entry bilateral with no  rales, rhonchi, wheezing, or rubs  HEART: Regular rate and rhythm; No murmurs, rubs, or gallops  ABDOMEN: Soft, Nontender, Nondistended; Bowel sounds present  EXTREMITIES:  2+ Peripheral Pulses, No clubbing, cyanosis, or edema  SKIN: No rashes or lesions    Care Discussed with Consultants/Other Providers [ x] YES  [ ] NO

## 2021-10-22 NOTE — PROGRESS NOTE ADULT - SUBJECTIVE AND OBJECTIVE BOX
Cardiovascular Disease Progress Note    Heart rates better controlled.  Mr. Painter is in no distress.     Objective Findings:  T(C): 37 (10-22-21 @ 05:46), Max: 37.8 (10-21-21 @ 10:00)  HR: 96 (10-22-21 @ 05:46) (96 - 126)  BP: 115/69 (10-22-21 @ 05:46) (115/69 - 137/74)  RR: 17 (10-22-21 @ 05:46) (17 - 18)  SpO2: 98% (10-22-21 @ 05:46) (97% - 98%)  Wt(kg): --  Daily     Daily       Physical Exam:  Gen: NAD; Patient resting comfortably  HEENT: EOMI, Normocephalic/ atraumatic  CV: IIR, normal S1 + S2, no m/r/g  Lungs:  Normal respiratory effort; crackles to auscultation bilaterally  Abd: soft, non-tender; bowel sounds present  Ext: No edema; warm and well perfused    Telemetry: a-fib 100s    Laboratory Data:                        13.6   10.52 )-----------( 158      ( 21 Oct 2021 07:21 )             39.7     10-21    138  |  101  |  26<H>  ----------------------------<  87  3.9   |  24  |  0.72    Ca    9.4      21 Oct 2021 07:21  Phos  1.7     10-21  Mg     1.90     10-21                Inpatient Medications:  MEDICATIONS  (STANDING):  aspirin  chewable 81 milliGRAM(s) Oral daily  atorvastatin 80 milliGRAM(s) Oral at bedtime  dextrose 40% Gel 15 Gram(s) Oral once  dextrose 5% + sodium chloride 0.45%. 1000 milliLiter(s) (60 mL/Hr) IV Continuous <Continuous>  dextrose 5%. 1000 milliLiter(s) (50 mL/Hr) IV Continuous <Continuous>  dextrose 5%. 1000 milliLiter(s) (100 mL/Hr) IV Continuous <Continuous>  dextrose 50% Injectable 25 Gram(s) IV Push once  dextrose 50% Injectable 12.5 Gram(s) IV Push once  dextrose 50% Injectable 25 Gram(s) IV Push once  digoxin  Injectable 125 MICROGram(s) IV Push daily  glucagon  Injectable 1 milliGRAM(s) IntraMuscular once  heparin   Injectable 5000 Unit(s) SubCutaneous every 12 hours  insulin lispro (ADMELOG) corrective regimen sliding scale   SubCutaneous every 6 hours  latanoprost 0.005% Ophthalmic Solution 1 Drop(s) Both EYES at bedtime  levothyroxine Injectable 40 MICROGram(s) IV Push at bedtime  metoprolol tartrate Injectable 5 milliGRAM(s) IV Push every 6 hours  PARoxetine 20 milliGRAM(s) Oral daily  piperacillin/tazobactam IVPB.. 3.375 Gram(s) IV Intermittent every 8 hours  senna 2 Tablet(s) Oral at bedtime      Assessment: 89 year old man with a-fib, aortic stenosis s/p TAVR 2017, CAD, and HTN presents with hypoxic respiratory failure, severe sepsis and a-fib with RVR.     Plan of Care:    #Atrial Fibrillation-  Rapid rates in the setting of severe sepsis.  Heart rates are slowly improving.  Continue IV Lopressor and digoxin as ordered.   No anticoagulation given history of intracranial hemorrhage and continued cognitive decline.     #Aortic Stenosis-  S/P JOSIAH.  No significant murmur noted on exam.   Continue current cardiac management.     #Hypoxic respiratory failure-  Due to PNA with infiltrates seen on CT scan.  Management as per the primary team.     Over 25 minutes spent on total encounter; more than 50% of the visit was spent counseling and/or coordinating care by the attending physician.      Ricky Davila MD [facility]  Cardiovascular Disease  [phone]

## 2021-10-22 NOTE — CHART NOTE - NSCHARTNOTEFT_GEN_A_CORE
IMPRESSION:  # Pneumonia  # Atrial fibrillation with RVR  # Dementia  # Dysphagia  # Tooth pain  # Advanced care planning  # Palliative care encounter    PLAN:  - Patient underwent cine-esophagram which showed mild-moderate oral stage and moderate/severe pharyngeal stage dysphagia  - Estefany NP spoke to HCP, Brandi Mcguire, who felt that patient would not want a feeding tube/PEG and would prefer comfort feeds despite risk of aspiration  - Patient remains DNR/DNI with focus on symptomatic management with medications, including ICU level care if required  - Continuing on IV antibiotics at this time, course per primary team/ID  - Disposition for patient likely return to [facility] with potential transition to hospice services at that facility    Discussed with Palliative Attending Dr. Kahn. Palliative Care team will sign-off on this patient at this time. Thank you for allowing us to participate in your patient's care.     Please contact us via [employer] Teams for any questions or concerns.  In the event of newly developing, evolving, or worsening symptoms, please contact the Palliative Medicine team via pager (if the patient is at [facility] #[phone] or if the patient is at [facility] #[phone]) The Geriatric and Palliative Medicine service has coverage 24 hours a day/ 7 days a week to provide medical recommendations regarding symptom management needs via telephone .

## 2021-10-23 NOTE — PROGRESS NOTE ADULT - SUBJECTIVE AND OBJECTIVE BOX
Date of Service  : 10-23-21 @ 14:02    INTERVAL HPI/OVERNIGHT EVENTS: More awake now .   Vital Signs Last 24 Hrs  T(C): 36.8 (23 Oct 2021 07:06), Max: 38.2 (22 Oct 2021 22:41)  T(F): 98.3 (23 Oct 2021 07:06), Max: 100.7 (22 Oct 2021 22:41)  HR: 87 (23 Oct 2021 06:24) (85 - 109)  BP: 147/72 (23 Oct 2021 06:24) (125/65 - 156/77)  BP(mean): --  RR: 18 (23 Oct 2021 06:24) (17 - 20)  SpO2: 96% (23 Oct 2021 06:24) (94% - 98%)  I&O's Summary    22 Oct 2021 07:01  -  23 Oct 2021 07:00  --------------------------------------------------------  IN: 735 mL / OUT: 0 mL / NET: 735 mL      MEDICATIONS  (STANDING):  aspirin  chewable 81 milliGRAM(s) Oral daily  atorvastatin 80 milliGRAM(s) Oral at bedtime  dextrose 40% Gel 15 Gram(s) Oral once  dextrose 5% + sodium chloride 0.45%. 1000 milliLiter(s) (60 mL/Hr) IV Continuous <Continuous>  dextrose 5%. 1000 milliLiter(s) (50 mL/Hr) IV Continuous <Continuous>  dextrose 5%. 1000 milliLiter(s) (100 mL/Hr) IV Continuous <Continuous>  dextrose 50% Injectable 25 Gram(s) IV Push once  dextrose 50% Injectable 12.5 Gram(s) IV Push once  dextrose 50% Injectable 25 Gram(s) IV Push once  glucagon  Injectable 1 milliGRAM(s) IntraMuscular once  heparin   Injectable 5000 Unit(s) SubCutaneous every 12 hours  insulin lispro (ADMELOG) corrective regimen sliding scale   SubCutaneous three times a day before meals  insulin lispro (ADMELOG) corrective regimen sliding scale   SubCutaneous at bedtime  latanoprost 0.005% Ophthalmic Solution 1 Drop(s) Both EYES at bedtime  levothyroxine Injectable 40 MICROGram(s) IV Push at bedtime  metoprolol tartrate Injectable 5 milliGRAM(s) IV Push every 6 hours  PARoxetine 20 milliGRAM(s) Oral daily  piperacillin/tazobactam IVPB.. 3.375 Gram(s) IV Intermittent every 8 hours  potassium phosphate / sodium phosphate Powder (PHOS-NaK) 1 Packet(s) Oral once  senna 2 Tablet(s) Oral at bedtime    MEDICATIONS  (PRN):  acetaminophen  Suppository .. 650 milliGRAM(s) Rectal every 6 hours PRN Temp greater or equal to 38C (100.4F), Mild Pain (1 - 3), Moderate Pain (4 - 6), Severe Pain (7 - 10)  levalbuterol Inhalation 0.63 milliGRAM(s) Inhalation every 6 hours PRN SOB    LABS:                        14.4   6.49  )-----------( 173      ( 23 Oct 2021 07:57 )             42.9     10-23    139  |  103  |  10  ----------------------------<  158<H>  3.7   |  25  |  0.58    Ca    9.6      23 Oct 2021 07:57  Phos  2.3     10-23  Mg     2.10     10-23          CAPILLARY BLOOD GLUCOSE      POCT Blood Glucose.: 130 mg/dL (23 Oct 2021 11:39)  POCT Blood Glucose.: 122 mg/dL (23 Oct 2021 07:49)  POCT Blood Glucose.: 148 mg/dL (22 Oct 2021 21:39)  POCT Blood Glucose.: 128 mg/dL (22 Oct 2021 17:10)              Consultant(s) Notes Reviewed:  [x ] YES  [ ] NO    PHYSICAL EXAM:  GENERAL: NAD,   HEAD:  Atraumatic, Normocephalic  NECK: Supple, No JVD, Normal thyroid  NERVOUS SYSTEM:  Alert   CHEST/LUNG: Good air entry bilateral with no  rales, rhonchi, wheezing, or rubs  HEART: Regular rate and rhythm; No murmurs, rubs, or gallops  ABDOMEN: Soft, Nontender, Nondistended; Bowel sounds present  EXTREMITIES:  2+ Peripheral Pulses, No clubbing, cyanosis, or edema  SKIN: No rashes or lesions    Care Discussed with Consultants/Other Providers [ x] YES  [ ] NO

## 2021-10-23 NOTE — PROGRESS NOTE ADULT - SUBJECTIVE AND OBJECTIVE BOX
Cardiovascular Disease Progress Note    Overnight events: Persistent fevers noted overnight.  Heart rates better controlled.  Mr. Painter is in no distress.  He cannot provide a meaningful history.     Objective Findings:  T(C): 36.8 (10-23-21 @ 07:06), Max: 38.2 (10-22-21 @ 22:41)  HR: 87 (10-23-21 @ 06:24) (84 - 109)  BP: 147/72 (10-23-21 @ 06:24) (112/68 - 156/77)  RR: 18 (10-23-21 @ 06:24) (17 - 20)  SpO2: 96% (10-23-21 @ 06:24) (94% - 98%)  Wt(kg): --  Daily     Daily       Physical Exam:  Gen: NAD; Patient resting comfortably  HEENT: EOMI, Normocephalic/ atraumatic  CV: IIR, normal S1 + S2, no m/r/g  Lungs:  Normal respiratory effort; crackles to auscultation bilaterally  Abd: soft, non-tender; bowel sounds present  Ext: No edema; warm and well perfused    Telemetry: A-fib 90s    Laboratory Data:                        11.3   7.67  )-----------( 148      ( 22 Oct 2021 08:29 )             33.0     10-22    135  |  100  |  20  ----------------------------<  216<H>  4.7   |  24  |  0.66    Ca    8.6      22 Oct 2021 08:29  Phos  1.6     10-22  Mg     1.90     10-22                Inpatient Medications:  MEDICATIONS  (STANDING):  aspirin  chewable 81 milliGRAM(s) Oral daily  atorvastatin 80 milliGRAM(s) Oral at bedtime  dextrose 40% Gel 15 Gram(s) Oral once  dextrose 5% + sodium chloride 0.45%. 1000 milliLiter(s) (60 mL/Hr) IV Continuous <Continuous>  dextrose 5%. 1000 milliLiter(s) (50 mL/Hr) IV Continuous <Continuous>  dextrose 5%. 1000 milliLiter(s) (100 mL/Hr) IV Continuous <Continuous>  dextrose 50% Injectable 25 Gram(s) IV Push once  dextrose 50% Injectable 25 Gram(s) IV Push once  dextrose 50% Injectable 12.5 Gram(s) IV Push once  digoxin  Injectable 125 MICROGram(s) IV Push daily  glucagon  Injectable 1 milliGRAM(s) IntraMuscular once  heparin   Injectable 5000 Unit(s) SubCutaneous every 12 hours  insulin lispro (ADMELOG) corrective regimen sliding scale   SubCutaneous three times a day before meals  insulin lispro (ADMELOG) corrective regimen sliding scale   SubCutaneous at bedtime  latanoprost 0.005% Ophthalmic Solution 1 Drop(s) Both EYES at bedtime  levothyroxine Injectable 40 MICROGram(s) IV Push at bedtime  metoprolol tartrate Injectable 5 milliGRAM(s) IV Push every 6 hours  PARoxetine 20 milliGRAM(s) Oral daily  piperacillin/tazobactam IVPB.. 3.375 Gram(s) IV Intermittent every 8 hours  potassium phosphate / sodium phosphate Powder (PHOS-NaK) 1 Packet(s) Oral four times a day  senna 2 Tablet(s) Oral at bedtime      Assessment: 89 year old man with a-fib, aortic stenosis s/p TAVR 2017, CAD, and HTN presents with hypoxic respiratory failure, severe sepsis and a-fib with RVR.     Plan of Care:    #Atrial Fibrillation-  Rapid rates in the setting of severe sepsis (persistent fevers noted overnight).  Heart rates are slowly improving.  Continue IV Lopressor and digoxin as ordered.   No anticoagulation given history of intracranial hemorrhage and continued cognitive decline.     #Aortic Stenosis-  S/P JOSIAH.  No significant murmur noted on exam.   Continue current cardiac management.     #Hypoxic respiratory failure-  Due to PNA with infiltrates seen on CT scan.  Management as per the primary team.       Over 25 minutes spent on total encounter; more than 50% of the visit was spent counseling and/or coordinating care by the attending physician.      Ricky Davila MD [facility]  Cardiovascular Disease  [phone] Cardiovascular Disease Progress Note    Overnight events: Persistent fevers noted overnight.  Heart rates better controlled.  Mr. Painter is in no distress.  He cannot provide a meaningful history.     Objective Findings:  T(C): 36.8 (10-23-21 @ 07:06), Max: 38.2 (10-22-21 @ 22:41)  HR: 87 (10-23-21 @ 06:24) (84 - 109)  BP: 147/72 (10-23-21 @ 06:24) (112/68 - 156/77)  RR: 18 (10-23-21 @ 06:24) (17 - 20)  SpO2: 96% (10-23-21 @ 06:24) (94% - 98%)  Wt(kg): --  Daily     Daily       Physical Exam:  Gen: NAD; Patient resting comfortably  HEENT: EOMI, Normocephalic/ atraumatic  CV: IIR, normal S1 + S2, no m/r/g  Lungs:  Normal respiratory effort; crackles to auscultation bilaterally  Abd: soft, non-tender; bowel sounds present  Ext: No edema; warm and well perfused    Telemetry: A-fib 90s    Laboratory Data:                        11.3   7.67  )-----------( 148      ( 22 Oct 2021 08:29 )             33.0     10-22    135  |  100  |  20  ----------------------------<  216<H>  4.7   |  24  |  0.66    Ca    8.6      22 Oct 2021 08:29  Phos  1.6     10-22  Mg     1.90     10-22                Inpatient Medications:  MEDICATIONS  (STANDING):  aspirin  chewable 81 milliGRAM(s) Oral daily  atorvastatin 80 milliGRAM(s) Oral at bedtime  dextrose 40% Gel 15 Gram(s) Oral once  dextrose 5% + sodium chloride 0.45%. 1000 milliLiter(s) (60 mL/Hr) IV Continuous <Continuous>  dextrose 5%. 1000 milliLiter(s) (50 mL/Hr) IV Continuous <Continuous>  dextrose 5%. 1000 milliLiter(s) (100 mL/Hr) IV Continuous <Continuous>  dextrose 50% Injectable 25 Gram(s) IV Push once  dextrose 50% Injectable 25 Gram(s) IV Push once  dextrose 50% Injectable 12.5 Gram(s) IV Push once  digoxin  Injectable 125 MICROGram(s) IV Push daily  glucagon  Injectable 1 milliGRAM(s) IntraMuscular once  heparin   Injectable 5000 Unit(s) SubCutaneous every 12 hours  insulin lispro (ADMELOG) corrective regimen sliding scale   SubCutaneous three times a day before meals  insulin lispro (ADMELOG) corrective regimen sliding scale   SubCutaneous at bedtime  latanoprost 0.005% Ophthalmic Solution 1 Drop(s) Both EYES at bedtime  levothyroxine Injectable 40 MICROGram(s) IV Push at bedtime  metoprolol tartrate Injectable 5 milliGRAM(s) IV Push every 6 hours  PARoxetine 20 milliGRAM(s) Oral daily  piperacillin/tazobactam IVPB.. 3.375 Gram(s) IV Intermittent every 8 hours  potassium phosphate / sodium phosphate Powder (PHOS-NaK) 1 Packet(s) Oral four times a day  senna 2 Tablet(s) Oral at bedtime      Assessment: 89 year old man with a-fib, aortic stenosis s/p TAVR 2017, CAD, and HTN presents with hypoxic respiratory failure, severe sepsis and a-fib with RVR.     Plan of Care:    #Atrial Fibrillation-  Heart rates are much improved.   I will change digoxin to every other day.  Continue IV Lopressor as ordered.   No anticoagulation given history of intracranial hemorrhage and continued cognitive decline.     #Aortic Stenosis-  S/P JOSIAH.  No significant murmur noted on exam.   Continue current cardiac management.     #Hypoxic respiratory failure-  Due to PNA with infiltrates seen on CT scan.  Management as per the primary team.       Over 25 minutes spent on total encounter; more than 50% of the visit was spent counseling and/or coordinating care by the attending physician.      Ricky Davila MD [facility]  Cardiovascular Disease  [phone]

## 2021-10-24 NOTE — PROGRESS NOTE ADULT - SUBJECTIVE AND OBJECTIVE BOX
Date of Service  : 10-24-21   INTERVAL HPI/OVERNIGHT EVENTS: I like Football as was watching on TV.   Vital Signs Last 24 Hrs  T(C): 36.6 (24 Oct 2021 12:12), Max: 36.7 (24 Oct 2021 05:56)  T(F): 97.8 (24 Oct 2021 12:12), Max: 98 (24 Oct 2021 05:56)  HR: 90 (24 Oct 2021 12:12) (78 - 90)  BP: 137/72 (24 Oct 2021 12:12) (122/81 - 137/72)  BP(mean): --  RR: 16 (24 Oct 2021 12:12) (16 - 16)  SpO2: 95% (24 Oct 2021 12:12) (95% - 97%)  I&O's Summary    23 Oct 2021 07:01  -  24 Oct 2021 07:00  --------------------------------------------------------  IN: 815 mL / OUT: 0 mL / NET: 815 mL    24 Oct 2021 07:01  -  24 Oct 2021 19:03  --------------------------------------------------------  IN: 236 mL / OUT: 0 mL / NET: 236 mL      MEDICATIONS  (STANDING):  aspirin  chewable 81 milliGRAM(s) Oral daily  atorvastatin 80 milliGRAM(s) Oral at bedtime  dextrose 40% Gel 15 Gram(s) Oral once  dextrose 5%. 1000 milliLiter(s) (50 mL/Hr) IV Continuous <Continuous>  dextrose 5%. 1000 milliLiter(s) (100 mL/Hr) IV Continuous <Continuous>  dextrose 50% Injectable 25 Gram(s) IV Push once  dextrose 50% Injectable 12.5 Gram(s) IV Push once  dextrose 50% Injectable 25 Gram(s) IV Push once  digoxin  Injectable 125 MICROGram(s) IV Push every other day  glucagon  Injectable 1 milliGRAM(s) IntraMuscular once  heparin   Injectable 5000 Unit(s) SubCutaneous every 12 hours  insulin lispro (ADMELOG) corrective regimen sliding scale   SubCutaneous three times a day before meals  insulin lispro (ADMELOG) corrective regimen sliding scale   SubCutaneous at bedtime  latanoprost 0.005% Ophthalmic Solution 1 Drop(s) Both EYES at bedtime  levothyroxine Injectable 40 MICROGram(s) IV Push at bedtime  metoprolol tartrate Injectable 5 milliGRAM(s) IV Push every 6 hours  PARoxetine 20 milliGRAM(s) Oral daily  piperacillin/tazobactam IVPB.. 3.375 Gram(s) IV Intermittent every 8 hours  senna 2 Tablet(s) Oral at bedtime    MEDICATIONS  (PRN):  acetaminophen  Suppository .. 650 milliGRAM(s) Rectal every 6 hours PRN Temp greater or equal to 38C (100.4F), Mild Pain (1 - 3), Moderate Pain (4 - 6), Severe Pain (7 - 10)  levalbuterol Inhalation 0.63 milliGRAM(s) Inhalation every 6 hours PRN SOB    LABS:                        14.4   6.49  )-----------( 173      ( 23 Oct 2021 07:57 )             42.9     10-23    139  |  103  |  10  ----------------------------<  158<H>  3.7   |  25  |  0.58    Ca    9.6      23 Oct 2021 07:57  Phos  2.3     10-23  Mg     2.10     10-23          CAPILLARY BLOOD GLUCOSE      POCT Blood Glucose.: 125 mg/dL (24 Oct 2021 16:50)  POCT Blood Glucose.: 129 mg/dL (24 Oct 2021 11:35)  POCT Blood Glucose.: 127 mg/dL (24 Oct 2021 07:53)  POCT Blood Glucose.: 96 mg/dL (23 Oct 2021 22:14)            Consultant(s) Notes Reviewed:  [x ] YES  [ ] NO    PHYSICAL EXAM:  GENERAL: NAD, Oxygen   HEAD:  Atraumatic, Normocephalic  NECK: Supple, No JVD, Normal thyroid  NERVOUS SYSTEM:  Alert & Oriented X1  CHEST/LUNG: Good air entry bilateral with no  rales, rhonchi, wheezing, or rubs  HEART: Regular rate and rhythm; No murmurs, rubs, or gallops  ABDOMEN: Soft, Nontender, Nondistended; Bowel sounds present  EXTREMITIES:  2+ Peripheral Pulses, No clubbing, cyanosis, or edema  SKIN: No rashes or lesions    Care Discussed with Consultants/Other Providers [ x] YES  [ ] NO

## 2021-10-24 NOTE — DISCHARGE NOTE PROVIDER - CARE PROVIDER_API CALL
Derrell Schuster  INTERNAL MEDICINE  [address] [place] [address]  Phone: [phone]  Fax: [phone]  Established Patient  Follow Up Time: 1 week

## 2021-10-24 NOTE — PROGRESS NOTE ADULT - SUBJECTIVE AND OBJECTIVE BOX
Cardiovascular Disease Progress Note    Overnight events: No acute events overnight. Mr. Painter is in no distress.  Otherwise review of systems negative    Objective Findings:  T(C): 36.7 (10-24-21 @ 05:56), Max: 37.2 (10-23-21 @ 12:40)  HR: 78 (10-24-21 @ 05:56) (78 - 113)  BP: 135/72 (10-24-21 @ 05:56) (122/81 - 153/90)  RR: 16 (10-24-21 @ 05:56) (16 - 16)  SpO2: 96% (10-24-21 @ 05:56) (95% - 100%)  Wt(kg): --  Daily     Daily       Physical Exam:  Gen: NAD; Patient resting comfortably  HEENT: EOMI, Normocephalic/ atraumatic  CV: IIR, normal S1 + S2, no m/r/g  Lungs:  Normal respiratory effort; crackles to auscultation bilaterally  Abd: soft, non-tender; bowel sounds present  Ext: No edema; warm and well perfused    Telemetry: a-fib 80s    Laboratory Data:                        14.4   6.49  )-----------( 173      ( 23 Oct 2021 07:57 )             42.9     10-23    139  |  103  |  10  ----------------------------<  158<H>  3.7   |  25  |  0.58    Ca    9.6      23 Oct 2021 07:57  Phos  2.3     10-23  Mg     2.10     10-23                Inpatient Medications:  MEDICATIONS  (STANDING):  aspirin  chewable 81 milliGRAM(s) Oral daily  atorvastatin 80 milliGRAM(s) Oral at bedtime  dextrose 40% Gel 15 Gram(s) Oral once  dextrose 5% + sodium chloride 0.45%. 1000 milliLiter(s) (60 mL/Hr) IV Continuous <Continuous>  dextrose 5%. 1000 milliLiter(s) (50 mL/Hr) IV Continuous <Continuous>  dextrose 5%. 1000 milliLiter(s) (100 mL/Hr) IV Continuous <Continuous>  dextrose 50% Injectable 25 Gram(s) IV Push once  dextrose 50% Injectable 12.5 Gram(s) IV Push once  dextrose 50% Injectable 25 Gram(s) IV Push once  digoxin  Injectable 125 MICROGram(s) IV Push every other day  glucagon  Injectable 1 milliGRAM(s) IntraMuscular once  heparin   Injectable 5000 Unit(s) SubCutaneous every 12 hours  insulin lispro (ADMELOG) corrective regimen sliding scale   SubCutaneous three times a day before meals  insulin lispro (ADMELOG) corrective regimen sliding scale   SubCutaneous at bedtime  latanoprost 0.005% Ophthalmic Solution 1 Drop(s) Both EYES at bedtime  levothyroxine Injectable 40 MICROGram(s) IV Push at bedtime  metoprolol tartrate Injectable 5 milliGRAM(s) IV Push every 6 hours  PARoxetine 20 milliGRAM(s) Oral daily  piperacillin/tazobactam IVPB.. 3.375 Gram(s) IV Intermittent every 8 hours  senna 2 Tablet(s) Oral at bedtime      Assessment: 89 year old man with a-fib, aortic stenosis s/p TAVR 2017, CAD, and HTN presents with hypoxic respiratory failure, severe sepsis and a-fib with RVR.     Plan of Care:    #Atrial Fibrillation-  Heart rates are much improved.   Low dose digoxin to every other day.  Continue IV Lopressor as ordered.   No anticoagulation given history of intracranial hemorrhage and continued cognitive decline.     #Aortic Stenosis-  S/P JOSIAH.  No significant murmur noted on exam.   Continue current cardiac management.     #Hypoxic respiratory failure-  Due to PNA with infiltrates seen on CT scan.  Management as per the primary team.       Over 25 minutes spent on total encounter; more than 50% of the visit was spent counseling and/or coordinating care by the attending physician.      Ricky Davila MD [facility]  Cardiovascular Disease  [phone]

## 2021-10-24 NOTE — DISCHARGE NOTE PROVIDER - NSDCMRMEDTOKEN_GEN_ALL_CORE_FT
aspirin 81 mg oral delayed release tablet: 1 tab(s) orally once a day  atorvastatin 80 mg oral tablet: 1 tab(s) orally once a day (at bedtime)  enoxaparin: 40 milligram(s) subcutaneous once a day  latanoprost 0.005% ophthalmic solution: 1 drop(s) to each affected eye once a day (in the evening) right eye  levothyroxine 50 mcg (0.05 mg) oral tablet: 1 tab(s) orally once a day  metFORMIN 500 mg oral tablet: 1 tab(s) orally 2 times a day  metoprolol tartrate 50 mg oral tablet: 1 tab(s) orally 2 times a day  PARoxetine 20 mg oral tablet: 1 tab(s) orally once a day  polyethylene glycol 3350 oral powder for reconstitution: 17 gram(s) orally 2 times a day  senna oral tablet: 2 tab(s) orally once a day (at bedtime)   aspirin 81 mg oral delayed release tablet: 1 tab(s) orally once a day  atorvastatin 80 mg oral tablet: 1 tab(s) orally once a day (at bedtime)  enoxaparin: 40 milligram(s) subcutaneous once a day  latanoprost 0.005% ophthalmic solution: 1 drop(s) to each affected eye once a day (in the evening) right eye  levothyroxine 50 mcg (0.05 mg) oral tablet: 1 tab(s) orally once a day  metFORMIN 500 mg oral tablet: 1 tab(s) orally 2 times a day  metoprolol tartrate 50 mg oral tablet: 1 tab(s) orally 2 times a day  PARoxetine 20 mg oral tablet: 1 tab(s) orally once a day  senna oral tablet: 2 tab(s) orally once a day (at bedtime)   aspirin 81 mg oral delayed release tablet: 1 tab(s) orally once a day  atorvastatin 80 mg oral tablet: 1 tab(s) orally once a day (at bedtime)  Digox 125 mcg (0.125 mg) oral tablet: 1 tab(s) orally every other day   latanoprost 0.005% ophthalmic solution: 1 drop(s) to each affected eye once a day (in the evening) right eye  levothyroxine 50 mcg (0.05 mg) oral tablet: 1 tab(s) orally once a day  Lopressor 50 mg oral tablet: 0.5 tab(s) orally 2 times a day   metFORMIN 500 mg oral tablet: 1 tab(s) orally 2 times a day  PARoxetine 20 mg oral tablet: 1 tab(s) orally once a day  senna oral tablet: 2 tab(s) orally once a day (at bedtime)

## 2021-10-24 NOTE — DISCHARGE NOTE PROVIDER - DETAILS OF MALNUTRITION DIAGNOSIS/DIAGNOSES
This patient has been assessed with a concern for Malnutrition and was treated during this hospitalization for the following Nutrition diagnosis/diagnoses:     -  10/26/2021: Severe protein-calorie malnutrition

## 2021-10-24 NOTE — DISCHARGE NOTE PROVIDER - HOSPITAL COURSE
89 yo M (A&Ox1) PMHx BPH, AFib not on AC due to ICH 05/2021, AS s/p TAVR 2017, CAD, Dementia, DM type 2, hypothyroidism, HTN, Dysphagia, presents from [facility] for concern for pneumonia. Pt unable to provide a history. Per paper work from SNF, pt c/o SOB and cough. Hospital course complicated by atrial fibrillation w/RVR (120's - 160's).  RRT on 10/19    Sepsis with Pneumonia    -S/P IV Abxs.   -Enterovirus positive .   -10/19 BCX -neg, f/u repeat BCx 10/23---   -sputum culture 10/24---  -ID consulted   -CT Angio Chest 10/19- No pulmonary embolus.Multifocal infection as delineated above. Follow-up recommended to ensure resolution.    Hypoxic respiratory failure 2/2 Viral URI  -RVP (+) enterovirus.  -Cont supportive care.  Pt on supp O2, titrate down oxygen as tolerated.      Atrial fibrillation with RVR.   -Not on AC . Rate control - improved  -10/19 s/p RRT for Afib w/ RVR & increased WOB  -IV APAP for continued fever 100.5F rectally however HR remains 130's  -Cards and EP consulted   -on IV dizoxin 125mcg every other day, IV metoprolol 5mg q6h   -No anticoagulation given history of intracranial hemorrhage in 5/2021 and continued cognitive decline.     Aortic Stenosis  -S/P JOSIAH.  -No significant murmur noted on exam.   -Continue current cardiac management.     Hypotension.   -S/P IVF . Had RRT   -BP readings better now.       Lactic acidosis.   -Likely sec to Hypotension .   -Resolving.     Diabetes mellitus.   -A1C 6.3%   -SSI for now     Hypothyroidism.   -Home dose synthroid IV    Dementia.   -Supportive care. Failed Speech swallow   -Abnormal  cineesophagogram -failed  -Per GOC with pall care 10/22-> HCP does not want NGT/PEG and started on pleasure feeds withp ureed diet despite risk of aspiration    Advance care planning.   -D/W His HCP Brandi in detail his critical condition and poor prognosis . Said he is DNR/DNI     Dispo: [facility]    On_________, case was discussed with Dr. Cabrera, patient is medically cleared and optimized for discharge today. All medications were reviewed with attending, and sent to mutually agreed upon pharmacy.     88 yo M (A&Ox1) PMHx BPH, AFib not on AC due to ICH 05/2021, AS s/p TAVR 2017, CAD, Dementia, DM type 2, hypothyroidism, HTN, Dysphagia, presents from [facility] for concern for pneumonia. Pt unable to provide a history. Per paper work from [facility], pt c/o SOB and cough. Hospital course complicated by atrial fibrillation w/RVR (120's - 160's).  RRT on 10/19    Sepsis with Pneumonia    -S/P IV Abxs.   -Enterovirus positive .   -10/19 BCX -neg, f/u repeat BCx 10/23- neg  -sputum culture 10/24- no specific bacteria isolated   -ID consulted   -CT Angio Chest 10/19- No pulmonary embolus.Multifocal infection as delineated above. Follow-up recommended to ensure resolution.    Hypoxic respiratory failure 2/2 Viral URI  -RVP (+) enterovirus.  -Cont supportive care.  Pt on supp O2, titrate down oxygen as tolerated.    -O2 sat 96-98% on room air     Atrial fibrillation with RVR.   -Not on AC . Rate control - improved  -10/19 s/p RRT for Afib w/ RVR & increased WOB  -IV APAP for continued fever 100.5F rectally however HR remains 130's  -Cards and EP consulted   -on IV dizoxin 125mcg every other day, IV metoprolol 5mg q6h   -No anticoagulation given history of intracranial hemorrhage in 5/2021 and continued cognitive decline.     Aortic Stenosis  -S/P JOSIAH.  -No significant murmur noted on exam.   -Continue current cardiac management.     Hypotension.   -S/P IVF . Had RRT   -BP readings better now.       Lactic acidosis.   -Likely sec to Hypotension .   -Resolving.     Diabetes mellitus.   -A1C 6.3%   -SSI for now     Hypothyroidism.   -Home dose synthroid IV    Dementia.   -Supportive care. Failed Speech swallow   -Abnormal  cineesophagogram -failed  -Per GOC with pall care 10/22-> HCP does not want NGT/PEG and started on pleasure feeds withp ureed diet despite risk of aspiration    Advance care planning.   -D/W His HCP Brandi in detail his critical condition and poor prognosis . Said he is DNR/DNI Molst form completed    Dispo: [facility]    On_________, case was discussed with Dr. Cabrera, patient is medically cleared and optimized for discharge today. All medications were reviewed with attending, and sent to mutually agreed upon pharmacy.     88 YO M (A&Ox1) PMHx BPH, AFib not on AC due to ICH 05/2021, AS s/p TAVR 2017, CAD, Dementia, DM type 2, hypothyroidism, HTN, Dysphagia, presents from [facility] for concern for pneumonia. Pt unable to provide a history. Per paper work from SNF, pt c/o SOB and cough. Hospital course complicated by atrial fibrillation w/RVR (120's - 160's).  RRT on 10/19    Sepsis with Pneumonia    S/P IV Abxs.   Enterovirus positive  10/19 BCX -neg, f/u repeat BCx 10/23- neg  Sputum culture 10/24- no specific bacteria isolated   CT Angio Chest 10/19- No pulmonary embolus. Multifocal infection as delineated above.     Hypoxic respiratory failure secondary to Viral URI  RVP (+) enterovirus.  Patient on supp O2, titrate down oxygen as tolerated.    O2 sat 96-98% on room air     Atrial fibrillation with RVR.   Not on AC. Rate control: Improved  10/19 s/p RRT for Afib w/ RVR & increased WOB  IV APAP for continued fever 100.5F rectally however HR remains 130's  On IV dizoxin 125mcg every other day, IV metoprolol 5mg q6h   No anticoagulation given history of intracranial hemorrhage in 5/2021 and continued cognitive decline  Please take your medications as prescribed.  Continue to take your blood thinner as prescribed and follow with your physician to monitor your levels.  Low fat diet, reduce caffeine intake, and exercise at least 30 minutes daily.    Aortic Stenosis  S/P JOSIAH  No significant murmur noted on exam.     Hypotension.   S/P IVF. Had RRT   BP readings better now     Lactic acidosis.   Likely secondary to Hypotension   Resolving.     Diabetes mellitus.   A1C 6.3%   Monitor finger sticks pre-meal and bedtime, low salt, fat and carbohydrate diet, minimize glucose intake.  Exercise daily for at least 30 minutes and weight loss.  Follow up with primary care physician and endocrinologist for routine Hemoglobin A1C checks and management.  Follow up with your ophthalmologist for routine yearly vision exams.    Hypothyroidism.   Home dose synthroid    Dementia.   Failed Speech swallow   Abnormal cineesophagogram: Failed   Per GOC with palliative care 10/22-> HCP does not want NGT/PEG and started on pleasure feeds with pureed diet despite risk of aspiration    Advance care planning.   HCP Brandi in detail his critical condition and poor prognosis . Said he is DNR/DNI Molst form completed    10/26: Case was discussed with Dr. Cabrera, patient is medically cleared and optimized for discharge today. All medications were reviewed with attending, and sent to mutually agreed upon pharmacy.

## 2021-10-24 NOTE — DISCHARGE NOTE PROVIDER - NSDCCPCAREPLAN_GEN_ALL_CORE_FT
PRINCIPAL DISCHARGE DIAGNOSIS  Diagnosis: Septic shock  Assessment and Plan of Treatment: complete the course of antibiotics as prescribed  Repeat blood culture showed-------  Sputum Culture-------------  -CAT scan Angio Chest 10/19- No pulmonary embolus. Multifocal infection as delineated above. Follow-up recommended to ensure resolution****      SECONDARY DISCHARGE DIAGNOSES  Diagnosis: Pneumonia  Assessment and Plan of Treatment: +enterovirus. Please continue your antibiotics as prescribed   -Cont supportive care.  Pt on supp O2, titrate down oxygen as tolerated.    Diagnosis: Atrial fibrillation with RVR  Assessment and Plan of Treatment: now improved, evaluated by cardiologist and electrophysiologist.  Continue heart rate control medications as prescribed  -No anticoagulation given history of intracranial hemorrhage in 5/2021 and continued cognitive decline.  Please continue your medications as directed and follow-up with your primary provider/cardiologist to further manage your care. Monitor for signs/symptoms of uncontrolled atrial fibrillation, such as, increased heart rate, palpitations, chest pain, dizziness, or shortness of breath - Return to emergency room if these signs/symptoms are present.       PRINCIPAL DISCHARGE DIAGNOSIS  Diagnosis: Septic shock  Assessment and Plan of Treatment: complete the course of antibiotics as prescribed  Repeat blood culture showed no growth  Sputum Culture nonspecifc WBC, no bacteria isolated  -CAT scan Angio Chest 10/19- No pulmonary embolus. Multifocal infection as delineated above. Follow-up recommended to ensure resolution, Please follow up with your primary care physician regarding your hospitalization        SECONDARY DISCHARGE DIAGNOSES  Diagnosis: Pneumonia  Assessment and Plan of Treatment: +enterovirus. Please continue your antibiotics as prescribed   -Cont supportive care.  Pt now on room air , breathing easy, with O2 sat 96-98% on room air.    Diagnosis: Atrial fibrillation with RVR  Assessment and Plan of Treatment: now improved, evaluated by cardiologist and electrophysiologist.  Continue heart rate control medications as prescribed  -No anticoagulation given history of intracranial hemorrhage in 5/2021 and continued cognitive decline.  Please continue your medications as directed and follow-up with your primary provider/cardiologist to further manage your care. Monitor for signs/symptoms of uncontrolled atrial fibrillation, such as, increased heart rate, palpitations, chest pain, dizziness, or shortness of breath - Return to emergency room if these signs/symptoms are present.       PRINCIPAL DISCHARGE DIAGNOSIS  Diagnosis: Septic shock  Assessment and Plan of Treatment: complete the course of antibiotics as prescribed. Repeat blood culture showed no growth. Sputum Culture nonspecifc WBC, no bacteria isolated  CAT scan Angio Chest 10/19- No pulmonary embolus. Multifocal infection as delineated above. Follow-up recommended to ensure resolution, Please follow up with your primary care physician regarding your hospitalization.      SECONDARY DISCHARGE DIAGNOSES  Diagnosis: Pneumonia  Assessment and Plan of Treatment: Enterovirus. Please continue your antibiotics as prescribed, with O2 sat 96-98% on room air.    Diagnosis: Atrial fibrillation with RVR  Assessment and Plan of Treatment: now improved, evaluated by cardiologist and electrophysiologist.  Continue heart rate control medications as prescribed  -No anticoagulation given history of intracranial hemorrhage in 5/2021 and continued cognitive decline.  Please continue your medications as directed and follow-up with your primary provider/cardiologist to further manage your care. Monitor for signs/symptoms of uncontrolled atrial fibrillation, such as, increased heart rate, palpitations, chest pain, dizziness, or shortness of breath - Return to emergency room if these signs/symptoms are present.

## 2021-10-25 NOTE — PROGRESS NOTE ADULT - SUBJECTIVE AND OBJECTIVE BOX
Infectious Diseases progress note:    Subjective: Afebrile, no leukocytosis.  NAD    ROS:  CONSTITUTIONAL:  No fever, chills, rigors  CARDIOVASCULAR:  No chest pain or palpitations  RESPIRATORY:   No SOB, cough, dyspnea on exertion.  No wheezing  GASTROINTESTINAL:  No abd pain, N/V, diarrhea/constipation  EXTREMITIES:  No swelling or joint pain  GENITOURINARY:  No burning on urination, increased frequency or urgency.  No flank pain  NEUROLOGIC:  No HA, visual disturbances  SKIN: No rashes    Allergies    No Known Allergies    Intolerances        ANTIBIOTICS/RELEVANT:  antimicrobials  piperacillin/tazobactam IVPB.. 3.375 Gram(s) IV Intermittent every 8 hours    immunologic:    OTHER:  acetaminophen  Suppository .. 650 milliGRAM(s) Rectal every 6 hours PRN  aspirin  chewable 81 milliGRAM(s) Oral daily  atorvastatin 80 milliGRAM(s) Oral at bedtime  dextrose 40% Gel 15 Gram(s) Oral once  dextrose 5%. 1000 milliLiter(s) IV Continuous <Continuous>  dextrose 5%. 1000 milliLiter(s) IV Continuous <Continuous>  dextrose 50% Injectable 25 Gram(s) IV Push once  dextrose 50% Injectable 12.5 Gram(s) IV Push once  dextrose 50% Injectable 25 Gram(s) IV Push once  digoxin  Injectable 125 MICROGram(s) IV Push every other day  glucagon  Injectable 1 milliGRAM(s) IntraMuscular once  heparin   Injectable 5000 Unit(s) SubCutaneous every 12 hours  insulin lispro (ADMELOG) corrective regimen sliding scale   SubCutaneous three times a day before meals  insulin lispro (ADMELOG) corrective regimen sliding scale   SubCutaneous at bedtime  latanoprost 0.005% Ophthalmic Solution 1 Drop(s) Both EYES at bedtime  levalbuterol Inhalation 0.63 milliGRAM(s) Inhalation every 6 hours PRN  levothyroxine Injectable 40 MICROGram(s) IV Push at bedtime  melatonin 6 milliGRAM(s) Oral at bedtime  metoprolol tartrate Injectable 5 milliGRAM(s) IV Push every 6 hours  PARoxetine 20 milliGRAM(s) Oral daily  senna 2 Tablet(s) Oral at bedtime      Objective:  Vital Signs Last 24 Hrs  T(C): 36.7 (25 Oct 2021 12:26), Max: 36.8 (24 Oct 2021 23:23)  T(F): 98.1 (25 Oct 2021 12:26), Max: 98.2 (24 Oct 2021 23:23)  HR: 93 (25 Oct 2021 12:26) (78 - 100)  BP: 148/83 (25 Oct 2021 12:26) (123/81 - 151/83)  BP(mean): --  RR: 18 (25 Oct 2021 12:26) (18 - 18)  SpO2: 97% (25 Oct 2021 12:26) (95% - 97%)    PHYSICAL EXAM:  Constitutional:NAD  Eyes:LEATHA, EOMI  Ear/Nose/Throat: no thrush, mucositis.  Moist mucous membranes	  Neck:no JVD, no lymphadenopathy, supple  Respiratory: CTA dinesh  Cardiovascular: S1S2 RRR, no murmurs  Gastrointestinal:soft, nontender,  nondistended (+) BS  Extremities:no e/e/c  Skin:  no rashes, open wounds or ulcerations        LABS:                        11.9   5.88  )-----------( 210      ( 25 Oct 2021 06:57 )             34.8     10-25    137  |  103  |  9   ----------------------------<  109<H>  3.4<L>   |  22  |  0.57    Ca    9.1      25 Oct 2021 06:57  Phos  2.6     10-25  Mg     1.90     10-25            Procalcitonin, Serum: 0.98 (10-23 @ 08:11)            Rapid RVP Result: Detected          MICROBIOLOGY:    Culture - Sputum . (10.25.21 @ 03:14)   Gram Stain:   Moderate polymorphonuclear leukocytes per low power field   No Squamous epithelial cells per low power field   Few Yeast like cells per oil power field   Specimen Source: .Sputum Sputum     Culture - Blood in AM (10.23.21 @ 10:07)   Specimen Source: .Blood Blood   Culture Results:   No growth to date.     Culture - Blood in AM (10.23.21 @ 10:07)   Specimen Source: .Blood Blood   Culture Results:   No growth to date.       RADIOLOGY & ADDITIONAL STUDIES:

## 2021-10-25 NOTE — PROGRESS NOTE ADULT - SUBJECTIVE AND OBJECTIVE BOX
Cardiovascular Disease Progress Note    Overnight events: No acute events overnight. Mr. Painter is in no distress.    Otherwise review of systems negative    Objective Findings:  T(C): 36.7 (10-25-21 @ 05:35), Max: 36.8 (10-24-21 @ 23:23)  HR: 84 (10-25-21 @ 05:55) (78 - 100)  BP: 135/79 (10-25-21 @ 05:55) (123/81 - 151/83)  RR: 18 (10-25-21 @ 05:35) (16 - 18)  SpO2: 95% (10-25-21 @ 05:35) (95% - 95%)  Wt(kg): --  Daily     Daily       Physical Exam:  Gen: NAD; Patient resting comfortably  HEENT: EOMI, Normocephalic/ atraumatic  CV: IIR, normal S1 + S2, no m/r/g  Lungs:  Normal respiratory effort; crackles to auscultation bilaterally  Abd: soft, non-tender; bowel sounds present  Ext: No edema; warm and well perfused    Telemetry: a-fib 70s    Laboratory Data:                        11.9   5.88  )-----------( 210      ( 25 Oct 2021 06:57 )             34.8     10-23    139  |  103  |  10  ----------------------------<  158<H>  3.7   |  25  |  0.58    Ca    9.6      23 Oct 2021 07:57  Phos  2.3     10-23  Mg     2.10     10-23                Inpatient Medications:  MEDICATIONS  (STANDING):  aspirin  chewable 81 milliGRAM(s) Oral daily  atorvastatin 80 milliGRAM(s) Oral at bedtime  dextrose 40% Gel 15 Gram(s) Oral once  dextrose 5%. 1000 milliLiter(s) (50 mL/Hr) IV Continuous <Continuous>  dextrose 5%. 1000 milliLiter(s) (100 mL/Hr) IV Continuous <Continuous>  dextrose 50% Injectable 25 Gram(s) IV Push once  dextrose 50% Injectable 12.5 Gram(s) IV Push once  dextrose 50% Injectable 25 Gram(s) IV Push once  digoxin  Injectable 125 MICROGram(s) IV Push every other day  glucagon  Injectable 1 milliGRAM(s) IntraMuscular once  heparin   Injectable 5000 Unit(s) SubCutaneous every 12 hours  insulin lispro (ADMELOG) corrective regimen sliding scale   SubCutaneous three times a day before meals  insulin lispro (ADMELOG) corrective regimen sliding scale   SubCutaneous at bedtime  latanoprost 0.005% Ophthalmic Solution 1 Drop(s) Both EYES at bedtime  levothyroxine Injectable 40 MICROGram(s) IV Push at bedtime  melatonin 6 milliGRAM(s) Oral at bedtime  metoprolol tartrate Injectable 5 milliGRAM(s) IV Push every 6 hours  PARoxetine 20 milliGRAM(s) Oral daily  piperacillin/tazobactam IVPB.. 3.375 Gram(s) IV Intermittent every 8 hours  senna 2 Tablet(s) Oral at bedtime      Assessment: 89 year old man with a-fib, aortic stenosis s/p TAVR 2017, CAD, and HTN presents with hypoxic respiratory failure, severe sepsis and a-fib with RVR.     Plan of Care:    #Atrial Fibrillation-  Heart rates are much improved.   Now that patient is on a PO diet, we can change medications to the oral route.  Consider transitioning to Lopressor 25 mg PO BID and Digoxin 125 mcg PO every other day.    No anticoagulation given history of intracranial hemorrhage and continued cognitive decline.     #Aortic Stenosis-  S/P JOSIAH.  No significant murmur noted on exam.   Continue current cardiac management.     #Hypoxic respiratory failure-  Due to PNA with infiltrates seen on CT scan.  Management as per the primary team.     #ACP (advance care planning)-  Advanced care planning was addressed.  No plan for invasive cardiac procedures given co-morbidities and debility.         Over 25 minutes spent on total encounter; more than 50% of the visit was spent counseling and/or coordinating care by the attending physician.      Ricky Davila MD [facility]  Cardiovascular Disease  [phone]

## 2021-10-25 NOTE — PROGRESS NOTE ADULT - SUBJECTIVE AND OBJECTIVE BOX
Date of Service  : 10-25-21     INTERVAL HPI/OVERNIGHT EVENTS: I feel fine .   Vital Signs Last 24 Hrs  T(C): 36.7 (25 Oct 2021 12:26), Max: 36.8 (24 Oct 2021 23:23)  T(F): 98.1 (25 Oct 2021 12:26), Max: 98.2 (24 Oct 2021 23:23)  HR: 93 (25 Oct 2021 12:26) (78 - 100)  BP: 148/83 (25 Oct 2021 12:26) (123/81 - 151/83)  BP(mean): --  RR: 18 (25 Oct 2021 12:26) (18 - 18)  SpO2: 97% (25 Oct 2021 12:26) (95% - 97%)  I&O's Summary    24 Oct 2021 07:01  -  25 Oct 2021 07:00  --------------------------------------------------------  IN: 786 mL / OUT: 0 mL / NET: 786 mL      MEDICATIONS  (STANDING):  aspirin  chewable 81 milliGRAM(s) Oral daily  atorvastatin 80 milliGRAM(s) Oral at bedtime  dextrose 40% Gel 15 Gram(s) Oral once  dextrose 5%. 1000 milliLiter(s) (50 mL/Hr) IV Continuous <Continuous>  dextrose 5%. 1000 milliLiter(s) (100 mL/Hr) IV Continuous <Continuous>  dextrose 50% Injectable 25 Gram(s) IV Push once  dextrose 50% Injectable 12.5 Gram(s) IV Push once  dextrose 50% Injectable 25 Gram(s) IV Push once  digoxin  Injectable 125 MICROGram(s) IV Push every other day  glucagon  Injectable 1 milliGRAM(s) IntraMuscular once  heparin   Injectable 5000 Unit(s) SubCutaneous every 12 hours  insulin lispro (ADMELOG) corrective regimen sliding scale   SubCutaneous three times a day before meals  insulin lispro (ADMELOG) corrective regimen sliding scale   SubCutaneous at bedtime  latanoprost 0.005% Ophthalmic Solution 1 Drop(s) Both EYES at bedtime  levothyroxine Injectable 40 MICROGram(s) IV Push at bedtime  melatonin 6 milliGRAM(s) Oral at bedtime  metoprolol tartrate Injectable 5 milliGRAM(s) IV Push every 6 hours  PARoxetine 20 milliGRAM(s) Oral daily  piperacillin/tazobactam IVPB.. 3.375 Gram(s) IV Intermittent every 8 hours  senna 2 Tablet(s) Oral at bedtime    MEDICATIONS  (PRN):  acetaminophen  Suppository .. 650 milliGRAM(s) Rectal every 6 hours PRN Temp greater or equal to 38C (100.4F), Mild Pain (1 - 3), Moderate Pain (4 - 6), Severe Pain (7 - 10)  levalbuterol Inhalation 0.63 milliGRAM(s) Inhalation every 6 hours PRN SOB    LABS:                        11.9   5.88  )-----------( 210      ( 25 Oct 2021 06:57 )             34.8     10-25    137  |  103  |  9   ----------------------------<  109<H>  3.4<L>   |  22  |  0.57    Ca    9.1      25 Oct 2021 06:57  Phos  2.6     10-25  Mg     1.90     10-25          CAPILLARY BLOOD GLUCOSE  107 (24 Oct 2021 20:42)      POCT Blood Glucose.: 122 mg/dL (25 Oct 2021 11:41)  POCT Blood Glucose.: 130 mg/dL (25 Oct 2021 08:35)  POCT Blood Glucose.: 125 mg/dL (24 Oct 2021 16:50)              Consultant(s) Notes Reviewed:  [x ] YES  [ ] NO    PHYSICAL EXAM:  GENERAL: NAD,   HEAD:  Atraumatic, Normocephalic  NECK: Supple, No JVD, Normal thyroid  NERVOUS SYSTEM:  Alert &  No focal deficit   CHEST/LUNG: Good air entry bilateral with no  rales, rhonchi, wheezing, or rubs  HEART: Regular rate and rhythm; No murmurs, rubs, or gallops  ABDOMEN: Soft, Nontender, Nondistended; Bowel sounds present  EXTREMITIES:  2+ Peripheral Pulses, No clubbing, cyanosis, or edema      Care Discussed with Consultants/Other Providers [ x] YES  [ ] NO

## 2021-10-25 NOTE — PROGRESS NOTE ADULT - ASSESSMENT
89 yo M PMHx BPH, AFib not on AC due to ICH, AS s/p TAVR 2017, CAD, Dementia, DM, hypothyroidism, HTN, Dysphagia, presents from [facility] for concern for pneumonia. Pt unable to provide a history. Per paper work from SNF, pt c/o SOB and cough.      Problem/Plan - 1:  ·  Problem:  Sepsis.   ·  Plan: S/P IV Abxs. Enterovirus positive . Cultures pending .    ID help appreciated.      Problem/Plan - 2:  ·  Problem:  Atrial fibrillation with RVR.   ·  Plan: Not on AC . Rate control . Cards and EP consulted .   IV Digoxin and Lopressor.      Problem/Plan - 3:  ·  Problem:  Hypotension.   ·  Plan: S/P IVF . Had RRT .      Problem/Plan - 4:  ·  Problem:  Lactic acidosis.   ·  Plan: Likely sec to Hypotension .   Once stable will get CT abdomen and Chest .     Problem/Plan - 5:  ·  Problem:  Diabetes mellitus.   ·  Plan: SSI for now . NPO .     Problem/Plan - 6:  ·  Problem:  Hypothyroidism.   ·  Plan: Home dose synthroid .     Problem/Plan - 7:  ·  Problem:  Dementia.   ·  Plan: Supportive care .     Problem/Plan - 8:  ·  Problem:  Advance care planning.   ·  Plan: D/W His HCP Brandi in detail his critical condition and poor prognosis . Said he is DNR/DNI .      D/W HCP Brandi again.     
89 yo M PMHx BPH, AFib not on AC due to ICH, AS s/p TAVR 2017, CAD, Dementia, DM, hypothyroidism, HTN, Dysphagia, presents from [facility] for concern for pneumonia. Pt unable to provide a history. Per paper work from [facility], pt c/o SOB and cough.   (19 Oct 2021 18:25)    ER vs:  Tmax 102.2, P 133.  /66.  Pt initially on NRB --> 3L NC.  RVP (+) enterovirus.  Covid pcr (-).   WBC 7.2 -->10.6.  CTA chest no PE.  Multifocal peribronchial groundglass abnormalities seen with multiple acinar nodules, s/o infection and endobronchial spread.    ID consulted for further abx managment.     Sepsis:    - Pt with fever, tachycardia on admission, requiring supp O2.  RVP (+) enterovirus, possible superimposed bacterial pna/aspiration.  - CTA chest results noted.  - Cont zosyn for now.  Monitor for clinical improvement.    - Swallow eval results noted, follow recs.  Maintain aspiration precautions.  Pt is on pleasure feeds, Palliative f/u appreciated.   - Check blood cx x 2 (ngtd), sputum cx.  Check procalcitonin      Viral URI:    - RVP (+) enterovirus.  - Cont supportive care.  Pt on supp O2, titrate down oxygen as tolerated.        Will follow,    Shirley Viveros  [phone]
89 yo M PMHx BPH, AFib not on AC due to ICH, AS s/p TAVR 2017, CAD, Dementia, DM, hypothyroidism, HTN, Dysphagia, presents from [facility] for concern for pneumonia. Pt unable to provide a history. Per paper work from SNF, pt c/o SOB and cough.      Problem/Plan - 1:  ·  Problem:  Sepsis with Pneumonia with Hypoxia    ·  Plan: Oxygen .   S/P IV Abxs and on Zosyn. Will change to PO to  Complete course.   Enterovirus positive .   Cultures noted.    ID help appreciated.   < from: CT Angio Chest PE Protocol w/ IV Cont (10.19.21 @ 22:15) >  IMPRESSION:    No pulmonary embolus.    Multifocal infection as delineated above. Follow-up recommended to ensure resolution.    < end of copied text >     Problem/Plan - 2:  ·  Problem:  Atrial fibrillation with RVR.   ·  Plan: Not on AC . Rate control .   Cards and EP helping.   IV Digoxin and Lopressor.      Problem/Plan - 3:  ·  Problem:  Hypotension.   ·  Plan: S/P IVF . Had RRT .     BP readings better now.       Problem/Plan - 4:  ·  Problem:  Lactic acidosis.   ·  Plan: Likely sec to Hypotension .   Resolving.      Problem/Plan - 5:  ·  Problem:  Diabetes mellitus.   ·  Plan: SSI for now . NPO .     Problem/Plan - 6:  ·  Problem:  Hypothyroidism.   ·  Plan: Home dose synthroid .     Problem/Plan - 7:  ·  Problem:  Dementia.   ·  Plan: Supportive care . Failed Speech swallow   Abnormal  cineesophagogram   Pleasure feeds and no NGT/PEG per HCP .      Problem/Plan - 8:  ·  Problem:  Advance care planning.   ·  Plan:   DNR/DNI . Awaiting Hospice.     Disposition : DC planning pending placement.     
89 yo M PMHx BPH, AFib not on AC due to ICH, AS s/p TAVR 2017, CAD, Dementia, DM, hypothyroidism, HTN, Dysphagia, presents from [facility] for concern for pneumonia. Pt unable to provide a history. Per paper work from SNF, pt c/o SOB and cough.      Problem/Plan - 1:  ·  Problem:  Sepsis with Pneumonia    ·  Plan: S/P IV Abxs. Still spiking temp .   Enterovirus positive .   Cultures pending .    ID help appreciated.   < from: CT Angio Chest PE Protocol w/ IV Cont (10.19.21 @ 22:15) >  IMPRESSION:    No pulmonary embolus.    Multifocal infection as delineated above. Follow-up recommended to ensure resolution.    < end of copied text >     Problem/Plan - 2:  ·  Problem:  Atrial fibrillation with RVR.   ·  Plan: Not on AC . Rate control .   Cards and EP helping.   IV Digoxin and Lopressor.      Problem/Plan - 3:  ·  Problem:  Hypotension.   ·  Plan: S/P IVF . Had RRT .     BP readings better now.       Problem/Plan - 4:  ·  Problem:  Lactic acidosis.   ·  Plan: Likely sec to Hypotension .   Resolving.      Problem/Plan - 5:  ·  Problem:  Diabetes mellitus.   ·  Plan: SSI for now . NPO .     Problem/Plan - 6:  ·  Problem:  Hypothyroidism.   ·  Plan: Home dose synthroid .     Problem/Plan - 7:  ·  Problem:  Dementia.   ·  Plan: Supportive care . Failed Speech swallow   Abnormal  cineesophagogram   Pleasure feeds and no NGT/PEG per HCP .      Problem/Plan - 8:  ·  Problem:  Advance care planning.   ·  Plan: D/W His HCP Brandi in detail his critical condition and poor prognosis . Said he is DNR/DNI .      
88 yo M PMHx BPH, AFib not on AC due to ICH, AS s/p TAVR 2017, CAD, Dementia, DM, hypothyroidism, HTN, Dysphagia, presents from [facility] for concern for pneumonia. Pt unable to provide a history. Per paper work from SNF, pt c/o SOB and cough.      Problem/Plan - 1:  ·  Problem:  Sepsis with Pneumonia    ·  Plan: S/P IV Abxs.   Enterovirus positive .   Cultures pending .    ID help appreciated.   < from: CT Angio Chest PE Protocol w/ IV Cont (10.19.21 @ 22:15) >  IMPRESSION:    No pulmonary embolus.    Multifocal infection as delineated above. Follow-up recommended to ensure resolution.    < end of copied text >     Problem/Plan - 2:  ·  Problem:  Atrial fibrillation with RVR.   ·  Plan: Not on AC . Rate control .   Cards and EP consulted .   IV Digoxin and Lopressor.      Problem/Plan - 3:  ·  Problem:  Hypotension.   ·  Plan: S/P IVF . Had RRT .     BP readings better now.       Problem/Plan - 4:  ·  Problem:  Lactic acidosis.   ·  Plan: Likely sec to Hypotension .   Resolving.      Problem/Plan - 5:  ·  Problem:  Diabetes mellitus.   ·  Plan: SSI for now . NPO .     Problem/Plan - 6:  ·  Problem:  Hypothyroidism.   ·  Plan: Home dose synthroid .     Problem/Plan - 7:  ·  Problem:  Dementia.   ·  Plan: Supportive care . Failed Speech swallow   Abnormal  cineesophagogram   Pleasure feeds and no NGT/PEG per HCP .    Problem/Plan - 8:  ·  Problem:  Advance care planning.   ·  Plan: D/W His HCP Brandi in detail his critical condition and poor prognosis . Said he is DNR/DNI .        
88 yo M PMHx BPH, AFib not on AC due to ICH, AS s/p TAVR 2017, CAD, Dementia, DM, hypothyroidism, HTN, Dysphagia, presents from [facility] for concern for pneumonia. Pt unable to provide a history. Per paper work from SNF, pt c/o SOB and cough.      Problem/Plan - 1:  ·  Problem:  Sepsis with Pneumonia    ·  Plan: S/P IV Abxs.   Enterovirus positive .   Cultures pending .    ID help appreciated.   < from: CT Angio Chest PE Protocol w/ IV Cont (10.19.21 @ 22:15) >  IMPRESSION:    No pulmonary embolus.    Multifocal infection as delineated above. Follow-up recommended to ensure resolution.    < end of copied text >     Problem/Plan - 2:  ·  Problem:  Atrial fibrillation with RVR.   ·  Plan: Not on AC . Rate control .   Cards and EP consulted .   IV Digoxin and Lopressor.      Problem/Plan - 3:  ·  Problem:  Hypotension.   ·  Plan: S/P IVF . Had RRT .     BP readings better now.       Problem/Plan - 4:  ·  Problem:  Lactic acidosis.   ·  Plan: Likely sec to Hypotension .   Resolving.      Problem/Plan - 5:  ·  Problem:  Diabetes mellitus.   ·  Plan: SSI for now . NPO .     Problem/Plan - 6:  ·  Problem:  Hypothyroidism.   ·  Plan: Home dose synthroid .     Problem/Plan - 7:  ·  Problem:  Dementia.   ·  Plan: Supportive care . Failed Speech swallow   Abnormal  cinesopagogram    Problem/Plan - 8:  ·  Problem:  Advance care planning.   ·  Plan: D/W His HCP Brandi in detail his critical condition and poor prognosis . Said he is DNR/DNI .      D/W HCP Brandi yesterday .    
89 yo M PMHx BPH, AFib not on AC due to ICH, AS s/p TAVR 2017, CAD, Dementia, DM, hypothyroidism, HTN, Dysphagia, presents from [facility] for concern for pneumonia. Pt unable to provide a history. Per paper work from SNF, pt c/o SOB and cough.      Problem/Plan - 1:  ·  Problem:  Sepsis with Pneumonia with Hypoxia    ·  Plan: Oxygen .   S/P IV Abxs and on Zosyn. Will change to PO to  Complete course.   Enterovirus positive .   Cultures noted.    ID help appreciated.   < from: CT Angio Chest PE Protocol w/ IV Cont (10.19.21 @ 22:15) >  IMPRESSION:    No pulmonary embolus.    Multifocal infection as delineated above. Follow-up recommended to ensure resolution.    < end of copied text >     Problem/Plan - 2:  ·  Problem:  Atrial fibrillation with RVR.   ·  Plan: Not on AC . Rate control .   Cards and EP helping.   IV Digoxin and Lopressor.      Problem/Plan - 3:  ·  Problem:  Hypotension.   ·  Plan: S/P IVF . Had RRT .     BP readings better now.       Problem/Plan - 4:  ·  Problem:  Lactic acidosis.   ·  Plan: Likely sec to Hypotension .   Resolving.      Problem/Plan - 5:  ·  Problem:  Diabetes mellitus.   ·  Plan: SSI for now . NPO .     Problem/Plan - 6:  ·  Problem:  Hypothyroidism.   ·  Plan: Home dose synthroid .     Problem/Plan - 7:  ·  Problem:  Dementia.   ·  Plan: Supportive care . Failed Speech swallow   Abnormal  cineesophagogram   Pleasure feeds and no NGT/PEG per HCP .      Problem/Plan - 8:  ·  Problem:  Advance care planning.   ·  Plan:   DNR/DNI . Awaiting Hospice.     Disposition : DC planning pending placement.         
88 yo M PMHx BPH, AFib not on AC due to ICH, AS s/p TAVR 2017, CAD, Dementia, DM, hypothyroidism, HTN, Dysphagia, presents from [facility] for concern for pneumonia. Pt unable to provide a history. Per paper work from [facility], pt c/o SOB and cough.   (19 Oct 2021 18:25)    ER vs:  Tmax 102.2, P 133.  /66.  Pt initially on NRB --> 3L NC.  RVP (+) enterovirus.  Covid pcr (-).   WBC 7.2 -->10.6.  CTA chest no PE.  Multifocal peribronchial groundglass abnormalities seen with multiple acinar nodules, s/o infection and endobronchial spread.    ID consulted for further abx managment.     Sepsis:    - Pt with fever, tachycardia on admission, requiring supp O2.  RVP (+) enterovirus, possible superimposed bacterial pna/aspiration.  - CTA chest results noted.  - Cont zosyn for now.  Monitor for clinical improvement.    - Swallow eval results noted, follow recs.  Maintain aspiration precautions.    - Check blood cx x 2, sputum cx.  Check procalcitonin      Viral URI:    - RVP (+) enterovirus.  - Cont supportive care.  Pt on supp O2, titrate down oxygen as tolerated.        Will followShirley Rai  [phone]
88 yo M PMHx BPH, AFib not on AC due to ICH, AS s/p TAVR 2017, CAD, Dementia, DM, hypothyroidism, HTN, Dysphagia, presents from [facility] for concern for pneumonia. Pt unable to provide a history. Per paper work from [facility], pt c/o SOB and cough.   (19 Oct 2021 18:25)    ER vs:  Tmax 102.2, P 133.  /66.  Pt initially on NRB --> 3L NC.  RVP (+) enterovirus.  Covid pcr (-).   WBC 7.2 -->10.6.  CTA chest no PE.  Multifocal peribronchial groundglass abnormalities seen with multiple acinar nodules, s/o infection and endobronchial spread.    ID consulted for further abx managment.     Sepsis:    - Pt with fever, tachycardia on admission, requiring supp O2.  RVP (+) enterovirus, possible superimposed bacterial pna/aspiration.  - CTA chest results noted.  - Cont zosyn for now.  Monitor for clinical improvement.    - Swallow eval results noted, follow recs.  Maintain aspiration precautions.  Pt is on pleasure feeds, Palliative f/u appreciated.   - Check blood cx x 2 (ngtd), sputum cx (prelim gm stain few yeast - likely colonizer, no need to treat).  Check procalcitonin      Viral URI:    - RVP (+) enterovirus.  - Cont supportive care.  Pt on supp O2, titrate down oxygen as tolerated.  Pt currently satting adequately on RA    * Complete 7 day course of zosyn . d/c planning in place      Will follow,    Shirley Viveros  [phone]
Mr. Painter is an 88 YO M with PMH of AFib not on AC, ICH, AS s/p TAVR, CAD, dementia, dysphagia, DM, L spinal stenosis, hypothyroid, HTN, BPH, who was brought in from long-term care facility for shortness of breath, cough and hypoxia. Shortly after admission, rapid response was called for tachycardia, increased work of breathing, improved with lopressor. Patient afebrile, remains tachycardic, saturating 98% on 3 L NC. He is entero/rhinovirus positive and CTA shows patchy nodular opacities in R upper/lower and L lower lobes. Patient DNR/DNI per healthcare proxy, Brandi Mcguire. Palliative care team was consulted to discuss goals of care.

## 2021-10-26 NOTE — PROGRESS NOTE ADULT - SUBJECTIVE AND OBJECTIVE BOX
Cardiovascular Disease Progress Note    Overnight events: No acute events overnight. Mr. Painter is laying flat and comfortably in no distress.    Otherwise review of systems negative    Objective Findings:  T(C): 37 (10-26-21 @ 06:56), Max: 37 (10-26-21 @ 06:56)  HR: 91 (10-26-21 @ 06:56) (83 - 98)  BP: 124/79 (10-26-21 @ 06:56) (124/79 - 160/90)  RR: 18 (10-26-21 @ 06:56) (18 - 18)  SpO2: 99% (10-26-21 @ 06:56) (97% - 99%)  Wt(kg): --  Daily     Daily       Physical Exam:  Gen: NAD; Patient resting comfortably  HEENT: EOMI, Normocephalic/ atraumatic  CV: IIR, normal S1 + S2, no m/r/g  Lungs:  Normal respiratory effort; crackles to auscultation bilaterally  Abd: soft, non-tender; bowel sounds present  Ext: No edema; warm and well perfused    Telemetry: a-fib 90s    Laboratory Data:                        13.9   7.96  )-----------( 290      ( 26 Oct 2021 06:47 )             40.8     10-26    136  |  99  |  10  ----------------------------<  102<H>  4.2   |  21<L>  |  0.59    Ca    9.5      26 Oct 2021 06:47  Phos  2.9     10-26  Mg     2.10     10-26                Inpatient Medications:  MEDICATIONS  (STANDING):  aspirin  chewable 81 milliGRAM(s) Oral daily  atorvastatin 80 milliGRAM(s) Oral at bedtime  dextrose 40% Gel 15 Gram(s) Oral once  dextrose 5%. 1000 milliLiter(s) (50 mL/Hr) IV Continuous <Continuous>  dextrose 5%. 1000 milliLiter(s) (100 mL/Hr) IV Continuous <Continuous>  dextrose 50% Injectable 25 Gram(s) IV Push once  dextrose 50% Injectable 12.5 Gram(s) IV Push once  dextrose 50% Injectable 25 Gram(s) IV Push once  digoxin  Injectable 125 MICROGram(s) IV Push every other day  glucagon  Injectable 1 milliGRAM(s) IntraMuscular once  heparin   Injectable 5000 Unit(s) SubCutaneous every 12 hours  insulin lispro (ADMELOG) corrective regimen sliding scale   SubCutaneous three times a day before meals  insulin lispro (ADMELOG) corrective regimen sliding scale   SubCutaneous at bedtime  latanoprost 0.005% Ophthalmic Solution 1 Drop(s) Both EYES at bedtime  levothyroxine Injectable 40 MICROGram(s) IV Push at bedtime  melatonin 6 milliGRAM(s) Oral at bedtime  metoprolol tartrate Injectable 5 milliGRAM(s) IV Push every 6 hours  PARoxetine 20 milliGRAM(s) Oral daily  piperacillin/tazobactam IVPB.. 3.375 Gram(s) IV Intermittent every 8 hours  senna 2 Tablet(s) Oral at bedtime      Assessment: 89 year old man with a-fib, aortic stenosis s/p TAVR 2017, CAD, and HTN presents with hypoxic respiratory failure, severe sepsis and a-fib with RVR.     Plan of Care:    #Atrial Fibrillation-  Heart rates are much improved.   Now that patient is on a PO diet, we can change medications to the oral route.  Consider transitioning to Lopressor 25 mg PO BID and Digoxin 125 mcg PO every other day.    No anticoagulation given history of intracranial hemorrhage and continued cognitive decline.     #Aortic Stenosis-  S/P JOSIAH.  No significant murmur noted on exam.   Continue current cardiac management.     #Hypoxic respiratory failure-  Due to PNA with infiltrates seen on CT scan.  Patient now on room air.   Management as per the primary team.       Over 25 minutes spent on total encounter; more than 50% of the visit was spent counseling and/or coordinating care by the attending physician.      Ricky Davila MD [facility]  Cardiovascular Disease  [phone]

## 2021-10-26 NOTE — DISCHARGE NOTE NURSING/CASE MANAGEMENT/SOCIAL WORK - PATIENT PORTAL LINK FT
You can access the FollowMyHealth Patient Portal offered by [facility] by registering at the following website: [email or web address]. By joining [facility]’s FollowMyHealth portal, you will also be able to view your health information using other applications (apps) compatible with our system.

## 2021-10-26 NOTE — PROGRESS NOTE ADULT - PROVIDER SPECIALTY LIST ADULT
Infectious Disease
Infectious Disease
Cardiology
Infectious Disease
Internal Medicine
Internal Medicine
Cardiology
Internal Medicine
Palliative Care

## 2021-10-26 NOTE — DIETITIAN INITIAL EVALUATION ADULT. - OTHER INFO
Pt 88 yo male with PMHx of BPH, AFib, AS s/p TAVR 2017, CAD, Dementia, DM, hypothyroidism, HTN, Dysphagia, presented from [facility] - per chart review.   Of note, Pt failed cineesophagogram (10/22) -->> pleasure feeds and no NGT/PEG per HCP.   Pt DNR/DNI, Pt likely return to SNF with potential transition to hospice services at facility, per chart review.     At time of visit, Pt awake, appears disoriented. Per nurse, Pt with good appetite; Pt ate 75% of breakfast this morning; Pt needs to be fed. No report of vomiting or diarrhea @ this time. +Fecal incontinence, per flow sheet. Pt appears under weight. Unable to discuss diet or weight history @ present. Nutrition focused physical exam conducted, Pt found with subcutaneous fat loss & muscle wasting. Will recommend to add Glucerna Shake, Honey Thickened 2x daily to diet rx.     Case discussed with nurse. RDN remains available.

## 2021-10-26 NOTE — PROGRESS NOTE ADULT - REASON FOR ADMISSION
SOB and low oxygen

## 2021-10-26 NOTE — DIETITIAN INITIAL EVALUATION ADULT. - CONTINUE CURRENT NUTRITION CARE PLAN
Was the patient seen in the last year in this department? Yes    Does patient have an active prescription for medications requested? No     Received Request Via: Pharmacy  
yes

## 2021-10-26 NOTE — DIETITIAN INITIAL EVALUATION ADULT. - ADD RECOMMEND
1. Encourage & assist Pt with meals; Monitor PO diet tolerance; Honor food preferences;           2. Add Multivitamins with minerals 1 tab daily for micronutrient coverage;           3. Monitor labs, hydration status;

## 2021-10-26 NOTE — DIETITIAN INITIAL EVALUATION ADULT. - DIET TYPE
Add Glucerna Therapeutic Nutrition 8oz. 2x daily (~440 Kcals, ~20 gm Protein) - Honey Thickened/supplement (specify)

## 2021-10-26 NOTE — DISCHARGE NOTE NURSING/CASE MANAGEMENT/SOCIAL WORK - NSDCCRNAME_GEN_ALL_CORE_FT
[facility] address: [phone] [place], [place] [id number], 1pm  via [facility]

## 2022-01-01 ENCOUNTER — INPATIENT (INPATIENT)
Facility: HOSPITAL | Age: 87
LOS: 7 days | End: 2022-02-10
Attending: INTERNAL MEDICINE | Admitting: INTERNAL MEDICINE
Payer: MEDICARE

## 2022-01-01 VITALS
SYSTOLIC BLOOD PRESSURE: 157 MMHG | OXYGEN SATURATION: 100 % | DIASTOLIC BLOOD PRESSURE: 95 MMHG | TEMPERATURE: 99 F | HEART RATE: 94 BPM | RESPIRATION RATE: 22 BRPM

## 2022-01-01 VITALS — OXYGEN SATURATION: 94 % | RESPIRATION RATE: 20 BRPM

## 2022-01-01 DIAGNOSIS — Z98.890 OTHER SPECIFIED POSTPROCEDURAL STATES: Chronic | ICD-10-CM

## 2022-01-01 DIAGNOSIS — I48.20 CHRONIC ATRIAL FIBRILLATION, UNSPECIFIED: ICD-10-CM

## 2022-01-01 DIAGNOSIS — U07.1 COVID-19: ICD-10-CM

## 2022-01-01 DIAGNOSIS — Z90.49 ACQUIRED ABSENCE OF OTHER SPECIFIED PARTS OF DIGESTIVE TRACT: Chronic | ICD-10-CM

## 2022-01-01 DIAGNOSIS — Z98.49 CATARACT EXTRACTION STATUS, UNSPECIFIED EYE: Chronic | ICD-10-CM

## 2022-01-01 DIAGNOSIS — R13.10 DYSPHAGIA, UNSPECIFIED: ICD-10-CM

## 2022-01-01 DIAGNOSIS — Z71.89 OTHER SPECIFIED COUNSELING: ICD-10-CM

## 2022-01-01 DIAGNOSIS — E03.9 HYPOTHYROIDISM, UNSPECIFIED: ICD-10-CM

## 2022-01-01 LAB
A1C WITH ESTIMATED AVERAGE GLUCOSE RESULT: 6.2 % — HIGH (ref 4–5.6)
ALBUMIN SERPL ELPH-MCNC: 3 G/DL — LOW (ref 3.3–5)
ALBUMIN SERPL ELPH-MCNC: 3.3 G/DL — SIGNIFICANT CHANGE UP (ref 3.3–5)
ALBUMIN SERPL ELPH-MCNC: 3.4 G/DL — SIGNIFICANT CHANGE UP (ref 3.3–5)
ALBUMIN SERPL ELPH-MCNC: 3.4 G/DL — SIGNIFICANT CHANGE UP (ref 3.3–5)
ALBUMIN SERPL ELPH-MCNC: 4.1 G/DL — SIGNIFICANT CHANGE UP (ref 3.3–5)
ALP SERPL-CCNC: 109 U/L — SIGNIFICANT CHANGE UP (ref 40–120)
ALP SERPL-CCNC: 70 U/L — SIGNIFICANT CHANGE UP (ref 40–120)
ALP SERPL-CCNC: 72 U/L — SIGNIFICANT CHANGE UP (ref 40–120)
ALP SERPL-CCNC: 84 U/L — SIGNIFICANT CHANGE UP (ref 40–120)
ALP SERPL-CCNC: 85 U/L — SIGNIFICANT CHANGE UP (ref 40–120)
ALT FLD-CCNC: 10 U/L — SIGNIFICANT CHANGE UP (ref 4–41)
ALT FLD-CCNC: 11 U/L — SIGNIFICANT CHANGE UP (ref 4–41)
ALT FLD-CCNC: 11 U/L — SIGNIFICANT CHANGE UP (ref 4–41)
ALT FLD-CCNC: 9 U/L — SIGNIFICANT CHANGE UP (ref 4–41)
ALT FLD-CCNC: 9 U/L — SIGNIFICANT CHANGE UP (ref 4–41)
ANION GAP SERPL CALC-SCNC: 16 MMOL/L — HIGH (ref 7–14)
ANION GAP SERPL CALC-SCNC: 18 MMOL/L — HIGH (ref 7–14)
ANION GAP SERPL CALC-SCNC: 19 MMOL/L — HIGH (ref 7–14)
ANISOCYTOSIS BLD QL: SIGNIFICANT CHANGE UP
APPEARANCE UR: CLEAR — SIGNIFICANT CHANGE UP
APTT BLD: 30 SEC — SIGNIFICANT CHANGE UP (ref 27–36.3)
AST SERPL-CCNC: 16 U/L — SIGNIFICANT CHANGE UP (ref 4–40)
AST SERPL-CCNC: 20 U/L — SIGNIFICANT CHANGE UP (ref 4–40)
AST SERPL-CCNC: 22 U/L — SIGNIFICANT CHANGE UP (ref 4–40)
AST SERPL-CCNC: 25 U/L — SIGNIFICANT CHANGE UP (ref 4–40)
AST SERPL-CCNC: 25 U/L — SIGNIFICANT CHANGE UP (ref 4–40)
BACTERIA # UR AUTO: NEGATIVE — SIGNIFICANT CHANGE UP
BASE EXCESS BLDV CALC-SCNC: -2.1 MMOL/L — LOW (ref -2–3)
BASE EXCESS BLDV CALC-SCNC: -2.6 MMOL/L — LOW (ref -2–3)
BASOPHILS # BLD AUTO: 0 K/UL — SIGNIFICANT CHANGE UP (ref 0–0.2)
BASOPHILS # BLD AUTO: 0.02 K/UL — SIGNIFICANT CHANGE UP (ref 0–0.2)
BASOPHILS # BLD AUTO: 0.05 K/UL — SIGNIFICANT CHANGE UP (ref 0–0.2)
BASOPHILS NFR BLD AUTO: 0 % — SIGNIFICANT CHANGE UP (ref 0–2)
BASOPHILS NFR BLD AUTO: 0.4 % — SIGNIFICANT CHANGE UP (ref 0–2)
BASOPHILS NFR BLD AUTO: 0.5 % — SIGNIFICANT CHANGE UP (ref 0–2)
BILIRUB DIRECT SERPL-MCNC: 0.5 MG/DL — HIGH (ref 0–0.3)
BILIRUB DIRECT SERPL-MCNC: 0.5 MG/DL — HIGH (ref 0–0.3)
BILIRUB DIRECT SERPL-MCNC: 0.7 MG/DL — HIGH (ref 0–0.3)
BILIRUB INDIRECT FLD-MCNC: 1.2 MG/DL — HIGH (ref 0–1)
BILIRUB INDIRECT FLD-MCNC: 1.3 MG/DL — HIGH (ref 0–1)
BILIRUB INDIRECT FLD-MCNC: 1.5 MG/DL — HIGH (ref 0–1)
BILIRUB SERPL-MCNC: 1.6 MG/DL — HIGH (ref 0.2–1.2)
BILIRUB SERPL-MCNC: 1.8 MG/DL — HIGH (ref 0.2–1.2)
BILIRUB SERPL-MCNC: 1.8 MG/DL — HIGH (ref 0.2–1.2)
BILIRUB SERPL-MCNC: 1.9 MG/DL — HIGH (ref 0.2–1.2)
BILIRUB SERPL-MCNC: 2 MG/DL — HIGH (ref 0.2–1.2)
BILIRUB SERPL-MCNC: 2 MG/DL — HIGH (ref 0.2–1.2)
BILIRUB UR-MCNC: NEGATIVE — SIGNIFICANT CHANGE UP
BLOOD GAS VENOUS COMPREHENSIVE RESULT: SIGNIFICANT CHANGE UP
BLOOD GAS VENOUS COMPREHENSIVE RESULT: SIGNIFICANT CHANGE UP
BUN SERPL-MCNC: 19 MG/DL — SIGNIFICANT CHANGE UP (ref 7–23)
BUN SERPL-MCNC: 19 MG/DL — SIGNIFICANT CHANGE UP (ref 7–23)
BUN SERPL-MCNC: 22 MG/DL — SIGNIFICANT CHANGE UP (ref 7–23)
CALCIUM SERPL-MCNC: 8 MG/DL — LOW (ref 8.4–10.5)
CALCIUM SERPL-MCNC: 8.7 MG/DL — SIGNIFICANT CHANGE UP (ref 8.4–10.5)
CALCIUM SERPL-MCNC: 9.3 MG/DL — SIGNIFICANT CHANGE UP (ref 8.4–10.5)
CHLORIDE BLDV-SCNC: 99 MMOL/L — SIGNIFICANT CHANGE UP (ref 96–108)
CHLORIDE BLDV-SCNC: 99 MMOL/L — SIGNIFICANT CHANGE UP (ref 96–108)
CHLORIDE SERPL-SCNC: 102 MMOL/L — SIGNIFICANT CHANGE UP (ref 98–107)
CHLORIDE SERPL-SCNC: 98 MMOL/L — SIGNIFICANT CHANGE UP (ref 98–107)
CHLORIDE SERPL-SCNC: 99 MMOL/L — SIGNIFICANT CHANGE UP (ref 98–107)
CO2 BLDV-SCNC: 26.5 MMOL/L — HIGH (ref 22–26)
CO2 BLDV-SCNC: 27.4 MMOL/L — HIGH (ref 22–26)
CO2 SERPL-SCNC: 15 MMOL/L — LOW (ref 22–31)
CO2 SERPL-SCNC: 20 MMOL/L — LOW (ref 22–31)
CO2 SERPL-SCNC: 20 MMOL/L — LOW (ref 22–31)
COLOR SPEC: YELLOW — SIGNIFICANT CHANGE UP
CREAT SERPL-MCNC: 0.47 MG/DL — LOW (ref 0.5–1.3)
CREAT SERPL-MCNC: 0.53 MG/DL — SIGNIFICANT CHANGE UP (ref 0.5–1.3)
CREAT SERPL-MCNC: 0.55 MG/DL — SIGNIFICANT CHANGE UP (ref 0.5–1.3)
CREAT SERPL-MCNC: 0.59 MG/DL — SIGNIFICANT CHANGE UP (ref 0.5–1.3)
CREAT SERPL-MCNC: 0.61 MG/DL — SIGNIFICANT CHANGE UP (ref 0.5–1.3)
CRP SERPL-MCNC: 54.5 MG/L — HIGH
CULTURE RESULTS: NO GROWTH — SIGNIFICANT CHANGE UP
CULTURE RESULTS: SIGNIFICANT CHANGE UP
D DIMER BLD IA.RAPID-MCNC: 2164 NG/ML DDU — HIGH
DIFF PNL FLD: NEGATIVE — SIGNIFICANT CHANGE UP
EOSINOPHIL # BLD AUTO: 0 K/UL — SIGNIFICANT CHANGE UP (ref 0–0.5)
EOSINOPHIL # BLD AUTO: 0 K/UL — SIGNIFICANT CHANGE UP (ref 0–0.5)
EOSINOPHIL # BLD AUTO: 0.02 K/UL — SIGNIFICANT CHANGE UP (ref 0–0.5)
EOSINOPHIL NFR BLD AUTO: 0 % — SIGNIFICANT CHANGE UP (ref 0–6)
EOSINOPHIL NFR BLD AUTO: 0 % — SIGNIFICANT CHANGE UP (ref 0–6)
EOSINOPHIL NFR BLD AUTO: 0.2 % — SIGNIFICANT CHANGE UP (ref 0–6)
EPI CELLS # UR: 1 /HPF — SIGNIFICANT CHANGE UP (ref 0–5)
ESTIMATED AVERAGE GLUCOSE: 131 — SIGNIFICANT CHANGE UP
FERRITIN SERPL-MCNC: 147 NG/ML — SIGNIFICANT CHANGE UP (ref 30–400)
FLUAV AG NPH QL: SIGNIFICANT CHANGE UP
FLUBV AG NPH QL: SIGNIFICANT CHANGE UP
GAS PNL BLDV: 133 MMOL/L — LOW (ref 136–145)
GAS PNL BLDV: 134 MMOL/L — LOW (ref 136–145)
GAS PNL BLDV: SIGNIFICANT CHANGE UP
GIANT PLATELETS BLD QL SMEAR: PRESENT — SIGNIFICANT CHANGE UP
GLUCOSE BLDC GLUCOMTR-MCNC: 179 MG/DL — HIGH (ref 70–99)
GLUCOSE BLDC GLUCOMTR-MCNC: 179 MG/DL — HIGH (ref 70–99)
GLUCOSE BLDC GLUCOMTR-MCNC: 188 MG/DL — HIGH (ref 70–99)
GLUCOSE BLDC GLUCOMTR-MCNC: 197 MG/DL — HIGH (ref 70–99)
GLUCOSE BLDC GLUCOMTR-MCNC: 203 MG/DL — HIGH (ref 70–99)
GLUCOSE BLDC GLUCOMTR-MCNC: 212 MG/DL — HIGH (ref 70–99)
GLUCOSE BLDC GLUCOMTR-MCNC: 217 MG/DL — HIGH (ref 70–99)
GLUCOSE BLDC GLUCOMTR-MCNC: 218 MG/DL — HIGH (ref 70–99)
GLUCOSE BLDC GLUCOMTR-MCNC: 251 MG/DL — HIGH (ref 70–99)
GLUCOSE BLDC GLUCOMTR-MCNC: 263 MG/DL — HIGH (ref 70–99)
GLUCOSE BLDC GLUCOMTR-MCNC: 265 MG/DL — HIGH (ref 70–99)
GLUCOSE BLDC GLUCOMTR-MCNC: 273 MG/DL — HIGH (ref 70–99)
GLUCOSE BLDC GLUCOMTR-MCNC: 273 MG/DL — HIGH (ref 70–99)
GLUCOSE BLDC GLUCOMTR-MCNC: 286 MG/DL — HIGH (ref 70–99)
GLUCOSE BLDC GLUCOMTR-MCNC: 348 MG/DL — HIGH (ref 70–99)
GLUCOSE BLDV-MCNC: 205 MG/DL — HIGH (ref 70–99)
GLUCOSE BLDV-MCNC: 216 MG/DL — HIGH (ref 70–99)
GLUCOSE SERPL-MCNC: 209 MG/DL — HIGH (ref 70–99)
GLUCOSE SERPL-MCNC: 273 MG/DL — HIGH (ref 70–99)
GLUCOSE SERPL-MCNC: 278 MG/DL — HIGH (ref 70–99)
GLUCOSE UR QL: ABNORMAL
HCO3 BLDV-SCNC: 25 MMOL/L — SIGNIFICANT CHANGE UP (ref 22–29)
HCO3 BLDV-SCNC: 26 MMOL/L — SIGNIFICANT CHANGE UP (ref 22–29)
HCT VFR BLD CALC: 40.5 % — SIGNIFICANT CHANGE UP (ref 39–50)
HCT VFR BLD CALC: 41.4 % — SIGNIFICANT CHANGE UP (ref 39–50)
HCT VFR BLD CALC: 41.6 % — SIGNIFICANT CHANGE UP (ref 39–50)
HCT VFR BLD CALC: 42.3 % — SIGNIFICANT CHANGE UP (ref 39–50)
HCT VFR BLDA CALC: 39 % — SIGNIFICANT CHANGE UP (ref 39–51)
HCT VFR BLDA CALC: 42 % — SIGNIFICANT CHANGE UP (ref 39–51)
HGB BLD CALC-MCNC: 13 G/DL — SIGNIFICANT CHANGE UP (ref 13–17)
HGB BLD CALC-MCNC: 14 G/DL — SIGNIFICANT CHANGE UP (ref 13–17)
HGB BLD-MCNC: 13.5 G/DL — SIGNIFICANT CHANGE UP (ref 13–17)
HGB BLD-MCNC: 13.6 G/DL — SIGNIFICANT CHANGE UP (ref 13–17)
HGB BLD-MCNC: 13.9 G/DL — SIGNIFICANT CHANGE UP (ref 13–17)
HGB BLD-MCNC: 14.1 G/DL — SIGNIFICANT CHANGE UP (ref 13–17)
HYALINE CASTS # UR AUTO: 2 /LPF — SIGNIFICANT CHANGE UP (ref 0–7)
IANC: 11.68 K/UL — HIGH (ref 1.5–8.5)
IANC: 4.2 K/UL — SIGNIFICANT CHANGE UP (ref 1.5–8.5)
IANC: 8.27 K/UL — SIGNIFICANT CHANGE UP (ref 1.5–8.5)
IMM GRANULOCYTES NFR BLD AUTO: 0.2 % — SIGNIFICANT CHANGE UP (ref 0–1.5)
IMM GRANULOCYTES NFR BLD AUTO: 0.4 % — SIGNIFICANT CHANGE UP (ref 0–1.5)
INR BLD: 1.21 RATIO — HIGH (ref 0.88–1.16)
INR BLD: 1.27 RATIO — HIGH (ref 0.88–1.16)
INR BLD: 1.66 RATIO — HIGH (ref 0.88–1.16)
INR BLD: 1.67 RATIO — HIGH (ref 0.88–1.16)
KETONES UR-MCNC: ABNORMAL
LACTATE BLDV-MCNC: 4 MMOL/L — CRITICAL HIGH (ref 0.5–2)
LACTATE BLDV-MCNC: 4.6 MMOL/L — CRITICAL HIGH (ref 0.5–2)
LACTATE SERPL-SCNC: 6.3 MMOL/L — CRITICAL HIGH (ref 0.5–2)
LACTATE SERPL-SCNC: 6.6 MMOL/L — CRITICAL HIGH (ref 0.5–2)
LDH SERPL L TO P-CCNC: 240 U/L — HIGH (ref 135–225)
LEUKOCYTE ESTERASE UR-ACNC: NEGATIVE — SIGNIFICANT CHANGE UP
LYMPHOCYTES # BLD AUTO: 0.23 K/UL — LOW (ref 1–3.3)
LYMPHOCYTES # BLD AUTO: 0.23 K/UL — LOW (ref 1–3.3)
LYMPHOCYTES # BLD AUTO: 0.64 K/UL — LOW (ref 1–3.3)
LYMPHOCYTES # BLD AUTO: 1.7 % — LOW (ref 13–44)
LYMPHOCYTES # BLD AUTO: 5 % — LOW (ref 13–44)
LYMPHOCYTES # BLD AUTO: 6.7 % — LOW (ref 13–44)
MACROCYTES BLD QL: SIGNIFICANT CHANGE UP
MAGNESIUM SERPL-MCNC: 1.5 MG/DL — LOW (ref 1.6–2.6)
MAGNESIUM SERPL-MCNC: 1.6 MG/DL — SIGNIFICANT CHANGE UP (ref 1.6–2.6)
MCHC RBC-ENTMCNC: 32.6 GM/DL — SIGNIFICANT CHANGE UP (ref 32–36)
MCHC RBC-ENTMCNC: 32.9 GM/DL — SIGNIFICANT CHANGE UP (ref 32–36)
MCHC RBC-ENTMCNC: 33.6 GM/DL — SIGNIFICANT CHANGE UP (ref 32–36)
MCHC RBC-ENTMCNC: 33.8 PG — SIGNIFICANT CHANGE UP (ref 27–34)
MCHC RBC-ENTMCNC: 33.9 GM/DL — SIGNIFICANT CHANGE UP (ref 32–36)
MCHC RBC-ENTMCNC: 34.5 PG — HIGH (ref 27–34)
MCHC RBC-ENTMCNC: 34.5 PG — HIGH (ref 27–34)
MCHC RBC-ENTMCNC: 34.8 PG — HIGH (ref 27–34)
MCV RBC AUTO: 102.7 FL — HIGH (ref 80–100)
MCV RBC AUTO: 102.8 FL — HIGH (ref 80–100)
MCV RBC AUTO: 103.8 FL — HIGH (ref 80–100)
MCV RBC AUTO: 105 FL — HIGH (ref 80–100)
METAMYELOCYTES # FLD: 7 % — HIGH (ref 0–1)
MONOCYTES # BLD AUTO: 0.18 K/UL — SIGNIFICANT CHANGE UP (ref 0–0.9)
MONOCYTES # BLD AUTO: 0.58 K/UL — SIGNIFICANT CHANGE UP (ref 0–0.9)
MONOCYTES # BLD AUTO: 0.71 K/UL — SIGNIFICANT CHANGE UP (ref 0–0.9)
MONOCYTES NFR BLD AUTO: 3.9 % — SIGNIFICANT CHANGE UP (ref 2–14)
MONOCYTES NFR BLD AUTO: 5.2 % — SIGNIFICANT CHANGE UP (ref 2–14)
MONOCYTES NFR BLD AUTO: 6 % — SIGNIFICANT CHANGE UP (ref 2–14)
NEUTROPHILS # BLD AUTO: 11.73 K/UL — HIGH (ref 1.8–7.4)
NEUTROPHILS # BLD AUTO: 4.2 K/UL — SIGNIFICANT CHANGE UP (ref 1.8–7.4)
NEUTROPHILS # BLD AUTO: 8.27 K/UL — HIGH (ref 1.8–7.4)
NEUTROPHILS NFR BLD AUTO: 76.5 % — SIGNIFICANT CHANGE UP (ref 43–77)
NEUTROPHILS NFR BLD AUTO: 86.2 % — HIGH (ref 43–77)
NEUTROPHILS NFR BLD AUTO: 90.5 % — HIGH (ref 43–77)
NEUTS BAND # BLD: 9.6 % — HIGH (ref 0–6)
NITRITE UR-MCNC: NEGATIVE — SIGNIFICANT CHANGE UP
NRBC # BLD: 0 /100 WBCS — SIGNIFICANT CHANGE UP
NRBC # FLD: 0 K/UL — SIGNIFICANT CHANGE UP
NT-PROBNP SERPL-SCNC: 1475 PG/ML — HIGH
OVALOCYTES BLD QL SMEAR: SLIGHT — SIGNIFICANT CHANGE UP
PCO2 BLDV: 53 MMHG — SIGNIFICANT CHANGE UP (ref 42–55)
PCO2 BLDV: 56 MMHG — HIGH (ref 42–55)
PH BLDV: 7.27 — LOW (ref 7.32–7.43)
PH BLDV: 7.28 — LOW (ref 7.32–7.43)
PH UR: 6 — SIGNIFICANT CHANGE UP (ref 5–8)
PHOSPHATE SERPL-MCNC: 2.4 MG/DL — LOW (ref 2.5–4.5)
PHOSPHATE SERPL-MCNC: 3.2 MG/DL — SIGNIFICANT CHANGE UP (ref 2.5–4.5)
PLAT MORPH BLD: NORMAL — SIGNIFICANT CHANGE UP
PLATELET # BLD AUTO: 205 K/UL — SIGNIFICANT CHANGE UP (ref 150–400)
PLATELET # BLD AUTO: 212 K/UL — SIGNIFICANT CHANGE UP (ref 150–400)
PLATELET # BLD AUTO: 225 K/UL — SIGNIFICANT CHANGE UP (ref 150–400)
PLATELET # BLD AUTO: 268 K/UL — SIGNIFICANT CHANGE UP (ref 150–400)
PLATELET COUNT - ESTIMATE: NORMAL — SIGNIFICANT CHANGE UP
PO2 BLDV: 30 MMHG — SIGNIFICANT CHANGE UP
PO2 BLDV: 34 MMHG — SIGNIFICANT CHANGE UP
POIKILOCYTOSIS BLD QL AUTO: SIGNIFICANT CHANGE UP
POLYCHROMASIA BLD QL SMEAR: SLIGHT — SIGNIFICANT CHANGE UP
POTASSIUM BLDV-SCNC: 4.2 MMOL/L — SIGNIFICANT CHANGE UP (ref 3.5–5.1)
POTASSIUM BLDV-SCNC: 4.3 MMOL/L — SIGNIFICANT CHANGE UP (ref 3.5–5.1)
POTASSIUM SERPL-MCNC: 3.5 MMOL/L — SIGNIFICANT CHANGE UP (ref 3.5–5.3)
POTASSIUM SERPL-MCNC: 4.3 MMOL/L — SIGNIFICANT CHANGE UP (ref 3.5–5.3)
POTASSIUM SERPL-MCNC: 4.3 MMOL/L — SIGNIFICANT CHANGE UP (ref 3.5–5.3)
POTASSIUM SERPL-SCNC: 3.5 MMOL/L — SIGNIFICANT CHANGE UP (ref 3.5–5.3)
POTASSIUM SERPL-SCNC: 4.3 MMOL/L — SIGNIFICANT CHANGE UP (ref 3.5–5.3)
POTASSIUM SERPL-SCNC: 4.3 MMOL/L — SIGNIFICANT CHANGE UP (ref 3.5–5.3)
PROCALCITONIN SERPL-MCNC: 3.32 NG/ML — HIGH (ref 0.02–0.1)
PROT SERPL-MCNC: 6.3 G/DL — SIGNIFICANT CHANGE UP (ref 6–8.3)
PROT SERPL-MCNC: 6.5 G/DL — SIGNIFICANT CHANGE UP (ref 6–8.3)
PROT SERPL-MCNC: 6.6 G/DL — SIGNIFICANT CHANGE UP (ref 6–8.3)
PROT SERPL-MCNC: 6.8 G/DL — SIGNIFICANT CHANGE UP (ref 6–8.3)
PROT SERPL-MCNC: 7.6 G/DL — SIGNIFICANT CHANGE UP (ref 6–8.3)
PROT UR-MCNC: ABNORMAL
PROTHROM AB SERPL-ACNC: 13.7 SEC — HIGH (ref 10.6–13.6)
PROTHROM AB SERPL-ACNC: 14.4 SEC — HIGH (ref 10.6–13.6)
PROTHROM AB SERPL-ACNC: 18.6 SEC — HIGH (ref 10.6–13.6)
PROTHROM AB SERPL-ACNC: 18.6 SEC — HIGH (ref 10.6–13.6)
RBC # BLD: 3.94 M/UL — LOW (ref 4.2–5.8)
RBC # BLD: 3.99 M/UL — LOW (ref 4.2–5.8)
RBC # BLD: 4.03 M/UL — LOW (ref 4.2–5.8)
RBC # BLD: 4.05 M/UL — LOW (ref 4.2–5.8)
RBC # FLD: 15.6 % — HIGH (ref 10.3–14.5)
RBC # FLD: 15.8 % — HIGH (ref 10.3–14.5)
RBC # FLD: 15.9 % — HIGH (ref 10.3–14.5)
RBC # FLD: 16.2 % — HIGH (ref 10.3–14.5)
RBC BLD AUTO: ABNORMAL
RBC CASTS # UR COMP ASSIST: 6 /HPF — HIGH (ref 0–4)
RSV RNA NPH QL NAA+NON-PROBE: SIGNIFICANT CHANGE UP
SAO2 % BLDV: 44.8 % — SIGNIFICANT CHANGE UP
SAO2 % BLDV: 54.7 % — SIGNIFICANT CHANGE UP
SARS-COV-2 RNA SPEC QL NAA+PROBE: DETECTED
SODIUM SERPL-SCNC: 135 MMOL/L — SIGNIFICANT CHANGE UP (ref 135–145)
SODIUM SERPL-SCNC: 136 MMOL/L — SIGNIFICANT CHANGE UP (ref 135–145)
SODIUM SERPL-SCNC: 136 MMOL/L — SIGNIFICANT CHANGE UP (ref 135–145)
SP GR SPEC: 1.03 — SIGNIFICANT CHANGE UP (ref 1–1.05)
SPECIMEN SOURCE: SIGNIFICANT CHANGE UP
TROPONIN T, HIGH SENSITIVITY RESULT: 13 NG/L — SIGNIFICANT CHANGE UP
TSH SERPL-MCNC: 0.4 UIU/ML — SIGNIFICANT CHANGE UP (ref 0.27–4.2)
UROBILINOGEN FLD QL: SIGNIFICANT CHANGE UP
VANCOMYCIN TROUGH SERPL-MCNC: 6.9 UG/ML — LOW (ref 10–20)
WBC # BLD: 13.62 K/UL — HIGH (ref 3.8–10.5)
WBC # BLD: 14.2 K/UL — HIGH (ref 3.8–10.5)
WBC # BLD: 4.64 K/UL — SIGNIFICANT CHANGE UP (ref 3.8–10.5)
WBC # BLD: 9.6 K/UL — SIGNIFICANT CHANGE UP (ref 3.8–10.5)
WBC # FLD AUTO: 13.62 K/UL — HIGH (ref 3.8–10.5)
WBC # FLD AUTO: 14.2 K/UL — HIGH (ref 3.8–10.5)
WBC # FLD AUTO: 4.64 K/UL — SIGNIFICANT CHANGE UP (ref 3.8–10.5)
WBC # FLD AUTO: 9.6 K/UL — SIGNIFICANT CHANGE UP (ref 3.8–10.5)
WBC UR QL: 2 /HPF — SIGNIFICANT CHANGE UP (ref 0–5)

## 2022-01-01 PROCEDURE — 99497 ADVNCD CARE PLAN 30 MIN: CPT | Mod: 25

## 2022-01-01 PROCEDURE — 99291 CRITICAL CARE FIRST HOUR: CPT

## 2022-01-01 PROCEDURE — 99223 1ST HOSP IP/OBS HIGH 75: CPT

## 2022-01-01 PROCEDURE — 71045 X-RAY EXAM CHEST 1 VIEW: CPT | Mod: 26

## 2022-01-01 PROCEDURE — 99232 SBSQ HOSP IP/OBS MODERATE 35: CPT

## 2022-01-01 PROCEDURE — 36556 INSERT NON-TUNNEL CV CATH: CPT

## 2022-01-01 PROCEDURE — 99291 CRITICAL CARE FIRST HOUR: CPT | Mod: CS,GC

## 2022-01-01 PROCEDURE — 93970 EXTREMITY STUDY: CPT | Mod: 26

## 2022-01-01 RX ORDER — MORPHINE SULFATE 50 MG/1
2 CAPSULE, EXTENDED RELEASE ORAL EVERY 6 HOURS
Refills: 0 | Status: DISCONTINUED | OUTPATIENT
Start: 2022-01-01 | End: 2022-01-01

## 2022-01-01 RX ORDER — DILTIAZEM HCL 120 MG
10 CAPSULE, EXT RELEASE 24 HR ORAL EVERY 6 HOURS
Refills: 0 | Status: DISCONTINUED | OUTPATIENT
Start: 2022-01-01 | End: 2022-01-01

## 2022-01-01 RX ORDER — DIGOXIN 250 MCG
125 TABLET ORAL DAILY
Refills: 0 | Status: DISCONTINUED | OUTPATIENT
Start: 2022-01-01 | End: 2022-01-01

## 2022-01-01 RX ORDER — METOPROLOL TARTRATE 50 MG
5 TABLET ORAL EVERY 6 HOURS
Refills: 0 | Status: DISCONTINUED | OUTPATIENT
Start: 2022-01-01 | End: 2022-01-01

## 2022-01-01 RX ORDER — VANCOMYCIN HCL 1 G
1250 VIAL (EA) INTRAVENOUS ONCE
Refills: 0 | Status: COMPLETED | OUTPATIENT
Start: 2022-01-01 | End: 2022-01-01

## 2022-01-01 RX ORDER — SODIUM CHLORIDE 9 MG/ML
1000 INJECTION INTRAMUSCULAR; INTRAVENOUS; SUBCUTANEOUS ONCE
Refills: 0 | Status: COMPLETED | OUTPATIENT
Start: 2022-01-01 | End: 2022-01-01

## 2022-01-01 RX ORDER — PIPERACILLIN AND TAZOBACTAM 4; .5 G/20ML; G/20ML
3.38 INJECTION, POWDER, LYOPHILIZED, FOR SOLUTION INTRAVENOUS ONCE
Refills: 0 | Status: COMPLETED | OUTPATIENT
Start: 2022-01-01 | End: 2022-01-01

## 2022-01-01 RX ORDER — MORPHINE SULFATE 50 MG/1
2 CAPSULE, EXTENDED RELEASE ORAL
Refills: 0 | Status: DISCONTINUED | OUTPATIENT
Start: 2022-01-01 | End: 2022-01-01

## 2022-01-01 RX ORDER — DILTIAZEM HCL 120 MG
15 CAPSULE, EXT RELEASE 24 HR ORAL
Qty: 125 | Refills: 0 | Status: DISCONTINUED | OUTPATIENT
Start: 2022-01-01 | End: 2022-01-01

## 2022-01-01 RX ORDER — ENOXAPARIN SODIUM 100 MG/ML
50 INJECTION SUBCUTANEOUS EVERY 12 HOURS
Refills: 0 | Status: DISCONTINUED | OUTPATIENT
Start: 2022-01-01 | End: 2022-01-01

## 2022-01-01 RX ORDER — INSULIN LISPRO 100/ML
3 VIAL (ML) SUBCUTANEOUS ONCE
Refills: 0 | Status: COMPLETED | OUTPATIENT
Start: 2022-01-01 | End: 2022-01-01

## 2022-01-01 RX ORDER — VANCOMYCIN HCL 1 G
1250 VIAL (EA) INTRAVENOUS EVERY 12 HOURS
Refills: 0 | Status: DISCONTINUED | OUTPATIENT
Start: 2022-01-01 | End: 2022-01-01

## 2022-01-01 RX ORDER — INSULIN LISPRO 100/ML
VIAL (ML) SUBCUTANEOUS EVERY 6 HOURS
Refills: 0 | Status: DISCONTINUED | OUTPATIENT
Start: 2022-01-01 | End: 2022-01-01

## 2022-01-01 RX ORDER — SENNA PLUS 8.6 MG/1
2 TABLET ORAL AT BEDTIME
Refills: 0 | Status: DISCONTINUED | OUTPATIENT
Start: 2022-01-01 | End: 2022-01-01

## 2022-01-01 RX ORDER — SODIUM CHLORIDE 9 MG/ML
500 INJECTION, SOLUTION INTRAVENOUS ONCE
Refills: 0 | Status: COMPLETED | OUTPATIENT
Start: 2022-01-01 | End: 2022-01-01

## 2022-01-01 RX ORDER — VANCOMYCIN HCL 1 G
VIAL (EA) INTRAVENOUS
Refills: 0 | Status: DISCONTINUED | OUTPATIENT
Start: 2022-01-01 | End: 2022-01-01

## 2022-01-01 RX ORDER — SODIUM CHLORIDE 9 MG/ML
1000 INJECTION, SOLUTION INTRAVENOUS
Refills: 0 | Status: DISCONTINUED | OUTPATIENT
Start: 2022-01-01 | End: 2022-01-01

## 2022-01-01 RX ORDER — ACETAMINOPHEN 500 MG
650 TABLET ORAL EVERY 6 HOURS
Refills: 0 | Status: DISCONTINUED | OUTPATIENT
Start: 2022-01-01 | End: 2022-01-01

## 2022-01-01 RX ORDER — ASPIRIN/CALCIUM CARB/MAGNESIUM 324 MG
81 TABLET ORAL DAILY
Refills: 0 | Status: DISCONTINUED | OUTPATIENT
Start: 2022-01-01 | End: 2022-01-01

## 2022-01-01 RX ORDER — ACETAMINOPHEN 500 MG
1000 TABLET ORAL ONCE
Refills: 0 | Status: COMPLETED | OUTPATIENT
Start: 2022-01-01 | End: 2022-01-01

## 2022-01-01 RX ORDER — METRONIDAZOLE 500 MG
500 TABLET ORAL ONCE
Refills: 0 | Status: COMPLETED | OUTPATIENT
Start: 2022-01-01 | End: 2022-01-01

## 2022-01-01 RX ORDER — DEXTROSE 50 % IN WATER 50 %
15 SYRINGE (ML) INTRAVENOUS ONCE
Refills: 0 | Status: DISCONTINUED | OUTPATIENT
Start: 2022-01-01 | End: 2022-01-01

## 2022-01-01 RX ORDER — DEXTROSE 50 % IN WATER 50 %
25 SYRINGE (ML) INTRAVENOUS ONCE
Refills: 0 | Status: DISCONTINUED | OUTPATIENT
Start: 2022-01-01 | End: 2022-01-01

## 2022-01-01 RX ORDER — PANTOPRAZOLE SODIUM 20 MG/1
40 TABLET, DELAYED RELEASE ORAL DAILY
Refills: 0 | Status: DISCONTINUED | OUTPATIENT
Start: 2022-01-01 | End: 2022-01-01

## 2022-01-01 RX ORDER — METOPROLOL TARTRATE 50 MG
5 TABLET ORAL ONCE
Refills: 0 | Status: COMPLETED | OUTPATIENT
Start: 2022-01-01 | End: 2022-01-01

## 2022-01-01 RX ORDER — PIPERACILLIN AND TAZOBACTAM 4; .5 G/20ML; G/20ML
3.38 INJECTION, POWDER, LYOPHILIZED, FOR SOLUTION INTRAVENOUS EVERY 8 HOURS
Refills: 0 | Status: DISCONTINUED | OUTPATIENT
Start: 2022-01-01 | End: 2022-01-01

## 2022-01-01 RX ORDER — ENOXAPARIN SODIUM 100 MG/ML
40 INJECTION SUBCUTANEOUS DAILY
Refills: 0 | Status: DISCONTINUED | OUTPATIENT
Start: 2022-01-01 | End: 2022-01-01

## 2022-01-01 RX ORDER — INSULIN LISPRO 100/ML
1 VIAL (ML) SUBCUTANEOUS ONCE
Refills: 0 | Status: COMPLETED | OUTPATIENT
Start: 2022-01-01 | End: 2022-01-01

## 2022-01-01 RX ORDER — REMDESIVIR 5 MG/ML
200 INJECTION INTRAVENOUS EVERY 24 HOURS
Refills: 0 | Status: COMPLETED | OUTPATIENT
Start: 2022-01-01 | End: 2022-01-01

## 2022-01-01 RX ORDER — DEXAMETHASONE 0.5 MG/5ML
6 ELIXIR ORAL DAILY
Refills: 0 | Status: DISCONTINUED | OUTPATIENT
Start: 2022-01-01 | End: 2022-01-01

## 2022-01-01 RX ORDER — SODIUM CHLORIDE 9 MG/ML
4 INJECTION INTRAMUSCULAR; INTRAVENOUS; SUBCUTANEOUS EVERY 12 HOURS
Refills: 0 | Status: DISCONTINUED | OUTPATIENT
Start: 2022-01-01 | End: 2022-01-01

## 2022-01-01 RX ORDER — VANCOMYCIN HCL 1 G
750 VIAL (EA) INTRAVENOUS EVERY 12 HOURS
Refills: 0 | Status: DISCONTINUED | OUTPATIENT
Start: 2022-01-01 | End: 2022-01-01

## 2022-01-01 RX ORDER — ONDANSETRON 8 MG/1
4 TABLET, FILM COATED ORAL EVERY 8 HOURS
Refills: 0 | Status: DISCONTINUED | OUTPATIENT
Start: 2022-01-01 | End: 2022-01-01

## 2022-01-01 RX ORDER — REMDESIVIR 5 MG/ML
100 INJECTION INTRAVENOUS EVERY 24 HOURS
Refills: 0 | Status: COMPLETED | OUTPATIENT
Start: 2022-01-01 | End: 2022-01-01

## 2022-01-01 RX ORDER — GLUCAGON INJECTION, SOLUTION 0.5 MG/.1ML
1 INJECTION, SOLUTION SUBCUTANEOUS ONCE
Refills: 0 | Status: DISCONTINUED | OUTPATIENT
Start: 2022-01-01 | End: 2022-01-01

## 2022-01-01 RX ORDER — DEXAMETHASONE 0.5 MG/5ML
6 ELIXIR ORAL ONCE
Refills: 0 | Status: COMPLETED | OUTPATIENT
Start: 2022-01-01 | End: 2022-01-01

## 2022-01-01 RX ORDER — MORPHINE SULFATE 50 MG/1
2 CAPSULE, EXTENDED RELEASE ORAL ONCE
Refills: 0 | Status: DISCONTINUED | OUTPATIENT
Start: 2022-01-01 | End: 2022-01-01

## 2022-01-01 RX ORDER — METOPROLOL TARTRATE 50 MG
50 TABLET ORAL
Refills: 0 | Status: DISCONTINUED | OUTPATIENT
Start: 2022-01-01 | End: 2022-01-01

## 2022-01-01 RX ORDER — FUROSEMIDE 40 MG
40 TABLET ORAL ONCE
Refills: 0 | Status: COMPLETED | OUTPATIENT
Start: 2022-01-01 | End: 2022-01-01

## 2022-01-01 RX ORDER — CEFEPIME 1 G/1
2000 INJECTION, POWDER, FOR SOLUTION INTRAMUSCULAR; INTRAVENOUS ONCE
Refills: 0 | Status: COMPLETED | OUTPATIENT
Start: 2022-01-01 | End: 2022-01-01

## 2022-01-01 RX ORDER — LEVOTHYROXINE SODIUM 125 MCG
35 TABLET ORAL
Refills: 0 | Status: DISCONTINUED | OUTPATIENT
Start: 2022-01-01 | End: 2022-01-01

## 2022-01-01 RX ORDER — DEXTROSE 50 % IN WATER 50 %
12.5 SYRINGE (ML) INTRAVENOUS ONCE
Refills: 0 | Status: DISCONTINUED | OUTPATIENT
Start: 2022-01-01 | End: 2022-01-01

## 2022-01-01 RX ORDER — LANOLIN ALCOHOL/MO/W.PET/CERES
3 CREAM (GRAM) TOPICAL AT BEDTIME
Refills: 0 | Status: DISCONTINUED | OUTPATIENT
Start: 2022-01-01 | End: 2022-01-01

## 2022-01-01 RX ORDER — SODIUM CHLORIDE 9 MG/ML
1000 INJECTION INTRAMUSCULAR; INTRAVENOUS; SUBCUTANEOUS
Refills: 0 | Status: DISCONTINUED | OUTPATIENT
Start: 2022-01-01 | End: 2022-01-01

## 2022-01-01 RX ORDER — REMDESIVIR 5 MG/ML
INJECTION INTRAVENOUS
Refills: 0 | Status: COMPLETED | OUTPATIENT
Start: 2022-01-01 | End: 2022-01-01

## 2022-01-01 RX ORDER — LEVOTHYROXINE SODIUM 125 MCG
50 TABLET ORAL DAILY
Refills: 0 | Status: DISCONTINUED | OUTPATIENT
Start: 2022-01-01 | End: 2022-01-01

## 2022-01-01 RX ADMIN — Medication 250 MILLIGRAM(S): at 01:41

## 2022-01-01 RX ADMIN — PIPERACILLIN AND TAZOBACTAM 25 GRAM(S): 4; .5 INJECTION, POWDER, LYOPHILIZED, FOR SOLUTION INTRAVENOUS at 13:45

## 2022-01-01 RX ADMIN — ENOXAPARIN SODIUM 50 MILLIGRAM(S): 100 INJECTION SUBCUTANEOUS at 22:40

## 2022-01-01 RX ADMIN — Medication 5 MILLIGRAM(S): at 05:20

## 2022-01-01 RX ADMIN — MORPHINE SULFATE 2 MILLIGRAM(S): 50 CAPSULE, EXTENDED RELEASE ORAL at 13:45

## 2022-01-01 RX ADMIN — Medication 3 UNIT(S): at 13:13

## 2022-01-01 RX ADMIN — SODIUM CHLORIDE 75 MILLILITER(S): 9 INJECTION, SOLUTION INTRAVENOUS at 22:56

## 2022-01-01 RX ADMIN — MORPHINE SULFATE 2 MILLIGRAM(S): 50 CAPSULE, EXTENDED RELEASE ORAL at 10:16

## 2022-01-01 RX ADMIN — MORPHINE SULFATE 2 MILLIGRAM(S): 50 CAPSULE, EXTENDED RELEASE ORAL at 07:35

## 2022-01-01 RX ADMIN — Medication 35 MICROGRAM(S): at 07:02

## 2022-01-01 RX ADMIN — MORPHINE SULFATE 2 MILLIGRAM(S): 50 CAPSULE, EXTENDED RELEASE ORAL at 01:37

## 2022-01-01 RX ADMIN — Medication 40 MILLIGRAM(S): at 04:17

## 2022-01-01 RX ADMIN — PIPERACILLIN AND TAZOBACTAM 25 GRAM(S): 4; .5 INJECTION, POWDER, LYOPHILIZED, FOR SOLUTION INTRAVENOUS at 14:32

## 2022-01-01 RX ADMIN — ENOXAPARIN SODIUM 50 MILLIGRAM(S): 100 INJECTION SUBCUTANEOUS at 10:16

## 2022-01-01 RX ADMIN — MORPHINE SULFATE 2 MILLIGRAM(S): 50 CAPSULE, EXTENDED RELEASE ORAL at 01:07

## 2022-01-01 RX ADMIN — PANTOPRAZOLE SODIUM 40 MILLIGRAM(S): 20 TABLET, DELAYED RELEASE ORAL at 12:28

## 2022-01-01 RX ADMIN — MORPHINE SULFATE 2 MILLIGRAM(S): 50 CAPSULE, EXTENDED RELEASE ORAL at 14:25

## 2022-01-01 RX ADMIN — PIPERACILLIN AND TAZOBACTAM 200 GRAM(S): 4; .5 INJECTION, POWDER, LYOPHILIZED, FOR SOLUTION INTRAVENOUS at 05:18

## 2022-01-01 RX ADMIN — MORPHINE SULFATE 2 MILLIGRAM(S): 50 CAPSULE, EXTENDED RELEASE ORAL at 22:37

## 2022-01-01 RX ADMIN — PIPERACILLIN AND TAZOBACTAM 25 GRAM(S): 4; .5 INJECTION, POWDER, LYOPHILIZED, FOR SOLUTION INTRAVENOUS at 05:59

## 2022-01-01 RX ADMIN — ENOXAPARIN SODIUM 50 MILLIGRAM(S): 100 INJECTION SUBCUTANEOUS at 12:01

## 2022-01-01 RX ADMIN — SODIUM CHLORIDE 75 MILLILITER(S): 9 INJECTION, SOLUTION INTRAVENOUS at 12:24

## 2022-01-01 RX ADMIN — PIPERACILLIN AND TAZOBACTAM 25 GRAM(S): 4; .5 INJECTION, POWDER, LYOPHILIZED, FOR SOLUTION INTRAVENOUS at 06:09

## 2022-01-01 RX ADMIN — MORPHINE SULFATE 2 MILLIGRAM(S): 50 CAPSULE, EXTENDED RELEASE ORAL at 14:00

## 2022-01-01 RX ADMIN — Medication 125 MICROGRAM(S): at 12:26

## 2022-01-01 RX ADMIN — ENOXAPARIN SODIUM 50 MILLIGRAM(S): 100 INJECTION SUBCUTANEOUS at 21:11

## 2022-01-01 RX ADMIN — MORPHINE SULFATE 2 MILLIGRAM(S): 50 CAPSULE, EXTENDED RELEASE ORAL at 07:33

## 2022-01-01 RX ADMIN — PIPERACILLIN AND TAZOBACTAM 25 GRAM(S): 4; .5 INJECTION, POWDER, LYOPHILIZED, FOR SOLUTION INTRAVENOUS at 06:05

## 2022-01-01 RX ADMIN — Medication 6 MILLIGRAM(S): at 05:04

## 2022-01-01 RX ADMIN — MORPHINE SULFATE 2 MILLIGRAM(S): 50 CAPSULE, EXTENDED RELEASE ORAL at 04:29

## 2022-01-01 RX ADMIN — SODIUM CHLORIDE 60 MILLILITER(S): 9 INJECTION INTRAMUSCULAR; INTRAVENOUS; SUBCUTANEOUS at 12:14

## 2022-01-01 RX ADMIN — MORPHINE SULFATE 2 MILLIGRAM(S): 50 CAPSULE, EXTENDED RELEASE ORAL at 17:24

## 2022-01-01 RX ADMIN — ENOXAPARIN SODIUM 50 MILLIGRAM(S): 100 INJECTION SUBCUTANEOUS at 10:47

## 2022-01-01 RX ADMIN — PANTOPRAZOLE SODIUM 40 MILLIGRAM(S): 20 TABLET, DELAYED RELEASE ORAL at 11:54

## 2022-01-01 RX ADMIN — MORPHINE SULFATE 2 MILLIGRAM(S): 50 CAPSULE, EXTENDED RELEASE ORAL at 15:40

## 2022-01-01 RX ADMIN — ENOXAPARIN SODIUM 50 MILLIGRAM(S): 100 INJECTION SUBCUTANEOUS at 06:06

## 2022-01-01 RX ADMIN — MORPHINE SULFATE 2 MILLIGRAM(S): 50 CAPSULE, EXTENDED RELEASE ORAL at 13:35

## 2022-01-01 RX ADMIN — PANTOPRAZOLE SODIUM 40 MILLIGRAM(S): 20 TABLET, DELAYED RELEASE ORAL at 13:07

## 2022-01-01 RX ADMIN — MORPHINE SULFATE 2 MILLIGRAM(S): 50 CAPSULE, EXTENDED RELEASE ORAL at 04:23

## 2022-01-01 RX ADMIN — PIPERACILLIN AND TAZOBACTAM 25 GRAM(S): 4; .5 INJECTION, POWDER, LYOPHILIZED, FOR SOLUTION INTRAVENOUS at 05:44

## 2022-01-01 RX ADMIN — Medication 6 MILLIGRAM(S): at 06:04

## 2022-01-01 RX ADMIN — MORPHINE SULFATE 2 MILLIGRAM(S): 50 CAPSULE, EXTENDED RELEASE ORAL at 19:36

## 2022-01-01 RX ADMIN — MORPHINE SULFATE 2 MILLIGRAM(S): 50 CAPSULE, EXTENDED RELEASE ORAL at 21:17

## 2022-01-01 RX ADMIN — Medication 5 MILLIGRAM(S): at 13:23

## 2022-01-01 RX ADMIN — MORPHINE SULFATE 2 MILLIGRAM(S): 50 CAPSULE, EXTENDED RELEASE ORAL at 11:19

## 2022-01-01 RX ADMIN — Medication 5 MILLIGRAM(S): at 19:45

## 2022-01-01 RX ADMIN — Medication 125 MICROGRAM(S): at 12:24

## 2022-01-01 RX ADMIN — MORPHINE SULFATE 2 MILLIGRAM(S): 50 CAPSULE, EXTENDED RELEASE ORAL at 22:50

## 2022-01-01 RX ADMIN — Medication 125 MICROGRAM(S): at 12:37

## 2022-01-01 RX ADMIN — MORPHINE SULFATE 2 MILLIGRAM(S): 50 CAPSULE, EXTENDED RELEASE ORAL at 12:54

## 2022-01-01 RX ADMIN — MORPHINE SULFATE 2 MILLIGRAM(S): 50 CAPSULE, EXTENDED RELEASE ORAL at 19:00

## 2022-01-01 RX ADMIN — Medication 100 MILLIGRAM(S): at 00:01

## 2022-01-01 RX ADMIN — ENOXAPARIN SODIUM 50 MILLIGRAM(S): 100 INJECTION SUBCUTANEOUS at 09:45

## 2022-01-01 RX ADMIN — MORPHINE SULFATE 2 MILLIGRAM(S): 50 CAPSULE, EXTENDED RELEASE ORAL at 12:59

## 2022-01-01 RX ADMIN — Medication 6 MILLIGRAM(S): at 00:01

## 2022-01-01 RX ADMIN — SODIUM CHLORIDE 75 MILLILITER(S): 9 INJECTION, SOLUTION INTRAVENOUS at 09:58

## 2022-01-01 RX ADMIN — PIPERACILLIN AND TAZOBACTAM 25 GRAM(S): 4; .5 INJECTION, POWDER, LYOPHILIZED, FOR SOLUTION INTRAVENOUS at 22:39

## 2022-01-01 RX ADMIN — Medication 1000 MILLIGRAM(S): at 22:00

## 2022-01-01 RX ADMIN — SODIUM CHLORIDE 75 MILLILITER(S): 9 INJECTION, SOLUTION INTRAVENOUS at 02:18

## 2022-01-01 RX ADMIN — MORPHINE SULFATE 2 MILLIGRAM(S): 50 CAPSULE, EXTENDED RELEASE ORAL at 01:52

## 2022-01-01 RX ADMIN — MORPHINE SULFATE 2 MILLIGRAM(S): 50 CAPSULE, EXTENDED RELEASE ORAL at 04:54

## 2022-01-01 RX ADMIN — Medication 650 MILLIGRAM(S): at 11:19

## 2022-01-01 RX ADMIN — Medication 6 MILLIGRAM(S): at 05:05

## 2022-01-01 RX ADMIN — MORPHINE SULFATE 2 MILLIGRAM(S): 50 CAPSULE, EXTENDED RELEASE ORAL at 19:26

## 2022-01-01 RX ADMIN — Medication 35 MICROGRAM(S): at 05:59

## 2022-01-01 RX ADMIN — MORPHINE SULFATE 2 MILLIGRAM(S): 50 CAPSULE, EXTENDED RELEASE ORAL at 12:39

## 2022-01-01 RX ADMIN — Medication 6 MILLIGRAM(S): at 06:09

## 2022-01-01 RX ADMIN — Medication 125 MICROGRAM(S): at 12:14

## 2022-01-01 RX ADMIN — MORPHINE SULFATE 2 MILLIGRAM(S): 50 CAPSULE, EXTENDED RELEASE ORAL at 04:30

## 2022-01-01 RX ADMIN — ENOXAPARIN SODIUM 50 MILLIGRAM(S): 100 INJECTION SUBCUTANEOUS at 21:51

## 2022-01-01 RX ADMIN — Medication 10 MILLIGRAM(S): at 00:18

## 2022-01-01 RX ADMIN — MORPHINE SULFATE 2 MILLIGRAM(S): 50 CAPSULE, EXTENDED RELEASE ORAL at 09:13

## 2022-01-01 RX ADMIN — Medication 35 MICROGRAM(S): at 05:08

## 2022-01-01 RX ADMIN — Medication 250 MILLIGRAM(S): at 22:55

## 2022-01-01 RX ADMIN — MORPHINE SULFATE 2 MILLIGRAM(S): 50 CAPSULE, EXTENDED RELEASE ORAL at 04:14

## 2022-01-01 RX ADMIN — Medication 250 MILLIGRAM(S): at 06:40

## 2022-01-01 RX ADMIN — Medication 1000 MILLIGRAM(S): at 23:59

## 2022-01-01 RX ADMIN — CEFEPIME 2000 MILLIGRAM(S): 1 INJECTION, POWDER, FOR SOLUTION INTRAMUSCULAR; INTRAVENOUS at 23:59

## 2022-01-01 RX ADMIN — MORPHINE SULFATE 2 MILLIGRAM(S): 50 CAPSULE, EXTENDED RELEASE ORAL at 01:32

## 2022-01-01 RX ADMIN — Medication 35 MICROGRAM(S): at 05:58

## 2022-01-01 RX ADMIN — REMDESIVIR 500 MILLIGRAM(S): 5 INJECTION INTRAVENOUS at 10:21

## 2022-01-01 RX ADMIN — SODIUM CHLORIDE 75 MILLILITER(S): 9 INJECTION, SOLUTION INTRAVENOUS at 10:47

## 2022-01-01 RX ADMIN — PANTOPRAZOLE SODIUM 40 MILLIGRAM(S): 20 TABLET, DELAYED RELEASE ORAL at 12:38

## 2022-01-01 RX ADMIN — MORPHINE SULFATE 2 MILLIGRAM(S): 50 CAPSULE, EXTENDED RELEASE ORAL at 22:22

## 2022-01-01 RX ADMIN — SODIUM CHLORIDE 500 MILLILITER(S): 9 INJECTION, SOLUTION INTRAVENOUS at 23:59

## 2022-01-01 RX ADMIN — ENOXAPARIN SODIUM 50 MILLIGRAM(S): 100 INJECTION SUBCUTANEOUS at 21:19

## 2022-01-01 RX ADMIN — PANTOPRAZOLE SODIUM 40 MILLIGRAM(S): 20 TABLET, DELAYED RELEASE ORAL at 11:34

## 2022-01-01 RX ADMIN — Medication 15 MG/HR: at 23:26

## 2022-01-01 RX ADMIN — REMDESIVIR 500 MILLIGRAM(S): 5 INJECTION INTRAVENOUS at 09:34

## 2022-01-01 RX ADMIN — PIPERACILLIN AND TAZOBACTAM 25 GRAM(S): 4; .5 INJECTION, POWDER, LYOPHILIZED, FOR SOLUTION INTRAVENOUS at 22:55

## 2022-01-01 RX ADMIN — MORPHINE SULFATE 2 MILLIGRAM(S): 50 CAPSULE, EXTENDED RELEASE ORAL at 22:31

## 2022-01-01 RX ADMIN — Medication 3 UNIT(S): at 02:19

## 2022-01-01 RX ADMIN — PIPERACILLIN AND TAZOBACTAM 25 GRAM(S): 4; .5 INJECTION, POWDER, LYOPHILIZED, FOR SOLUTION INTRAVENOUS at 13:53

## 2022-01-01 RX ADMIN — MORPHINE SULFATE 2 MILLIGRAM(S): 50 CAPSULE, EXTENDED RELEASE ORAL at 15:03

## 2022-01-01 RX ADMIN — SODIUM CHLORIDE 500 MILLILITER(S): 9 INJECTION, SOLUTION INTRAVENOUS at 23:14

## 2022-01-01 RX ADMIN — MORPHINE SULFATE 2 MILLIGRAM(S): 50 CAPSULE, EXTENDED RELEASE ORAL at 13:08

## 2022-01-01 RX ADMIN — MORPHINE SULFATE 2 MILLIGRAM(S): 50 CAPSULE, EXTENDED RELEASE ORAL at 04:38

## 2022-01-01 RX ADMIN — Medication 10 MG/HR: at 10:47

## 2022-01-01 RX ADMIN — MORPHINE SULFATE 2 MILLIGRAM(S): 50 CAPSULE, EXTENDED RELEASE ORAL at 16:44

## 2022-01-01 RX ADMIN — REMDESIVIR 500 MILLIGRAM(S): 5 INJECTION INTRAVENOUS at 10:03

## 2022-01-01 RX ADMIN — Medication 166.67 MILLIGRAM(S): at 00:50

## 2022-01-01 RX ADMIN — Medication 15 MG/HR: at 07:37

## 2022-01-01 RX ADMIN — MORPHINE SULFATE 2 MILLIGRAM(S): 50 CAPSULE, EXTENDED RELEASE ORAL at 07:45

## 2022-01-01 RX ADMIN — PANTOPRAZOLE SODIUM 40 MILLIGRAM(S): 20 TABLET, DELAYED RELEASE ORAL at 12:14

## 2022-01-01 RX ADMIN — MORPHINE SULFATE 2 MILLIGRAM(S): 50 CAPSULE, EXTENDED RELEASE ORAL at 07:50

## 2022-01-01 RX ADMIN — PIPERACILLIN AND TAZOBACTAM 25 GRAM(S): 4; .5 INJECTION, POWDER, LYOPHILIZED, FOR SOLUTION INTRAVENOUS at 05:09

## 2022-01-01 RX ADMIN — Medication 10 MG/HR: at 07:02

## 2022-01-01 RX ADMIN — Medication 500 MILLIGRAM(S): at 00:29

## 2022-01-01 RX ADMIN — MORPHINE SULFATE 2 MILLIGRAM(S): 50 CAPSULE, EXTENDED RELEASE ORAL at 21:30

## 2022-01-01 RX ADMIN — PANTOPRAZOLE SODIUM 40 MILLIGRAM(S): 20 TABLET, DELAYED RELEASE ORAL at 12:26

## 2022-01-01 RX ADMIN — ENOXAPARIN SODIUM 50 MILLIGRAM(S): 100 INJECTION SUBCUTANEOUS at 17:41

## 2022-01-01 RX ADMIN — MORPHINE SULFATE 2 MILLIGRAM(S): 50 CAPSULE, EXTENDED RELEASE ORAL at 15:55

## 2022-01-01 RX ADMIN — MORPHINE SULFATE 2 MILLIGRAM(S): 50 CAPSULE, EXTENDED RELEASE ORAL at 17:14

## 2022-01-01 RX ADMIN — MORPHINE SULFATE 2 MILLIGRAM(S): 50 CAPSULE, EXTENDED RELEASE ORAL at 01:19

## 2022-01-01 RX ADMIN — PIPERACILLIN AND TAZOBACTAM 25 GRAM(S): 4; .5 INJECTION, POWDER, LYOPHILIZED, FOR SOLUTION INTRAVENOUS at 21:56

## 2022-01-01 RX ADMIN — MORPHINE SULFATE 2 MILLIGRAM(S): 50 CAPSULE, EXTENDED RELEASE ORAL at 17:29

## 2022-01-01 RX ADMIN — MORPHINE SULFATE 2 MILLIGRAM(S): 50 CAPSULE, EXTENDED RELEASE ORAL at 07:21

## 2022-01-01 RX ADMIN — MORPHINE SULFATE 2 MILLIGRAM(S): 50 CAPSULE, EXTENDED RELEASE ORAL at 10:11

## 2022-01-01 RX ADMIN — Medication 6 MILLIGRAM(S): at 05:20

## 2022-01-01 RX ADMIN — MORPHINE SULFATE 2 MILLIGRAM(S): 50 CAPSULE, EXTENDED RELEASE ORAL at 16:08

## 2022-01-01 RX ADMIN — PIPERACILLIN AND TAZOBACTAM 25 GRAM(S): 4; .5 INJECTION, POWDER, LYOPHILIZED, FOR SOLUTION INTRAVENOUS at 21:10

## 2022-01-01 RX ADMIN — MORPHINE SULFATE 2 MILLIGRAM(S): 50 CAPSULE, EXTENDED RELEASE ORAL at 07:36

## 2022-01-01 RX ADMIN — ENOXAPARIN SODIUM 50 MILLIGRAM(S): 100 INJECTION SUBCUTANEOUS at 22:58

## 2022-01-01 RX ADMIN — REMDESIVIR 500 MILLIGRAM(S): 5 INJECTION INTRAVENOUS at 10:37

## 2022-01-01 RX ADMIN — MORPHINE SULFATE 2 MILLIGRAM(S): 50 CAPSULE, EXTENDED RELEASE ORAL at 16:59

## 2022-01-01 RX ADMIN — MORPHINE SULFATE 2 MILLIGRAM(S): 50 CAPSULE, EXTENDED RELEASE ORAL at 01:34

## 2022-01-01 RX ADMIN — MORPHINE SULFATE 2 MILLIGRAM(S): 50 CAPSULE, EXTENDED RELEASE ORAL at 05:07

## 2022-01-01 RX ADMIN — SODIUM CHLORIDE 75 MILLILITER(S): 9 INJECTION, SOLUTION INTRAVENOUS at 07:40

## 2022-01-01 RX ADMIN — Medication 10 MG/HR: at 05:43

## 2022-01-01 RX ADMIN — Medication 125 MICROGRAM(S): at 11:34

## 2022-01-01 RX ADMIN — REMDESIVIR 500 MILLIGRAM(S): 5 INJECTION INTRAVENOUS at 10:47

## 2022-01-01 RX ADMIN — Medication 125 MICROGRAM(S): at 13:06

## 2022-01-01 RX ADMIN — Medication 35 MICROGRAM(S): at 06:32

## 2022-01-01 RX ADMIN — MORPHINE SULFATE 2 MILLIGRAM(S): 50 CAPSULE, EXTENDED RELEASE ORAL at 01:15

## 2022-01-01 RX ADMIN — MORPHINE SULFATE 2 MILLIGRAM(S): 50 CAPSULE, EXTENDED RELEASE ORAL at 10:26

## 2022-01-01 RX ADMIN — Medication 15 MG/HR: at 22:40

## 2022-01-01 RX ADMIN — PANTOPRAZOLE SODIUM 40 MILLIGRAM(S): 20 TABLET, DELAYED RELEASE ORAL at 14:37

## 2022-01-01 RX ADMIN — Medication 125 MICROGRAM(S): at 11:53

## 2022-01-01 RX ADMIN — Medication 166.67 MILLIGRAM(S): at 23:37

## 2022-01-01 RX ADMIN — PIPERACILLIN AND TAZOBACTAM 25 GRAM(S): 4; .5 INJECTION, POWDER, LYOPHILIZED, FOR SOLUTION INTRAVENOUS at 05:03

## 2022-01-01 RX ADMIN — Medication 250 MILLIGRAM(S): at 10:41

## 2022-01-01 RX ADMIN — MORPHINE SULFATE 2 MILLIGRAM(S): 50 CAPSULE, EXTENDED RELEASE ORAL at 19:21

## 2022-01-01 RX ADMIN — MORPHINE SULFATE 2 MILLIGRAM(S): 50 CAPSULE, EXTENDED RELEASE ORAL at 22:16

## 2022-01-01 RX ADMIN — ENOXAPARIN SODIUM 50 MILLIGRAM(S): 100 INJECTION SUBCUTANEOUS at 10:03

## 2022-01-01 RX ADMIN — PIPERACILLIN AND TAZOBACTAM 25 GRAM(S): 4; .5 INJECTION, POWDER, LYOPHILIZED, FOR SOLUTION INTRAVENOUS at 05:04

## 2022-01-01 RX ADMIN — MORPHINE SULFATE 2 MILLIGRAM(S): 50 CAPSULE, EXTENDED RELEASE ORAL at 01:30

## 2022-01-01 RX ADMIN — PIPERACILLIN AND TAZOBACTAM 25 GRAM(S): 4; .5 INJECTION, POWDER, LYOPHILIZED, FOR SOLUTION INTRAVENOUS at 13:50

## 2022-01-01 RX ADMIN — Medication 10 MILLIGRAM(S): at 17:42

## 2022-01-01 RX ADMIN — MORPHINE SULFATE 2 MILLIGRAM(S): 50 CAPSULE, EXTENDED RELEASE ORAL at 09:49

## 2022-01-01 RX ADMIN — Medication 1 UNIT(S): at 06:31

## 2022-01-01 RX ADMIN — MORPHINE SULFATE 2 MILLIGRAM(S): 50 CAPSULE, EXTENDED RELEASE ORAL at 13:28

## 2022-01-01 RX ADMIN — MORPHINE SULFATE 2 MILLIGRAM(S): 50 CAPSULE, EXTENDED RELEASE ORAL at 22:36

## 2022-01-01 RX ADMIN — Medication 35 MICROGRAM(S): at 06:37

## 2022-01-01 RX ADMIN — Medication 35 MICROGRAM(S): at 05:05

## 2022-01-01 RX ADMIN — MORPHINE SULFATE 2 MILLIGRAM(S): 50 CAPSULE, EXTENDED RELEASE ORAL at 10:12

## 2022-01-01 RX ADMIN — Medication 6 MILLIGRAM(S): at 05:52

## 2022-01-01 RX ADMIN — MORPHINE SULFATE 2 MILLIGRAM(S): 50 CAPSULE, EXTENDED RELEASE ORAL at 22:19

## 2022-01-01 RX ADMIN — CEFEPIME 100 MILLIGRAM(S): 1 INJECTION, POWDER, FOR SOLUTION INTRAMUSCULAR; INTRAVENOUS at 23:10

## 2022-01-01 RX ADMIN — Medication 125 MICROGRAM(S): at 14:37

## 2022-01-01 RX ADMIN — SODIUM CHLORIDE 1000 MILLILITER(S): 9 INJECTION INTRAMUSCULAR; INTRAVENOUS; SUBCUTANEOUS at 01:58

## 2022-01-01 RX ADMIN — Medication 650 MILLIGRAM(S): at 12:00

## 2022-01-01 RX ADMIN — Medication 10 MG/HR: at 06:05

## 2022-01-01 RX ADMIN — MORPHINE SULFATE 2 MILLIGRAM(S): 50 CAPSULE, EXTENDED RELEASE ORAL at 10:30

## 2022-01-01 RX ADMIN — MORPHINE SULFATE 2 MILLIGRAM(S): 50 CAPSULE, EXTENDED RELEASE ORAL at 11:34

## 2022-01-01 RX ADMIN — SODIUM CHLORIDE 500 MILLILITER(S): 9 INJECTION, SOLUTION INTRAVENOUS at 00:50

## 2022-01-01 RX ADMIN — Medication 400 MILLIGRAM(S): at 22:54

## 2022-01-01 RX ADMIN — MORPHINE SULFATE 2 MILLIGRAM(S): 50 CAPSULE, EXTENDED RELEASE ORAL at 17:39

## 2022-01-01 RX ADMIN — PIPERACILLIN AND TAZOBACTAM 25 GRAM(S): 4; .5 INJECTION, POWDER, LYOPHILIZED, FOR SOLUTION INTRAVENOUS at 23:01

## 2022-01-01 RX ADMIN — PIPERACILLIN AND TAZOBACTAM 25 GRAM(S): 4; .5 INJECTION, POWDER, LYOPHILIZED, FOR SOLUTION INTRAVENOUS at 13:54

## 2022-01-01 RX ADMIN — MORPHINE SULFATE 2 MILLIGRAM(S): 50 CAPSULE, EXTENDED RELEASE ORAL at 08:58

## 2022-01-01 RX ADMIN — SODIUM CHLORIDE 75 MILLILITER(S): 9 INJECTION, SOLUTION INTRAVENOUS at 06:00

## 2022-01-01 RX ADMIN — Medication 6 MILLIGRAM(S): at 05:09

## 2022-01-01 RX ADMIN — Medication 166.67 MILLIGRAM(S): at 12:18

## 2022-01-01 RX ADMIN — SODIUM CHLORIDE 60 MILLILITER(S): 9 INJECTION, SOLUTION INTRAVENOUS at 17:33

## 2022-01-01 RX ADMIN — MORPHINE SULFATE 2 MILLIGRAM(S): 50 CAPSULE, EXTENDED RELEASE ORAL at 04:45

## 2022-01-01 RX ADMIN — Medication 5 MILLIGRAM(S): at 01:42

## 2022-01-01 RX ADMIN — MORPHINE SULFATE 2 MILLIGRAM(S): 50 CAPSULE, EXTENDED RELEASE ORAL at 18:37

## 2022-01-01 RX ADMIN — Medication 400 MILLIGRAM(S): at 20:37

## 2022-01-01 RX ADMIN — PIPERACILLIN AND TAZOBACTAM 25 GRAM(S): 4; .5 INJECTION, POWDER, LYOPHILIZED, FOR SOLUTION INTRAVENOUS at 21:19

## 2022-01-01 RX ADMIN — ENOXAPARIN SODIUM 50 MILLIGRAM(S): 100 INJECTION SUBCUTANEOUS at 10:21

## 2022-01-01 RX ADMIN — MORPHINE SULFATE 2 MILLIGRAM(S): 50 CAPSULE, EXTENDED RELEASE ORAL at 15:18

## 2022-01-01 RX ADMIN — SODIUM CHLORIDE 1000 MILLILITER(S): 9 INJECTION INTRAMUSCULAR; INTRAVENOUS; SUBCUTANEOUS at 22:55

## 2022-01-01 RX ADMIN — ENOXAPARIN SODIUM 50 MILLIGRAM(S): 100 INJECTION SUBCUTANEOUS at 22:09

## 2022-01-01 RX ADMIN — PIPERACILLIN AND TAZOBACTAM 25 GRAM(S): 4; .5 INJECTION, POWDER, LYOPHILIZED, FOR SOLUTION INTRAVENOUS at 14:43

## 2022-01-01 RX ADMIN — MORPHINE SULFATE 2 MILLIGRAM(S): 50 CAPSULE, EXTENDED RELEASE ORAL at 22:04

## 2022-02-02 NOTE — ED ADULT NURSE NOTE - NSIMPLEMENTINTERV_GEN_ALL_ED
Implemented All Fall with Harm Risk Interventions:  Davis to call system. Call bell, personal items and telephone within reach. Instruct patient to call for assistance. Room bathroom lighting operational. Non-slip footwear when patient is off stretcher. Physically safe environment: no spills, clutter or unnecessary equipment. Stretcher in lowest position, wheels locked, appropriate side rails in place. Provide visual cue, wrist band, yellow gown, etc. Monitor gait and stability. Monitor for mental status changes and reorient to person, place, and time. Review medications for side effects contributing to fall risk. Reinforce activity limits and safety measures with patient and family. Provide visual clues: red socks.

## 2022-02-02 NOTE — ED ADULT NURSE REASSESSMENT NOTE - NS ED NURSE REASSESS COMMENT FT1
pt medicated as per md orders. cefepime infusing at to the R wrist (2g in 30 min) and L wrist infusing to the R wrist (500 ml over 60 min). pt turned and positioned. suction at bedside. airway patent. pt O2 sat on 100% with nasal canula high flow. will continue to monitor.

## 2022-02-02 NOTE — ED PROVIDER NOTE - OBJECTIVE STATEMENT
89 year old male with a  pmhx of DM, hypothyroid, HTN, PNA, dementia, dysphagia, is brought to ED by EMS from [facility] for evaluation of difficulty breathing. Per NH, pt had fever, cough, congestion that began today. Had rapid COVID-19 test that returned positive en route to hospital. Pt was routinely screened as part of weekly testing. Unclear if pt recently aspirated. Has MOLST in chart, is DNR/DNI. 89 year old male with a  pmhx of DM, hypothyroid, HTN, PNA, dementia, dysphagia, is brought to ED by EMS from [facility] for evaluation of difficulty breathing. Per NH, pt had fever, cough, congestion that began today. Had rapid COVID-19 test that returned positive en route to hospital. Pt was routinely screened as part of weekly testing. Unclear if pt recently aspirated. Has MOLST in chart, is DNR/DNI.  Previous MRN- [id number]

## 2022-02-02 NOTE — ED PROVIDER NOTE - ATTENDING CONTRIBUTION TO CARE
see progress note above  88 yo m past medical history dm, hypothyroidism, htn, dementia, dysphagia sent from NH for fever, congestion, cough, sob. patient able to state name and follow some simple commands (squeeze hand, open mouth). exam as above. acute resp failure 2/2 covid vs bacteria cause most likely at time of eval. plan: HFNC, abx, ivf, labs, cxr, admit.

## 2022-02-02 NOTE — ED PROVIDER NOTE - CLINICAL SUMMARY MEDICAL DECISION MAKING FREE TEXT BOX
Logan Petit, PGY-2- 89 year old male with a pmhx of dementia, dysphagia, fever/cough/congestion x1 days. Vitals significant for tachycardia, hypoxia on arrival. Pt placed on HiFlow by respiratory. Plan to obtain sepsis work up. Was reportedly covid-19 positive at NH today. Will get cbc, cmp, blood cultures, vbg with lactate, procal, covid-19/flu swab, cxr, ua/ucx. Will cover with cefepime and flagyl. Unknown cardiac status, will be judicious with IVF.

## 2022-02-02 NOTE — ED ADULT NURSE NOTE - OBJECTIVE STATEMENT
pt rcd to rm 17. pt A&Ox0. pt brought to ED by EMS from [facility] for difficulty breathing. pt had rapid covid test that was positive. unclear if pt aspirated on vomit. pt coffee ground emesis. suction at bedside. pt on high-flow nasal canula sat at 99% on RA. pt respirations are even and unlabored. pt is tachypneic. pt hx of afib, dementia, dysphagia. pt has 20G to right wrist and 20G to left wrist. pt rcd to rm 17. pt A&Ox0. pt brought to ED by EMS from [facility] for difficulty breathing. pt had rapid covid test that was positive. unclear if pt aspirated on vomit. pt coffee ground emesis. suction at bedside. pt on high-flow nasal canula sat at 99% on RA. pt respirations are even and unlabored. pt is tachypneic. pt hx of afib, dementia, dysphagia. pt has 20G to right wrist and 20G to left wrist. pt afib on cardiac monitor at this time. pt medicated as per ordered. labs collected and sent. will continue to monitor. pt rcd to rm 17. pt A&Ox0. pt brought to ED by EMS from [facility] for difficulty breathing. pt had rapid covid test that was positive. unclear if pt aspirated on vomit. pt coffee ground emesis. suction at bedside. pt on high-flow nasal canula sat at 99% on RA. pt respirations are even and unlabored. pt is tachypneic. pt hx of afib, dementia, dysphagia. pt has 18G to right wrist and 18G to left wrist. pt afib on cardiac monitor at this time. pt medicated as per ordered. labs collected and sent. will continue to monitor. pt rcd to rm 17. pt A&Ox0. pt brought to ED by EMS from [facility] for difficulty breathing. pt had rapid covid test that was positive. unclear if pt aspirated on vomit. pt coffee ground emesis. suction at bedside. pt on high-flow nasal canula sat at 99% on RA. pt respirations are even and labored. pt is tachypneic. pt hx of afib, dementia, dysphagia. pt has 18G to right wrist and 18G to left wrist. pt afib on cardiac monitor at this time. pt medicated as per ordered. labs collected and sent. will continue to monitor.

## 2022-02-02 NOTE — ED PROVIDER NOTE - PHYSICAL EXAMINATION
General: chronically ill appearing, respiratory distress  Skin: no rash, no pallor  Head: normocephalic, atraumatic  Eyes: clear conjunctiva, EOMI  ENMT: airway patent, no nasal discharge  Cardiovascular: tachycardic normal rhythm, S1/S2  Pulmonary: rhonchi throughout, tachypneic, hypoxic on RA  Abdomen: soft, nontender  Musculoskeletal: moving extremities well, no deformity  Psych: not participating in exam, unable to answer questions

## 2022-02-02 NOTE — ED ADULT TRIAGE NOTE - CHIEF COMPLAINT QUOTE
arrives from [facility] for covid + rapid test, unknown vax status, hx dementia and unable to voice needs in triage

## 2022-02-02 NOTE — ED PROVIDER NOTE - PROGRESS NOTE DETAILS
Wallace Pandey DO: per Ms Epi started borderline fever, cough, congestion, sob at NH. + rapid covid at NH. Logan Bell, PGY-2- Pt reevaluated, looks clinically improved. RR is decreasing, O2 sat 100% on hiflow. Will update pt's significant other Brandi Mcguire at [phone] Logan Petit, PGY-2- Spoke with Dr. Cabrera regarding admission, requested MICU consult for hypotension. MICU evaluated pt, will admit to Dr. Cabrera's service. Recommending additional fluids. Pt has 500 cc LR infusing now, will reassess after IVF.

## 2022-02-02 NOTE — ED ADULT NURSE NOTE - CHIEF COMPLAINT QUOTE
arrives from [facility] for covid + rapid test, unknown vax status, hx dementia and unable to voice needs in triage

## 2022-02-02 NOTE — ED PROVIDER NOTE - CARE PLAN
1 Principal Discharge DX:	2019 novel coronavirus disease (COVID-19)  Secondary Diagnosis:	Pneumonia, aspiration  Secondary Diagnosis:	Atrial fibrillation  Secondary Diagnosis:	Acute respiratory failure due to COVID-19

## 2022-02-03 NOTE — H&P ADULT - PROBLEM SELECTOR PLAN 3
- hx of afib not on AC as per outpatient charts  - c/w digoxin and metoprolol  - obtain EKG, was not in chart  - tele monitoring

## 2022-02-03 NOTE — SWALLOW BEDSIDE ASSESSMENT ADULT - DIET PRIOR TO ADMI
Per RN, patient presenting from Nursing home, however documentation not available. Per H&P, patient is on dysphagia diet of nectar thick liquids.

## 2022-02-03 NOTE — CONSULT NOTE ADULT - ASSESSMENT
88 y/o M with pnhx of afib, DM, hypothyroid, HTN, PNA, dementia, dysphagia, presented to the ED for cough, congestion and SOB. GI consulted for dysphagia     Dysphagia   2/2 advanced dementia worsened d/t Sepsis   aspiration precautions   recommend NGT placement for now   if in line with GOC, plan PEG when off Covid Isolation   guarded prognosis     Covid PNA   management per ID/Medicine teams appreciated   favor elevated Bilirubin in setting of Sepsis     Afib   management per cardiology     I reviewed the overnight course of events on the unit, re-confirming the patient history. I discussed the care with the patient and their family. The plan of care was discussed with the physician assistant and modifications were made to the notation where appropriate. Differential diagnosis and plan of care discussed with patient after the evaluation. Advanced care planning was discussed with patient and family.  Advanced care planning forms were reviewed and discussed.  Risks, benefits and alternatives of gastroenterologic procedures were discussed in detail and all questions were answered. 35 minutes spent on total encounter of which more than fifty percent of the encounter was spent counseling and/or coordinating care by the attending physician.    89 y/o M with pnhx of afib, DM, hypothyroid, HTN, PNA, dementia, dysphagia, presented to the ED for cough, congestion and SOB. GI consulted for dysphagia     Dysphagia   2/2 AMS worsened d/t Sepsis   aspiration precautions   recommend NGT placement for now   if in line with GOC, plan PEG when off Covid Isolation   guarded prognosis     Covid PNA   management per ID/Medicine teams appreciated   favor elevated Bilirubin in setting of Sepsis     Afib   management per cardiology     I reviewed the overnight course of events on the unit, re-confirming the patient history. I discussed the care with the patient and their family. The plan of care was discussed with the physician assistant and modifications were made to the notation where appropriate. Differential diagnosis and plan of care discussed with patient after the evaluation. Advanced care planning was discussed with patient and family.  Advanced care planning forms were reviewed and discussed.  Risks, benefits and alternatives of gastroenterologic procedures were discussed in detail and all questions were answered. 35 minutes spent on total encounter of which more than fifty percent of the encounter was spent counseling and/or coordinating care by the attending physician.

## 2022-02-03 NOTE — H&P ADULT - PROBLEM SELECTOR PLAN 1
Assessment:  - patient presented with hypoxic respiratory failure secondary to covid19 infection  - CXR shows b/l covid pneumonia  - hypoxic on high flow 70% O2    Plan:  - start remdesivir and dexamethasone  - MICU consult to follow up  - attempt to wean off high flow  - DNR/DNI as per surrogate and MOLST form documents  - prone position, incentive inspirometry

## 2022-02-03 NOTE — CONSULT NOTE ADULT - PROBLEM SELECTOR RECOMMENDATION 9
covid pneumonia: procal is low : doubt he has any kind of bacterial infection: he has dysphagia and he is on puree diet: on remdesivir as well as dexa:  d dimer is very high : on full ac:

## 2022-02-03 NOTE — CONSULT NOTE ADULT - ATTENDING COMMENTS
Patient is a 89M with h/o DM, hypothyroid, HTN, dementia, p/w acute hypoxic respiratory failure due to covid pna vs aspiration.      - f/up cultures  - continue steroids, f/up ID regarding IL-6 meds  - I titrated down FiO2 to 70%, would continue to titrate down as tolerates, target SpO2 88-95%, tolerating well on HFNC  - clinically volume depleted, would give additional 500 - 1000 mL as bolus/continuous infusion.  - DNR/DNI, normotensive with stable hemodynamics, does not require critical care monitoring at this time, but please reconult if change in clinical status.  - DVT ppx, consider GI given steroids and HFNC    Simon Bolanos MD

## 2022-02-03 NOTE — SWALLOW BEDSIDE ASSESSMENT ADULT - SWALLOW EVAL: RECOMMENDED DIET
1) PO is contraindicated at this time given clinical presentation as stated above. 2) Consider short-term non-oral means of nutritional 3) RD consult as patient is at an increased nutritional risk.

## 2022-02-03 NOTE — CONSULT NOTE ADULT - SUBJECTIVE AND OBJECTIVE BOX
Chief Complaint:  Patient is a 89y old  Male who presents with a chief complaint of sob (2022 11:50)    Dementia    Chronic atrial fibrillation    DM2 (diabetes mellitus, type 2)    Benign essential HTN    History of appendectomy    S/P cataract surgery       HPI:  This is a 88 y/o M with pnhx of afib, DM, hypothyroid, HTN, PNA, dementia, dysphagia, presented to the ED for cough, congestion and SOB. Patient is demented, cannot answer questions. Most of history obtained from surrogate and ED/outpatient charts. The patient presented for new onset cough and congestion so he was sent to the ED in the setting of a recent positive covid test. The patient is hypoxic and needed to be started on high flow. Spoke with surrogate, sean, who was only able to tell me that he presented from NH for URI symptoms. Patient also had hx of pneumonia in 10/2021 admission from different chart. GI Consulted for dysphagia. Pt is with AMS/Dementia and unable to participate in HPI at time of visit. Chart reviewed, case discussed with collaborative teams. Pt failed swallow eval and needs alternative means of nutrition.      No Known Allergies      acetaminophen     Tablet .. 650 milliGRAM(s) Oral every 6 hours PRN  aluminum hydroxide/magnesium hydroxide/simethicone Suspension 30 milliLiter(s) Oral every 4 hours PRN  aspirin  chewable 81 milliGRAM(s) Oral daily  dexAMETHasone  Injectable 6 milliGRAM(s) IV Push daily  dextrose 40% Gel 15 Gram(s) Oral once  dextrose 5%. 1000 milliLiter(s) IV Continuous <Continuous>  dextrose 5%. 1000 milliLiter(s) IV Continuous <Continuous>  dextrose 50% Injectable 25 Gram(s) IV Push once  dextrose 50% Injectable 12.5 Gram(s) IV Push once  dextrose 50% Injectable 25 Gram(s) IV Push once  digoxin  Injectable 125 MICROGram(s) IV Push daily  enoxaparin Injectable 50 milliGRAM(s) SubCutaneous every 12 hours  glucagon  Injectable 1 milliGRAM(s) IntraMuscular once  insulin lispro (ADMELOG) corrective regimen sliding scale   SubCutaneous every 6 hours  insulin lispro Injectable (ADMELOG). 3 Unit(s) SubCutaneous once  levothyroxine Injectable 35 MICROGram(s) IV Push <User Schedule>  melatonin 3 milliGRAM(s) Oral at bedtime PRN  metoprolol tartrate Injectable 5 milliGRAM(s) IV Push every 6 hours  ondansetron Injectable 4 milliGRAM(s) IV Push every 8 hours PRN  pantoprazole  Injectable 40 milliGRAM(s) IV Push daily  PARoxetine 10 milliGRAM(s) Oral daily  remdesivir  IVPB   IV Intermittent   senna 2 Tablet(s) Oral at bedtime  sodium chloride 0.9%. 1000 milliLiter(s) IV Continuous <Continuous>        FAMILY HISTORY:  No pertinent family history in first degree relatives          Review of Systems:  *unobtainable 2/2 confusion        Relevant Family History:   n/c    Relevant Social History: n/c      Physical Exam:    Vital Signs:  Vital Signs Last 24 Hrs  T(C): 36.7 (2022 12:29), Max: 37.2 (2022 02:43)  T(F): 98 (2022 12:29), Max: 99 (2022 02:43)  HR: 112 (2022 12:29) (94 - 139)  BP: 128/88 (2022 12:29) (91/62 - 164/81)  BP(mean): --  RR: 20 (2022 12:29) (18 - 37)  SpO2: 100% (2022 12:29) (98% - 100%)  Daily Height in cm: 172.72 (2022 04:30)    Daily     General:  Appears stated age, well-groomed, nad  HEENT:  NC/AT,  conjunctivae clear and pink, no thyromegaly, nodules, adenopathy, no JVD  Chest:  Full & symmetric excursion, no increased effort, breath sounds clear  Cardiovascular:  Regular rhythm, S1, S2, no murmur/rub/S3/S4, no abdominal bruit, no edema  Abdomen:  Soft, non-tender, non-distended, normoactive bowel sounds,  no masses ,no hepatosplenomeagaly, no signs of chronic liver disease  Extremities:  no cyanosis,clubbing or edema  Skin:  No rash/erythema/ecchymoses/petechiae/wounds/abscess/warm/dry  Neuro/Psych:  A&Ox0  , no asterixis, no tremor, no encephalopathy    Laboratory:                            14.1   4.64  )-----------( 205      ( 2022 07:29 )             41.6     -    135  |  99  |  22  ----------------------------<  273<H>  4.3   |  20<L>  |  0.59    Ca    8.7      2022 07:29  Phos  3.2     02-03  Mg     1.60     02-03    TPro  x   /  Alb  x   /  TBili  2.0<H>  /  DBili  x   /  AST  x   /  ALT  x   /  AlkPhos  x   02-03    LIVER FUNCTIONS - ( 2022 07:29 )  Alb: 3.4 g/dL / Pro: 6.5 g/dL / ALK PHOS: 85 U/L / ALT: 11 U/L / AST: 22 U/L / GGT: x           PT/INR - ( 2022 07:29 )   PT: 14.4 sec;   INR: 1.27 ratio         PTT - ( 2022 22:44 )  PTT:30.0 sec  Urinalysis Basic - ( 2022 01:16 )    Color: Yellow / Appearance: Clear / S.028 / pH: x  Gluc: x / Ketone: Trace  / Bili: Negative / Urobili: <2 mg/dL   Blood: x / Protein: 30 mg/dL / Nitrite: Negative   Leuk Esterase: Negative / RBC: 6 /HPF / WBC 2 /HPF   Sq Epi: x / Non Sq Epi: 1 /HPF / Bacteria: Negative        Imaging:

## 2022-02-03 NOTE — CONSULT NOTE ADULT - ASSESSMENT
88 yo M with A Fib, DM, hypothyroid, PNA, dementia, initially with cough/sob  No fever, no leukocytosis  CXR suspicious for COVID pneumonia  HFNC  Dementia--baseline poor AMS status  Overall,  1) COVID  - HFNC  - RemD 5 days then DC  - Dexa 10 days then DC  - Supportive care  - If not improving on current therapies, please evaluate for Tocilizumab  - DC Vanco/zosyn  2) Hypoxia  - Role for anticoagulation/O2 supplementation per primary team  - F/U pulmonary  3) AMS  - At baseline vs worsening?  - Monitor    Cameron Moscoso MD  Pager [phone]  From 5pm-9am, and on weekends call [phone] 89 yo M with A Fib, DM, hypothyroid, PNA, dementia, initially with cough/sob  No fever, no leukocytosis  CXR suspicious for COVID pneumonia  HFNC  Dementia--baseline poor AMS status  Overall,  1) COVID  - HFNC  - RemD 5 days then DC  - Dexa 10 days then DC  - Supportive care  - If not improving on current therapies, please evaluate for Tocilizumab  - DC Vanco/zosyn  2) Hypoxia  - Role for anticoagulation/O2 supplementation per primary team  - F/U pulmonary  3) AMS  - At baseline vs worsening?  - Monitor    Cameron Moscoso MD  Contact on TEAMS messaging from 9am - 5pm  From 5pm-9am, and on weekends call [phone] or on call fellow

## 2022-02-03 NOTE — CONSULT NOTE ADULT - SUBJECTIVE AND OBJECTIVE BOX
"HPI:  This is a 88 y/o M with pnhx of afib, DM, hypothyroid, HTN, PNA, dementia, dysphagia, presented to the ED for cough, congestion and SOB. Patient is demented, cannot answer questions. Most of history obtained from surrogate and ED/outpatient charts. The patient presented for new onset cough and congestion so he was sent to the ED in the setting of a recent positive covid test. The patient is hypoxic and needed to be started on high flow. Spoke with surrogate, sean, who was only able to tell me that he presented from NH for URI symptoms. Patient also had hx of pneumonia in 10/2021 admission from different chart. (MRN- [id number]) (2022 03:13)"    Above reviewed. 89 yo M with A Fib, DM, hypothyroid, PNA, dementia, initially with cough/sob. Patient not verbal, not able to interact with me. Not able to provide further input or history. ID called for further eval.    PAST MEDICAL & SURGICAL HISTORY:  Dementia    Chronic atrial fibrillation    DM2 (diabetes mellitus, type 2)    Benign essential HTN    History of appendectomy    S/P cataract surgery    Allergies    No Known Allergies    Intolerances    ANTIMICROBIALS:  piperacillin/tazobactam IVPB.. 3.375 every 8 hours  remdesivir  IVPB    vancomycin  IVPB 750 every 12 hours    OTHER MEDS:  acetaminophen     Tablet .. 650 milliGRAM(s) Oral every 6 hours PRN  aluminum hydroxide/magnesium hydroxide/simethicone Suspension 30 milliLiter(s) Oral every 4 hours PRN  aspirin  chewable 81 milliGRAM(s) Oral daily  dexAMETHasone  Injectable 6 milliGRAM(s) IV Push daily  digoxin  Injectable 125 MICROGram(s) IV Push daily  enoxaparin Injectable 50 milliGRAM(s) SubCutaneous every 12 hours  levothyroxine Injectable 35 MICROGram(s) IV Push <User Schedule>  melatonin 3 milliGRAM(s) Oral at bedtime PRN  metoprolol tartrate Injectable 5 milliGRAM(s) IV Push every 6 hours  ondansetron Injectable 4 milliGRAM(s) IV Push every 8 hours PRN  pantoprazole  Injectable 40 milliGRAM(s) IV Push daily  PARoxetine 10 milliGRAM(s) Oral daily  senna 2 Tablet(s) Oral at bedtime  sodium chloride 0.9%. 1000 milliLiter(s) IV Continuous <Continuous>    SOCIAL HISTORY: Not able to provide further history/input dementia    FAMILY HISTORY:   Not able to provide further history/input dementia    Drug Dosing Weight  Height (cm): 172.7 (2022 04:30)  Weight (kg): 49.7 (2022 04:30)  BMI (kg/m2): 16.7 (2022 04:30)  BSA (m2): 1.58 (2022 04:30)    PE:    Vital Signs Last 24 Hrs  T(C): 36.7 (2022 09:15), Max: 37.2 (2022 02:43)  T(F): 98 (2022 09:15), Max: 99 (2022 02:43)  HR: 109 (2022 11:09) (94 - 139)  BP: 118/79 (2022 09:15) (91/62 - 164/81)  RR: 18 (2022 11:09) (18 - 37)  SpO2: 98% (2022 11:09) (98% - 100%)    Gen: AOx0-1, NAD, non-toxic, pleasant  CV: S1+S2 normal, tachycardic  Resp: Clear bilat, no resp distress, no crackles/wheezes, HFNC  Abd: Soft, nontender, +BS  Ext: No LE edema, no wounds  : No Green  IV/Skin: No thrombophlebitis  Msk: No low back pain, no arthralgias, no joint swelling  Neuro: No sensory deficits, no motor deficits    LABS:                        14.1   4.64  )-----------( 205      ( 2022 07:29 )             41.6     02-03    135  |  99  |  22  ----------------------------<  273<H>  4.3   |  20<L>  |  0.59    Ca    8.7      2022 07:29  Phos  3.2     02-03  Mg     1.60     -03    TPro  x   /  Alb  x   /  TBili  2.0<H>  /  DBili  x   /  AST  x   /  ALT  x   /  AlkPhos  x   02-03    Urinalysis Basic - ( 2022 01:16 )    Color: Yellow / Appearance: Clear / S.028 / pH: x  Gluc: x / Ketone: Trace  / Bili: Negative / Urobili: <2 mg/dL   Blood: x / Protein: 30 mg/dL / Nitrite: Negative   Leuk Esterase: Negative / RBC: 6 /HPF / WBC 2 /HPF   Sq Epi: x / Non Sq Epi: 1 /HPF / Bacteria: Negative    MICROBIOLOGY:    COVID+    RADIOLOGY:    2/3 XR:    FINDINGS:  The heart is normal in size.  There are bilateral, lower lobe predominant airspace opacities consistent   with known Covid-19 pneumonia.  There is no pneumothorax or pleural effusion.  No acute bony abnormality.    IMPRESSION:  Bilateral, lower lobe predominant airspace opacities consistent with   known Covid-19 pneumonia.

## 2022-02-03 NOTE — H&P ADULT - HISTORY OF PRESENT ILLNESS
This is a 88 y/o M with pnhx of afib, DM, hypothyroid, HTN, PNA, dementia, dysphagia This is a 89 y/o M with pnhx of afib, DM, hypothyroid, HTN, PNA, dementia, dysphagia, presented to the ED for cough, congestion and SOB. Patient is demented, cannot answer questions. Most of history obtained from surrogate and ED/outpatient charts. The patient presented for new onset cough and congestion so he was sent to the ED in the setting of a recent positive covid test. The patient is hypoxic and needed to be started on high flow. Spoke with surrogate, sean, who was only able to tell me that he presented from NH for URI symptoms. Patient also had hx of pneumonia in 10/2021 admission from different chart. (MRN- [id number])

## 2022-02-03 NOTE — H&P ADULT - NSICDXPASTMEDICALHX_GEN_ALL_CORE_FT
PAST MEDICAL HISTORY:  Benign essential HTN     Chronic atrial fibrillation     Dementia     DM2 (diabetes mellitus, type 2)

## 2022-02-03 NOTE — SWALLOW BEDSIDE ASSESSMENT ADULT - ADDITIONAL RECOMMENDATIONS
Reconsult this department as patient becomes medically optimized for reassessment of the swallow. This department to follow up as schedule permits.

## 2022-02-03 NOTE — PATIENT PROFILE ADULT - FALL HARM RISK - HARM RISK INTERVENTIONS

## 2022-02-03 NOTE — CONSULT NOTE ADULT - SUBJECTIVE AND OBJECTIVE BOX
22 @ 13:23    Patient is a 89y old  Male who presents with a chief complaint of sob (2022 13:03)      HPI:  This is a 89 y/o M with pnhx of afib, DM, hypothyroid, HTN, PNA, dementia, dysphagia, presented to the ED for cough, congestion and SOB. Patient is demented, cannot answer questions. Most of history obtained from surrogate and ED/outpatient charts. The patient presented for new onset cough and congestion so he was sent to the ED in the setting of a recent positive covid test. The patient is hypoxic and needed to be started on high flow. Spoke with surrogate, sean, who was only able to tell me that he presented from NH for URI symptoms. Patient also had hx of pneumonia in 10/2021 admission from different chart. (MRN- [id number]) (2022 03:13)       spoke to his significant other: Sean:  he was a very heavy smoker: > 40 yrs:  he does nto have emphysema: or asthma:   not using any inahlers as well as BD:  his dementia started about two weeks ago:     ?FOLLOWING PRESENT  [x ] Hx of PE/DVT, [x ] Hx COPD, [x ] Hx of Asthma, [ x] Hx of Hospitalization, [ x]  Hx of BiPAP/CPAP use, [x ] Hx of NANETTE    Allergies    No Known Allergies    Intolerances        PAST MEDICAL & SURGICAL HISTORY:  Dementia    Chronic atrial fibrillation    DM2 (diabetes mellitus, type 2)    Benign essential HTN    History of appendectomy    S/P cataract surgery        FAMILY HISTORY:  No pertinent family history in first degree relatives        Social History: [ ex smoker: quit 30 years ago  ] TOBACCO                  [ x ] ETOH                                 [ x ] IVDA/DRUGS    REVIEW OF SYSTEMS    pt cant answer  General:	    Skin/Breast:  	  Ophthalmologic:  	  ENMT:	    Respiratory and Thorax:  	  Cardiovascular:	    Gastrointestinal:	    Genitourinary:	    Musculoskeletal:	    Neurological:	    Psychiatric:	    Hematology/Lymphatics:	    Endocrine:	    Allergic/Immunologic:	    MEDICATIONS  (STANDING):  aspirin  chewable 81 milliGRAM(s) Oral daily  dexAMETHasone  Injectable 6 milliGRAM(s) IV Push daily  dextrose 40% Gel 15 Gram(s) Oral once  dextrose 5%. 1000 milliLiter(s) (50 mL/Hr) IV Continuous <Continuous>  dextrose 5%. 1000 milliLiter(s) (100 mL/Hr) IV Continuous <Continuous>  dextrose 50% Injectable 25 Gram(s) IV Push once  dextrose 50% Injectable 12.5 Gram(s) IV Push once  dextrose 50% Injectable 25 Gram(s) IV Push once  digoxin  Injectable 125 MICROGram(s) IV Push daily  enoxaparin Injectable 50 milliGRAM(s) SubCutaneous every 12 hours  glucagon  Injectable 1 milliGRAM(s) IntraMuscular once  insulin lispro (ADMELOG) corrective regimen sliding scale   SubCutaneous every 6 hours  insulin lispro Injectable (ADMELOG). 3 Unit(s) SubCutaneous once  levothyroxine Injectable 35 MICROGram(s) IV Push <User Schedule>  metoprolol tartrate Injectable 5 milliGRAM(s) IV Push every 6 hours  pantoprazole  Injectable 40 milliGRAM(s) IV Push daily  PARoxetine 10 milliGRAM(s) Oral daily  remdesivir  IVPB   IV Intermittent   senna 2 Tablet(s) Oral at bedtime  sodium chloride 0.9%. 1000 milliLiter(s) (60 mL/Hr) IV Continuous <Continuous>    MEDICATIONS  (PRN):  acetaminophen     Tablet .. 650 milliGRAM(s) Oral every 6 hours PRN Temp greater or equal to 38C (100.4F), Mild Pain (1 - 3)  aluminum hydroxide/magnesium hydroxide/simethicone Suspension 30 milliLiter(s) Oral every 4 hours PRN Dyspepsia  melatonin 3 milliGRAM(s) Oral at bedtime PRN Insomnia  ondansetron Injectable 4 milliGRAM(s) IV Push every 8 hours PRN Nausea and/or Vomiting       Vital Signs Last 24 Hrs  T(C): 36.7 (2022 13:17), Max: 37.2 (2022 02:43)  T(F): 98.1 (2022 13:17), Max: 99 (2022 02:43)  HR: 108 (2022 13:17) (94 - 139)  BP: 125/77 (2022 13:17) (91/62 - 164/81)  BP(mean): --  RR: 20 (2022 13:17) (18 - 37)  SpO2: 100% (2022 13:17) (98% - 100%)Orthostatic VS          I&O's Summary    2022 07:01  -  2022 07:00  --------------------------------------------------------  IN: 0 mL / OUT: 200 mL / NET: -200 mL        Physical Exam:   GENERAL: NAD, well-groomed, well-developed  HEENT: LEATHA/   Atraumatic, Normocephalic  ENMT: No tonsillar erythema, exudates, or enlargement; Moist mucous membranes, Good dentition, No lesions  NECK: Supple, No JVD, Normal thyroid  CHEST/LUNG: Clear to auscultation bilaterally  CVS: Regular rate and rhythm; No murmurs, rubs, or gallops  GI: : Soft, Nontender, Nondistended; Bowel sounds present  NERVOUS SYSTEM:  Alert & awake  EXTREMITIES:  -edema  LYMPH: No lymphadenopathy noted  SKIN: No rashes or lesions  ENDOCRINOLOGY: No Thyromegaly  PSYCH: dementia    Labs:  -2.1<56<4>>30<<7.275>>-2.1<<3><<4><<5<<309>>, Venous<53<4>>34<<7.285>>Venous<<3><<4><<5<<349>>                            14.1   4.64  )-----------( 205      ( 2022 07:29 )             41.6                         13.5   9.60  )-----------( 268      ( 2022 22:44 )             41.4     02-03    135  |  99  |  22  ----------------------------<  273<H>  4.3   |  20<L>  |  0.59  02-    136  |  98  |  19  ----------------------------<  209<H>  4.3   |  20<L>  |  0.61    Ca    8.7      2022 07:29  Ca    9.3      2022 22:44  Phos  3.2     02-03  Mg     1.60     -03    TPro  x   /  Alb  x   /  TBili  2.0<H>  /  DBili  x   /  AST  x   /  ALT  x   /  AlkPhos  x   -  TPro  6.5  /  Alb  3.4  /  TBili  2.0<H>  /  DBili  0.5<H>  /  AST  22  /  ALT  11  /  AlkPhos  85  -  TPro  7.6  /  Alb  4.1  /  TBili  1.6<H>  /  DBili  x   /  AST  20  /  ALT  9   /  AlkPhos  109  -    CAPILLARY BLOOD GLUCOSE      POCT Blood Glucose.: 273 mg/dL (2022 12:41)  POCT Blood Glucose.: 286 mg/dL (2022 08:56)  POCT Blood Glucose.: 179 mg/dL (2022 04:28)  POCT Blood Glucose.: 176 mg/dL (2022 21:15)    LIVER FUNCTIONS - ( 2022 07:29 )  Alb: 3.4 g/dL / Pro: 6.5 g/dL / ALK PHOS: 85 U/L / ALT: 11 U/L / AST: 22 U/L / GGT: x           PT/INR - ( 2022 07:29 )   PT: 14.4 sec;   INR: 1.27 ratio         PTT - ( 2022 22:44 )  PTT:30.0 sec  Urinalysis Basic - ( 2022 01:16 )    Color: Yellow / Appearance: Clear / S.028 / pH: x  Gluc: x / Ketone: Trace  / Bili: Negative / Urobili: <2 mg/dL   Blood: x / Protein: 30 mg/dL / Nitrite: Negative   Leuk Esterase: Negative / RBC: 6 /HPF / WBC 2 /HPF   Sq Epi: x / Non Sq Epi: 1 /HPF / Bacteria: Negative      D DImer  Procalcitonin, Serum: 0.04 ng/mL ( @ 22:56)  D-Dimer Assay, Quantitative: 2164 ng/mL DDU ( @ 07:29)  Serum Pro-Brain Natriuretic Peptide: 1475 pg/mL ( @ 22:44)      Studies  Chest X-RAY  CT SCAN Chest   CT Abdomen  Venous Dopplers: LE:   Others    ra< from: Xray Chest 1 View- PORTABLE-Urgent (22 @ 00:38) >    CLINICAL INDICATION: Sepsis, Covid positive.    TECHNIQUE: Single frontal, portable viewof the chest was obtained.    COMPARISON: No prior relevant imaging for comparison.    FINDINGS:  The heart is normal in size.  There are bilateral, lower lobe predominant airspace opacities consistent   with known Covid-19 pneumonia.  There is no pneumothorax or pleural effusion.  No acute bony abnormality.    IMPRESSION:  Bilateral, lower lobe predominant airspace opacities consistent with   known Covid-19 pneumonia.    --- End of Report ---           ED ROMERO MD; Resident Interventional Radiology  This document has been electronically signed.  BRYON WOODRUFF MD; Attending Radiologist  This document has been electronically signed. Feb  3 2022  6:36AM    < end of copied text >

## 2022-02-03 NOTE — SWALLOW BEDSIDE ASSESSMENT ADULT - SWALLOW EVAL: DIAGNOSIS
Patient unable to gain adequate alertness for assessment despite maximum prompting (i.e. verbal cuing, sternal rub, cold compress). Oral/pharyngeal phase of swallow could not be adequately assessed.

## 2022-02-03 NOTE — SWALLOW BEDSIDE ASSESSMENT ADULT - COMMENTS
Per H&P report, patient is a "This is a 89 y/o M with pnhx of afib, DM, hypothyroid, HTN, PNA, dementia, dysphagia, presented to the ED for cough, congestion and SOB. Patient is demented, cannot answer questions. Most of history obtained from surrogate and ED/outpatient charts. The patient presented for new onset cough and congestion so he was sent to the ED in the setting of a recent positive covid test. The patient is hypoxic and needed to be started on high flow. Spoke with surrogate, sean, who was only able to tell me that he presented from NH for URI symptoms. Patient also had hx of pneumonia in 10/2021 admission from different chart. (MRN- [id number])"    WBC is WFL. Most recent CXR revealed "Bilateral, lower lobe predominant airspace opacities consistent with known Covid-19 pneumonia.".     Patient seen at bedside this AM for an initial assessment of the swallow function. Patient receiving supplemental O2 via High Flow Nasal Cannula at this time (60L, 92% FiO2 ). RN reporting patient does not follow directives.

## 2022-02-03 NOTE — CONSULT NOTE ADULT - ASSESSMENT
89 y/o M with pnhx of afib, DM, hypothyroid, HTN, PNA, dementia, dysphagia, presented to the ED for cough, congestion and SOB. Patient likely to be in hypoxic respiratory failure requiring high flow most likely 2/2 COVID PNA.       #COVID-PNA:  -Agree with steroids, patient most likely will not benefit from remdesivir given severe COVID will defer to primary team on decision, c/w supportive care as well. Pro-ruben 0.04 low suspicion for superimposed bacterial pneumonia would hold abx.   -Patient DNR/DNI c/w non-invasive oxygen supplementation, saturating well on Hi Rod 50L and 85% when examined would target SpO2% 90-96% 89 y/o M with pnhx of afib, DM, hypothyroid, HTN, PNA, dementia, dysphagia, presented to the ED for cough, congestion and SOB. Patient likely to be in hypoxic respiratory failure requiring high flow most likely 2/2 COVID PNA.       #COVID-PNA:  -Agree with steroids, patient most likely will not benefit from remdesivir given severe COVID will defer to primary team on decision, c/w supportive care as well. Pro-ruben 0.04 low suspicion for superimposed bacterial pneumonia would hold abx.   -Patient DNR/DNI c/w non-invasive oxygen supplementation, saturating well on Hi Rod 50L and 85% when examined would try to  target SpO2% 92-96%, avoid hyperoxia, wean as tolerated.   -Duonebs PRN, looks volume down on exam may benefit from gentle IVFs, would avoid diuresis at this time.   -Rest of care per primary team     Currently not a MICU candidate please reconsult PRN.     Plan d/w Dr. Bolanos 89 y/o M with pnhx of afib, DM, hypothyroid, HTN, PNA, dementia, dysphagia, presented to the ED for cough, congestion and SOB. Patient likely to be in hypoxic respiratory failure requiring high flow most likely 2/2 COVID PNA.       #COVID-PNA:  -Agree with steroids, patient most likely will not benefit from remdesivir given severe COVID will defer to primary team on decision, c/w supportive care as well. Pro-ruben 0.04 low suspicion for superimposed bacterial pneumonia, but would continue abx for now with low threshold to stop depending on clinical course.   - f/up inflammatory markers, consider ID evaluation for IL-6 inhibitor  -Patient DNR/DNI c/w non-invasive oxygen supplementation, saturating well on Hi Rod 50L and 85% when examined would try to  target SpO2% 92-96%, avoid hyperoxia, wean as tolerated.   -Duonebs PRN, looks volume down on exam may benefit from gentle IVFs, would avoid diuresis at this time.   -Rest of care per primary team     Currently not a MICU candidate please reconsult PRN if change in clinical status     Plan d/w Dr. Bolanos

## 2022-02-03 NOTE — CONSULT NOTE ADULT - ASSESSMENT
Attending addendum:   Agree with above  Admitted with cough, dyspnea and coffee ground emesis found to have COVID and AF with RVR  Continue with rate control with IV BB as patient is NPO for dysphagia  On ac per primary team for elevated d-dimer  Treatment of COVID per primary team     Abbi Virk MD

## 2022-02-03 NOTE — CHART NOTE - NSCHARTNOTEFT_GEN_A_CORE
Notified by RN that patient's HR was sustaining between 130-160s afib. Received Cardizem and Digoxin doses today. Lopressor 5 mg IV ordered. Rectal temp 101.5. Tylenol 1 g IV ordered. Patient SpO2 low 80s on HFNC 55L/55% and NRB 15L. Patient unable to be proned. Chest PT completed. HFNC increased to 60L/100% with SpO2 improving to 93%. Patient resting comfortably in bed with HFNC and NRB over. HR now 120-130s. Ice bags applied. Will continue to monitor closely for fever resolution. CBC, Lactate, Procalcitonin, and BCx sent.                          13.6   14.20 )-----------( 212      ( 2022 21:09 )             40.5       < from: Xray Chest 1 View- PORTABLE-Urgent (22 @ 00:38) >    IMPRESSION:  Bilateral, lower lobe predominant airspace opacities consistent with   known Covid-19 pneumonia.    < end of copied text >      Urinalysis Basic - ( 2022 01:16 )    Color: Yellow / Appearance: Clear / S.028 / pH: x  Gluc: x / Ketone: Trace  / Bili: Negative / Urobili: <2 mg/dL   Blood: x / Protein: 30 mg/dL / Nitrite: Negative   Leuk Esterase: Negative / RBC: 6 /HPF / WBC 2 /HPF   Sq Epi: x / Non Sq Epi: 1 /HPF / Bacteria: Negative Notified by RN that patient's HR was sustaining between 130-160s afib. Received Cardizem and Digoxin doses today. Lopressor 5 mg IV ordered. Rectal temp 101.5. Tylenol 1 g IV ordered. Patient SpO2 low 80s on HFNC 55L/55% and NRB 15L. Patient unable to be proned. Chest PT completed. HFNC increased to 60L/100% with SpO2 improving to 93%. Patient resting comfortably in bed with HFNC and NRB over. HR now 120-130s. Ice bags applied. Will continue to monitor closely for fever resolution. CBC, Lactate, Procalcitonin, and BCx sent.                          13.6   14.20 )-----------( 212      ( 2022 21:09 )             40.5       Procalcitonin, Serum: 3.32 (22 @ 21:09)      < from: Xray Chest 1 View- PORTABLE-Urgent (22 @ 00:38) >    IMPRESSION:  Bilateral, lower lobe predominant airspace opacities consistent with   known Covid-19 pneumonia.    < end of copied text >      Urinalysis Basic - ( 2022 01:16 )    Color: Yellow / Appearance: Clear / S.028 / pH: x  Gluc: x / Ketone: Trace  / Bili: Negative / Urobili: <2 mg/dL   Blood: x / Protein: 30 mg/dL / Nitrite: Negative   Leuk Esterase: Negative / RBC: 6 /HPF / WBC 2 /HPF   Sq Epi: x / Non Sq Epi: 1 /HPF / Bacteria: Negative      Originally placed on IV abx for questionable superimposed aspiration PNA but was DC'd per ID. Now, given change in status and rise in WBC, increased oxygen demands, temp, elevated Procal, and history of dysphagia with failed S&S, will restart Vancomycin and Zosyn IV. ID to follow up in AM for further recommendations.         Elizabeth Rascon PA-C  Pager [phone] Notified by RN that patient's HR was sustaining between 130-160s afib. Received Cardizem and Digoxin doses today. Lopressor 5 mg IV ordered. Rectal temp 101.5. Tylenol 1 g IV ordered. Patient SpO2 low 80s on HFNC 55L/55% and NRB 15L. Patient unable to be proned. Chest PT completed. HFNC increased to 60L/100% with SpO2 improving to 93%. Patient resting comfortably in bed with HFNC and NRB over. HR now 120-130s. Ice bags applied. Will continue to monitor closely for fever resolution. CBC, Lactate, Procalcitonin, and BCx sent.                          13.6   14.20 )-----------( 212      ( 2022 21:09 )             40.5       Procalcitonin, Serum: 3.32 (22 @ 21:09)    Lactate, Blood: 6.6: TYPE:(C=Critical, N=Notification, A=Abnormal) _  TESTS: _LACT.  DATE/TIME CALLED: _2022 22:33:39 EST  CALLED TO: YANNICK HERNANDEZRN  READ BACK (2 Patient Identifiers)(Y/N): _Y  READ BACK VALUES (Y/N): _Y  CALLED BY: _MK mmol/L (22 @ 21:09)      < from: Xray Chest 1 View- PORTABLE-Urgent (22 @ 00:38) >    IMPRESSION:  Bilateral, lower lobe predominant airspace opacities consistent with   known Covid-19 pneumonia.    < end of copied text >      Urinalysis Basic - ( 2022 01:16 )    Color: Yellow / Appearance: Clear / S.028 / pH: x  Gluc: x / Ketone: Trace  / Bili: Negative / Urobili: <2 mg/dL   Blood: x / Protein: 30 mg/dL / Nitrite: Negative   Leuk Esterase: Negative / RBC: 6 /HPF / WBC 2 /HPF   Sq Epi: x / Non Sq Epi: 1 /HPF / Bacteria: Negative      Given Lactate of 6.6, increased NS+D5W maintenance fluids from 60 cc/hr to 75 cc/hr and additional 1L NS bolus over 60 minutes ordered.     Originally placed on IV abx for questionable superimposed aspiration PNA but was DC'd per ID. Now, given change in status and rise in WBC, increased oxygen demands, temp, elevated Procal, Lactate, and history of dysphagia with failed S&S, will restart Vancomycin and Zosyn IV. ID to follow up in AM for further recommendations.         Elizabeth Rascon PA-C  Pager [phone] Notified by RN that patient's HR was sustaining between 130-160s afib. Received Cardizem and Digoxin doses today. Lopressor 5 mg IV ordered. Rectal temp 101.5. Tylenol 1 g IV ordered. Patient SpO2 low 80s on HFNC 55L/55% and NRB 15L. Patient unable to be proned. Chest PT completed. HFNC increased to 60L/100% with SpO2 improving to 93%. Patient resting comfortably in bed with HFNC and NRB over. HR now 120-130s. Ice bags applied. Will continue to monitor closely for fever resolution. CBC, Lactate, Procalcitonin, and BCx sent.                          13.6   14.20 )-----------( 212      ( 2022 21:09 )             40.5       Procalcitonin, Serum: 3.32 (22 @ 21:09)    Lactate, Blood: 6.6: TYPE:(C=Critical, N=Notification, A=Abnormal) _  TESTS: _LACT.  DATE/TIME CALLED: _2022 22:33:39 EST  CALLED TO: YANNICK HERNANDEZRN  READ BACK (2 Patient Identifiers)(Y/N): _Y  READ BACK VALUES (Y/N): _Y  CALLED BY: _MK mmol/L (22 @ 21:09)      < from: Xray Chest 1 View- PORTABLE-Urgent (22 @ 00:38) >    IMPRESSION:  Bilateral, lower lobe predominant airspace opacities consistent with   known Covid-19 pneumonia.    < end of copied text >      Urinalysis Basic - ( 2022 01:16 )    Color: Yellow / Appearance: Clear / S.028 / pH: x  Gluc: x / Ketone: Trace  / Bili: Negative / Urobili: <2 mg/dL   Blood: x / Protein: 30 mg/dL / Nitrite: Negative   Leuk Esterase: Negative / RBC: 6 /HPF / WBC 2 /HPF   Sq Epi: x / Non Sq Epi: 1 /HPF / Bacteria: Negative      Given Lactate of 6.6, increased NS+D5W maintenance fluids from 60 cc/hr to 75 cc/hr and additional 1L NS bolus over 60 minutes ordered.     Originally placed on IV abx for questionable superimposed aspiration PNA but was DC'd per ID. Now, given change in status and rise in WBC, increased oxygen demands, temp, elevated Procal, Lactate, and history of dysphagia with failed S&S, will restart Vancomycin and Zosyn IV. ID to follow up in AM for further recommendations.         ----ADDENDUM AT 02:45----    Patient seen again at bedside. Repeat rectal temp 100.2 with ice bags still applied. Oral and nasal suctioning completed. Lungs with b/l crackles. repeat CXR ordered. Current SpO2 88-89% on HFNC 60L/100% and 15L NRB. Inhaled sodium chloride 3% ordered with chest PT and further suctioning PRN. Will continue to monitor closely         Elizabeth Rascon PA-C  Pager [phone] Notified by RN that patient's HR was sustaining between 130-160s afib. Received Cardizem and Digoxin doses today. Lopressor 5 mg IV ordered. Rectal temp 101.5. Tylenol 1 g IV ordered. Patient SpO2 low 80s on HFNC 55L/55% and NRB 15L. Patient unable to be proned. Chest PT completed. HFNC increased to 60L/100% with SpO2 improving to 93%. Patient resting comfortably in bed with HFNC and NRB over. HR now 120-130s. Ice bags applied. Will continue to monitor closely for fever resolution. CBC, Lactate, Procalcitonin, and BCx sent.                          13.6   14.20 )-----------( 212      ( 2022 21:09 )             40.5       Procalcitonin, Serum: 3.32 (22 @ 21:09)    Lactate, Blood: 6.6: TYPE:(C=Critical, N=Notification, A=Abnormal) _  TESTS: _LACT.  DATE/TIME CALLED: _2022 22:33:39 EST  CALLED TO: YANNICK HERNANDEZRN  READ BACK (2 Patient Identifiers)(Y/N): _Y  READ BACK VALUES (Y/N): _Y  CALLED BY: _MK mmol/L (22 @ 21:09)      < from: Xray Chest 1 View- PORTABLE-Urgent (22 @ 00:38) >    IMPRESSION:  Bilateral, lower lobe predominant airspace opacities consistent with   known Covid-19 pneumonia.    < end of copied text >      Urinalysis Basic - ( 2022 01:16 )    Color: Yellow / Appearance: Clear / S.028 / pH: x  Gluc: x / Ketone: Trace  / Bili: Negative / Urobili: <2 mg/dL   Blood: x / Protein: 30 mg/dL / Nitrite: Negative   Leuk Esterase: Negative / RBC: 6 /HPF / WBC 2 /HPF   Sq Epi: x / Non Sq Epi: 1 /HPF / Bacteria: Negative      Given Lactate of 6.6, increased NS+D5W maintenance fluids from 60 cc/hr to 75 cc/hr and additional 1L NS bolus over 60 minutes ordered.     Originally placed on IV abx for questionable superimposed aspiration PNA but was DC'd per ID. Now, given change in status and rise in WBC, increased oxygen demands, temp, elevated Procal, Lactate, and history of dysphagia with failed S&S, will restart Vancomycin and Zosyn IV. ID to follow up in AM for further recommendations.         ----ADDENDUM AT 02:45----    Patient seen again at bedside. Repeat rectal temp 100.2 with ice bags still applied. Oral and nasal suctioning completed. Lungs with b/l crackles. repeat CXR ordered. Current SpO2 88-89% on HFNC 60L/100% and 15L NRB. Inhaled sodium chloride 3% ordered with chest PT and further suctioning PRN. Will continue to monitor closely       ----ADDENDUM AT 03:55----    Lasix 40 mg IV ordered for repeat CXR showing new right pleural effusion.      < from: Xray Chest 1 View- PORTABLE-Urgent (Xray Chest 1 View- PORTABLE-Urgent .) (22 @ 03:03) >    ******PRELIMINARY REPORT******     INTERPRETATION:  interval increase in bilateral patchy opacities, with   new right pleural effusion.        Elizabeth Rascon PA-C  Pager [phone] Notified by RN that patient's HR was sustaining between 130-160s afib. Received Cardizem and Digoxin doses today. Lopressor 5 mg IV ordered. Rectal temp 101.5. Tylenol 1 g IV ordered. Patient SpO2 low 80s on HFNC 55L/55% and NRB 15L. Patient unable to be proned. Chest PT completed. HFNC increased to 60L/100% with SpO2 improving to 93%. Patient resting comfortably in bed with HFNC and NRB over. HR now 120-130s. Ice bags applied. Will continue to monitor closely for fever resolution. CBC, Lactate, Procalcitonin, and BCx sent.                          13.6   14.20 )-----------( 212      ( 2022 21:09 )             40.5       Procalcitonin, Serum: 3.32 (22 @ 21:09)    Lactate, Blood: 6.6: TYPE:(C=Critical, N=Notification, A=Abnormal) _  TESTS: _LACT.  DATE/TIME CALLED: _2022 22:33:39 EST  CALLED TO: YANNICK HERNANDEZRN  READ BACK (2 Patient Identifiers)(Y/N): _Y  READ BACK VALUES (Y/N): _Y  CALLED BY: _MK mmol/L (22 @ 21:09)      < from: Xray Chest 1 View- PORTABLE-Urgent (22 @ 00:38) >    IMPRESSION:  Bilateral, lower lobe predominant airspace opacities consistent with   known Covid-19 pneumonia.    < end of copied text >      Urinalysis Basic - ( 2022 01:16 )    Color: Yellow / Appearance: Clear / S.028 / pH: x  Gluc: x / Ketone: Trace  / Bili: Negative / Urobili: <2 mg/dL   Blood: x / Protein: 30 mg/dL / Nitrite: Negative   Leuk Esterase: Negative / RBC: 6 /HPF / WBC 2 /HPF   Sq Epi: x / Non Sq Epi: 1 /HPF / Bacteria: Negative      Given Lactate of 6.6, increased NS+D5W maintenance fluids from 60 cc/hr to 75 cc/hr and additional 1L NS bolus over 60 minutes ordered.     Originally placed on IV abx for questionable superimposed aspiration PNA but was DC'd per ID. Now, given change in status and rise in WBC, increased oxygen demands, temp, elevated Procal, Lactate, and history of dysphagia with failed S&S, will restart Vancomycin and Zosyn IV. ID to follow up in AM for further recommendations.         ----ADDENDUM AT 02:45----    Patient seen again at bedside. Repeat rectal temp 100.2 with ice bags still applied. Oral and nasal suctioning completed. Lungs with b/l crackles. repeat CXR ordered. Current SpO2 88-89% on HFNC 60L/100% and 15L NRB. Inhaled sodium chloride 3% ordered with chest PT and further suctioning PRN. Will continue to monitor closely       ----ADDENDUM AT 03:55----    Lasix 40 mg IV ordered for repeat CXR showing new right pleural effusion.      < from: Xray Chest 1 View- PORTABLE-Urgent (Xray Chest 1 View- PORTABLE-Urgent .) (22 @ 03:03) >    ******PRELIMINARY REPORT******     INTERPRETATION:  interval increase in bilateral patchy opacities, with   new right pleural effusion.      ----ADDENDUM AT 05:07----    HR sustaining in 140-160s with SpO2 80% and not improving. Rectal temp 99.4. s/p Lopressor 5 mg IV x3 with minimal HR control after fever resolution. Contacted cardiology on-call, awaiting further recommendations.         Elizabeth Rascon PA-C  Pager [phone] Notified by RN that patient's HR was sustaining between 130-160s afib. Received Cardizem and Digoxin doses today. Lopressor 5 mg IV ordered. Rectal temp 101.5. Tylenol 1 g IV ordered. Patient SpO2 low 80s on HFNC 55L/55% and NRB 15L. Patient unable to be proned. Chest PT completed. HFNC increased to 60L/100% with SpO2 improving to 93%. Patient resting comfortably in bed with HFNC and NRB over. HR now 120-130s. Ice bags applied. Will continue to monitor closely for fever resolution. CBC, Lactate, Procalcitonin, and BCx sent.                          13.6   14.20 )-----------( 212      ( 2022 21:09 )             40.5       Procalcitonin, Serum: 3.32 (22 @ 21:09)    Lactate, Blood: 6.6: TYPE:(C=Critical, N=Notification, A=Abnormal) _  TESTS: _LACT.  DATE/TIME CALLED: _2022 22:33:39 EST  CALLED TO: YANNICK HERNANDEZRN  READ BACK (2 Patient Identifiers)(Y/N): _Y  READ BACK VALUES (Y/N): _Y  CALLED BY: _MK mmol/L (22 @ 21:09)      < from: Xray Chest 1 View- PORTABLE-Urgent (22 @ 00:38) >    IMPRESSION:  Bilateral, lower lobe predominant airspace opacities consistent with   known Covid-19 pneumonia.    < end of copied text >      Urinalysis Basic - ( 2022 01:16 )    Color: Yellow / Appearance: Clear / S.028 / pH: x  Gluc: x / Ketone: Trace  / Bili: Negative / Urobili: <2 mg/dL   Blood: x / Protein: 30 mg/dL / Nitrite: Negative   Leuk Esterase: Negative / RBC: 6 /HPF / WBC 2 /HPF   Sq Epi: x / Non Sq Epi: 1 /HPF / Bacteria: Negative      Given Lactate of 6.6, increased NS+D5W maintenance fluids from 60 cc/hr to 75 cc/hr and additional 1L NS bolus over 60 minutes ordered.     Originally placed on IV abx for questionable superimposed aspiration PNA but was DC'd per ID. Now, given change in status and rise in WBC, increased oxygen demands, temp, elevated Procal, Lactate, and history of dysphagia with failed S&S, will restart Vancomycin and Zosyn IV. ID to follow up in AM for further recommendations.         ----ADDENDUM AT 02:45----    Patient seen again at bedside. Repeat rectal temp 100.2 with ice bags still applied. Oral and nasal suctioning completed. Lungs with b/l crackles. repeat CXR ordered. Current SpO2 88-89% on HFNC 60L/100% and 15L NRB. Inhaled sodium chloride 3% ordered with chest PT and further suctioning PRN. Will continue to monitor closely       ----ADDENDUM AT 03:55----    Lasix 40 mg IV ordered for repeat CXR showing new right pleural effusion.      < from: Xray Chest 1 View- PORTABLE-Urgent (Xray Chest 1 View- PORTABLE-Urgent .) (22 @ 03:03) >    ******PRELIMINARY REPORT******     INTERPRETATION:  interval increase in bilateral patchy opacities, with   new right pleural effusion.      ----ADDENDUM AT 05:07----    HR sustaining in 140-160s with SpO2 80% and not improving. Rectal temp 99.4. s/p Lopressor 5 mg IV x3 with minimal HR control after fever resolution. Contacted cardiology on-call, awaiting further recommendations.       ----ADDENDUM AT 05:30---    Spoke with cardiology, will start Cardizem 10 mg IV gtt for HR goal of 100-120 bpm. Called life-partner, Brandi to allow her to come for end-of-life visit. At this time she is deciding to come and will consider comfort care after she decided if she wants to come for visit or not. Will continue to monitor. All questions answered.         Elizabeth Rascon PA-C  Pager [phone] Notified by RN that patient's HR was sustaining between 130-160s afib. Received Cardizem and Digoxin doses today. Lopressor 5 mg IV ordered. Rectal temp 101.5. Tylenol 1 g IV ordered. Patient SpO2 low 80s on HFNC 55L/55% and NRB 15L. Patient unable to be proned. Chest PT completed. HFNC increased to 60L/100% with SpO2 improving to 93%. Patient resting comfortably in bed with HFNC and NRB over. HR now 120-130s. Ice bags applied. Will continue to monitor closely for fever resolution. CBC, Lactate, Procalcitonin, and BCx sent.                          13.6   14.20 )-----------( 212      ( 2022 21:09 )             40.5       Procalcitonin, Serum: 3.32 (22 @ 21:09)    Lactate, Blood: 6.6: TYPE:(C=Critical, N=Notification, A=Abnormal) _  TESTS: _LACT.  DATE/TIME CALLED: _2022 22:33:39 EST  CALLED TO: YANNICK HERNANDEZRN  READ BACK (2 Patient Identifiers)(Y/N): _Y  READ BACK VALUES (Y/N): _Y  CALLED BY: _MK mmol/L (22 @ 21:09)      < from: Xray Chest 1 View- PORTABLE-Urgent (22 @ 00:38) >    IMPRESSION:  Bilateral, lower lobe predominant airspace opacities consistent with   known Covid-19 pneumonia.    < end of copied text >      Urinalysis Basic - ( 2022 01:16 )    Color: Yellow / Appearance: Clear / S.028 / pH: x  Gluc: x / Ketone: Trace  / Bili: Negative / Urobili: <2 mg/dL   Blood: x / Protein: 30 mg/dL / Nitrite: Negative   Leuk Esterase: Negative / RBC: 6 /HPF / WBC 2 /HPF   Sq Epi: x / Non Sq Epi: 1 /HPF / Bacteria: Negative      Given Lactate of 6.6, increased NS+D5W maintenance fluids from 60 cc/hr to 75 cc/hr and additional 1L NS bolus over 60 minutes ordered.     Originally placed on IV abx for questionable superimposed aspiration PNA but was DC'd per ID. Now, given change in status and rise in WBC, increased oxygen demands, temp, elevated Procal, Lactate, and history of dysphagia with failed S&S, will restart Vancomycin and Zosyn IV. ID to follow up in AM for further recommendations.         ----ADDENDUM AT 02:45----    Patient seen again at bedside. Repeat rectal temp 100.2 with ice bags still applied. Oral and nasal suctioning completed. Lungs with b/l crackles. repeat CXR ordered. Current SpO2 88-89% on HFNC 60L/100% and 15L NRB. Inhaled sodium chloride 3% ordered with chest PT and further suctioning PRN. Will continue to monitor closely       ----ADDENDUM AT 03:55----    Lasix 40 mg IV ordered for repeat CXR showing new right pleural effusion.      < from: Xray Chest 1 View- PORTABLE-Urgent (Xray Chest 1 View- PORTABLE-Urgent .) (22 @ 03:03) >    ******PRELIMINARY REPORT******     INTERPRETATION:  interval increase in bilateral patchy opacities, with   new right pleural effusion.      ----ADDENDUM AT 05:07----    HR sustaining in 140-160s with SpO2 80% and not improving. Rectal temp 99.4. s/p Lopressor 5 mg IV x3 with minimal HR control after fever resolution. Contacted cardiology on-call, awaiting further recommendations.       ----ADDENDUM AT 05:30---    Spoke with cardiology, will start Cardizem 10 mg IV gtt for HR goal of 100-120 bpm. Called life-partner, Brandi to allow her to come for end-of-life visit. At this time she is deciding to come and will consider comfort care after she decided if she wants to come for visit or not. She did not want to make him comfort at this time in fears that he will pass away prior to her getting the chance to say goodbye and she wanted to see if the cardizem gtt will help his heart rate. Will continue to monitor. All questions answered.         Elizabeth Rascon PA-C  Pager [phone]

## 2022-02-03 NOTE — H&P ADULT - ASSESSMENT
88 y/o M with pnhx of afib, DM, hypothyroid, HTN, PNA, dementia, dysphagia, presented to the ED for cough, congestion and SOB. Patient likely to be in hypoxic repsiratory failure requiring high flow. Admit for covid pneumonia and possibly aspiration pneumonia Epidermal Sutures: 4-0 Nylon

## 2022-02-03 NOTE — H&P ADULT - NSHPPHYSICALEXAM_GEN_ALL_CORE
PHYSICAL EXAM:  VITALS: Vital Signs Last 24 Hrs  T(C): 37.2 (03 Feb 2022 02:43), Max: 37.2 (03 Feb 2022 02:43)  T(F): 99 (03 Feb 2022 02:43), Max: 99 (03 Feb 2022 02:43)  HR: 116 (03 Feb 2022 02:43) (94 - 139)  BP: 121/90 (03 Feb 2022 02:43) (91/62 - 164/81)  BP(mean): --  RR: 26 (03 Feb 2022 02:43) (22 - 37)  SpO2: 100% (03 Feb 2022 02:43) (100% - 100%)  GENERAL: NAD, well-developed, appears cachetic  HEAD:  Atraumatic, Normocephalic  EYES: EOMI, PERRL, conjunctiva and sclera clear  ENT: Moist Mucus Membranes present, no ulcers appreciated  NECK: Supple, No JVD  CHEST/LUNG: rhonchi b/l throughout lungfields, no wheezing, no accessory muscle use  HEART: Regular rate and rhythm, tachycardic; No murmurs, rubs, or gallops, (+)S1, S2  ABDOMEN: Soft, Nontender, Nondistended; Normal Bowel sounds   EXTREMITIES:  2+ Peripheral Pulses, No clubbing, cyanosis, or edema  PSYCH: demented  NEUROLOGY: AAOx0, demented  SKIN: No rashes or lesions

## 2022-02-03 NOTE — GOALS OF CARE CONVERSATION - ADVANCED CARE PLANNING - CONVERSATION DETAILS
I had full discussion with the surrogate, sean. Explained to her the current clinical status of the patient. I also reviewed the MOLST form that was signed on 10/2021 by the ED, which was not signed properly (verbal consent box not checked and the back was missing a signature).    I reviewed DNR and DNI status again, and again explained to her the risks and benefits over phone. She requests the patient to be DNR (does not want chest compressions) and DNI (not to be dependent on a machine for breathing as per her words). However, she still wants the other interventions done (send to hospital, IV fluids, nasogastric tube placement for feeds, and IV abx).    MOLST form completed with up to date information and properly signed with witness, and placed in chart. I had full discussion with the surrogate, sean. Explained to her the current clinical status of the patient. I also reviewed the MOLST form that was signed on 10/2021 by the ED, which was not signed properly (verbal consent box not checked and the back was missing a signature). Documents also reviewed from outside facility which indicates HCP and code statuses    I reviewed DNR and DNI status again, and again explained to her the risks and benefits over phone. She requests the patient to be DNR (does not want chest compressions) and DNI (not to be dependent on a machine for breathing as per her words). However, she still wants the other interventions done (send to hospital, IV fluids, nasogastric tube placement for feeds, and IV abx).    MOLST form completed with up to date information and properly signed with witness, and placed in chart.

## 2022-02-03 NOTE — CONSULT NOTE ADULT - ASSESSMENT
88 y/o M with pnhx of afib, DM, hypothyroid, HTN, PNA, dementia, dysphagia, presented to the ED for cough, congestion and SOB. Patient likely to be in hypoxic repsiratory failure requiring high flow. Admit for covid pneumonia and possibly aspiration pneumonia

## 2022-02-03 NOTE — CONSULT NOTE ADULT - SUBJECTIVE AND OBJECTIVE BOX
date of service 2/3/22    HISTORY OF PRESENT ILLNESS: HPI:  This is a 88 y/o M with pnhx of afib, DM, hypothyroid, HTN, PNA, dementia, dysphagia, presented to the ED for cough, congestion and SOB. Patient is demented, cannot answer questions. Most of history obtained from surrogate and ED/outpatient charts. The patient presented for new onset cough and congestion so he was sent to the ED in the setting of a recent positive covid test. The patient is hypoxic and needed to be started on high flow. Spoke with surrogate, sean, who was only able to tell me that he presented from NH for URI symptoms. Patient also had hx of pneumonia in 10/2021 admission from different chart. (MRN- [id number]) (03 Feb 2022 03:13)    ROS unable to be obtained.  appears comfortable laying in bed on hi-alexys.      PAST MEDICAL & SURGICAL HISTORY:  Dementia    Chronic atrial fibrillation    DM2 (diabetes mellitus, type 2)    Benign essential HTN    History of appendectomy    S/P cataract surgery      MEDICATIONS  (STANDING):  aspirin  chewable 81 milliGRAM(s) Oral daily  dexAMETHasone  Injectable 6 milliGRAM(s) IV Push daily  dextrose 40% Gel 15 Gram(s) Oral once  dextrose 5%. 1000 milliLiter(s) (50 mL/Hr) IV Continuous <Continuous>  dextrose 5%. 1000 milliLiter(s) (100 mL/Hr) IV Continuous <Continuous>  dextrose 50% Injectable 25 Gram(s) IV Push once  dextrose 50% Injectable 12.5 Gram(s) IV Push once  dextrose 50% Injectable 25 Gram(s) IV Push once  digoxin  Injectable 125 MICROGram(s) IV Push daily  enoxaparin Injectable 50 milliGRAM(s) SubCutaneous every 12 hours  glucagon  Injectable 1 milliGRAM(s) IntraMuscular once  insulin lispro (ADMELOG) corrective regimen sliding scale   SubCutaneous every 6 hours  levothyroxine Injectable 35 MICROGram(s) IV Push <User Schedule>  metoprolol tartrate Injectable 5 milliGRAM(s) IV Push every 6 hours  pantoprazole  Injectable 40 milliGRAM(s) IV Push daily  PARoxetine 10 milliGRAM(s) Oral daily  remdesivir  IVPB   IV Intermittent   senna 2 Tablet(s) Oral at bedtime  sodium chloride 0.9%. 1000 milliLiter(s) (60 mL/Hr) IV Continuous <Continuous>      Allergies  No Known Allergies      FAMILY HISTORY:  No pertinent family history in first degree relatives  Noncontributory for premature coronary disease or sudden cardiac death    SOCIAL HISTORY:    [x ] Non-smoker  [ ] Smoker  [ ] Alcohol    FLU VACCINE THIS YEAR STARTS IN AUGUST:  [ ] Yes    [ ] No    IF OVER 65 HAVE YOU EVER HAD A PNA VACCINE:  [ ] Yes    [ ] No       [ ] N/A      REVIEW OF SYSTEMS:  [ ]chest pain  [  ]shortness of breath  [  ]palpitations  [  ]syncope  [ ]near syncope [ ]upper extremity weakness   [ ] lower extremity weakness  [  ]diplopia  [  ]altered mental status   [  ]fevers  [ ]chills [ ]nausea  [ ]vomiting  [  ]dysphagia    [ ]abdominal pain  [ ]melena  [ ]BRBPR    [  ]epistaxis  [  ]rash    [ ]lower extremity edema        [ ] All others negative	  [x ] Unable to obtain      LABS:	 	    CARDIAC MARKERS:  Troponin T, High Sensitivity Result: 13: Rapid changes upward or downward in high-sensitivity troponin levels  strongly suggest acute myocardial injury. Hemolysis may falsely lower  results. Renal impairment may increase results.  Normal: <6 - 14 ng/L  Indeterminate: 15 - 51 ng/L  Elevated:>51 ng/L  Please see "[email or web address] on the [employer] intranet for  more information. ng/L (02.02.22 @ 22:44)                        14.1   4.64  )-----------( 205      ( 03 Feb 2022 07:29 )             41.6       135  |  99  |  22  ----------------------------<  273<H>  4.3   |  20<L>  |  0.59    Ca    8.7      03 Feb 2022 07:29  Phos  3.2     02-03  Mg     1.60     02-03    TPro  x   /  Alb  x   /  TBili  2.0<H>  /  DBili  x   /  AST  x   /  ALT  x   /  AlkPhos  x   02-03    Creatinine Trend: 0.59<--, 0.61<--    Coags:  PT/INR - ( 03 Feb 2022 07:29 )   PT: 14.4 sec;   INR: 1.27 ratio    PTT - ( 02 Feb 2022 22:44 )  PTT:30.0 sec  proBNP: Serum Pro-Brain Natriuretic Peptide: 1475 pg/mL (02-02 @ 22:44)  TSH: Thyroid Stimulating Hormone, Serum: 0.40 uIU/mL (02-03 @ 07:29)    PHYSICAL EXAM:  T(C): 36.7 (02-03-22 @ 13:17), Max: 37.2 (02-03-22 @ 02:43)  HR: 113 (02-03-22 @ 15:34) (94 - 139)  BP: 140/84 (02-03-22 @ 13:36) (91/62 - 164/81)  RR: 20 (02-03-22 @ 15:34) (18 - 37)  SpO2: 99% (02-03-22 @ 15:34) (98% - 100%)  Wt(kg): --   BMI (kg/m2): 16.7 (02-03-22 @ 04:30)  I&O's Summary    Gen: Appears comfortable  HEENT:  (-)icterus (-)pallor  CV: N S1 S2 1/6 JC (+)2 Pulses B/l  Resp:  coarse BS BL  GI: (+) BS Soft, NT, ND  Lymph:  (-)Edema, (-)obvious lymphadenopathy  Skin: Warm to touch, Normal turgor  Psych: unable to assess    TELEMETRY: -130	      ECG:  AF	    < from: Xray Chest 1 View- PORTABLE-Urgent (02.03.22 @ 00:38) >  IMPRESSION:  Bilateral, lower lobe predominant airspace opacities consistent with   known Covid-19 pneumonia.    < end of copied text >      ASSESSMENT/PLAN: This is a 88 y/o M with pnhx of afib (on asa, Dig and Metoprolol prior to admit), DM, hypothyroid, HTN, PNA, dementia, dysphagia, presented to the ED for cough, congestion, coffee ground emesis and SOB, found with COVID    --cardiology consulted for management of rapid AF  --given dysphagia, pt is on IV meds  --on IV Lopressor  --consider IV cardizem PRN  --was not on AC at home, now on full dose lovenox due to elevated D Dimer  --monitor H/H

## 2022-02-03 NOTE — CONSULT NOTE ADULT - SUBJECTIVE AND OBJECTIVE BOX
CHIEF COMPLAINT:    HPI: 89 year old male with a  pmhx of DM, hypothyroid, HTN, PNA, dementia, dysphagia, is brought to ED by EMS from [facility] for evaluation of difficulty breathing. Per NH, pt had fever, cough, congestion that began today. Had rapid COVID-19 test that returned positive en route to hospital. Pt was routinely screened as part of weekly testing. Unclear if pt recently aspirated. Has MOLST in chart, is DNR/DNI.  Previous MRN- [id number]    MICU consulted for hypoxic respiratory failure. Patient minimally verbal on interview, denies any chest pain, nausea or vomiting. Tearful during interview and extremely anxious. Patient states being vaccinated, unable to answer if he was boosted.       PAST MEDICAL & SURGICAL HISTORY:  Dementia          Allergies    No Known Allergies    Intolerances        HOME MEDICATIONS:    REVIEW OF SYSTEMS:    Constitutional/General- Denies acute distress.   Eyes- No changes in vision, double vision, blurry vision, does not wear glasses.  ENT- No nasal congestion or rhinorrhea,  changes in hearing, does not wear hearing aids. No odynophagia or dysphagia.   Skin-Denies rashes.  Cardiovascular- Denies chest pain or heart palpitations. No orthopnea/PND.  Pulmonary- Denies shortness of breath, no cough. No pleuritic chest pain or hemoptysis.   Gastrointestinal- Denies nausea, vomiting, diarrhea, bloating, constipation, abdominal pain.  Genitourinary-Denies dysuria, frequency, or change in urine odor/appearance.   Musculoskeletal-Denies changes in strength, no joint tenderness or swelling.  Neurologic-Denies changes in memory. Denies headache. weakness, paresthesias, or imbalance.   Endocrine-Denies heat/cold intolerance, weight change, excessive sweating, or polydipsia/polyuria  Psychology-Denies changes in mood. Denies anxiety or depression.  Heme/Lymph-Denies easy bruising.     OBJECTIVE:  ICU Vital Signs Last 24 Hrs  T(C): 37.2 (2022 02:43), Max: 37.2 (2022 02:43)  T(F): 99 (2022 02:43), Max: 99 (2022 02:43)  HR: 116 (2022 02:43) (94 - 139)  BP: 121/90 (2022 02:43) (91/62 - 164/81)  BP(mean): --  ABP: --  ABP(mean): --  RR: 26 (2022 02:43) (22 - 37)  SpO2: 100% (2022 02:43) (100% - 100%)         @ 07:01  -  -03 @ 02:52  --------------------------------------------------------  IN: 0 mL / OUT: 200 mL / NET: -200 mL      CAPILLARY BLOOD GLUCOSE      POCT Blood Glucose.: 176 mg/dL (2022 21:15)      PHYSICAL EXAM:  General:   HEENT:   Lymph Nodes:  Neck:   Respiratory:   Cardiovascular:   Abdomen:   Extremities:   Skin:   Neurological:  Psychiatry:    HOSPITAL MEDICATIONS:  MEDICATIONS  (STANDING):    MEDICATIONS  (PRN):      LABS:                        13.5   9.60  )-----------( 268      ( 2022 22:44 )             41.4         136  |  98  |  19  ----------------------------<  209<H>  4.3   |  20<L>  |  0.61    Ca    9.3      2022 22:44    TPro  7.6  /  Alb  4.1  /  TBili  1.6<H>  /  DBili  x   /  AST  20  /  ALT  9   /  AlkPhos  109      PT/INR - ( 2022 22:44 )   PT: 13.7 sec;   INR: 1.21 ratio         PTT - ( 2022 22:44 )  PTT:30.0 sec  Urinalysis Basic - ( 2022 01:16 )    Color: Yellow / Appearance: Clear / S.028 / pH: x  Gluc: x / Ketone: Trace  / Bili: Negative / Urobili: <2 mg/dL   Blood: x / Protein: 30 mg/dL / Nitrite: Negative   Leuk Esterase: Negative / RBC: 6 /HPF / WBC 2 /HPF   Sq Epi: x / Non Sq Epi: 1 /HPF / Bacteria: Negative        Venous Blood Gas:   @ 01:16  7.27/56/30/26/44.8  VBG Lactate: 4.6  Venous Blood Gas:   @ 22:44  7.28/53/34/25/54.7  VBG Lactate: 4.0      MICROBIOLOGY:     RADIOLOGY:  [ ] Reviewed and interpreted by me    EKG: CHIEF COMPLAINT:    HPI: 89 year old male with a  pmhx of DM, hypothyroid, HTN, PNA, dementia, dysphagia, is brought to ED by EMS from [facility] for evaluation of difficulty breathing. Per NH, pt had fever, cough, congestion that began today. Had rapid COVID-19 test that returned positive en route to hospital. Pt was routinely screened as part of weekly testing. Unclear if pt recently aspirated. Has MOLST in chart, is DNR/DNI.  Previous MRN- [id number]    MICU consulted for hypoxic respiratory failure. Patient minimally verbal on interview, denies any chest pain, nausea or vomiting. Tearful during interview and extremely anxious. Patient states being vaccinated, unable to answer if he was boosted.       PAST MEDICAL & SURGICAL HISTORY:  Dementia          Allergies    No Known Allergies    Intolerances        HOME MEDICATIONS:    REVIEW OF SYSTEMS:    Patient unable to participate in full review of systems.     OBJECTIVE:  ICU Vital Signs Last 24 Hrs  T(C): 37.2 (2022 02:43), Max: 37.2 (2022 02:43)  T(F): 99 (2022 02:43), Max: 99 (2022 02:43)  HR: 116 (2022 02:43) (94 - 139)  BP: 121/90 (2022 02:43) (91/62 - 164/81)  BP(mean): --  ABP: --  ABP(mean): --  RR: 26 (2022 02:43) (22 - 37)  SpO2: 100% (2022 02:43) (100% - 100%)        - @ 07:01  -  02-03 @ 02:52  --------------------------------------------------------  IN: 0 mL / OUT: 200 mL / NET: -200 mL      CAPILLARY BLOOD GLUCOSE      POCT Blood Glucose.: 176 mg/dL (2022 21:15)      PHYSICAL EXAM:  Vital Signs Last 24 Hrs  T(C): 37.2 (2022 02:43), Max: 37.2 (2022 02:43)  T(F): 99 (2022 02:43), Max: 99 (2022 02:43)  HR: 116 (2022 02:43) (94 - 139)  BP: 121/90 (2022 02:43) (91/62 - 164/81)  BP(mean): --  RR: 26 (2022 02:43) (22 - 37)  SpO2: 100% (2022 02:43) (100% - 100%)    General: Alert oriented to name, unable to state place or date.   HEENT: EOMI, PERRLA.   Neck: Supple, no thyromegaly, no appreciable JVP.   Lungs: coarse breath sounds, no wheezing appreciated, breath sounds present b/l. Hi Rod NC.   Heart: tachycardic rate, no m/r/g.   Abdomen: Soft, non-tender, non-distended, normal bowel sounds, no Hepatosplenomegaly (HSM), no suprapubic tenderness.   Skin: No rash, ecchymosis, petechiae, or nodules.  Neuro: Grossly intact neurology.   HOSPITAL MEDICATIONS:  MEDICATIONS  (STANDING):    MEDICATIONS  (PRN):      LABS:                        13.5   9.60  )-----------( 268      ( 2022 22:44 )             41.4     02    136  |  98  |  19  ----------------------------<  209<H>  4.3   |  20<L>  |  0.61    Ca    9.3      2022 22:44    TPro  7.6  /  Alb  4.1  /  TBili  1.6<H>  /  DBili  x   /  AST  20  /  ALT  9   /  AlkPhos  109  02-02    PT/INR - ( 2022 22:44 )   PT: 13.7 sec;   INR: 1.21 ratio         PTT - ( 2022 22:44 )  PTT:30.0 sec  Urinalysis Basic - ( 2022 01:16 )    Color: Yellow / Appearance: Clear / S.028 / pH: x  Gluc: x / Ketone: Trace  / Bili: Negative / Urobili: <2 mg/dL   Blood: x / Protein: 30 mg/dL / Nitrite: Negative   Leuk Esterase: Negative / RBC: 6 /HPF / WBC 2 /HPF   Sq Epi: x / Non Sq Epi: 1 /HPF / Bacteria: Negative        Venous Blood Gas:   @ 01:16  7.27/56/30/26/44.8  VBG Lactate: 4.6  Venous Blood Gas:   @ 22:44  7.28/53/34/25/54.7  VBG Lactate: 4.0      MICROBIOLOGY:     RADIOLOGY:  [ ] Reviewed and interpreted by me    EKG:

## 2022-02-03 NOTE — H&P ADULT - PROBLEM SELECTOR PLAN 2
Assessment:  - patient with dementia has hx of dyaphagia but able to tolerate nectar thick liquids as per outpatient records  - unclear if he can tolerate PO intake now given he has covid and hypoxia    Plan:  - S/S eval  - NPO except meds  - can give trial of NG tube for feeds as per surrogate Assessment:  - patient with dementia has hx of dyaphagia but able to tolerate nectar thick liquids as per outpatient records  - unclear if he can tolerate PO intake now given he has covid and hypoxia    Plan:  - S/S eval  - NPO except meds  - can give trial of NG tube for feeds as per surrogate  - there may be concern for aspiration event as per outpatient charts, will start zosyn and vanc

## 2022-02-03 NOTE — CONSULT NOTE ADULT - COMMENTS
[  ] All other systems negative aside from above.  [X] ROS unobtainable because: Dementia, not able to communicate

## 2022-02-03 NOTE — H&P ADULT - NSHPLABSRESULTS_GEN_ALL_CORE
13.5   9.60  )-----------( 268      ( 2022 22:44 )             41.4       02-02    136  |  98  |  19  ----------------------------<  209<H>  4.3   |  20<L>  |  0.61    Ca    9.3      2022 22:44    TPro  7.6  /  Alb  4.1  /  TBili  1.6<H>  /  DBili  x   /  AST  20  /  ALT  9   /  AlkPhos  109  02-02            PT/INR - ( 2022 22:44 )   PT: 13.7 sec;   INR: 1.21 ratio         PTT - ( 2022 22:44 )  PTT:30.0 sec    01:16 - VBG - pH: 7.27  | pCO2: 56    | pO2: 30    | Lactate: 4.6    22:44 - VBG - pH: 7.28  | pCO2: 53    | pO2: 34    | Lactate: 4.0        Urinalysis Basic - ( 2022 01:16 )    Color: Yellow / Appearance: Clear / S.028 / pH: x  Gluc: x / Ketone: Trace  / Bili: Negative / Urobili: <2 mg/dL   Blood: x / Protein: 30 mg/dL / Nitrite: Negative   Leuk Esterase: Negative / RBC: 6 /HPF / WBC 2 /HPF   Sq Epi: x / Non Sq Epi: 1 /HPF / Bacteria: Negative

## 2022-02-04 NOTE — CONSULT NOTE ADULT - CONSULT REQUESTED DATE/TIME
03-Feb-2022 15:59
03-Feb-2022 02:51
03-Feb-2022 13:03
04-Feb-2022 17:54
03-Feb-2022 11:51
03-Feb-2022 13:23

## 2022-02-04 NOTE — CHART NOTE - NSCHARTNOTEFT_GEN_A_CORE
Notified by RN patient is tachycardic sustaining HR 140s-160s bpm rapid AFIB on tele on cardizem gtt. Pt also sustaining oxygen saturation of high 70s-80s% on max settings HFNC + NRB. As per MD Dr. Manzano discussion with patient's HCP and life-partner Brandi, she wishes to proceed with comfort measures. Hospice evaluation ordered by Dr. Manzano. Spoke with HCP and life-partner Brandi who is in agreement for low-dose Morphine IVP for comfort measures given sustaining tachycardia and oxygen desaturation. Morphine 2 mg IVP x 1 dose ordered. Will continue to monitor and follow closely.     Elizabeth Devlin PA-C  Pager [phone]

## 2022-02-04 NOTE — CONSULT NOTE ADULT - SUBJECTIVE AND OBJECTIVE BOX
HISTORY OF PRESENT ILLNESS: HPI:  This is a 89 y/o M with pnhx of afib, DM, hypothyroid, HTN, PNA, dementia, dysphagia, presented to the ED for cough, congestion and SOB. Patient is demented, cannot answer questions. Most of history obtained from surrogate and ED/outpatient charts. The patient presented for new onset cough and congestion so he was sent to the ED in the setting of a recent positive covid test. The patient is hypoxic and needed to be started on high flow. Spoke with surrogate, sean, who was only able to tell me that he presented from NH for URI symptoms. Patient also had hx of pneumonia in 10/2021 admission from different chart. (MRN- [id number]) (03 Feb 2022 03:13)    Patient is in an unfortunate situation requiring nonrebreather mask, already with dementia at baseline and unable to participate in his healthcare decisionmaking.  At the time of consultation request he has been made DNR with comfort measures, in the treatment of pneumonia, COVID, with concern for possible pulmonary embolism.  No further aggressive treatment will be offered.  EP consulted re: management of rapid AFib, 160+bpm, in a very elderly and frail patient.  Due to baseline dementia and current state of illness a ROS is not obtainable.    PAST MEDICAL & SURGICAL HISTORY:  Dementia    Chronic atrial fibrillation    DM2 (diabetes mellitus, type 2)    Benign essential HTN    History of appendectomy    S/P cataract surgery          MEDICATIONS  (STANDING):  aspirin  chewable 81 milliGRAM(s) Oral daily  dexAMETHasone  Injectable 6 milliGRAM(s) IV Push daily  dextrose 40% Gel 15 Gram(s) Oral once  dextrose 5% + sodium chloride 0.9%. 1000 milliLiter(s) (75 mL/Hr) IV Continuous <Continuous>  dextrose 5%. 1000 milliLiter(s) (100 mL/Hr) IV Continuous <Continuous>  dextrose 5%. 1000 milliLiter(s) (50 mL/Hr) IV Continuous <Continuous>  dextrose 50% Injectable 25 Gram(s) IV Push once  dextrose 50% Injectable 12.5 Gram(s) IV Push once  dextrose 50% Injectable 25 Gram(s) IV Push once  digoxin  Injectable 125 MICROGram(s) IV Push daily  diltiazem Infusion 10 mG/Hr (10 mL/Hr) IV Continuous <Continuous>  enoxaparin Injectable 50 milliGRAM(s) SubCutaneous every 12 hours  glucagon  Injectable 1 milliGRAM(s) IntraMuscular once  levothyroxine Injectable 35 MICROGram(s) IV Push <User Schedule>  pantoprazole  Injectable 40 milliGRAM(s) IV Push daily  PARoxetine 10 milliGRAM(s) Oral daily  piperacillin/tazobactam IVPB.. 3.375 Gram(s) IV Intermittent every 8 hours  remdesivir  IVPB   IV Intermittent   remdesivir  IVPB 100 milliGRAM(s) IV Intermittent every 24 hours  senna 2 Tablet(s) Oral at bedtime  sodium chloride 3%  Inhalation 4 milliLiter(s) Inhalation every 12 hours  vancomycin  IVPB 750 milliGRAM(s) IV Intermittent every 12 hours      Allergies    No Known Allergies    Intolerances    FAMILY HISTORY:  No pertinent family history in first degree relatives      Non-contributary for premature coronary disease or sudden cardiac death    SOCIAL HISTORY:    [x ] Non-smoker  [ ] Smoker  [ ] Alcohol    PHYSICAL EXAM:  T(C): 36.9 (02-04-22 @ 13:40), Max: 38.6 (02-03-22 @ 20:13)  HR: 161 (02-04-22 @ 13:40) (108 - 176)  BP: 112/96 (02-04-22 @ 13:40) (112/96 - 133/87)  RR: 18 (02-04-22 @ 15:42) (18 - 25)  SpO2: 85% (02-04-22 @ 15:42) (81% - 94%)  Wt(kg): --    Appearance: cachectic frail elderly man, increased work of breathing noted.  nonrebreather mask.	  HEENT:   tacky oral mucosa, eyes closed	  Lymphatic: No lymphadenopathy , no edema  Cardiovascular: rapid irregular S1 S2, No JVD, No murmurs , Peripheral pulses palpable 2+ bilaterally  Respiratory: poor air entry, rhonchi, accessory muscle use  Gastrointestinal:  Soft, Non-tender, + BS	  Skin: No rashes, No ecchymoses, No cyanosis, cool to touch  Musculoskeletal: does not participate in strength,ROM assessment due to mental status during sepsis.  Psychiatry:  Mood & affect unable to be assessed, patient obtunded    TELEMETRY: AF/RVR 160s	    ECG:  same	  	  LABS:	 	                          13.9   13.62 )-----------( 225      ( 04 Feb 2022 07:49 )             42.3     02-04    136  |  102  |  19  ----------------------------<  278<H>  3.5   |  15<L>  |  0.53    Ca    8.0<L>      04 Feb 2022 07:49  Phos  2.4     02-04  Mg     1.50     02-04    TPro  6.6  /  Alb  3.3  /  TBili  1.8<H>  /  DBili  0.5<H>  /  AST  25  /  ALT  10  /  AlkPhos  70  02-04    ASSESSMENT/PLAN: 	89y Male with dementia, transitioned to comfort care only / DNR, in the setting of pneumonia, covid, +/- suspected pulmonary embolus. He has a past medical history of AFib that is now quite rapid.  O2 sats in the 70s despite supplementation with NRB mask.    Recommend discontinuation of telemetry in line with comfort care.  Increase medications to offload his work of breathing and potential suffering. Suggest dilaudid or morphine, IV diazepam as needed.    He appears to be actively dying.  He is not able to take anything by oral route.    This plan of care may be better administered on an inpatient hospice unit.  My recommendations were discussed with the floor NP prior to writing this note.    Omar Amin M.D.  Cardiac Electrophysiology    office [phone]  pager [phone]

## 2022-02-04 NOTE — PROGRESS NOTE ADULT - CONVERSATION DETAILS
GOC dw HCP  explained poor prognosis  DNR, DNR  Hospice referral  no feeding tube  MOLST updated  hospice referral made    40 mion spent on GOC discussionns

## 2022-02-05 NOTE — DIETITIAN INITIAL EVALUATION ADULT. - PROBLEM SELECTOR PLAN 2
Assessment:  - patient with dementia has hx of dyaphagia but able to tolerate nectar thick liquids as per outpatient records  - unclear if he can tolerate PO intake now given he has covid and hypoxia    Plan:  - S/S eval  - NPO except meds  - can give trial of NG tube for feeds as per surrogate  - there may be concern for aspiration event as per outpatient charts, will start zosyn and vanc

## 2022-02-05 NOTE — DIETITIAN INITIAL EVALUATION ADULT. - PERTINENT MEDS FT
MEDICATIONS  (STANDING):  aspirin  chewable 81 milliGRAM(s) Oral daily  dexAMETHasone  Injectable 6 milliGRAM(s) IV Push daily  dextrose 40% Gel 15 Gram(s) Oral once  dextrose 5% + sodium chloride 0.9%. 1000 milliLiter(s) (75 mL/Hr) IV Continuous <Continuous>  dextrose 5%. 1000 milliLiter(s) (100 mL/Hr) IV Continuous <Continuous>  dextrose 5%. 1000 milliLiter(s) (50 mL/Hr) IV Continuous <Continuous>  dextrose 50% Injectable 25 Gram(s) IV Push once  dextrose 50% Injectable 12.5 Gram(s) IV Push once  dextrose 50% Injectable 25 Gram(s) IV Push once  digoxin  Injectable 125 MICROGram(s) IV Push daily  diltiazem Infusion 10 mG/Hr (10 mL/Hr) IV Continuous <Continuous>  enoxaparin Injectable 50 milliGRAM(s) SubCutaneous every 12 hours  glucagon  Injectable 1 milliGRAM(s) IntraMuscular once  levothyroxine Injectable 35 MICROGram(s) IV Push <User Schedule>  pantoprazole  Injectable 40 milliGRAM(s) IV Push daily  PARoxetine 10 milliGRAM(s) Oral daily  piperacillin/tazobactam IVPB.. 3.375 Gram(s) IV Intermittent every 8 hours  remdesivir  IVPB   IV Intermittent   remdesivir  IVPB 100 milliGRAM(s) IV Intermittent every 24 hours  senna 2 Tablet(s) Oral at bedtime  vancomycin  IVPB 750 milliGRAM(s) IV Intermittent every 12 hours

## 2022-02-05 NOTE — DIETITIAN INITIAL EVALUATION ADULT. - OTHER INFO
89 year old male with a PMH of Afib, DM, hypothyroid, HTN, PNA, dementia, dysphagia, presented to the ED for cough, congestion and SOB. Patient likely to be in hypoxic respiratory failure requiring high flow, admitted for COVID pneumonia and possibly aspiration pneumonia, s/p swallow evaluation (2/3) with recommendation for NPO-short term non-oral means of nutrition, per [facility] hospitalist note (2/4) noted decision for no feeding tube with hospice referral made per chart.    Patient currently NPO since admission. No GI distress or food allergies noted per chart. Unable to obtain UBW at this time. No weights noted per HIE. ABW is 49.7 kg (2/3) per chart. No edema or pressure injuries noted per RN flow sheet. Per physical observation, patient noted with severe muscle loss in temporal region, moderate fat loss in buccal region and severe fat loss in orbital region putting patient at risk of malnutrition. 89 year old male with a PMH of Afib, DM, hypothyroid, HTN, PNA, dementia, dysphagia, presented to the ED for cough, congestion and SOB. Patient likely to be in hypoxic respiratory failure requiring high flow, admitted for COVID pneumonia and possibly aspiration pneumonia, s/p swallow evaluation (2/3) with recommendation for NPO-short term non-oral means of nutrition, per [facility] hospitalist note (2/4) noted decision for no feeding tube with hospice referral made per chart.    Patient currently NPO since admission. No GI distress or food allergies noted per chart. Unable to obtain UBW at this time. No weights noted per HIE. ABW is 49.7 kg (2/3) per chart. No edema or pressure injuries noted per RN flow sheet. Per physical observation, patient noted with severe muscle loss in temporal region, moderate fat loss in buccal region and severe fat loss in orbital region putting patient at risk for malnutrition.

## 2022-02-05 NOTE — DIETITIAN INITIAL EVALUATION ADULT. - PERTINENT LABORATORY DATA
Lives at home with daughter. Denies drinking, smoking and uses medical marijuana for knee hemarthrosis 02-04 Na 136 mmol/L Glu 278 mg/dL<H> K+ 3.5 mmol/L Cr 0.53 mg/dL BUN 19 mg/dL Phos 2.4 mg/dL<L>  02-05 @ 08:58 POCT 217 mg/dL  02-05 @ 05:42 POCT 179 mg/dL  02-05 @ 01:37 POCT 263 mg/dL  02-04 @ 21:31 POCT 218 mg/dL  02-04 @ 18:07 POCT 197 mg/dL  02-04 @ 13:10 POCT 273 mg/dL  A1C with Estimated Average Glucose (02.03.22 @ 07:29), A1C with Estimated Average Glucose Result: 6.2

## 2022-02-05 NOTE — DIETITIAN INITIAL EVALUATION ADULT. - ORAL INTAKE PTA/DIET HISTORY
Patient seen for assessment. Unable to obtain nutrition information r/t patients cognitive status. Information obtained from extensive chart review.

## 2022-02-07 NOTE — HOSPICE CARE NOTE - CONVESATION DETAILS
Pt currently on HiFlo at a liter alexys well above what can be accommodated at home or at an InPt facility

## 2022-02-07 NOTE — PROVIDER CONTACT NOTE (CRITICAL VALUE NOTIFICATION) - ACTION/TREATMENT ORDERED:
As per provider, fluid bolus, antibiotics and increased rate of continuous fluid ordered
MD made aware of increased lactate. awaiting further orders.
As per provider, Pt now on comfort care and hospice care.
Follow [facility]. Titrate heparin drip to 12 mL/hr

## 2022-02-07 NOTE — PROVIDER CONTACT NOTE (CRITICAL VALUE NOTIFICATION) - ASSESSMENT
Atrial Fibrillation on tele. Pt A&O 0-unable to assess any symtoms
Atrial Fibrillation on tele. Asymptomatic
Pt started on heparin drip. Most recent .2

## 2022-02-07 NOTE — PROVIDER CONTACT NOTE (CRITICAL VALUE NOTIFICATION) - RECOMMENDATIONS
Will continue to monitor.
Will continue to monitor. Pt on comfort care
Follow [facility]. Titrate heparin drip to 12 mL/hr

## 2022-02-07 NOTE — PROVIDER CONTACT NOTE (CRITICAL VALUE NOTIFICATION) - BACKGROUND
HTN T2DM AFIB Dementia
Pt AOx0, bedrest, fibile, NPO, HFNC with NRB
Pt started on heparin drip. Most recent .2

## 2022-02-07 NOTE — CHART NOTE - NSCHARTNOTEFT_GEN_A_CORE
Patient is comfort measures only, NO LABS.  Vancomycin dc'ed since trough will not be monitored. FS dc'ed.   Dr. Manzano aware. Patient is comfort measures only, NO LABS.  Vancomycin dc'ed since trough will not be monitored. FS dc'ed. Patient on cardizem gtt and telemtry, will dc' today as discussed w/ Dr. Manzano. Patient is comfort measures only, NO LABS.  Morphine changed from prn to schedule. Vancomycin dc'ed since trough will not be monitored. FS dc'ed. Patient on cardizem gtt and telemetry, will dc' today as discussed w/ Dr. Manzano.

## 2022-02-09 NOTE — PROVIDER CONTACT NOTE (OTHER) - NAME OF MD/NP/PA/DO NOTIFIED:
Elizabeth Mcfarland
Jennifer romero
Elizabeth Devlin
Jennifer romero
Mina
Cameron Harding
Elizabeth Sibley
Cameron Harding
Elizabeth Mcfarland
Cameron Harding
Sumanth Mcgraw
Cameron Harding
Elizabeth Devlin
FRANKLYN Johnson
Jennifer romero
Sumanth Mcgraw
Jennifer Chambers
Jennifer romero

## 2022-02-09 NOTE — PROVIDER CONTACT NOTE (OTHER) - DATE AND TIME:
08-Feb-2022 19:45
04-Feb-2022 23:40
09-Feb-2022 23:29
03-Feb-2022 19:38
04-Feb-2022 13:40
05-Feb-2022 13:30
03-Feb-2022 17:15
07-Feb-2022 18:34
08-Feb-2022 09:46
03-Feb-2022 09:25
07-Feb-2022 07:30
07-Feb-2022 12:55
07-Feb-2022 18:56
05-Feb-2022 17:30
07-Feb-2022 09:48
07-Feb-2022 11:50
05-Feb-2022 01:45
07-Feb-2022 12:10
07-Feb-2022 11:18
07-Feb-2022 13:15

## 2022-02-09 NOTE — PROVIDER CONTACT NOTE (OTHER) - ACTION/TREATMENT ORDERED:
Provider notified. No further orders at this time.
Provider notified. Provider ordered IV push Cardizem and given as per order
Provider notified. give morphine as per order. no further orders
Provider notified. PRN morphine given as per order PRN for tachypnea
Provider notified. No further orders at this time.
Provider notified. Tylenol suppository given as per order for temp. No further orders at this time.
Provider notified. Digoxin given as per order. No further orders at this time.
Provider notified. No further orders at this time.
Provider notified. Provider ordered IV push Morphine.
Pt is comfort care measures only. Morphine given Q3 hours. As per WARNER Barnes, vital signs are to be done once per routine regardless of morphine IVP administration.
Pt is comfort care measures only. Morphine given Q3 hours. As per WARNER Barnes, vital signs are to be done once per routine regardless of morphine IVP administration.
Pt is comfort care measures only. Morphine given Q3 hours. As per WARNER Enriquez and attending, vital signs are to be done once per routine regardless of morphine IVP administration.
Insulin administered as ordered.
Provider notified. No further orders at this time.
Provider notified. Provider ordered IV push Metoprolol and given as per order
As per provider no insulin given, patient is comfort measures only.
Will continue to monitor
Provider made aware, will continue to assess and monitor.
Provider notified. No further orders at this time
Provider notified. No further orders at this time.

## 2022-02-09 NOTE — PROVIDER CONTACT NOTE (OTHER) - BACKGROUND
COVID
Pt is comfort care measures only. Morphine given Q3 hours.
Pt is comfort care measures only. Morphine given Q3 hours.
patient admit with COVID
patient admitted with COVID.
CPVID
Pt is comfort care measures only. Morphine given Q3 hours.
patient admit with COVID
COVID
Patient had 4 beats of vtach.
patient admit with COVID

## 2022-02-09 NOTE — PROVIDER CONTACT NOTE (OTHER) - REASON
, /88
patient rapid A.fib 160 sustaining
patient rapid A.fib 138 sustaining
patient rapid A.fib 160s-170s on cardiac monitor
Elevated blood sugar
Low O2 sat
Patient had 4 beats of vtach
patient tachypneic 22 breaths per min
Pt comfort care with q3 morphine orders.
Patient , rectal temp 100.4
Pt comfort care with q3 morphine orders.
Pt comfort care with q3 morphine orders.
patient rapid A.fib 130's lasting about 4 minutes
, /90
Elevated blood sugar
patient rapid A.fib 161 sustaining

## 2022-02-09 NOTE — PROVIDER CONTACT NOTE (OTHER) - SITUATION
Patient had 4 beats of vtach
patient rapid A.fib 130's lasting about 4 minutes
, /88
Pt blood sugar 263
Patient , rectal temp 100.4
Pt O2 sustaining 80-88 on highflow nasal cannula and non rebreather, HR goes up to 140s, nonsustaining
Pt blood sugar 348
Pt is comfort care measures only. Morphine given Q3 hours.
patient rapid A.fib 138 sustaining
patient rapid A.fib 160 sustaining
patient tachypneic 22 breaths per min
patient rapid A.fib 160 sustaining
patient rapid A.fib 160s-170s on cardiac monitor
, /90
Pt is comfort care measures only. Morphine given Q3 hours.
Pt is comfort care measures only. Morphine given Q3 hours.

## 2022-02-09 NOTE — PROVIDER CONTACT NOTE (OTHER) - RECOMMENDATIONS
ACP put in order for 1 time dose of 3 units insulin
Provider Notified
Provider notified.
Provider made aware, will continue to assess and monitor.
Will continue to assess and monitor.
No new orders at this time
Provider notified, Pt on comfort measures now.
Provider Notified
Provider notified
Pt is comfort care measures only. Morphine given Q3 hours. As per WARNER Barnes, vital signs are to be done once per routine regardless of morphine IVP administration.
Pt is comfort care measures only. Morphine given Q3 hours. As per WARNER Enriquez and attending, vital signs are to be done once per routine regardless of morphine IVP administration.
Provider Notified
Pt is comfort care measures only. Morphine given Q3 hours. As per WARNER Barnes, vital signs are to be done once per routine regardless of morphine IVP administration.

## 2022-02-09 NOTE — HOSPICE CARE NOTE - HOSPICE APPROVAL ADDITIONAL DETAILS
Pt's supplemental o2 needs far exceed the maximum output of high flow o2 equipment (FiO2 40%/ 40 LPM)  outside the hospital setting.     Pt cannot be accepted for hospice care at this time.

## 2022-02-09 NOTE — PROVIDER CONTACT NOTE (OTHER) - ASSESSMENT
Pt asymptomatic, pt does not have sliding scale insulin for q6 blood sugar. Patient is comfort measures only
patient AxOx0
Patient Axox0
Patient had 4 beats of vtach
Pt is comfort care measures only. Morphine given Q3 hours.
patient AxOx0 as per baseline. no non-verbal signs or symptoms of chest pain or discomfort
patient AxOx0 as per baseline. no non-verbal signs or symptoms of chest pain or discomfort at this time.
Pt is comfort care measures only. Morphine given Q3 hours.
patient AxOx0
patient AxOx0 as per baseline. no non-verbal signs or symptoms of chest pain or discomfort
Patient Axox0
Pt asymptomatic, pt does not have sliding scale insulin for q6 FS
patient AxOx0 as per baseline. Appears to be resting comfortably.
patient AxOx0
Pt respiratory rate increased, using accessory muscles, pt is comfort care
patient AxOx0 as per baseline. no non-verbal signs or symptoms of chest pain or discomfort
Pt is comfort care measures only. Morphine given Q3 hours.
patient AxOx0

## 2022-02-10 NOTE — DISCHARGE NOTE FOR THE EXPIRED PATIENT - REASON FOR ADMISSION
89 y/o M with pnhx of afib, DM, hypothyroid, HTN, PNA, dementia, dysphagia, presented to the ED for cough, congestion and SOB. Patient likely to be in hypoxic repsiratory failure requiring high flow. Admit for covid pneumonia and possibly aspiration pneumonia. Pt was admitted to [facility] and started empirically on Vancomycin and Zosyn for presume aspiration event. Pt also tested positive for COVID 19 with defuse bilateral opacities on CXR. Pt was placed on high Flow. Given multiple comorbidities, overall Pt's status and high oxygen requirements with poor prognosis for recovery Pt was made DNR/DNI and placed on Comfort care. Pt lost respiratory effort and lost pulse and was pronounced dead today at 1904

## 2022-02-10 NOTE — PROGRESS NOTE ADULT - NSPROGADDITIONALINFOA_GEN_ALL_CORE
pt is doing extremely poorly today too :  has covid and is very hypoxic: dnr: comfort care!  dw acp
pt is doing extremely poorly: has covid and is very hypoxic: dnr: comfort care!  dw acp
pt is doing extremely poorly today too :  has covid and is very hypoxic: dnr: comfort care!  jennifer acp    2/7; doing very poorly: DNR: jennifer acp  2/09:pt is comfort care: doing very poorly:  2/10: doing very poorly: comfort care: cont oxygen for maximum comfort!
pt is doing extremely poorly today too :  has covid and is very hypoxic: dnr: comfort care!  dw acp    2/7; doing very poorly: DNR: jennifer acp
pt is doing extremely poorly today too :  has covid and is very hypoxic: dnr: comfort care!  jennifer acp    2/7; doing very poorly: DNR: jennifer acp  2/09:pt is comfort care: doing very poorly:

## 2022-02-10 NOTE — PROGRESS NOTE ADULT - PROBLEM/PLAN-1
This is a recent snapshot of the patient's [facility] medical record.  For current drug dose and complete information and questions, call [phone] or In Basket pool, [facility] ([id number])  CSN Number:  [id number]    
DISPLAY PLAN FREE TEXT

## 2022-02-10 NOTE — CHART NOTE - NSCHARTNOTEFT_GEN_A_CORE
Nutrition Services follow-up for severe malnutrition.   88 y/o M with pnhx of afib, DM, hypothyroid, HTN, PNA, dementia, dysphagia, presented to the ED for cough, congestion and SOB. Noted with b/l covid PNA.    Patient on comfort measures, remains NPO since 2/3/22 (x7 days).  Hospice appropriate, however, per hospice care note 2/9/22: Pt's supplemental o2 needs far exceed the maximum output of high flow o2 equipment (FiO2 40%/ 40 LPM)  outside the hospital setting.     Source: Patient [ ]    Family [ ]     other [X] Chart Review     Diet, NPO (02-03-22 @ 04:54)    Current Weight: 2/3 - 49.7kg     Pertinent Medications:   digoxin  Injectable  levothyroxine Injectable  morphine  - Injectable  pantoprazole  Injectable    Pertinent Labs:   02-04 Phos 2.4 mg/dL<L> 02-06 Alb 3.0 g/dL<L>    Skin: No pressure injuries noted, 2+ edema to rt arm, wrist, hand    Estimated Needs:   [ X ] no change since previous assessment  [ ] recalculated:     Previous Nutrition Diagnosis: SEVERE MALNUTRITION    Nutrition Diagnosis is [X ] ongoing  [ ] resolved [ ] not applicable     Additional Recommendations:  1) Current clinical condition precludes nutritional interventions at this time; no feeding tubes per family as noted in chart.    RDN remains available.

## 2022-02-10 NOTE — PROGRESS NOTE ADULT - NUTRITIONAL ASSESSMENT
This patient has been assessed with a concern for Malnutrition and has been determined to have a diagnosis/diagnoses of Severe protein-calorie malnutrition and Underweight (BMI < 19).    This patient is being managed with:   Diet NPO-  Entered: Feb  3 2022  4:54AM    

## 2022-02-10 NOTE — PROGRESS NOTE ADULT - PROBLEM SELECTOR PLAN 1
doing very poorly: on 100% high flow [id number]% nrb:  tachypneic: on covid rx:    2/6: he is doing very poorly: HR is uncontrolled: still hypoxic despite getting NRB as well as high flow:   on covid rx:    2/7: he is doing very poorly: on 100% high flow and 100% NRB:  on dexa: he is DNR  2/9: heisnot doing well at all: still on zosyn and dexa ? now comfort measures: getting morphine! finish 10 daysof dexa
doing very poorly: on 100% high flow [id number]% nrb:  tachypneic: on covid rx:    2/6: he is doing very poorly: HR is uncontrolled: still hypoxic despite getting NRB as well as high flow:   on covid rx:
doing very poorly: on 100% high flow [id number]% nrb:  tachypneic: on covid rx:    2/6: he is doing very poorly: HR is uncontrolled: still hypoxic despite getting NRB as well as high flow:   on covid rx:    2/7: he is doing very poorly: on 100% high flow and 100% NRB:  on dexa: he is DNR
doing very poorly: on 100% high flow [id number]% nrb:  tachypneic: on covid rx:
doing very poorly: on 100% high flow [id number]% nrb:  tachypneic: on covid rx:    2/6: he is doing very poorly: HR is uncontrolled: still hypoxic despite getting NRB as well as high flow:   on covid rx:    2/7: he is doing very poorly: on 100% high flow and 100% NRB:  on dexa: he is DNR  2/9: heisnot doing well at all: still on zosyn and dexa ? now comfort measures: getting morphine! finish 10 daysof dexa  2/10: he has been doing very poorly for past few days: he is on comfort care treatment not is  any resp distress:

## 2022-02-10 NOTE — PROGRESS NOTE ADULT - PROBLEM SELECTOR PLAN 3
hr high : on full ac:  2/6: hr not controlled: on AC:  2/7: on full ac:
hr high : on full ac:
hr high : on full ac:  2/6: hr not controlled: on AC:
hr high : on full ac:  2/6: hr not controlled: on AC:  2/7: on full ac:  2/9: he is stillon full dose ac ? dc
hr high : on full ac:  2/6: hr not controlled: on AC:  2/7: on full ac:  2/9: he is stillon full dose ac ? dc  2/10: defer to primary team

## 2022-02-10 NOTE — PROGRESS NOTE ADULT - PROVIDER SPECIALTY LIST ADULT
Electrophysiology
Hospitalist
Infectious Disease
Infectious Disease
Cardiology
Gastroenterology
Hospitalist
Infectious Disease
Electrophysiology
Gastroenterology
Hospitalist
Hospitalist
Infectious Disease
Gastroenterology
Hospitalist
Pulmonology

## 2022-02-10 NOTE — PROGRESS NOTE ADULT - SUBJECTIVE AND OBJECTIVE BOX
CC: F/U for COVID    Saw/spoke to patient. Poor mental status. Not able to interact with me.    Allergies  No Known Allergies    ANTIMICROBIALS:  piperacillin/tazobactam IVPB.. 3.375 every 8 hours  dexAMETHasone  Injectable 6 milliGRAM(s) IV Push daily    PE:    Vital Signs Last 24 Hrs  T(C): 37.2 (07 Feb 2022 18:34), Max: 38 (07 Feb 2022 11:18)  T(F): 98.9 (07 Feb 2022 18:34), Max: 100.4 (07 Feb 2022 11:18)  HR: 119 (07 Feb 2022 18:34) (112 - 160)  BP: 175/90 (07 Feb 2022 18:34) (130/71 - 175/90)  RR: 19 (07 Feb 2022 18:34) (19 - 22)  SpO2: 95% (07 Feb 2022 18:34) (90% - 96%)    Gen: AOx0, poorly interactive, ill appearing  CV: S1+S2 normal, tachycardic  Resp: Visibly SOB, HFNC, facemask O2    LABS:    02-06    x   |  x   |  x   ----------------------------<  x   x    |  x   |  0.47<L>    TPro  6.3  /  Alb  3.0<L>  /  TBili  1.9<H>  /  DBili  0.7<H>  /  AST  16  /  ALT  9   /  AlkPhos  84  02-06    MICROBIOLOGY:    .Blood Blood-Peripheral  02-04-22   No growth to date.      .Blood Blood-Peripheral  02-03-22   No growth to date.     (otherwise reviewed)    RADIOLOGY:    2/4 XR:      FINDINGS:  Increase in the bilateral right greater than left airspace opacities,   with new right pleural effusion  No pneumothorax.  The heart is not enlarged.  No acute osseous abnormality.    IMPRESSION:  Increase in the bilateral right greater than left airspace opacities,   with new right pleural effusion likely developing ARDS from multifocal   covid 19 pneumonia although superimposed edema not excluded.
Date of Service: 02-06-22 @ 14:09    Patient is a 89y old  Male who presents with a chief complaint of sob (06 Feb 2022 13:31)      Any change in ROS: Doing very poorly:   on 100% NRB and high flow 100%    MEDICATIONS  (STANDING):  aspirin  chewable 81 milliGRAM(s) Oral daily  dexAMETHasone  Injectable 6 milliGRAM(s) IV Push daily  dextrose 40% Gel 15 Gram(s) Oral once  dextrose 5% + sodium chloride 0.9%. 1000 milliLiter(s) (75 mL/Hr) IV Continuous <Continuous>  dextrose 5%. 1000 milliLiter(s) (100 mL/Hr) IV Continuous <Continuous>  dextrose 5%. 1000 milliLiter(s) (50 mL/Hr) IV Continuous <Continuous>  dextrose 50% Injectable 25 Gram(s) IV Push once  dextrose 50% Injectable 12.5 Gram(s) IV Push once  dextrose 50% Injectable 25 Gram(s) IV Push once  digoxin  Injectable 125 MICROGram(s) IV Push daily  diltiazem Infusion 15 mG/Hr (15 mL/Hr) IV Continuous <Continuous>  enoxaparin Injectable 50 milliGRAM(s) SubCutaneous every 12 hours  glucagon  Injectable 1 milliGRAM(s) IntraMuscular once  levothyroxine Injectable 35 MICROGram(s) IV Push <User Schedule>  pantoprazole  Injectable 40 milliGRAM(s) IV Push daily  PARoxetine 10 milliGRAM(s) Oral daily  piperacillin/tazobactam IVPB.. 3.375 Gram(s) IV Intermittent every 8 hours  remdesivir  IVPB 100 milliGRAM(s) IV Intermittent every 24 hours  remdesivir  IVPB   IV Intermittent   senna 2 Tablet(s) Oral at bedtime  vancomycin  IVPB      vancomycin  IVPB 1250 milliGRAM(s) IV Intermittent every 12 hours    MEDICATIONS  (PRN):  acetaminophen     Tablet .. 650 milliGRAM(s) Oral every 6 hours PRN Temp greater or equal to 38C (100.4F), Mild Pain (1 - 3)  aluminum hydroxide/magnesium hydroxide/simethicone Suspension 30 milliLiter(s) Oral every 4 hours PRN Dyspepsia  melatonin 3 milliGRAM(s) Oral at bedtime PRN Insomnia  morphine  - Injectable 2 milliGRAM(s) IV Push every 3 hours PRN tachypnea/ respiratory distress/agitation  ondansetron Injectable 4 milliGRAM(s) IV Push every 8 hours PRN Nausea and/or Vomiting    Vital Signs Last 24 Hrs  T(C): 37 (06 Feb 2022 12:25), Max: 37 (06 Feb 2022 12:25)  T(F): 98.6 (06 Feb 2022 12:25), Max: 98.6 (06 Feb 2022 12:25)  HR: 153 (06 Feb 2022 12:25) (136 - 154)  BP: 134/64 (06 Feb 2022 12:25) (134/64 - 145/82)  BP(mean): --  RR: 20 (06 Feb 2022 12:25) (20 - 22)  SpO2: 89% (06 Feb 2022 12:25) (87% - 91%)    I&O's Summary    05 Feb 2022 07:01  -  06 Feb 2022 07:00  --------------------------------------------------------  IN: 1460 mL / OUT: 1350 mL / NET: 110 mL          Physical Exam:   GENERAL: elderly ill appearing  HEENT: LEATHA/   Atraumatic, Normocephalic  ENMT: No tonsillar erythema, exudates, or enlargement; Moist mucous membranes, Good dentition, No lesions  NECK: Supple, No JVD, Normal thyroid  CHEST/LUNG: tachypneic no wheezing  CVS: Regular rate and rhythm; No murmurs, rubs, or gallops  GI: : Soft, Nontender, Nondistended; Bowel sounds present  NERVOUS SYSTEM:  unresponvie and doign very poorly: on 100% nrb andhigh flow: tachypneic  EXTREMITIES:  2+ Peripheral Pulses, No clubbing, cyanosis, or edema  LYMPH: No lymphadenopathy noted  SKIN: No rashes or lesions  ENDOCRINOLOGY: No Thyromegaly  PSYCH: unresponsive    Labs:  26, 25                            13.9   13.62 )-----------( 225      ( 04 Feb 2022 07:49 )             42.3                         13.6   14.20 )-----------( 212      ( 03 Feb 2022 21:09 )             40.5                         14.1   4.64  )-----------( 205      ( 03 Feb 2022 07:29 )             41.6                         13.5   9.60  )-----------( 268      ( 02 Feb 2022 22:44 )             41.4     02-06    x   |  x   |  x   ----------------------------<  x   x    |  x   |  0.47<L>  02-04    136  |  102  |  19  ----------------------------<  278<H>  3.5   |  15<L>  |  0.53  02-03    135  |  99  |  22  ----------------------------<  273<H>  4.3   |  20<L>  |  0.59  02-02    136  |  98  |  19  ----------------------------<  209<H>  4.3   |  20<L>  |  0.61      TPro  6.3  /  Alb  3.0<L>  /  TBili  1.9<H>  /  DBili  0.7<H>  /  AST  16  /  ALT  9   /  AlkPhos  84  02-06  TPro  6.6  /  Alb  3.3  /  TBili  1.8<H>  /  DBili  0.5<H>  /  AST  25  /  ALT  10  /  AlkPhos  70  02-04  TPro  x   /  Alb  x   /  TBili  2.0<H>  /  DBili  x   /  AST  x   /  ALT  x   /  AlkPhos  x   02-03  TPro  6.5  /  Alb  3.4  /  TBili  2.0<H>  /  DBili  0.5<H>  /  AST  22  /  ALT  11  /  AlkPhos  85  02-03  TPro  7.6  /  Alb  4.1  /  TBili  1.6<H>  /  DBili  x   /  AST  20  /  ALT  9   /  AlkPhos  109  02-02    CAPILLARY BLOOD GLUCOSE          LIVER FUNCTIONS - ( 06 Feb 2022 07:22 )  Alb: 3.0 g/dL / Pro: 6.3 g/dL / ALK PHOS: 84 U/L / ALT: 9 U/L / AST: 16 U/L / GGT: x           PT/INR - ( 06 Feb 2022 07:22 )   PT: 18.6 sec;   INR: 1.67 ratio             D-Dimer Assay, Quantitative: 2164 ng/mL DDU (02-03 @ 07:29)  Procalcitonin, Serum: 3.32 ng/mL (02-03 @ 21:09)  Procalcitonin, Serum: 0.04 ng/mL (02-02 @ 22:56)  Lactate, Blood: 6.3 mmol/L (02-04 @ 07:49)  Lactate, Blood: 6.6 mmol/L (02-03 @ 21:09)        RECENT CULTURES:  02-04 @ 06:33 .Blood Blood-Peripheral         radr< from: Xray Chest 1 View- PORTABLE-Urgent (Xray Chest 1 View- PORTABLE-Urgent .) (02.04.22 @ 03:03) >    ACC: [id number] EXAM:  XR CHEST PORTABLE URGENT 1V                          PROCEDURE DATE:  02/04/2022          INTERPRETATION:  CLINICAL INDICATION: Shortness of Breath    TECHNIQUE: Single view of the chest was obtained.    COMPARISON: Chest radiograph 2/3/2022.    FINDINGS:  Increase in the bilateral right greater than left airspace opacities,   with new right pleural effusion  No pneumothorax.  The heart is not enlarged.  No acute osseous abnormality.    IMPRESSION:  Increase in the bilateral right greater than left airspace opacities,   with new right pleural effusion likely developing ARDS from multifocal   covid 19 pneumonia although superimposed edema not excluded.    --- End of Report ---          DANIEL HOLBROOK MD; Resident Radiology  This document has been electronically signed.  BRYON WOODRUFF MD; Attending Radiologist  This document has been electronically signed. Feb 4 2022 12:05PM    < end of copied text >         No growth to date.    02-03 @ 02:31 .Blood Blood-Peripheral                No growth to date.    02-03 @ 02:25 .Blood Blood-Peripheral                No growth to date.    02-03 @ 00:20 Clean Catch Clean Catch (Midstream)                No growth          RESPIRATORY CULTURES:          Studies  Chest X-RAY  CT SCAN Chest   Venous Dopplers: LE:   CT Abdomen  Others              
Date of Service: 02-09-22 @ 14:26    Patient is a 89y old  Male who presents with a chief complaint of sob (08 Feb 2022 13:54)      Any change in ROS: ptis doing very poorly:  on 100% oxygen : unresponsive: comfort care:       MEDICATIONS  (STANDING):  aspirin  chewable 81 milliGRAM(s) Oral daily  dexAMETHasone  Injectable 6 milliGRAM(s) IV Push daily  dextrose 40% Gel 15 Gram(s) Oral once  dextrose 5% + sodium chloride 0.9%. 1000 milliLiter(s) (75 mL/Hr) IV Continuous <Continuous>  dextrose 50% Injectable 25 Gram(s) IV Push once  dextrose 50% Injectable 12.5 Gram(s) IV Push once  digoxin  Injectable 125 MICROGram(s) IV Push daily  enoxaparin Injectable 50 milliGRAM(s) SubCutaneous every 12 hours  levothyroxine Injectable 35 MICROGram(s) IV Push <User Schedule>  morphine  - Injectable 2 milliGRAM(s) IV Push every 3 hours  pantoprazole  Injectable 40 milliGRAM(s) IV Push daily  piperacillin/tazobactam IVPB.. 3.375 Gram(s) IV Intermittent every 8 hours    MEDICATIONS  (PRN):  acetaminophen  Suppository .. 650 milliGRAM(s) Rectal every 6 hours PRN Temp greater or equal to 38C (100.4F)  ondansetron Injectable 4 milliGRAM(s) IV Push every 8 hours PRN Nausea and/or Vomiting    Vital Signs Last 24 Hrs  T(C): 36.7 (09 Feb 2022 12:20), Max: 36.8 (08 Feb 2022 21:05)  T(F): 98 (09 Feb 2022 12:20), Max: 98.2 (08 Feb 2022 21:05)  HR: 125 (09 Feb 2022 12:20) (98 - 135)  BP: 150/95 (09 Feb 2022 12:20) (150/95 - 159/105)  BP(mean): --  RR: 20 (09 Feb 2022 12:20) (20 - 20)  SpO2: 97% (09 Feb 2022 12:20) (91% - 97%)    I&O's Summary    08 Feb 2022 07:01  -  09 Feb 2022 07:00  --------------------------------------------------------  IN: 0 mL / OUT: 450 mL / NET: -450 mL          Physical Exam:   GENERAL: elderly sick appearing pt   HEENT: LEATHA/   Atraumatic, Normocephalic  ENMT: No tonsillar erythema, exudates, or enlargement; Moist mucous membranes, Good dentition, No lesions  NECK: Supple, No JVD, Normal thyroid  CHEST/LUNG: poor resp excursion  CVS: Regular rate and rhythm; No murmurs, rubs, or gallops  GI: : Soft, Nontender, Nondistended; Bowel sounds present  NERVOUS SYSTEM:  unresponsive  EXTREMITIES:  2+ Peripheral Pulses, No clubbing, cyanosis, or edema  LYMPH: No lymphadenopathy noted  SKIN: No rashes or lesions  ENDOCRINOLOGY: No Thyromegaly  PSYCH: unreaponaive    Labs:  26, 25        02-06    x   |  x   |  x   ----------------------------<  x   x    |  x   |  0.47<L>      TPro  6.3  /  Alb  3.0<L>  /  TBili  1.9<H>  /  DBili  0.7<H>  /  AST  16  /  ALT  9   /  AlkPhos  84  02-06    CAPILLARY BLOOD GLUCOSE                D-Dimer Assay, Quantitative: 2164 ng/mL DDU (02-03 @ 07:29)        RECENT CULTURES:  02-03 @ 20:34 .Blood Blood-Venous       rad< from: Xray Chest 1 View- PORTABLE-Urgent (Xray Chest 1 View- PORTABLE-Urgent .) (02.04.22 @ 03:03) >  MPARISON: Chest radiograph 2/3/2022.    FINDINGS:  Increase in the bilateral right greater than left airspace opacities,   with new right pleural effusion  No pneumothorax.  The heart is not enlarged.  No acute osseous abnormality.    IMPRESSION:  Increase in the bilateral right greater than left airspace opacities,   with new right pleural effusion likely developing ARDS from multifocal   covid 19 pneumonia although superimposed edema not excluded.    --- End of Report ---          DANIEL HOLBROOK MD; Resident Radiology  This document has been electronically signed.  BRYON WOODRUFF MD; Attending Radiologist  This document has been electronically signed. Feb 4 2022 12:05PM    < end of copied text >           No Growth Final    02-03 @ 20:30 .Blood Blood-Peripheral                No Growth Final    02-03 @ 00:20 Clean Catch Clean Catch (Midstream)                No growth    02-02 @ 22:40 .Blood Blood-Peripheral                No Growth Final    02-02 @ 22:20 .Blood Blood-Peripheral                No Growth Final          RESPIRATORY CULTURES:          Studies  Chest X-RAY  CT SCAN Chest   Venous Dopplers: LE:   CT Abdomen  Others              
EP ATTENDING      no tele    remains on non-rebreather, ROS unable to be obtained    DATE OF SERVICE - 02-07-22    Review of Systems:   Constitutional: [ ] fevers, [ ] chills.   Skin: [ ] dry skin. [ ] rashes.  Psychiatric: [ ] depression, [ ] anxiety.   Gastrointestinal: [ ] BRBPR, [ ] melena.   Neurological: [ ] confusion. [ ] seizures. [ ] shuffling gait.   Ears,Nose,Mouth and Throat: [ ] ear pain [ ] sore throat.   Eyes: [ ] diplopia.   Respiratory: [ ] hemoptysis. [ ] shortness of breath  Cardiovascular: See HPI above  Hematologic/Lymphatic: [ ] anemia. [ ] painful nodes. [ ] prolonged bleeding.   Genitourinary: [ ] hematuria. [ ] flank pain.   Endocrine: [ ] significant change in weight. [ ] intolerance to heat and cold.     Review of systems [ ] otherwise negative, [x] otherwise unable to obtain    FH: no family history of sudden cardiac death in first degree relatives    SH: [ ] tobacco, [ ] alcohol, [ ] drugs    acetaminophen  Suppository .. 650 milliGRAM(s) Rectal every 6 hours PRN  aluminum hydroxide/magnesium hydroxide/simethicone Suspension 30 milliLiter(s) Oral every 4 hours PRN  aspirin  chewable 81 milliGRAM(s) Oral daily  dexAMETHasone  Injectable 6 milliGRAM(s) IV Push daily  dextrose 40% Gel 15 Gram(s) Oral once  dextrose 5% + sodium chloride 0.9%. 1000 milliLiter(s) IV Continuous <Continuous>  dextrose 50% Injectable 25 Gram(s) IV Push once  dextrose 50% Injectable 12.5 Gram(s) IV Push once  digoxin  Injectable 125 MICROGram(s) IV Push daily  enoxaparin Injectable 50 milliGRAM(s) SubCutaneous every 12 hours  levothyroxine Injectable 35 MICROGram(s) IV Push <User Schedule>  melatonin 3 milliGRAM(s) Oral at bedtime PRN  morphine  - Injectable 2 milliGRAM(s) IV Push every 3 hours  ondansetron Injectable 4 milliGRAM(s) IV Push every 8 hours PRN  pantoprazole  Injectable 40 milliGRAM(s) IV Push daily  PARoxetine 10 milliGRAM(s) Oral daily  piperacillin/tazobactam IVPB.. 3.375 Gram(s) IV Intermittent every 8 hours  senna 2 Tablet(s) Oral at bedtime          02-06    x   |  x   |  x   ----------------------------<  x   x    |  x   |  0.47<L>      TPro  6.3  /  Alb  3.0<L>  /  TBili  1.9<H>  /  DBili  0.7<H>  /  AST  16  /  ALT  9   /  AlkPhos  84  02-06            T(C): 37.3 (02-07-22 @ 12:05), Max: 38 (02-07-22 @ 11:18)  HR: 115 (02-07-22 @ 12:05) (115 - 160)  BP: 130/71 (02-07-22 @ 12:05) (130/71 - 144/70)  RR: 20 (02-07-22 @ 12:05) (20 - 22)  SpO2: 95% (02-07-22 @ 12:05) (89% - 95%)  Wt(kg): --    I&O's Summary    06 Feb 2022 07:01  -  07 Feb 2022 07:00  --------------------------------------------------------  IN: 0 mL / OUT: 400 mL / NET: -400 mL      Head: Normocephalic and atraumatic.   Neck: No JVD. No bruits. Supple. Does not appear to be enlarged.   Cardiovascular: + S1,S2 ; IRR Soft systolic murmur at the left lower sternal border. No rubs noted.    Lungs: CTA b/l. No rhonchi, rales or wheezes.   Abdomen: + BS, soft. Non tender. Non distended. No rebound. No guarding.   Extremities: No clubbing/cyanosis/edema.   Skin: Warm and moist. The patient's skin has normal elasticity and good skin turgor.         A/P) 88 y/o male PMH AF, DM, hypothyroidism, hypertension, dementia a/w covid. EP called for rapid AF    -continue digoxin  -management of covid as per primary team  -unlikely to survive this hospitalization  -no further inpatient EP workup expected  -f/u palliative care      Courtney Ulrich M.D., [facility]  Cardiac Electrophysiology  [facility]  [address] [place] [address], [address] [place] [address]  [email or web address]    office [phone]  pager [phone]  
INTERVAL HPI/OVERNIGHT EVENTS:    ROS unobtainable 2/2 AMS/lethargy   on supplemental O2  without new gi events per team    MEDICATIONS  (STANDING):  aspirin  chewable 81 milliGRAM(s) Oral daily  dexAMETHasone  Injectable 6 milliGRAM(s) IV Push daily  dextrose 40% Gel 15 Gram(s) Oral once  dextrose 5% + sodium chloride 0.9%. 1000 milliLiter(s) (75 mL/Hr) IV Continuous <Continuous>  dextrose 5%. 1000 milliLiter(s) (100 mL/Hr) IV Continuous <Continuous>  dextrose 5%. 1000 milliLiter(s) (50 mL/Hr) IV Continuous <Continuous>  dextrose 50% Injectable 25 Gram(s) IV Push once  dextrose 50% Injectable 12.5 Gram(s) IV Push once  dextrose 50% Injectable 25 Gram(s) IV Push once  digoxin  Injectable 125 MICROGram(s) IV Push daily  diltiazem Infusion 10 mG/Hr (10 mL/Hr) IV Continuous <Continuous>  enoxaparin Injectable 50 milliGRAM(s) SubCutaneous every 12 hours  glucagon  Injectable 1 milliGRAM(s) IntraMuscular once  levothyroxine Injectable 35 MICROGram(s) IV Push <User Schedule>  pantoprazole  Injectable 40 milliGRAM(s) IV Push daily  PARoxetine 10 milliGRAM(s) Oral daily  piperacillin/tazobactam IVPB.. 3.375 Gram(s) IV Intermittent every 8 hours  remdesivir  IVPB   IV Intermittent   remdesivir  IVPB 100 milliGRAM(s) IV Intermittent every 24 hours  senna 2 Tablet(s) Oral at bedtime  sodium chloride 3%  Inhalation 4 milliLiter(s) Inhalation every 12 hours  vancomycin  IVPB 750 milliGRAM(s) IV Intermittent every 12 hours    MEDICATIONS  (PRN):  acetaminophen     Tablet .. 650 milliGRAM(s) Oral every 6 hours PRN Temp greater or equal to 38C (100.4F), Mild Pain (1 - 3)  aluminum hydroxide/magnesium hydroxide/simethicone Suspension 30 milliLiter(s) Oral every 4 hours PRN Dyspepsia  melatonin 3 milliGRAM(s) Oral at bedtime PRN Insomnia  ondansetron Injectable 4 milliGRAM(s) IV Push every 8 hours PRN Nausea and/or Vomiting      Allergies    No Known Allergies    Intolerances        Review of Systems: *unobtainable  2/2 confusion        Vital Signs Last 24 Hrs  T(C): 36.6 (2022 05:50), Max: 36.9 (2022 13:40)  T(F): 97.9 (2022 05:50), Max: 98.4 (2022 13:40)  HR: 176 (2022 10:35) (73 - 176)  BP: 142/86 (2022 05:50) (112/96 - 142/86)  BP(mean): --  RR: 24 (2022 10:35) (18 - 26)  SpO2: 85% (2022 10:35) (81% - 88%)    PHYSICAL EXAM:    Constitutional: NAD  HEENT: EOMI, throat clear  Neck: No LAD, supple  Respiratory: +high flow  Cardiovascular: S1 and S2, RRR, no M  Gastrointestinal: BS+, soft, NT/ND, neg HSM,  Extremities: No peripheral edema, neg clubbing, cyanosis  Vascular: 2+ peripheral pulses  Neurological: A/O x 0-1  Psychiatric: confused  Skin: No rashes      LABS:                        13.9   13.62 )-----------( 225      ( 2022 07:49 )             42.3     02-04    136  |  102  |  19  ----------------------------<  278<H>  3.5   |  15<L>  |  0.53    Ca    8.0<L>      2022 07:49  Phos  2.4     02-04  Mg     1.50     02-04    TPro  6.6  /  Alb  3.3  /  TBili  1.8<H>  /  DBili  0.5<H>  /  AST  25  /  ALT  10  /  AlkPhos  70  02-04    PT/INR - ( 2022 07:49 )   PT: 18.6 sec;   INR: 1.66 ratio         PTT - ( 2022 22:44 )  PTT:30.0 sec  Urinalysis Basic - ( 2022 01:16 )    Color: Yellow / Appearance: Clear / S.028 / pH: x  Gluc: x / Ketone: Trace  / Bili: Negative / Urobili: <2 mg/dL   Blood: x / Protein: 30 mg/dL / Nitrite: Negative   Leuk Esterase: Negative / RBC: 6 /HPF / WBC 2 /HPF   Sq Epi: x / Non Sq Epi: 1 /HPF / Bacteria: Negative        RADIOLOGY & ADDITIONAL TESTS:  
INTERVAL HPI/OVERNIGHT EVENTS:    ROS unobtainable 2/2 AMS/lethargy   remains on ventimask  without new gi events per team    MEDICATIONS  (STANDING):  aspirin  chewable 81 milliGRAM(s) Oral daily  dexAMETHasone  Injectable 6 milliGRAM(s) IV Push daily  dextrose 40% Gel 15 Gram(s) Oral once  dextrose 5% + sodium chloride 0.9%. 1000 milliLiter(s) (75 mL/Hr) IV Continuous <Continuous>  dextrose 5%. 1000 milliLiter(s) (100 mL/Hr) IV Continuous <Continuous>  dextrose 5%. 1000 milliLiter(s) (50 mL/Hr) IV Continuous <Continuous>  dextrose 50% Injectable 25 Gram(s) IV Push once  dextrose 50% Injectable 12.5 Gram(s) IV Push once  dextrose 50% Injectable 25 Gram(s) IV Push once  digoxin  Injectable 125 MICROGram(s) IV Push daily  diltiazem Infusion 10 mG/Hr (10 mL/Hr) IV Continuous <Continuous>  enoxaparin Injectable 50 milliGRAM(s) SubCutaneous every 12 hours  glucagon  Injectable 1 milliGRAM(s) IntraMuscular once  levothyroxine Injectable 35 MICROGram(s) IV Push <User Schedule>  pantoprazole  Injectable 40 milliGRAM(s) IV Push daily  PARoxetine 10 milliGRAM(s) Oral daily  piperacillin/tazobactam IVPB.. 3.375 Gram(s) IV Intermittent every 8 hours  remdesivir  IVPB   IV Intermittent   remdesivir  IVPB 100 milliGRAM(s) IV Intermittent every 24 hours  senna 2 Tablet(s) Oral at bedtime  sodium chloride 3%  Inhalation 4 milliLiter(s) Inhalation every 12 hours  vancomycin  IVPB 750 milliGRAM(s) IV Intermittent every 12 hours    MEDICATIONS  (PRN):  acetaminophen     Tablet .. 650 milliGRAM(s) Oral every 6 hours PRN Temp greater or equal to 38C (100.4F), Mild Pain (1 - 3)  aluminum hydroxide/magnesium hydroxide/simethicone Suspension 30 milliLiter(s) Oral every 4 hours PRN Dyspepsia  melatonin 3 milliGRAM(s) Oral at bedtime PRN Insomnia  ondansetron Injectable 4 milliGRAM(s) IV Push every 8 hours PRN Nausea and/or Vomiting      Allergies    No Known Allergies    Intolerances        Review of Systems: *unobtainable  2/2 confusion        Vital Signs Last 24 Hrs  T(C): 36.6 (2022 05:50), Max: 36.9 (2022 13:40)  T(F): 97.9 (2022 05:50), Max: 98.4 (2022 13:40)  HR: 176 (2022 10:35) (73 - 176)  BP: 142/86 (2022 05:50) (112/96 - 142/86)  BP(mean): --  RR: 24 (2022 10:35) (18 - 26)  SpO2: 85% (2022 10:35) (81% - 88%)    PHYSICAL EXAM:    Constitutional: NAD  HEENT: EOMI, throat clear  Neck: No LAD, supple  Respiratory: +high flow  Cardiovascular: S1 and S2, RRR, no M  Gastrointestinal: BS+, soft, NT/ND, neg HSM,  Extremities: No peripheral edema, neg clubbing, cyanosis  Vascular: 2+ peripheral pulses  Neurological: A/O x 0-1  Psychiatric: confused  Skin: No rashes      LABS:                        13.9   13.62 )-----------( 225      ( 2022 07:49 )             42.3     02-04    136  |  102  |  19  ----------------------------<  278<H>  3.5   |  15<L>  |  0.53    Ca    8.0<L>      2022 07:49  Phos  2.4     02-04  Mg     1.50     02-04    TPro  6.6  /  Alb  3.3  /  TBili  1.8<H>  /  DBili  0.5<H>  /  AST  25  /  ALT  10  /  AlkPhos  70  02-04    PT/INR - ( 2022 07:49 )   PT: 18.6 sec;   INR: 1.66 ratio         PTT - ( 2022 22:44 )  PTT:30.0 sec  Urinalysis Basic - ( 2022 01:16 )    Color: Yellow / Appearance: Clear / S.028 / pH: x  Gluc: x / Ketone: Trace  / Bili: Negative / Urobili: <2 mg/dL   Blood: x / Protein: 30 mg/dL / Nitrite: Negative   Leuk Esterase: Negative / RBC: 6 /HPF / WBC 2 /HPF   Sq Epi: x / Non Sq Epi: 1 /HPF / Bacteria: Negative        RADIOLOGY & ADDITIONAL TESTS:  
Patient is a 89y old  Male who presents with a chief complaint of sob (07 Feb 2022 14:20)    Date of servie : 02-07-22 @ 15:26  INTERVAL HPI/OVERNIGHT EVENTS:  T(C): 37.3 (02-07-22 @ 12:05), Max: 38 (02-07-22 @ 11:18)  HR: 118 (02-07-22 @ 13:15) (112 - 160)  BP: 134/75 (02-07-22 @ 13:15) (130/71 - 144/70)  RR: 20 (02-07-22 @ 15:15) (20 - 22)  SpO2: 95% (02-07-22 @ 15:15) (89% - 95%)  Wt(kg): --  I&O's Summary    06 Feb 2022 07:01  -  07 Feb 2022 07:00  --------------------------------------------------------  IN: 0 mL / OUT: 400 mL / NET: -400 mL        LABS:    02-06    x   |  x   |  x   ----------------------------<  x   x    |  x   |  0.47<L>      TPro  6.3  /  Alb  3.0<L>  /  TBili  1.9<H>  /  DBili  0.7<H>  /  AST  16  /  ALT  9   /  AlkPhos  84  02-06    PT/INR - ( 06 Feb 2022 07:22 )   PT: 18.6 sec;   INR: 1.67 ratio             CAPILLARY BLOOD GLUCOSE                MEDICATIONS  (STANDING):  aspirin  chewable 81 milliGRAM(s) Oral daily  dexAMETHasone  Injectable 6 milliGRAM(s) IV Push daily  dextrose 40% Gel 15 Gram(s) Oral once  dextrose 5% + sodium chloride 0.9%. 1000 milliLiter(s) (75 mL/Hr) IV Continuous <Continuous>  dextrose 50% Injectable 25 Gram(s) IV Push once  dextrose 50% Injectable 12.5 Gram(s) IV Push once  digoxin  Injectable 125 MICROGram(s) IV Push daily  enoxaparin Injectable 50 milliGRAM(s) SubCutaneous every 12 hours  levothyroxine Injectable 35 MICROGram(s) IV Push <User Schedule>  morphine  - Injectable 2 milliGRAM(s) IV Push every 3 hours  pantoprazole  Injectable 40 milliGRAM(s) IV Push daily  PARoxetine 10 milliGRAM(s) Oral daily  piperacillin/tazobactam IVPB.. 3.375 Gram(s) IV Intermittent every 8 hours  senna 2 Tablet(s) Oral at bedtime    MEDICATIONS  (PRN):  acetaminophen  Suppository .. 650 milliGRAM(s) Rectal every 6 hours PRN Temp greater or equal to 38C (100.4F)  aluminum hydroxide/magnesium hydroxide/simethicone Suspension 30 milliLiter(s) Oral every 4 hours PRN Dyspepsia  melatonin 3 milliGRAM(s) Oral at bedtime PRN Insomnia  ondansetron Injectable 4 milliGRAM(s) IV Push every 8 hours PRN Nausea and/or Vomiting          PHYSICAL EXAM:  GENERAL: frail, actively dying   CHEST/LUNG: Coarse breath sounds +  HEART: Regular rate and rhythm; No murmurs, rubs, or gallops  ABDOMEN: Soft, Nontender, Nondistended; Bowel sounds present  EXTREMITIES: edema +    Care Discussed with Consultants/Other Providers [x ] YES  [ ] NO
CC: F/U for COVID    Saw/spoke to patient. Ill appearing.    Allergies  No Known Allergies    ANTIMICROBIALS:  Off    PE:    Vital Signs Last 24 Hrs  T(C): 37.2 (10 Feb 2022 11:30), Max: 37.3 (10 Feb 2022 04:30)  T(F): 98.9 (10 Feb 2022 11:30), Max: 99.1 (10 Feb 2022 04:30)  HR: 125 (10 Feb 2022 11:30) (108 - 125)  BP: 148/100 (10 Feb 2022 11:30) (148/100 - 152/105)  RR: 20 (10 Feb 2022 12:07) (20 - 21)  SpO2: 95% (10 Feb 2022 12:07) (94% - 95%)    Gen: AOx0, ill appearing, poorly verbal  CV: tachycardic  Resp: HFNC, facemask O2    LABS:    No new available    MICROBIOLOGY:    .Blood Blood-Venous  02-03-22   No Growth Final      .Blood Blood-Peripheral  02-03-22   No Growth Final     RADIOLOGY:    2/4 XR:    IMPRESSION:  Increase in the bilateral right greater than left airspace opacities,   with new right pleural effusion likely developing ARDS from multifocal   covid 19 pneumonia although superimposed edema not excluded.
CC: F/U for COVID    Saw/spoke to patient. No fevers, no chills. Poor mental status.    Allergies  No Known Allergies    ANTIMICROBIALS:  piperacillin/tazobactam IVPB.. 3.375 every 8 hours  dexAMETHasone  Injectable 6 milliGRAM(s) IV Push daily    PE:    Vital Signs Last 24 Hrs  T(C): 37.2 (08 Feb 2022 12:20), Max: 37.2 (07 Feb 2022 18:34)  T(F): 99 (08 Feb 2022 12:20), Max: 99 (08 Feb 2022 12:20)  HR: 140 (08 Feb 2022 12:20) (107 - 150)  BP: 136/91 (08 Feb 2022 12:20) (131/71 - 175/90)  RR: 20 (08 Feb 2022 12:20) (19 - 20)  SpO2: 98% (08 Feb 2022 12:20) (95% - 98%)    Gen: AOx0, fatigued, ill appearing  Resp: HFNC, SOB    LABS:    No new available    MICROBIOLOGY:    .Blood Blood-Peripheral  02-04-22   No growth to date.     Clean Catch Clean Catch (Midstream)  02-03-22   No growth     (otherwise reviewed    RADIOLOGY:    2/4 XR:    FINDINGS:  Increase in the bilateral right greater than left airspace opacities,   with new right pleural effusion  No pneumothorax.  The heart is not enlarged.  No acute osseous abnormality.    IMPRESSION:  Increase in the bilateral right greater than left airspace opacities,   with new right pleural effusion likely developing ARDS from multifocal   covid 19 pneumonia although superimposed edema not excluded.
CC: F/U for SOB    Saw/spoke to patient. Unchanged. No new complaints.    Allergies  No Known Allergies    ANTIMICROBIALS:  piperacillin/tazobactam IVPB.. 3.375 every 8 hours  remdesivir  IVPB    remdesivir  IVPB 100 every 24 hours  vancomycin  IVPB 750 every 12 hours    PE:    Vital Signs Last 24 Hrs  T(C): 37.3 (2022 06:05), Max: 38.6 (2022 20:13)  T(F): 99.2 (2022 06:05), Max: 101.5 (2022 20:13)  HR: 176 (2022 06:05) (108 - 176)  BP: 127/75 (2022 06:05) (117/62 - 142/83)  RR: 20 (2022 11:28) (20 - 25)  SpO2: 89% (2022 11:28) (82% - 99%)    Gen: AOx0-1, not able to interact with me  CV: Tachycardic  Resp: HFNC/Facemask O2  Abd: Soft, nontender, +BS  Ext: No LE edema, no wounds    LABS:                        13.9   13.62 )-----------( 225      ( 2022 07:49 )             42.3     02-04    136  |  102  |  19  ----------------------------<  278<H>  3.5   |  15<L>  |  0.53    Ca    8.0<L>      2022 07:49  Phos  2.4     02-04  Mg     1.50     02-04    TPro  6.6  /  Alb  3.3  /  TBili  1.8<H>  /  DBili  0.5<H>  /  AST  25  /  ALT  10  /  AlkPhos  70  02-04    Urinalysis Basic - ( 2022 01:16 )    Color: Yellow / Appearance: Clear / S.028 / pH: x  Gluc: x / Ketone: Trace  / Bili: Negative / Urobili: <2 mg/dL   Blood: x / Protein: 30 mg/dL / Nitrite: Negative   Leuk Esterase: Negative / RBC: 6 /HPF / WBC 2 /HPF   Sq Epi: x / Non Sq Epi: 1 /HPF / Bacteria: Negative    MICROBIOLOGY:    .Blood Blood-Peripheral  22   No growth to date.    .Blood Blood-Peripheral  22   No growth to date.     Clean Catch Clean Catch (Midstream)  22   No growth  --  --    (otherwise reviewed)    RADIOLOGY:     XR:    IMPRESSION:  Increase in the bilateral right greater than left airspace opacities,   with new right pleural effusion likely developing ARDS from multifocal   covid 19 pneumonia although superimposed edema not excluded.  
Date of service: 02/04/22    chief complaint: sob     extended hpi:  89 y/o M with pnhx of afib, DM, hypothyroid, HTN, PNA, dementia, dysphagia, presented to the ED for cough, congestion and SOB being seen for Afib.     S: appears lethargic on high flow, no complaints; ros limited.     Review of Systems:   Constitutional: [ ] fevers, [ ] chills.   Skin: [ ] dry skin. [ ] rashes.  Psychiatric: [ ] depression, [ ] anxiety.   Gastrointestinal: [ ] BRBPR, [ ] melena.   Neurological: [ ] confusion. [ ] seizures. [ ] shuffling gait.   Ears,Nose,Mouth and Throat: [ ] ear pain [ ] sore throat.   Eyes: [ ] diplopia.   Respiratory: [ ] hemoptysis. [ ] shortness of breath  Cardiovascular: See HPI above  Hematologic/Lymphatic: [ ] anemia. [ ] painful nodes. [ ] prolonged bleeding.   Genitourinary: [ ] hematuria. [ ] flank pain.   Endocrine: [ ] significant change in weight. [ ] intolerance to heat and cold.     Review of systems [ ] otherwise negative, [x ] otherwise unable to obtain    FH: no family history of sudden cardiac death in first degree relatives    SH: [ ] tobacco, [ ] alcohol, [ ] drugs    acetaminophen     Tablet .. 650 milliGRAM(s) Oral every 6 hours PRN  aluminum hydroxide/magnesium hydroxide/simethicone Suspension 30 milliLiter(s) Oral every 4 hours PRN  aspirin  chewable 81 milliGRAM(s) Oral daily  dexAMETHasone  Injectable 6 milliGRAM(s) IV Push daily  dextrose 40% Gel 15 Gram(s) Oral once  dextrose 5% + sodium chloride 0.9%. 1000 milliLiter(s) IV Continuous <Continuous>  dextrose 5%. 1000 milliLiter(s) IV Continuous <Continuous>  dextrose 5%. 1000 milliLiter(s) IV Continuous <Continuous>  dextrose 50% Injectable 25 Gram(s) IV Push once  dextrose 50% Injectable 12.5 Gram(s) IV Push once  dextrose 50% Injectable 25 Gram(s) IV Push once  digoxin  Injectable 125 MICROGram(s) IV Push daily  diltiazem Infusion 10 mG/Hr IV Continuous <Continuous>  enoxaparin Injectable 50 milliGRAM(s) SubCutaneous every 12 hours  glucagon  Injectable 1 milliGRAM(s) IntraMuscular once  levothyroxine Injectable 35 MICROGram(s) IV Push <User Schedule>  melatonin 3 milliGRAM(s) Oral at bedtime PRN  ondansetron Injectable 4 milliGRAM(s) IV Push every 8 hours PRN  pantoprazole  Injectable 40 milliGRAM(s) IV Push daily  PARoxetine 10 milliGRAM(s) Oral daily  piperacillin/tazobactam IVPB.. 3.375 Gram(s) IV Intermittent every 8 hours  remdesivir  IVPB   IV Intermittent   remdesivir  IVPB 100 milliGRAM(s) IV Intermittent every 24 hours  senna 2 Tablet(s) Oral at bedtime  sodium chloride 3%  Inhalation 4 milliLiter(s) Inhalation every 12 hours  vancomycin  IVPB 750 milliGRAM(s) IV Intermittent every 12 hours                            13.9   13.62 )-----------( 225      ( 04 Feb 2022 07:49 )             42.3       02-04    136  |  102  |  19  ----------------------------<  278<H>  3.5   |  15<L>  |  0.53    Ca    8.0<L>      04 Feb 2022 07:49  Phos  2.4     02-04  Mg     1.50     02-04    TPro  6.6  /  Alb  3.3  /  TBili  1.8<H>  /  DBili  0.5<H>  /  AST  25  /  ALT  10  /  AlkPhos  70  02-04            T(C): 37.3 (02-04-22 @ 06:05), Max: 38.6 (02-03-22 @ 20:13)  HR: 176 (02-04-22 @ 06:05) (108 - 176)  BP: 127/75 (02-04-22 @ 06:05) (117/62 - 142/83)  RR: 20 (02-04-22 @ 11:28) (20 - 25)  SpO2: 89% (02-04-22 @ 11:28) (82% - 99%)  Wt(kg): --    I&O's Summary    03 Feb 2022 07:01  -  04 Feb 2022 07:00  --------------------------------------------------------  IN: 540 mL / OUT: 0 mL / NET: 540 mL        General: lethargic on high flow   Head: Normocephalic and atraumatic.   Neck: No JVD. No bruits. Supple. Does not appear to be enlarged.   Cardiovascular: + S1,S2 ; RRR Soft systolic murmur at the left lower sternal border. No rubs noted.    Lungs: CTA b/l. No rhonchi, rales or wheezes.   Abdomen: + BS, soft. Non tender. Non distended. No rebound. No guarding.   Extremities: No clubbing/cyanosis/edema.   Psychiatric: unable to assess     Tele: 's     A/P:  89 y/o M with pnhx of afib, DM, hypothyroid, HTN, PNA, dementia, dysphagia, presented to the ED for cough, congestion and SOB.    -pt. found to have COVID   -on steroids, remdesivir, and broad spectrum Abx   -follow up ID   -on high flow for respiratory support - follow up pulmonary  -patient on ac for COVID per primary team - unclear why patient was off ac at home for history of Afib   -pt. with AF with RVR - suspect some level of tachycardia given critical illness - continue with IV dig - will consult EP for further recommendations   -Pt. DNR - continue with discussions regarding GOC       Abbi Virk MD 
Date of service: 02/05/22    chief complaint: sob     extended hpi:  89 y/o M with pnhx of afib, DM, hypothyroid, HTN, PNA, dementia, dysphagia, presented to the ED for cough, congestion and SOB being seen for Afib.     S: appears lethargic on high flow; ros limited.     Review of systems [ ] otherwise negative, [x ] otherwise unable to obtain    FH: no family history of sudden cardiac death in first degree relatives    SH: [ ] tobacco, [ ] alcohol, [ ] drugs    acetaminophen     Tablet .. 650 milliGRAM(s) Oral every 6 hours PRN  aluminum hydroxide/magnesium hydroxide/simethicone Suspension 30 milliLiter(s) Oral every 4 hours PRN  aspirin  chewable 81 milliGRAM(s) Oral daily  dexAMETHasone  Injectable 6 milliGRAM(s) IV Push daily  dextrose 40% Gel 15 Gram(s) Oral once  dextrose 5% + sodium chloride 0.9%. 1000 milliLiter(s) IV Continuous <Continuous>  dextrose 5%. 1000 milliLiter(s) IV Continuous <Continuous>  dextrose 5%. 1000 milliLiter(s) IV Continuous <Continuous>  dextrose 50% Injectable 25 Gram(s) IV Push once  dextrose 50% Injectable 12.5 Gram(s) IV Push once  dextrose 50% Injectable 25 Gram(s) IV Push once  digoxin  Injectable 125 MICROGram(s) IV Push daily  diltiazem Infusion 10 mG/Hr IV Continuous <Continuous>  enoxaparin Injectable 50 milliGRAM(s) SubCutaneous every 12 hours  glucagon  Injectable 1 milliGRAM(s) IntraMuscular once  levothyroxine Injectable 35 MICROGram(s) IV Push <User Schedule>  melatonin 3 milliGRAM(s) Oral at bedtime PRN  morphine  - Injectable 2 milliGRAM(s) IV Push every 6 hours PRN  ondansetron Injectable 4 milliGRAM(s) IV Push every 8 hours PRN  pantoprazole  Injectable 40 milliGRAM(s) IV Push daily  PARoxetine 10 milliGRAM(s) Oral daily  piperacillin/tazobactam IVPB.. 3.375 Gram(s) IV Intermittent every 8 hours  remdesivir  IVPB   IV Intermittent   remdesivir  IVPB 100 milliGRAM(s) IV Intermittent every 24 hours  senna 2 Tablet(s) Oral at bedtime  vancomycin  IVPB 750 milliGRAM(s) IV Intermittent every 12 hours                            13.9   13.62 )-----------( 225      ( 04 Feb 2022 07:49 )             42.3     136  |  102  |  19  ----------------------------<  278<H>  3.5   |  15<L>  |  0.53    Ca    8.0<L>      04 Feb 2022 07:49  Phos  2.4     02-04  Mg     1.50     02-04    TPro  6.6  /  Alb  3.3  /  TBili  1.8<H>  /  DBili  0.5<H>  /  AST  25  /  ALT  10  /  AlkPhos  70  02-04    T(C): 36.8 (02-05-22 @ 13:00), Max: 36.8 (02-05-22 @ 13:00)  HR: 150 (02-05-22 @ 15:30) (73 - 176)  BP: 141/78 (02-05-22 @ 13:00) (118/72 - 142/86)  RR: 22 (02-05-22 @ 15:30) (20 - 26)  SpO2: 87% (02-05-22 @ 15:30) (81% - 88%)    General: lethargic on high flow   Head: Normocephalic and atraumatic.   Neck: No JVD. No bruits. Supple. Does not appear to be enlarged.   Cardiovascular: + S1,S2 ; RRR Soft systolic murmur at the left lower sternal border. No rubs noted.    Lungs: CTA b/l. No rhonchi, rales or wheezes.   Abdomen: + BS, soft. Non tender. Non distended. No rebound. No guarding.   Extremities: No clubbing/cyanosis/edema.   Psychiatric: unable to assess     Tele: 's     A/P:  89 y/o M with pnhx of afib, DM, hypothyroid, HTN, PNA, dementia, dysphagia, presented to the ED for cough, congestion and SOB.    -pt. found to have COVID   -on steroids, remdesivir, and broad spectrum Abx   -follow up ID   -on high flow for respiratory support - follow up pulmonary  -patient on ac for COVID per primary team - unclear why patient was off ac at home for history of Afib   -pt. with AF with RVR - suspect some level of tachycardia given critical illness  -Pt. DNR, prognosis guarded, family being contacted regarding GOC      
Date of service: 02/06/22    chief complaint: sob     extended hpi:  88 y/o M with pnhx of afib, DM, hypothyroid, HTN, PNA, dementia, dysphagia, presented to the ED for cough, congestion and SOB being seen for Afib.     S: appears lethargic on high flow; ros limited.     Review of systems [ ] otherwise negative, [x ] otherwise unable to obtain    FH: no family history of sudden cardiac death in first degree relatives    SH: [ ] tobacco, [ ] alcohol, [ ] drugs          acetaminophen     Tablet .. 650 milliGRAM(s) Oral every 6 hours PRN  aluminum hydroxide/magnesium hydroxide/simethicone Suspension 30 milliLiter(s) Oral every 4 hours PRN  aspirin  chewable 81 milliGRAM(s) Oral daily  dexAMETHasone  Injectable 6 milliGRAM(s) IV Push daily  dextrose 40% Gel 15 Gram(s) Oral once  dextrose 5% + sodium chloride 0.9%. 1000 milliLiter(s) IV Continuous <Continuous>  dextrose 5%. 1000 milliLiter(s) IV Continuous <Continuous>  dextrose 5%. 1000 milliLiter(s) IV Continuous <Continuous>  dextrose 50% Injectable 25 Gram(s) IV Push once  dextrose 50% Injectable 12.5 Gram(s) IV Push once  dextrose 50% Injectable 25 Gram(s) IV Push once  digoxin  Injectable 125 MICROGram(s) IV Push daily  diltiazem Infusion 15 mG/Hr IV Continuous <Continuous>  enoxaparin Injectable 50 milliGRAM(s) SubCutaneous every 12 hours  glucagon  Injectable 1 milliGRAM(s) IntraMuscular once  levothyroxine Injectable 35 MICROGram(s) IV Push <User Schedule>  melatonin 3 milliGRAM(s) Oral at bedtime PRN  morphine  - Injectable 2 milliGRAM(s) IV Push every 3 hours PRN  ondansetron Injectable 4 milliGRAM(s) IV Push every 8 hours PRN  pantoprazole  Injectable 40 milliGRAM(s) IV Push daily  PARoxetine 10 milliGRAM(s) Oral daily  piperacillin/tazobactam IVPB.. 3.375 Gram(s) IV Intermittent every 8 hours  remdesivir  IVPB   IV Intermittent   remdesivir  IVPB 100 milliGRAM(s) IV Intermittent every 24 hours  senna 2 Tablet(s) Oral at bedtime  vancomycin  IVPB      vancomycin  IVPB 1250 milliGRAM(s) IV Intermittent every 12 hours          Hemoglobin: 13.9 g/dL (02-04 @ 07:49)  Hemoglobin: 13.6 g/dL (02-03 @ 21:09)  Hemoglobin: 14.1 g/dL (02-03 @ 07:29)  Hemoglobin: 13.5 g/dL (02-02 @ 22:44)      02-06    x   |  x   |  x   ----------------------------<  x   x    |  x   |  0.47<L>      TPro  6.3  /  Alb  3.0<L>  /  TBili  1.9<H>  /  DBili  0.7<H>  /  AST  16  /  ALT  9   /  AlkPhos  84  02-06    Creatinine Trend: 0.47<--, 0.53<--, 0.59<--, 0.61<--    COAGS: PT/INR - ( 06 Feb 2022 07:22 )   PT: 18.6 sec;   INR: 1.67 ratio       T(C): 36.9 (02-06-22 @ 04:54), Max: 36.9 (02-06-22 @ 04:54)  HR: 152 (02-06-22 @ 08:10) (136 - 176)  BP: 139/70 (02-06-22 @ 04:54) (139/70 - 145/82)  RR: 20 (02-06-22 @ 08:10) (20 - 24)  SpO2: 88% (02-06-22 @ 08:10) (85% - 91%)  Wt(kg): --    I&O's Summary    05 Feb 2022 07:01  -  06 Feb 2022 07:00  --------------------------------------------------------  IN: 1460 mL / OUT: 1350 mL / NET: 110 mL      General: lethargic on high flow   Head: Normocephalic and atraumatic.   Neck: No JVD. No bruits. Supple. Does not appear to be enlarged.   Cardiovascular: + S1,S2 ; RRR Soft systolic murmur at the left lower sternal border. No rubs noted.    Lungs: CTA b/l. No rhonchi, rales or wheezes.   Abdomen: + BS, soft. Non tender. Non distended. No rebound. No guarding.   Extremities: No clubbing/cyanosis/edema.   Psychiatric: unable to assess     Tele: 's     A/P:  88 y/o M with pnhx of afib, DM, hypothyroid, HTN, PNA, dementia, dysphagia, presented to the ED for cough, congestion and SOB.    -pt. found to have COVID   -on steroids, remdesivir, and broad spectrum Abx   -follow up ID   -on high flow for respiratory support - follow up pulmonary  -patient on ac for COVID per primary team - unclear why patient was off ac at home for history of Afib   -pt. with AF with RVR - suspect some level of tachycardia given critical illness  -Pt. DNR, prognosis guarded, family being contacted regarding GOC      
Date of service: 02/07/22    chief complaint: sob     extended hpi:  88 y/o M with pnhx of afib, DM, hypothyroid, HTN, PNA, dementia, dysphagia, presented to the ED for cough, congestion and SOB being seen for Afib.     S: lethargic, non-verbal, unable to obtain ros.     Review of systems [ ] otherwise negative, [x ] otherwise unable to obtain    FH: no family history of sudden cardiac death in first degree relatives    SH: [ ] tobacco, [ ] alcohol, [ ] drugs     acetaminophen  Suppository .. 650 milliGRAM(s) Rectal every 6 hours PRN  aluminum hydroxide/magnesium hydroxide/simethicone Suspension 30 milliLiter(s) Oral every 4 hours PRN  aspirin  chewable 81 milliGRAM(s) Oral daily  dexAMETHasone  Injectable 6 milliGRAM(s) IV Push daily  dextrose 40% Gel 15 Gram(s) Oral once  dextrose 5% + sodium chloride 0.9%. 1000 milliLiter(s) IV Continuous <Continuous>  dextrose 50% Injectable 25 Gram(s) IV Push once  dextrose 50% Injectable 12.5 Gram(s) IV Push once  digoxin  Injectable 125 MICROGram(s) IV Push daily  enoxaparin Injectable 50 milliGRAM(s) SubCutaneous every 12 hours  levothyroxine Injectable 35 MICROGram(s) IV Push <User Schedule>  melatonin 3 milliGRAM(s) Oral at bedtime PRN  morphine  - Injectable 2 milliGRAM(s) IV Push every 3 hours  ondansetron Injectable 4 milliGRAM(s) IV Push every 8 hours PRN  pantoprazole  Injectable 40 milliGRAM(s) IV Push daily  PARoxetine 10 milliGRAM(s) Oral daily  piperacillin/tazobactam IVPB.. 3.375 Gram(s) IV Intermittent every 8 hours  senna 2 Tablet(s) Oral at bedtime          02-06    x   |  x   |  x   ----------------------------<  x   x    |  x   |  0.47<L>      TPro  6.3  /  Alb  3.0<L>  /  TBili  1.9<H>  /  DBili  0.7<H>  /  AST  16  /  ALT  9   /  AlkPhos  84  02-06            T(C): 37.3 (02-07-22 @ 12:05), Max: 38 (02-07-22 @ 11:18)  HR: 118 (02-07-22 @ 13:15) (112 - 160)  BP: 131/71 (02-07-22 @ 15:55) (130/71 - 144/70)  RR: 19 (02-07-22 @ 15:55) (19 - 22)  SpO2: 95% (02-07-22 @ 15:55) (89% - 96%)  Wt(kg): --    I&O's Summary    06 Feb 2022 07:01  -  07 Feb 2022 07:00  --------------------------------------------------------  IN: 0 mL / OUT: 400 mL / NET: -400 mL      General: lethargic   Head: Normocephalic and atraumatic.   Neck: No JVD. No bruits. Supple. Does not appear to be enlarged.   Cardiovascular: + S1,S2 ; RRR Soft systolic murmur at the left lower sternal border. No rubs noted.    Lungs: CTA b/l. No rhonchi, rales or wheezes.   Abdomen: + BS, soft. Non tender. Non distended. No rebound. No guarding.   Extremities: No clubbing/cyanosis/edema.   Psychiatric: unable to assess     Tele: tele discontinued     A/P:  88 y/o M with pnhx of afib, DM, hypothyroid, HTN, PNA, dementia, dysphagia, presented to the ED for cough, congestion and SOB.    -pt. found to have COVID   -on steroids, remdesivir, and broad spectrum Abx   -follow up ID   -on high flow for respiratory support - follow up pulmonary  -patient on ac for COVID per primary team - unclear why patient was off ac at home for history of Afib   -pt. with AF with RVR - suspect some level of tachycardia given critical illness  -Pt. DNR, prognosis guarded and plans for comfort care  -therefore, no further inpatient cardiac workup expected     Abbi Virk MD       
EP ATTENDING      tele: rapid AF    remains on non-rebreather, ROS unable to be obtained    DATE OF SERVICE - 02-06-22     Review of Systems:   Constitutional: [ ] fevers, [ ] chills.   Skin: [ ] dry skin. [ ] rashes.  Psychiatric: [ ] depression, [ ] anxiety.   Gastrointestinal: [ ] BRBPR, [ ] melena.   Neurological: [ ] confusion. [ ] seizures. [ ] shuffling gait.   Ears,Nose,Mouth and Throat: [ ] ear pain [ ] sore throat.   Eyes: [ ] diplopia.   Respiratory: [ ] hemoptysis. [ ] shortness of breath  Cardiovascular: See HPI above  Hematologic/Lymphatic: [ ] anemia. [ ] painful nodes. [ ] prolonged bleeding.   Genitourinary: [ ] hematuria. [ ] flank pain.   Endocrine: [ ] significant change in weight. [ ] intolerance to heat and cold.     Review of systems [ ] otherwise negative, [x] otherwise unable to obtain    FH: no family history of sudden cardiac death in first degree relatives    SH: [ ] tobacco, [ ] alcohol, [ ] drugs    acetaminophen     Tablet .. 650 milliGRAM(s) Oral every 6 hours PRN  aluminum hydroxide/magnesium hydroxide/simethicone Suspension 30 milliLiter(s) Oral every 4 hours PRN  aspirin  chewable 81 milliGRAM(s) Oral daily  dexAMETHasone  Injectable 6 milliGRAM(s) IV Push daily  dextrose 40% Gel 15 Gram(s) Oral once  dextrose 5% + sodium chloride 0.9%. 1000 milliLiter(s) IV Continuous <Continuous>  dextrose 5%. 1000 milliLiter(s) IV Continuous <Continuous>  dextrose 5%. 1000 milliLiter(s) IV Continuous <Continuous>  dextrose 50% Injectable 25 Gram(s) IV Push once  dextrose 50% Injectable 12.5 Gram(s) IV Push once  dextrose 50% Injectable 25 Gram(s) IV Push once  digoxin  Injectable 125 MICROGram(s) IV Push daily  diltiazem Infusion 15 mG/Hr IV Continuous <Continuous>  enoxaparin Injectable 50 milliGRAM(s) SubCutaneous every 12 hours  glucagon  Injectable 1 milliGRAM(s) IntraMuscular once  levothyroxine Injectable 35 MICROGram(s) IV Push <User Schedule>  melatonin 3 milliGRAM(s) Oral at bedtime PRN  morphine  - Injectable 2 milliGRAM(s) IV Push every 3 hours PRN  ondansetron Injectable 4 milliGRAM(s) IV Push every 8 hours PRN  pantoprazole  Injectable 40 milliGRAM(s) IV Push daily  PARoxetine 10 milliGRAM(s) Oral daily  piperacillin/tazobactam IVPB.. 3.375 Gram(s) IV Intermittent every 8 hours  remdesivir  IVPB   IV Intermittent   remdesivir  IVPB 100 milliGRAM(s) IV Intermittent every 24 hours  senna 2 Tablet(s) Oral at bedtime  vancomycin  IVPB      vancomycin  IVPB 1250 milliGRAM(s) IV Intermittent every 12 hours          02-06    x   |  x   |  x   ----------------------------<  x   x    |  x   |  0.47<L>      TPro  6.3  /  Alb  3.0<L>  /  TBili  1.9<H>  /  DBili  0.7<H>  /  AST  16  /  ALT  9   /  AlkPhos  84  02-06            T(C): 37 (02-06-22 @ 12:25), Max: 37 (02-06-22 @ 12:25)  HR: 142 (02-06-22 @ 15:45) (136 - 154)  BP: 134/64 (02-06-22 @ 12:25) (134/64 - 145/82)  RR: 20 (02-06-22 @ 15:45) (20 - 22)  SpO2: 89% (02-06-22 @ 15:45) (88% - 91%)  Wt(kg): --    I&O's Summary    05 Feb 2022 07:01  -  06 Feb 2022 07:00  --------------------------------------------------------  IN: 1460 mL / OUT: 1350 mL / NET: 110 mL    06 Feb 2022 07:01  -  06 Feb 2022 16:42  --------------------------------------------------------  IN: 0 mL / OUT: 400 mL / NET: -400 mL      Head: Normocephalic and atraumatic.   Neck: No JVD. No bruits. Supple. Does not appear to be enlarged.   Cardiovascular: + S1,S2 ; IRR Soft systolic murmur at the left lower sternal border. No rubs noted.    Lungs: coarse BS. No rhonchi, rales or wheezes.   Abdomen: + BS, soft. Non tender. Non distended. No rebound. No guarding.   Extremities: No clubbing/cyanosis/edema.   Skin: Warm and moist. The patient's skin has normal elasticity and good skin turgor.       A/P) 89 y/o male PMH AF, DM, hypothyroidism, hypertension, dementia a/w covid. EP called for rapid AF    -continue cardizem drip  -management of covid as per primary team  -unlikely to survive this hospitalization  -no further inpatient EP workup expected  -f/u palliative care      Courtney Ulrich M.D., [facility]  Cardiac Electrophysiology  [facility]  [address] [place] [address], -[address] [place] [address]  [email or web address]    office [phone]  pager [phone]  
INTERVAL HPI/OVERNIGHT EVENTS:    ROS unobtainable 2/2 AMS   on O2   without new gi events per team    MEDICATIONS  (STANDING):  aspirin  chewable 81 milliGRAM(s) Oral daily  dexAMETHasone  Injectable 6 milliGRAM(s) IV Push daily  dextrose 40% Gel 15 Gram(s) Oral once  dextrose 5% + sodium chloride 0.9%. 1000 milliLiter(s) (75 mL/Hr) IV Continuous <Continuous>  dextrose 5%. 1000 milliLiter(s) (100 mL/Hr) IV Continuous <Continuous>  dextrose 5%. 1000 milliLiter(s) (50 mL/Hr) IV Continuous <Continuous>  dextrose 50% Injectable 25 Gram(s) IV Push once  dextrose 50% Injectable 12.5 Gram(s) IV Push once  dextrose 50% Injectable 25 Gram(s) IV Push once  digoxin  Injectable 125 MICROGram(s) IV Push daily  diltiazem Infusion 10 mG/Hr (10 mL/Hr) IV Continuous <Continuous>  enoxaparin Injectable 50 milliGRAM(s) SubCutaneous every 12 hours  glucagon  Injectable 1 milliGRAM(s) IntraMuscular once  levothyroxine Injectable 35 MICROGram(s) IV Push <User Schedule>  pantoprazole  Injectable 40 milliGRAM(s) IV Push daily  PARoxetine 10 milliGRAM(s) Oral daily  piperacillin/tazobactam IVPB.. 3.375 Gram(s) IV Intermittent every 8 hours  remdesivir  IVPB   IV Intermittent   remdesivir  IVPB 100 milliGRAM(s) IV Intermittent every 24 hours  senna 2 Tablet(s) Oral at bedtime  sodium chloride 3%  Inhalation 4 milliLiter(s) Inhalation every 12 hours  vancomycin  IVPB 750 milliGRAM(s) IV Intermittent every 12 hours    MEDICATIONS  (PRN):  acetaminophen     Tablet .. 650 milliGRAM(s) Oral every 6 hours PRN Temp greater or equal to 38C (100.4F), Mild Pain (1 - 3)  aluminum hydroxide/magnesium hydroxide/simethicone Suspension 30 milliLiter(s) Oral every 4 hours PRN Dyspepsia  melatonin 3 milliGRAM(s) Oral at bedtime PRN Insomnia  ondansetron Injectable 4 milliGRAM(s) IV Push every 8 hours PRN Nausea and/or Vomiting      Allergies    No Known Allergies    Intolerances        Review of Systems: *unobtainable  2/2 confusion        Vital Signs Last 24 Hrs  T(C): 37.3 (2022 06:05), Max: 38.6 (2022 20:13)  T(F): 99.2 (2022 06:05), Max: 101.5 (2022 20:13)  HR: 176 (2022 06:05) (108 - 176)  BP: 127/75 (2022 06:05) (117/62 - 142/83)  BP(mean): --  RR: 20 (2022 11:28) (20 - 25)  SpO2: 89% (2022 11:28) (82% - 100%)    PHYSICAL EXAM:    Constitutional: NAD  HEENT: EOMI, throat clear  Neck: No LAD, supple  Respiratory: +high flow  Cardiovascular: S1 and S2, RRR, no M  Gastrointestinal: BS+, soft, NT/ND, neg HSM,  Extremities: No peripheral edema, neg clubbing, cyanosis  Vascular: 2+ peripheral pulses  Neurological: A/O x 0-1  Psychiatric: confused  Skin: No rashes      LABS:                        13.9   13.62 )-----------( 225      ( 2022 07:49 )             42.3     02-04    136  |  102  |  19  ----------------------------<  278<H>  3.5   |  15<L>  |  0.53    Ca    8.0<L>      2022 07:49  Phos  2.4     02-04  Mg     1.50     02-04    TPro  6.6  /  Alb  3.3  /  TBili  1.8<H>  /  DBili  0.5<H>  /  AST  25  /  ALT  10  /  AlkPhos  70  02-04    PT/INR - ( 2022 07:49 )   PT: 18.6 sec;   INR: 1.66 ratio         PTT - ( 2022 22:44 )  PTT:30.0 sec  Urinalysis Basic - ( 2022 01:16 )    Color: Yellow / Appearance: Clear / S.028 / pH: x  Gluc: x / Ketone: Trace  / Bili: Negative / Urobili: <2 mg/dL   Blood: x / Protein: 30 mg/dL / Nitrite: Negative   Leuk Esterase: Negative / RBC: 6 /HPF / WBC 2 /HPF   Sq Epi: x / Non Sq Epi: 1 /HPF / Bacteria: Negative        RADIOLOGY & ADDITIONAL TESTS:  
INTERVAL HPI/OVERNIGHT EVENTS:    events noted  now on comfort care   appears comfortable    MEDICATIONS  (STANDING):  aspirin  chewable 81 milliGRAM(s) Oral daily  dexAMETHasone  Injectable 6 milliGRAM(s) IV Push daily  dextrose 40% Gel 15 Gram(s) Oral once  dextrose 5% + sodium chloride 0.9%. 1000 milliLiter(s) (75 mL/Hr) IV Continuous <Continuous>  dextrose 50% Injectable 25 Gram(s) IV Push once  dextrose 50% Injectable 12.5 Gram(s) IV Push once  digoxin  Injectable 125 MICROGram(s) IV Push daily  enoxaparin Injectable 50 milliGRAM(s) SubCutaneous every 12 hours  levothyroxine Injectable 35 MICROGram(s) IV Push <User Schedule>  morphine  - Injectable 2 milliGRAM(s) IV Push every 3 hours  pantoprazole  Injectable 40 milliGRAM(s) IV Push daily  piperacillin/tazobactam IVPB.. 3.375 Gram(s) IV Intermittent every 8 hours    MEDICATIONS  (PRN):  acetaminophen  Suppository .. 650 milliGRAM(s) Rectal every 6 hours PRN Temp greater or equal to 38C (100.4F)  ondansetron Injectable 4 milliGRAM(s) IV Push every 8 hours PRN Nausea and/or Vomiting      Allergies    No Known Allergies    Intolerances        Review of Systems:  *unobtainable        Vital Signs Last 24 Hrs  T(C): 37.1 (07 Feb 2022 22:35), Max: 38 (07 Feb 2022 11:18)  T(F): 98.7 (07 Feb 2022 22:35), Max: 100.4 (07 Feb 2022 11:18)  HR: 141 (08 Feb 2022 04:50) (107 - 150)  BP: 149/109 (07 Feb 2022 22:35) (130/71 - 175/90)  BP(mean): --  RR: 20 (08 Feb 2022 07:59) (19 - 20)  SpO2: 97% (08 Feb 2022 07:59) (95% - 98%)    PHYSICAL EXAM:    Constitutional: NAD, lethargic/comfortable appearing  HEENT: EOMI, throat clear  Neck: No LAD, supple  Respiratory:ventimask  Cardiovascular: S1 and S2, RRR, no M  Gastrointestinal: BS+, soft, NT/ND, neg HSM,  Extremities: No peripheral edema, neg clubbing, cyanosis  Vascular: 2+ peripheral pulses  Neurological: A/O x 0  Psychiatric: lethargic  Skin: No rashes      LABS:                RADIOLOGY & ADDITIONAL TESTS:  
INTERVAL HPI/OVERNIGHT EVENTS:    ROS unobtainable 2/2 AMS/lethargy   on supplemental O2  without new gi events per team    MEDICATIONS  (STANDING):  aspirin  chewable 81 milliGRAM(s) Oral daily  dexAMETHasone  Injectable 6 milliGRAM(s) IV Push daily  dextrose 40% Gel 15 Gram(s) Oral once  dextrose 5% + sodium chloride 0.9%. 1000 milliLiter(s) (75 mL/Hr) IV Continuous <Continuous>  dextrose 5%. 1000 milliLiter(s) (100 mL/Hr) IV Continuous <Continuous>  dextrose 5%. 1000 milliLiter(s) (50 mL/Hr) IV Continuous <Continuous>  dextrose 50% Injectable 25 Gram(s) IV Push once  dextrose 50% Injectable 12.5 Gram(s) IV Push once  dextrose 50% Injectable 25 Gram(s) IV Push once  digoxin  Injectable 125 MICROGram(s) IV Push daily  diltiazem Infusion 10 mG/Hr (10 mL/Hr) IV Continuous <Continuous>  enoxaparin Injectable 50 milliGRAM(s) SubCutaneous every 12 hours  glucagon  Injectable 1 milliGRAM(s) IntraMuscular once  levothyroxine Injectable 35 MICROGram(s) IV Push <User Schedule>  pantoprazole  Injectable 40 milliGRAM(s) IV Push daily  PARoxetine 10 milliGRAM(s) Oral daily  piperacillin/tazobactam IVPB.. 3.375 Gram(s) IV Intermittent every 8 hours  remdesivir  IVPB   IV Intermittent   remdesivir  IVPB 100 milliGRAM(s) IV Intermittent every 24 hours  senna 2 Tablet(s) Oral at bedtime  sodium chloride 3%  Inhalation 4 milliLiter(s) Inhalation every 12 hours  vancomycin  IVPB 750 milliGRAM(s) IV Intermittent every 12 hours    MEDICATIONS  (PRN):  acetaminophen     Tablet .. 650 milliGRAM(s) Oral every 6 hours PRN Temp greater or equal to 38C (100.4F), Mild Pain (1 - 3)  aluminum hydroxide/magnesium hydroxide/simethicone Suspension 30 milliLiter(s) Oral every 4 hours PRN Dyspepsia  melatonin 3 milliGRAM(s) Oral at bedtime PRN Insomnia  ondansetron Injectable 4 milliGRAM(s) IV Push every 8 hours PRN Nausea and/or Vomiting      Allergies    No Known Allergies    Intolerances        Review of Systems: *unobtainable  2/2 confusion        Vital Signs Last 24 Hrs  T(C): 36.6 (2022 05:50), Max: 36.9 (2022 13:40)  T(F): 97.9 (2022 05:50), Max: 98.4 (2022 13:40)  HR: 176 (2022 10:35) (73 - 176)  BP: 142/86 (2022 05:50) (112/96 - 142/86)  BP(mean): --  RR: 24 (2022 10:35) (18 - 26)  SpO2: 85% (2022 10:35) (81% - 88%)    PHYSICAL EXAM:    Constitutional: NAD  HEENT: EOMI, throat clear  Neck: No LAD, supple  Respiratory: +high flow  Cardiovascular: S1 and S2, RRR, no M  Gastrointestinal: BS+, soft, NT/ND, neg HSM,  Extremities: No peripheral edema, neg clubbing, cyanosis  Vascular: 2+ peripheral pulses  Neurological: A/O x 0-1  Psychiatric: confused  Skin: No rashes      LABS:                        13.9   13.62 )-----------( 225      ( 2022 07:49 )             42.3     02-04    136  |  102  |  19  ----------------------------<  278<H>  3.5   |  15<L>  |  0.53    Ca    8.0<L>      2022 07:49  Phos  2.4     02-04  Mg     1.50     02-04    TPro  6.6  /  Alb  3.3  /  TBili  1.8<H>  /  DBili  0.5<H>  /  AST  25  /  ALT  10  /  AlkPhos  70  02-04    PT/INR - ( 2022 07:49 )   PT: 18.6 sec;   INR: 1.66 ratio         PTT - ( 2022 22:44 )  PTT:30.0 sec  Urinalysis Basic - ( 2022 01:16 )    Color: Yellow / Appearance: Clear / S.028 / pH: x  Gluc: x / Ketone: Trace  / Bili: Negative / Urobili: <2 mg/dL   Blood: x / Protein: 30 mg/dL / Nitrite: Negative   Leuk Esterase: Negative / RBC: 6 /HPF / WBC 2 /HPF   Sq Epi: x / Non Sq Epi: 1 /HPF / Bacteria: Negative        RADIOLOGY & ADDITIONAL TESTS:  
Patient is a 89y old  Male who presents with a chief complaint of sob (06 Feb 2022 09:11)    Date of servie : 02-06-22 @ 13:31  INTERVAL HPI/OVERNIGHT EVENTS:  T(C): 37 (02-06-22 @ 12:25), Max: 37 (02-06-22 @ 12:25)  HR: 153 (02-06-22 @ 12:25) (136 - 154)  BP: 134/64 (02-06-22 @ 12:25) (134/64 - 145/82)  RR: 20 (02-06-22 @ 12:25) (20 - 22)  SpO2: 89% (02-06-22 @ 12:25) (87% - 91%)  Wt(kg): --  I&O's Summary    05 Feb 2022 07:01  -  06 Feb 2022 07:00  --------------------------------------------------------  IN: 1460 mL / OUT: 1350 mL / NET: 110 mL        LABS:    02-06    x   |  x   |  x   ----------------------------<  x   x    |  x   |  0.47<L>      TPro  6.3  /  Alb  3.0<L>  /  TBili  1.9<H>  /  DBili  0.7<H>  /  AST  16  /  ALT  9   /  AlkPhos  84  02-06    PT/INR - ( 06 Feb 2022 07:22 )   PT: 18.6 sec;   INR: 1.67 ratio             CAPILLARY BLOOD GLUCOSE                MEDICATIONS  (STANDING):  aspirin  chewable 81 milliGRAM(s) Oral daily  dexAMETHasone  Injectable 6 milliGRAM(s) IV Push daily  dextrose 40% Gel 15 Gram(s) Oral once  dextrose 5% + sodium chloride 0.9%. 1000 milliLiter(s) (75 mL/Hr) IV Continuous <Continuous>  dextrose 5%. 1000 milliLiter(s) (100 mL/Hr) IV Continuous <Continuous>  dextrose 5%. 1000 milliLiter(s) (50 mL/Hr) IV Continuous <Continuous>  dextrose 50% Injectable 25 Gram(s) IV Push once  dextrose 50% Injectable 12.5 Gram(s) IV Push once  dextrose 50% Injectable 25 Gram(s) IV Push once  digoxin  Injectable 125 MICROGram(s) IV Push daily  diltiazem Infusion 15 mG/Hr (15 mL/Hr) IV Continuous <Continuous>  enoxaparin Injectable 50 milliGRAM(s) SubCutaneous every 12 hours  glucagon  Injectable 1 milliGRAM(s) IntraMuscular once  levothyroxine Injectable 35 MICROGram(s) IV Push <User Schedule>  pantoprazole  Injectable 40 milliGRAM(s) IV Push daily  PARoxetine 10 milliGRAM(s) Oral daily  piperacillin/tazobactam IVPB.. 3.375 Gram(s) IV Intermittent every 8 hours  remdesivir  IVPB   IV Intermittent   remdesivir  IVPB 100 milliGRAM(s) IV Intermittent every 24 hours  senna 2 Tablet(s) Oral at bedtime  vancomycin  IVPB      vancomycin  IVPB 1250 milliGRAM(s) IV Intermittent every 12 hours    MEDICATIONS  (PRN):  acetaminophen     Tablet .. 650 milliGRAM(s) Oral every 6 hours PRN Temp greater or equal to 38C (100.4F), Mild Pain (1 - 3)  aluminum hydroxide/magnesium hydroxide/simethicone Suspension 30 milliLiter(s) Oral every 4 hours PRN Dyspepsia  melatonin 3 milliGRAM(s) Oral at bedtime PRN Insomnia  morphine  - Injectable 2 milliGRAM(s) IV Push every 3 hours PRN tachypnea/ respiratory distress/agitation  ondansetron Injectable 4 milliGRAM(s) IV Push every 8 hours PRN Nausea and/or Vomiting          PHYSICAL EXAM:  GENERAL: frail  CHEST/LUNG: Coarse brreath sounds bilaterally   HEART: Regular rate and rhythm; No murmurs, rubs, or gallops  ABDOMEN: Soft, Nontender, Nondistended; Bowel sounds present  EXTREMITIES:  edema +    Care Discussed with Consultants/Other Providers [x ] YES  [ ] NO
Patient is a 89y old  Male who presents with a chief complaint of sob (05 Feb 2022 16:05)    Date of servie : 02-05-22 @ 16:11  INTERVAL HPI/OVERNIGHT EVENTS:  T(C): 36.8 (02-05-22 @ 13:00), Max: 36.8 (02-05-22 @ 13:00)  HR: 150 (02-05-22 @ 15:30) (73 - 176)  BP: 141/78 (02-05-22 @ 13:00) (118/72 - 142/86)  RR: 22 (02-05-22 @ 15:30) (20 - 26)  SpO2: 87% (02-05-22 @ 15:30) (81% - 88%)  Wt(kg): --  I&O's Summary    04 Feb 2022 07:01  -  05 Feb 2022 07:00  --------------------------------------------------------  IN: 0 mL / OUT: 600 mL / NET: -600 mL    05 Feb 2022 07:01  -  05 Feb 2022 16:11  --------------------------------------------------------  IN: 0 mL / OUT: 300 mL / NET: -300 mL        LABS:                        13.9   13.62 )-----------( 225      ( 04 Feb 2022 07:49 )             42.3     02-04    136  |  102  |  19  ----------------------------<  278<H>  3.5   |  15<L>  |  0.53    Ca    8.0<L>      04 Feb 2022 07:49  Phos  2.4     02-04  Mg     1.50     02-04    TPro  6.6  /  Alb  3.3  /  TBili  1.8<H>  /  DBili  0.5<H>  /  AST  25  /  ALT  10  /  AlkPhos  70  02-04    PT/INR - ( 04 Feb 2022 07:49 )   PT: 18.6 sec;   INR: 1.66 ratio             CAPILLARY BLOOD GLUCOSE      POCT Blood Glucose.: 348 mg/dL (05 Feb 2022 12:31)  POCT Blood Glucose.: 217 mg/dL (05 Feb 2022 08:58)  POCT Blood Glucose.: 179 mg/dL (05 Feb 2022 05:42)  POCT Blood Glucose.: 263 mg/dL (05 Feb 2022 01:37)  POCT Blood Glucose.: 218 mg/dL (04 Feb 2022 21:31)  POCT Blood Glucose.: 197 mg/dL (04 Feb 2022 18:07)            MEDICATIONS  (STANDING):  aspirin  chewable 81 milliGRAM(s) Oral daily  dexAMETHasone  Injectable 6 milliGRAM(s) IV Push daily  dextrose 40% Gel 15 Gram(s) Oral once  dextrose 5% + sodium chloride 0.9%. 1000 milliLiter(s) (75 mL/Hr) IV Continuous <Continuous>  dextrose 5%. 1000 milliLiter(s) (100 mL/Hr) IV Continuous <Continuous>  dextrose 5%. 1000 milliLiter(s) (50 mL/Hr) IV Continuous <Continuous>  dextrose 50% Injectable 25 Gram(s) IV Push once  dextrose 50% Injectable 12.5 Gram(s) IV Push once  dextrose 50% Injectable 25 Gram(s) IV Push once  digoxin  Injectable 125 MICROGram(s) IV Push daily  diltiazem Infusion 10 mG/Hr (10 mL/Hr) IV Continuous <Continuous>  enoxaparin Injectable 50 milliGRAM(s) SubCutaneous every 12 hours  glucagon  Injectable 1 milliGRAM(s) IntraMuscular once  levothyroxine Injectable 35 MICROGram(s) IV Push <User Schedule>  pantoprazole  Injectable 40 milliGRAM(s) IV Push daily  PARoxetine 10 milliGRAM(s) Oral daily  piperacillin/tazobactam IVPB.. 3.375 Gram(s) IV Intermittent every 8 hours  remdesivir  IVPB   IV Intermittent   remdesivir  IVPB 100 milliGRAM(s) IV Intermittent every 24 hours  senna 2 Tablet(s) Oral at bedtime  vancomycin  IVPB 750 milliGRAM(s) IV Intermittent every 12 hours    MEDICATIONS  (PRN):  acetaminophen     Tablet .. 650 milliGRAM(s) Oral every 6 hours PRN Temp greater or equal to 38C (100.4F), Mild Pain (1 - 3)  aluminum hydroxide/magnesium hydroxide/simethicone Suspension 30 milliLiter(s) Oral every 4 hours PRN Dyspepsia  melatonin 3 milliGRAM(s) Oral at bedtime PRN Insomnia  morphine  - Injectable 2 milliGRAM(s) IV Push every 6 hours PRN tachypnea/ respiratory distress  ondansetron Injectable 4 milliGRAM(s) IV Push every 8 hours PRN Nausea and/or Vomiting          PHYSICAL EXAM:  GENERAL: frail  CHEST/LUNG: Coarse breath sounds bilaterally   HEART: Regular rate and rhythm; No murmurs, rubs, or gallops  ABDOMEN: Soft, Nontender, Nondistended; Bowel sounds present  EXTREMITIES: edema +    Care Discussed with Consultants/Other Providers [x ] YES  [ ] NO
Patient is a 89y old  Male who presents with a chief complaint of sob (08 Feb 2022 09:40)    Date of servie : 02-08-22 @ 13:55  INTERVAL HPI/OVERNIGHT EVENTS:  T(C): 37.2 (02-08-22 @ 12:20), Max: 37.2 (02-07-22 @ 18:34)  HR: 140 (02-08-22 @ 12:20) (107 - 150)  BP: 136/91 (02-08-22 @ 12:20) (130/71 - 175/90)  RR: 20 (02-08-22 @ 12:20) (19 - 20)  SpO2: 98% (02-08-22 @ 12:20) (95% - 98%)  Wt(kg): --  I&O's Summary    07 Feb 2022 07:01  -  08 Feb 2022 07:00  --------------------------------------------------------  IN: 900 mL / OUT: 700 mL / NET: 200 mL        LABS:              CAPILLARY BLOOD GLUCOSE                MEDICATIONS  (STANDING):  aspirin  chewable 81 milliGRAM(s) Oral daily  dexAMETHasone  Injectable 6 milliGRAM(s) IV Push daily  dextrose 40% Gel 15 Gram(s) Oral once  dextrose 5% + sodium chloride 0.9%. 1000 milliLiter(s) (75 mL/Hr) IV Continuous <Continuous>  dextrose 50% Injectable 25 Gram(s) IV Push once  dextrose 50% Injectable 12.5 Gram(s) IV Push once  digoxin  Injectable 125 MICROGram(s) IV Push daily  enoxaparin Injectable 50 milliGRAM(s) SubCutaneous every 12 hours  levothyroxine Injectable 35 MICROGram(s) IV Push <User Schedule>  morphine  - Injectable 2 milliGRAM(s) IV Push every 3 hours  pantoprazole  Injectable 40 milliGRAM(s) IV Push daily  piperacillin/tazobactam IVPB.. 3.375 Gram(s) IV Intermittent every 8 hours    MEDICATIONS  (PRN):  acetaminophen  Suppository .. 650 milliGRAM(s) Rectal every 6 hours PRN Temp greater or equal to 38C (100.4F)  ondansetron Injectable 4 milliGRAM(s) IV Push every 8 hours PRN Nausea and/or Vomiting          PHYSICAL EXAM:  GENERAL: frail  CHEST/LUNG: crackles +  HEART: Regular rate and rhythm; No murmurs, rubs, or gallops  ABDOMEN: Soft, Nontender, Nondistended; Bowel sounds present  EXTREMITIES: edema +    Care Discussed with Consultants/Other Providers [ x] YES  [ ] NO
Patient is a 89y old  Male who presents with a chief complaint of sob (09 Feb 2022 14:26)    Date of servie : 02-09-22 @ 14:55  INTERVAL HPI/OVERNIGHT EVENTS:  T(C): 36.7 (02-09-22 @ 12:20), Max: 36.8 (02-08-22 @ 21:05)  HR: 125 (02-09-22 @ 12:20) (98 - 135)  BP: 150/95 (02-09-22 @ 12:20) (150/95 - 159/105)  RR: 20 (02-09-22 @ 12:20) (20 - 20)  SpO2: 97% (02-09-22 @ 12:20) (91% - 97%)  Wt(kg): --  I&O's Summary    08 Feb 2022 07:01  -  09 Feb 2022 07:00  --------------------------------------------------------  IN: 0 mL / OUT: 450 mL / NET: -450 mL        LABS:              CAPILLARY BLOOD GLUCOSE                MEDICATIONS  (STANDING):  aspirin  chewable 81 milliGRAM(s) Oral daily  dexAMETHasone  Injectable 6 milliGRAM(s) IV Push daily  dextrose 40% Gel 15 Gram(s) Oral once  dextrose 5% + sodium chloride 0.9%. 1000 milliLiter(s) (75 mL/Hr) IV Continuous <Continuous>  dextrose 50% Injectable 25 Gram(s) IV Push once  dextrose 50% Injectable 12.5 Gram(s) IV Push once  digoxin  Injectable 125 MICROGram(s) IV Push daily  enoxaparin Injectable 50 milliGRAM(s) SubCutaneous every 12 hours  levothyroxine Injectable 35 MICROGram(s) IV Push <User Schedule>  morphine  - Injectable 2 milliGRAM(s) IV Push every 3 hours  pantoprazole  Injectable 40 milliGRAM(s) IV Push daily    MEDICATIONS  (PRN):  acetaminophen  Suppository .. 650 milliGRAM(s) Rectal every 6 hours PRN Temp greater or equal to 38C (100.4F)  ondansetron Injectable 4 milliGRAM(s) IV Push every 8 hours PRN Nausea and/or Vomiting          PHYSICAL EXAM:  GENERAL: frail  CHEST/LUNG: Coarse breath sounds bilaterally   HEART: Regular rate and rhythm; No murmurs, rubs, or gallops  ABDOMEN: Soft, Nontender, Nondistended; Bowel sounds present  EXTREMITIES:  edema +    Care Discussed with Consultants/Other Providers [ x] YES  [ ] NO
Patient is a 89y old  Male who presents with a chief complaint of sob (10 Feb 2022 12:34)    Date of servie : 02-10-22 @ 14:52  INTERVAL HPI/OVERNIGHT EVENTS:  T(C): 37.2 (02-10-22 @ 11:30), Max: 37.3 (02-10-22 @ 04:30)  HR: 125 (02-10-22 @ 11:30) (108 - 125)  BP: 148/100 (02-10-22 @ 11:30) (148/100 - 152/105)  RR: 20 (02-10-22 @ 12:07) (20 - 21)  SpO2: 95% (02-10-22 @ 12:07) (94% - 95%)  Wt(kg): --  I&O's Summary      LABS:              CAPILLARY BLOOD GLUCOSE                MEDICATIONS  (STANDING):  aspirin  chewable 81 milliGRAM(s) Oral daily  digoxin  Injectable 125 MICROGram(s) IV Push daily  enoxaparin Injectable 50 milliGRAM(s) SubCutaneous every 12 hours  levothyroxine Injectable 35 MICROGram(s) IV Push <User Schedule>  morphine  - Injectable 2 milliGRAM(s) IV Push every 3 hours  pantoprazole  Injectable 40 milliGRAM(s) IV Push daily    MEDICATIONS  (PRN):  acetaminophen  Suppository .. 650 milliGRAM(s) Rectal every 6 hours PRN Temp greater or equal to 38C (100.4F)  ondansetron Injectable 4 milliGRAM(s) IV Push every 8 hours PRN Nausea and/or Vomiting          PHYSICAL EXAM:  GENERAL: frail  CHEST/LUNG: Coarse breath sounds +  HEART: s1s2+  ABDOMEN: Soft, Nontender, Nondistended; Bowel sounds present  EXTREMITIES:  2+ Peripheral Pulses, No clubbing, cyanosis, or edema  LYMPH: No lymphadenopathy noted  SKIN: No rashes or lesions    Care Discussed with Consultants/Other Providers [x ] YES  [ ] NO
Date of Service: 22 @ 16:34    Patient is a 89y old  Male who presents with a chief complaint of sob (2022 14:35)      Any change in ROS: pt is doing poorly: on 100% nrb and 100% high flow: tachypenic    MEDICATIONS  (STANDING):  aspirin  chewable 81 milliGRAM(s) Oral daily  dexAMETHasone  Injectable 6 milliGRAM(s) IV Push daily  dextrose 40% Gel 15 Gram(s) Oral once  dextrose 5% + sodium chloride 0.9%. 1000 milliLiter(s) (75 mL/Hr) IV Continuous <Continuous>  dextrose 5%. 1000 milliLiter(s) (100 mL/Hr) IV Continuous <Continuous>  dextrose 5%. 1000 milliLiter(s) (50 mL/Hr) IV Continuous <Continuous>  dextrose 50% Injectable 25 Gram(s) IV Push once  dextrose 50% Injectable 12.5 Gram(s) IV Push once  dextrose 50% Injectable 25 Gram(s) IV Push once  digoxin  Injectable 125 MICROGram(s) IV Push daily  diltiazem Infusion 10 mG/Hr (10 mL/Hr) IV Continuous <Continuous>  enoxaparin Injectable 50 milliGRAM(s) SubCutaneous every 12 hours  glucagon  Injectable 1 milliGRAM(s) IntraMuscular once  levothyroxine Injectable 35 MICROGram(s) IV Push <User Schedule>  pantoprazole  Injectable 40 milliGRAM(s) IV Push daily  PARoxetine 10 milliGRAM(s) Oral daily  piperacillin/tazobactam IVPB.. 3.375 Gram(s) IV Intermittent every 8 hours  remdesivir  IVPB   IV Intermittent   remdesivir  IVPB 100 milliGRAM(s) IV Intermittent every 24 hours  senna 2 Tablet(s) Oral at bedtime  sodium chloride 3%  Inhalation 4 milliLiter(s) Inhalation every 12 hours  vancomycin  IVPB 750 milliGRAM(s) IV Intermittent every 12 hours    MEDICATIONS  (PRN):  acetaminophen     Tablet .. 650 milliGRAM(s) Oral every 6 hours PRN Temp greater or equal to 38C (100.4F), Mild Pain (1 - 3)  aluminum hydroxide/magnesium hydroxide/simethicone Suspension 30 milliLiter(s) Oral every 4 hours PRN Dyspepsia  melatonin 3 milliGRAM(s) Oral at bedtime PRN Insomnia  ondansetron Injectable 4 milliGRAM(s) IV Push every 8 hours PRN Nausea and/or Vomiting    Vital Signs Last 24 Hrs  T(C): 37.3 (2022 06:05), Max: 38.6 (2022 20:13)  T(F): 99.2 (2022 06:05), Max: 101.5 (2022 20:13)  HR: 176 (2022 06:05) (108 - 176)  BP: 127/75 (2022 06:05) (117/62 - 142/83)  BP(mean): --  RR: 18 (2022 15:42) (18 - 25)  SpO2: 85% (2022 15:42) (82% - 98%)    I&O's Summary    2022 07:01  -  2022 07:00  --------------------------------------------------------  IN: 540 mL / OUT: 0 mL / NET: 540 mL          Physical Exam:   GENERAL: NAD, well-groomed, well-developed  HEENT: LEATHA/   Atraumatic, Normocephalic  ENMT: No tonsillar erythema, exudates, or enlargement; Moist mucous membranes, Good dentition, No lesions  NECK: Supple, No JVD, Normal thyroid  CHEST/LUNG: Clear to auscultaion  CVS: Regular rate and rhythm; No murmurs, rubs, or gallops  GI: : Soft, Nontender, Nondistended; Bowel sounds present  NERVOUS SYSTEM: slightly awake: poor resp   EXTREMITIES: -edema  LYMPH: No lymphadenopathy noted  SKIN: No rashes or lesions  ENDOCRINOLOGY: No Thyromegaly  PSYCH: looks comfortable    Labs:  26, 25                            13.9   13.62 )-----------( 225      ( 2022 07:49 )             42.3                         13.6   14.20 )-----------( 212      ( 2022 21:09 )             40.5                         14.1   4.64  )-----------( 205      ( 2022 07:29 )             41.6                         13.5   9.60  )-----------( 268      ( 2022 22:44 )             41.4     04    136  |  102  |  19  ----------------------------<  278<H>  3.5   |  15<L>  |  0.53  03    135  |  99  |  22  ----------------------------<  273<H>  4.3   |  20<L>  |  0.59  02-02    136  |  98  |  19  ----------------------------<  209<H>  4.3   |  20<L>  |  0.61    Ca    8.0<L>      2022 07:49  Ca    8.7      2022 07:29  Ca    9.3      2022 22:44  Phos  2.4     -04  Phos  3.2     02-03  Mg     1.50     02-04  Mg     1.60     02-03    TPro  6.6  /  Alb  3.3  /  TBili  1.8<H>  /  DBili  0.5<H>  /  AST  25  /  ALT  10  /  AlkPhos  70  02-04  TPro  x   /  Alb  x   /  TBili  2.0<H>  /  DBili  x   /  AST  x   /  ALT  x   /  AlkPhos  x   02-03  TPro  6.5  /  Alb  3.4  /  TBili  2.0<H>  /  DBili  0.5<H>  /  AST  22  /  ALT  11  /  AlkPhos  85  02-03  TPro  7.6  /  Alb  4.1  /  TBili  1.6<H>  /  DBili  x   /  AST  20  /  ALT  9   /  AlkPhos  109  02-02    CAPILLARY BLOOD GLUCOSE      POCT Blood Glucose.: 273 mg/dL (2022 13:10)  POCT Blood Glucose.: 251 mg/dL (2022 09:10)  POCT Blood Glucose.: 265 mg/dL (2022 06:36)  POCT Blood Glucose.: 203 mg/dL (2022 00:58)  POCT Blood Glucose.: 212 mg/dL (2022 21:08)  POCT Blood Glucose.: 188 mg/dL (2022 18:29)      LIVER FUNCTIONS - ( 2022 07:49 )  Alb: 3.3 g/dL / Pro: 6.6 g/dL / ALK PHOS: 70 U/L / ALT: 10 U/L / AST: 25 U/L / GGT: x           PT/INR - ( 2022 07:49 )   PT: 18.6 sec;   INR: 1.66 ratio         PTT - ( 2022 22:44 )  PTT:30.0 sec  Urinalysis Basic - ( 2022 01:16 )    Color: Yellow / Appearance: Clear / S.028 / pH: x  Gluc: x / Ketone: Trace  / Bili: Negative / Urobili: <2 mg/dL   Blood: x / Protein: 30 mg/dL / Nitrite: Negative   Leuk Esterase: Negative / RBC: 6 /HPF / WBC 2 /HPF   Sq Epi: x / Non Sq Epi: 1 /HPF / Bacteria: Negative      D-Dimer Assay, Quantitative: 2164 ng/mL DDU ( @ 07:29)  Procalcitonin, Serum: 3.32 ng/mL ( @ 21:09)  Procalcitonin, Serum: 0.04 ng/mL ( @ 22:56)  Serum Pro-Brain Natriuretic Peptide: 1475 pg/mL ( @ 22:44)  Lactate, Blood: 6.3 mmol/L ( @ 07:49)  Lactate, Blood: 6.6 mmol/L ( @ 21:09)        RECENT CULTURES:   @ 02:31 .Blood Blood-Peripheral                No growth to date.     @ 02:25 .Blood Blood-Peripheral       rad< from: Xray Chest 1 View- PORTABLE-Urgent (Xray Chest 1 View- PORTABLE-Urgent .) (22 @ 03:03) >  with new right pleural effusion  No pneumothorax.  The heart is not enlarged.  No acute osseous abnormality.    IMPRESSION:  Increase in the bilateral right greater than left airspace opacities,   with new right pleural effusion likely developing ARDS from multifocal   covid 19 pneumonia although superimposed edema not excluded.    --- End of Report ---          DANIEL HOLBROOK MD; Resident Radiology  This document has been electronically signed.  BRYON WOODRUFF MD; Attending Radiologist  This document has been electronically signed. 2022 12:05PM    < end of copied text >           No growth to date.     @ 00:20 Clean Catch Clean Catch (Midstream)                No growth          RESPIRATORY CULTURES:          Studies  Chest X-RAY  CT SCAN Chest   Venous Dopplers: LE:   CT Abdomen  Others              
Patient is a 89y old  Male who presents with a chief complaint of sob (2022 13:30)    Date of servie : 22 @ 14:36  INTERVAL HPI/OVERNIGHT EVENTS:  T(C): 37.3 (22 @ 06:05), Max: 38.6 (22 @ 20:13)  HR: 176 (22 @ 06:05) (108 - 176)  BP: 127/75 (22 @ 06:05) (117/62 - 142/83)  RR: 20 (22 @ 11:28) (20 - 25)  SpO2: 89% (22 @ 11:28) (82% - 99%)  Wt(kg): --  I&O's Summary    2022 07:01  -  2022 07:00  --------------------------------------------------------  IN: 540 mL / OUT: 0 mL / NET: 540 mL        LABS:                        13.9   13.62 )-----------( 225      ( 2022 07:49 )             42.3     -04    136  |  102  |  19  ----------------------------<  278<H>  3.5   |  15<L>  |  0.53    Ca    8.0<L>      2022 07:49  Phos  2.4     -  Mg     1.50         TPro  6.6  /  Alb  3.3  /  TBili  1.8<H>  /  DBili  0.5<H>  /  AST  25  /  ALT  10  /  AlkPhos  70  02-04    PT/INR - ( 2022 07:49 )   PT: 18.6 sec;   INR: 1.66 ratio         PTT - ( 2022 22:44 )  PTT:30.0 sec  Urinalysis Basic - ( 2022 01:16 )    Color: Yellow / Appearance: Clear / S.028 / pH: x  Gluc: x / Ketone: Trace  / Bili: Negative / Urobili: <2 mg/dL   Blood: x / Protein: 30 mg/dL / Nitrite: Negative   Leuk Esterase: Negative / RBC: 6 /HPF / WBC 2 /HPF   Sq Epi: x / Non Sq Epi: 1 /HPF / Bacteria: Negative      CAPILLARY BLOOD GLUCOSE      POCT Blood Glucose.: 273 mg/dL (2022 13:10)  POCT Blood Glucose.: 251 mg/dL (2022 09:10)  POCT Blood Glucose.: 265 mg/dL (2022 06:36)  POCT Blood Glucose.: 203 mg/dL (2022 00:58)  POCT Blood Glucose.: 212 mg/dL (2022 21:08)  POCT Blood Glucose.: 188 mg/dL (2022 18:29)        Urinalysis Basic - ( 2022 01:16 )    Color: Yellow / Appearance: Clear / S.028 / pH: x  Gluc: x / Ketone: Trace  / Bili: Negative / Urobili: <2 mg/dL   Blood: x / Protein: 30 mg/dL / Nitrite: Negative   Leuk Esterase: Negative / RBC: 6 /HPF / WBC 2 /HPF   Sq Epi: x / Non Sq Epi: 1 /HPF / Bacteria: Negative        MEDICATIONS  (STANDING):  aspirin  chewable 81 milliGRAM(s) Oral daily  dexAMETHasone  Injectable 6 milliGRAM(s) IV Push daily  dextrose 40% Gel 15 Gram(s) Oral once  dextrose 5% + sodium chloride 0.9%. 1000 milliLiter(s) (75 mL/Hr) IV Continuous <Continuous>  dextrose 5%. 1000 milliLiter(s) (100 mL/Hr) IV Continuous <Continuous>  dextrose 5%. 1000 milliLiter(s) (50 mL/Hr) IV Continuous <Continuous>  dextrose 50% Injectable 25 Gram(s) IV Push once  dextrose 50% Injectable 12.5 Gram(s) IV Push once  dextrose 50% Injectable 25 Gram(s) IV Push once  digoxin  Injectable 125 MICROGram(s) IV Push daily  diltiazem Infusion 10 mG/Hr (10 mL/Hr) IV Continuous <Continuous>  enoxaparin Injectable 50 milliGRAM(s) SubCutaneous every 12 hours  glucagon  Injectable 1 milliGRAM(s) IntraMuscular once  levothyroxine Injectable 35 MICROGram(s) IV Push <User Schedule>  pantoprazole  Injectable 40 milliGRAM(s) IV Push daily  PARoxetine 10 milliGRAM(s) Oral daily  piperacillin/tazobactam IVPB.. 3.375 Gram(s) IV Intermittent every 8 hours  remdesivir  IVPB   IV Intermittent   remdesivir  IVPB 100 milliGRAM(s) IV Intermittent every 24 hours  senna 2 Tablet(s) Oral at bedtime  sodium chloride 3%  Inhalation 4 milliLiter(s) Inhalation every 12 hours  vancomycin  IVPB 750 milliGRAM(s) IV Intermittent every 12 hours    MEDICATIONS  (PRN):  acetaminophen     Tablet .. 650 milliGRAM(s) Oral every 6 hours PRN Temp greater or equal to 38C (100.4F), Mild Pain (1 - 3)  aluminum hydroxide/magnesium hydroxide/simethicone Suspension 30 milliLiter(s) Oral every 4 hours PRN Dyspepsia  melatonin 3 milliGRAM(s) Oral at bedtime PRN Insomnia  ondansetron Injectable 4 milliGRAM(s) IV Push every 8 hours PRN Nausea and/or Vomiting          PHYSICAL EXAM:  GENERAL: frial   CHEST/LUNG: Coarse breath sounds bilaterally   HEART: Regular rate and rhythm; No murmurs, rubs, or gallops  ABDOMEN: Soft, Nontender, Nondistended; Bowel sounds present  EXTREMITIES: edema +    Care Discussed with Consultants/Other Providers x[ ] YES  [ ] NO
Date of Service: 02-10-22 @ 12:34    Patient is a 89y old  Male who presents with a chief complaint of sob (09 Feb 2022 14:55)      Any change in ROS:  doing very poorly:       MEDICATIONS  (STANDING):  aspirin  chewable 81 milliGRAM(s) Oral daily  digoxin  Injectable 125 MICROGram(s) IV Push daily  enoxaparin Injectable 50 milliGRAM(s) SubCutaneous every 12 hours  levothyroxine Injectable 35 MICROGram(s) IV Push <User Schedule>  morphine  - Injectable 2 milliGRAM(s) IV Push every 3 hours  pantoprazole  Injectable 40 milliGRAM(s) IV Push daily    MEDICATIONS  (PRN):  acetaminophen  Suppository .. 650 milliGRAM(s) Rectal every 6 hours PRN Temp greater or equal to 38C (100.4F)  ondansetron Injectable 4 milliGRAM(s) IV Push every 8 hours PRN Nausea and/or Vomiting    Vital Signs Last 24 Hrs  T(C): 37.2 (10 Feb 2022 11:30), Max: 37.3 (10 Feb 2022 04:30)  T(F): 98.9 (10 Feb 2022 11:30), Max: 99.1 (10 Feb 2022 04:30)  HR: 125 (10 Feb 2022 11:30) (108 - 125)  BP: 148/100 (10 Feb 2022 11:30) (148/100 - 152/105)  BP(mean): --  RR: 20 (10 Feb 2022 12:07) (20 - 21)  SpO2: 95% (10 Feb 2022 12:07) (94% - 95%)    I&O's Summary        Physical Exam:   GENERAL: looks pretty weak and dying   HEENT: LEATHA/   Atraumatic, Normocephalic  ENMT: No tonsillar erythema, exudates, or enlargement; Moist mucous membranes, Good dentition, No lesions  NECK: Supple, No JVD, Normal thyroid  CHEST/LUNG: Clear to auscultaion, ; No rales, rhonchi, wheezing, or rubs  CVS: Regular rate and rhythm; No murmurs, rubs, or gallops  GI: : Soft, Nontender, Nondistended; Bowel sounds present  NERVOUS SYSTEM:  unresponsive:   EXTREMITIES:-edema  LYMPH: No lymphadenopathy noted  SKIN: No rashes or lesions  ENDOCRINOLOGY: No Thyromegaly  PSYCH: Unresponsive    Labs:                CAPILLARY BLOOD GLUCOSE                      RECENT CULTURES:  02-03 @ 20:34 .Blood Blood-Venous                No Growth Final    02-03 @ 20:30 .Blood Blood-Peripheral                No Growth Final          RESPIRATORY CULTURES:          Studies  Chest X-RAY  CT SCAN Chest   Venous Dopplers: LE:   CT Abdomen  Others              
Date of Service: 02-07-22 @ 14:20    Patient is a 89y old  Male who presents with a chief complaint of sob (07 Feb 2022 14:19)      Any change in ROS:  doing very poorly:  on 100% high flow as wellas 100%NRB     MEDICATIONS  (STANDING):  aspirin  chewable 81 milliGRAM(s) Oral daily  dexAMETHasone  Injectable 6 milliGRAM(s) IV Push daily  dextrose 40% Gel 15 Gram(s) Oral once  dextrose 5% + sodium chloride 0.9%. 1000 milliLiter(s) (75 mL/Hr) IV Continuous <Continuous>  dextrose 50% Injectable 25 Gram(s) IV Push once  dextrose 50% Injectable 12.5 Gram(s) IV Push once  digoxin  Injectable 125 MICROGram(s) IV Push daily  enoxaparin Injectable 50 milliGRAM(s) SubCutaneous every 12 hours  levothyroxine Injectable 35 MICROGram(s) IV Push <User Schedule>  morphine  - Injectable 2 milliGRAM(s) IV Push every 3 hours  pantoprazole  Injectable 40 milliGRAM(s) IV Push daily  PARoxetine 10 milliGRAM(s) Oral daily  piperacillin/tazobactam IVPB.. 3.375 Gram(s) IV Intermittent every 8 hours  senna 2 Tablet(s) Oral at bedtime    MEDICATIONS  (PRN):  acetaminophen  Suppository .. 650 milliGRAM(s) Rectal every 6 hours PRN Temp greater or equal to 38C (100.4F)  aluminum hydroxide/magnesium hydroxide/simethicone Suspension 30 milliLiter(s) Oral every 4 hours PRN Dyspepsia  melatonin 3 milliGRAM(s) Oral at bedtime PRN Insomnia  ondansetron Injectable 4 milliGRAM(s) IV Push every 8 hours PRN Nausea and/or Vomiting    Vital Signs Last 24 Hrs  T(C): 37.3 (07 Feb 2022 12:05), Max: 38 (07 Feb 2022 11:18)  T(F): 99.1 (07 Feb 2022 12:05), Max: 100.4 (07 Feb 2022 11:18)  HR: 118 (07 Feb 2022 13:15) (112 - 160)  BP: 134/75 (07 Feb 2022 13:15) (130/71 - 144/70)  BP(mean): --  RR: 20 (07 Feb 2022 13:15) (20 - 22)  SpO2: 95% (07 Feb 2022 13:15) (89% - 95%)    I&O's Summary    06 Feb 2022 07:01  -  07 Feb 2022 07:00  --------------------------------------------------------  IN: 0 mL / OUT: 400 mL / NET: -400 mL          Physical Exam:   GENERAL: NAD, well-groomed, well-developed  HEENT: LEATHA/   Atraumatic, Normocephalic  ENMT: No tonsillar erythema, exudates, or enlargement; Moist mucous membranes, Good dentition, No lesions  NECK: Supple, No JVD, Normal thyroid  CHEST/LUNG:tachypenic  CVS: Regular rate and rhythm; No murmurs, rubs, or gallops  GI: : Soft, Nontender, Nondistended; Bowel sounds present  NERVOUS SYSTEM:  unresponsive  EXTREMITIES:- edema  LYMPH: No lymphadenopathy noted  SKIN: No rashes or lesions  ENDOCRINOLOGY: No Thyromegaly  PSYCH: unresponsive    Labs:  26, 25                            13.9   13.62 )-----------( 225      ( 04 Feb 2022 07:49 )             42.3                         13.6   14.20 )-----------( 212      ( 03 Feb 2022 21:09 )             40.5     02-06    x   |  x   |  x   ----------------------------<  x   x    |  x   |  0.47<L>  02-04    136  |  102  |  19  ----------------------------<  278<H>  3.5   |  15<L>  |  0.53      TPro  6.3  /  Alb  3.0<L>  /  TBili  1.9<H>  /  DBili  0.7<H>  /  AST  16  /  ALT  9   /  AlkPhos  84  02-06  TPro  6.6  /  Alb  3.3  /  TBili  1.8<H>  /  DBili  0.5<H>  /  AST  25  /  ALT  10  /  AlkPhos  70  02-04    CAPILLARY BLOOD GLUCOSE          LIVER FUNCTIONS - ( 06 Feb 2022 07:22 )  Alb: 3.0 g/dL / Pro: 6.3 g/dL / ALK PHOS: 84 U/L / ALT: 9 U/L / AST: 16 U/L / GGT: x           PT/INR - ( 06 Feb 2022 07:22 )   PT: 18.6 sec;   INR: 1.67 ratio             D-Dimer Assay, Quantitative: 2164 ng/mL DDU (02-03 @ 07:29)  Procalcitonin, Serum: 3.32 ng/mL (02-03 @ 21:09)  Lactate, Blood: 6.3 mmol/L (02-04 @ 07:49)  Lactate, Blood: 6.6 mmol/L (02-03 @ 21:09)    ra< from: Xray Chest 1 View- PORTABLE-Urgent (Xray Chest 1 View- PORTABLE-Urgent .) (02.04.22 @ 03:03) >    COMPARISON: Chest radiograph 2/3/2022.    FINDINGS:  Increase in the bilateral right greater than left airspace opacities,   with new right pleural effusion  No pneumothorax.  The heart is not enlarged.  No acute osseous abnormality.    IMPRESSION:  Increase in the bilateral right greater than left airspace opacities,   with new right pleural effusion likely developing ARDS from multifocal   covid 19 pneumonia although superimposed edema not excluded.    --- End of Report ---          DANIEL HOLBROOK MD; Resident Radiology  This document has been electronically signed.  BRYON WOODRUFF MD; Attending Radiologist  This document has been electronically signed. Feb 4 2022 12:05PM    < end of copied text >      RECENT CULTURES:  02-04 @ 06:33 .Blood Blood-Peripheral                No growth to date.    02-03 @ 02:31 .Blood Blood-Peripheral                No growth to date.    02-03 @ 02:25 .Blood Blood-Peripheral                No growth to date.    02-03 @ 00:20 Clean Catch Clean Catch (Midstream)                No growth          RESPIRATORY CULTURES:          Studies  Chest X-RAY  CT SCAN Chest   Venous Dopplers: LE:   CT Abdomen  Others

## 2022-02-10 NOTE — PROGRESS NOTE ADULT - ASSESSMENT
88 y/o M with pnhx of afib, DM, hypothyroid, HTN, PNA, dementia, dysphagia, presented to the ED for cough, congestion and SOB. Patient likely to be in hypoxic repsiratory failure requiring high flow. Admit for covid pneumonia and possibly aspiration pneumonia
 89 y/o M with pnhx of afib, DM, hypothyroid, HTN, PNA, dementia, dysphagia, presented to the ED for cough, congestion and SOB. Patient likely to be in hypoxic repsiratory failure requiring high flow. Admit for covid pneumonia and possibly aspiration pneumonia    Problem/Plan - 1:  ·  Problem: Acute respiratory failure due to COVID-19.   ·  Plan: Assessment:  - patient presented with hypoxic respiratory failure secondary to covid19 infection  - CXR shows b/l covid pneumonia  - hypoxic on high flow 70% O2  - cw with remdesivir and dexamethasone    Problem/Plan - 2:  ·  Problem: Dysphagia.   ·  Plan: Assessment:  - patient with dementia has hx of dyaphagia but able to tolerate nectar thick liquids as per outpatient records  - no feeding tube as per family     Problem/Plan - 3:  ·  Problem: Chronic atrial fibrillation.   ·  Plan: - hx of afib not on AC as per outpatient charts  - c/w digoxin and cardiazem drip   - tele monitoring.    Problem/Plan - 4:·  Problem: Adult hypothyroidism.   ·  Plan: - c/w synthroid.    Comfort measures only, hospice pending 
 89 y/o M with pnhx of afib, DM, hypothyroid, HTN, PNA, dementia, dysphagia, presented to the ED for cough, congestion and SOB. Patient likely to be in hypoxic repsiratory failure requiring high flow. Admit for covid pneumonia and possibly aspiration pneumonia    Problem/Plan - 1:  ·  Problem: Acute respiratory failure due to COVID-19.   ·  Plan: Assessment:  - patient presented with hypoxic respiratory failure secondary to covid19 infection  - CXR shows b/l covid pneumonia  - hypoxic on high flow 70% O2  - cw with remdesivir and dexamethasone    Problem/Plan - 2:  ·  Problem: Dysphagia.   ·  Plan: Assessment:  - patient with dementia has hx of dyaphagia but able to tolerate nectar thick liquids as per outpatient records  - no feeding tube as perr family     Problem/Plan - 3:  ·  Problem: Chronic atrial fibrillation.   ·  Plan: - hx of afib not on AC as per outpatient charts  - c/w digoxin and metoprolol  - tele monitoring.    Problem/Plan - 4:·  Problem: Adult hypothyroidism.   ·  Plan: - c/w synthroid.    
88 y/o M with pnhx of afib, DM, hypothyroid, HTN, PNA, dementia, dysphagia, presented to the ED for cough, congestion and SOB. GI consulted for dysphagia     Dysphagia   2/2 AMS worsened d/t Sepsis   aspiration precautions   poor prognosis; DNR  comfort care  please call with questions    Covid PNA   management per ID/Medicine teams appreciated   favor elevated Bilirubin in setting of Sepsis     Afib   management per cardiology     I reviewed the overnight course of events on the unit, re-confirming the patient history. I discussed the care with the patient and their family. The plan of care was discussed with the physician assistant and modifications were made to the notation where appropriate. Differential diagnosis and plan of care discussed with patient after the evaluation. Advanced care planning was discussed with patient and family.  Advanced care planning forms were reviewed and discussed.  Risks, benefits and alternatives of gastroenterologic procedures were discussed in detail and all questions were answered. 35 minutes spent on total encounter of which more than fifty percent of the encounter was spent counseling and/or coordinating care by the attending physician.   
88 y/o M with pnhx of afib, DM, hypothyroid, HTN, PNA, dementia, dysphagia, presented to the ED for cough, congestion and SOB. Patient likely to be in hypoxic repsiratory failure requiring high flow. Admit for covid pneumonia and possibly aspiration pneumonia    Problem/Plan - 1:  ·  Problem: Acute respiratory failure due to COVID-19.   ·  Plan: Assessment:  - patient presented with hypoxic respiratory failure secondary to covid19 infection  - CXR shows b/l covid pneumonia  - symptomatic care     Problem/Plan - 2:  ·  Problem: Dysphagia.   ·  Plan: Assessment:  - patient with dementia has hx of dysphagia but able to tolerate nectar thick liquids as per outpatient records  - no feeding tube as per family     Problem/Plan - 3:  ·  Problem: Chronic atrial fibrillation.   ·  Plan: - hx of afib not on AC as per outpatient charts    Problem/Plan - 4:·  Problem: Adult hypothyroidism.   ·  Plan: - c/w synthroid.    Comfort measures only, hospice pending 
88 y/o M with pnhx of afib, DM, hypothyroid, HTN, PNA, dementia, dysphagia, presented to the ED for cough, congestion and SOB. Patient likely to be in hypoxic repsiratory failure requiring high flow. Admit for covid pneumonia and possibly aspiration pneumonia    Problem/Plan - 1:  ·  Problem: Acute respiratory failure due to COVID-19.   ·  Plan: Assessment:  - patient presented with hypoxic respiratory failure secondary to covid19 infection  - CXR shows b/l covid pneumonia  - symptomatic care     Problem/Plan - 2:  ·  Problem: Dysphagia.   ·  Plan: Assessment:  - patient with dementia has hx of dysphagia but able to tolerate nectar thick liquids as per outpatient records  - no feeding tube as per family     Problem/Plan - 3:  ·  Problem: Chronic atrial fibrillation.   ·  Plan: - hx of afib not on AC as per outpatient charts    Problem/Plan - 4:·  Problem: Adult hypothyroidism.   ·  Plan: - c/w synthroid.    Comfort measures only, hospice pending   
89 y/o M with pnhx of afib, DM, hypothyroid, HTN, PNA, dementia, dysphagia, presented to the ED for cough, congestion and SOB. GI consulted for dysphagia     Dysphagia   2/2 AMS worsened d/t Sepsis   aspiration precautions   NGT placement if in line w/GOC when off HFNC  if in line with GOC, consider PEG when off Covid Isolation   poor prognosis; DNR    Covid PNA   management per ID/Medicine teams appreciated   favor elevated Bilirubin in setting of Sepsis     Afib   management per cardiology     I reviewed the overnight course of events on the unit, re-confirming the patient history. I discussed the care with the patient and their family. The plan of care was discussed with the physician assistant and modifications were made to the notation where appropriate. Differential diagnosis and plan of care discussed with patient after the evaluation. Advanced care planning was discussed with patient and family.  Advanced care planning forms were reviewed and discussed.  Risks, benefits and alternatives of gastroenterologic procedures were discussed in detail and all questions were answered. 35 minutes spent on total encounter of which more than fifty percent of the encounter was spent counseling and/or coordinating care by the attending physician.   
89 yo M with A Fib, DM, hypothyroid, PNA, dementia, initially with cough/sob  No fever, no leukocytosis  CXR suspicious for COVID pneumonia  HFNC  Dementia--baseline poor AMS status  Planned for comfort measures?  Overall,  1) COVID  - HFNC  - S/p RemD  - Dexa 10 days then DC  - Supportive care  - On IV abx for possible bacterial process, not candidate for Toci  2) Hypoxia  - Role for anticoagulation/O2 supplementation per primary team  - F/U pulmonary  3) AMS  - At baseline vs worsening?  - Monitor  4) Fever/Leukocytosis  - Worsening leukocytosis, fever, XR worsening--most likely is due to combination of steroids and worsening COVID > bacterial process  - Zosyn, plan for 5-7 day course  - F/U BCXs  - Low threshold for MICU if within GOC    GOC per primary team  My colleagues will be covering this patient on 2/9/22, I will return 2/10. Please call [phone] or on call fellow with any questions or change in status.     Cameron Moscoso MD  Contact on TEAMS messaging from 9am - 5pm  From 5pm-9am, and on weekends call [phone] or on call fellow 
 88 y/o M with pnhx of afib, DM, hypothyroid, HTN, PNA, dementia, dysphagia, presented to the ED for cough, congestion and SOB. Patient likely to be in hypoxic repsiratory failure requiring high flow. Admit for covid pneumonia and possibly aspiration pneumonia
 89 y/o M with pnhx of afib, DM, hypothyroid, HTN, PNA, dementia, dysphagia, presented to the ED for cough, congestion and SOB. Patient likely to be in hypoxic repsiratory failure requiring high flow. Admit for covid pneumonia and possibly aspiration pneumonia
88 yo M with A Fib, DM, hypothyroid, PNA, dementia, initially with cough/sob  No fever, no leukocytosis  CXR suspicious for COVID pneumonia  HFNC  Dementia--baseline poor AMS status  Overall,  1) COVID  - HFNC  - S/p RemD  - Dexa 10 days then DC  - Supportive care  - On IV abx for possible bacterial process, not candidate for Toci  2) Hypoxia  - Role for anticoagulation/O2 supplementation per primary team  - F/U pulmonary  3) AMS  - At baseline vs worsening?  - Monitor  4) Fever/Leukocytosis  - Worsening leukocytosis, fever, XR worsening--most likely is due to combination of steroids and worsening COVID > bacterial process  - Zosyn, plan for 5-7 day course  - F/U BCXs  - Check MRSA PCR  - Low threshold for MICU if within GOC    Cameron Moscoso MD  Contact on TEAMS messaging from 9am - 5pm  From 5pm-9am, and on weekends call [phone] or on call fellow 
89 y/o M with pnhx of afib, DM, hypothyroid, HTN, PNA, dementia, dysphagia, presented to the ED for cough, congestion and SOB. GI consulted for dysphagia     Dysphagia   2/2 AMS worsened d/t Sepsis   aspiration precautions   consider NGT placement if in line w/GOC when off HFNC  if in line with GOC, plan PEG when off Covid Isolation   poor prognosis; DNR  Covid PNA   management per ID/Medicine teams appreciated   favor elevated Bilirubin in setting of Sepsis     Afib   management per cardiology     I reviewed the overnight course of events on the unit, re-confirming the patient history. I discussed the care with the patient and their family. The plan of care was discussed with the physician assistant and modifications were made to the notation where appropriate. Differential diagnosis and plan of care discussed with patient after the evaluation. Advanced care planning was discussed with patient and family.  Advanced care planning forms were reviewed and discussed.  Risks, benefits and alternatives of gastroenterologic procedures were discussed in detail and all questions were answered. 35 minutes spent on total encounter of which more than fifty percent of the encounter was spent counseling and/or coordinating care by the attending physician.   
89 yo M with A Fib, DM, hypothyroid, PNA, dementia, initially with cough/sob  No fever, no leukocytosis  CXR suspicious for COVID pneumonia  HFNC  Dementia--baseline poor AMS status  In the interim, team re-added Vanco/Zosyn  Overall,  1) COVID  - HFNC  - RemD 5 days then DC  - Dexa 10 days then DC  - Supportive care  - On IV abx for possible bacterial process, not candidate for Toci  2) Hypoxia  - Role for anticoagulation/O2 supplementation per primary team  - F/U pulmonary  3) AMS  - At baseline vs worsening?  - Monitor  4) Fever/Leukocytosis  - Worsening leukocytosis, fever, XR worsening--most likely is due to combination of steroids and worsening COVID > bacterial process  - Continue Vanco/Zosyn for now (monitor levels)  - F/U BCXs  - Check MRSA PCR  - Low threshold for MICU if within GOC    Cameron Moscoso MD  Contact on TEAMS messaging from 9am - 5pm  From 5pm-9am, and on weekends call [phone] or on call fellow 
 88 y/o M with pnhx of afib, DM, hypothyroid, HTN, PNA, dementia, dysphagia, presented to the ED for cough, congestion and SOB. Patient likely to be in hypoxic repsiratory failure requiring high flow. Admit for covid pneumonia and possibly aspiration pneumonia
 88 y/o M with pnhx of afib, DM, hypothyroid, HTN, PNA, dementia, dysphagia, presented to the ED for cough, congestion and SOB. Patient likely to be in hypoxic repsiratory failure requiring high flow. Admit for covid pneumonia and possibly aspiration pneumonia    Problem/Plan - 1:  ·  Problem: Acute respiratory failure due to COVID-19.   ·  Plan: Assessment:  - patient presented with hypoxic respiratory failure secondary to covid19 infection  - CXR shows b/l covid pneumonia  - hypoxic on high flow 70% O2  - cw with remdesivir and dexamethasone    Problem/Plan - 2:  ·  Problem: Dysphagia.   ·  Plan: Assessment:  - patient with dementia has hx of dyaphagia but able to tolerate nectar thick liquids as per outpatient records  - no feeding tube as per family     Problem/Plan - 3:  ·  Problem: Chronic atrial fibrillation.   ·  Plan: - hx of afib not on AC as per outpatient charts  - c/w digoxin and cardiazem drip   - tele monitoring.    Problem/Plan - 4:·  Problem: Adult hypothyroidism.   ·  Plan: - c/w synthroid.
88 y/o M with pnhx of afib, DM, hypothyroid, HTN, PNA, dementia, dysphagia, presented to the ED for cough, congestion and SOB. Patient likely to be in hypoxic repsiratory failure requiring high flow. Admit for covid pneumonia and possibly aspiration pneumonia    Problem/Plan - 1:  ·  Problem: Acute respiratory failure due to COVID-19.   ·  Plan: Assessment:  - patient presented with hypoxic respiratory failure secondary to covid19 infection  - CXR shows b/l covid pneumonia  - symptomatic care     Problem/Plan - 2:  ·  Problem: Dysphagia.   ·  Plan: Assessment:  - patient with dementia has hx of dysphagia but able to tolerate nectar thick liquids as per outpatient records  - no feeding tube as per family     Problem/Plan - 3:  ·  Problem: Chronic atrial fibrillation.   ·  Plan: - hx of afib not on AC as per outpatient charts    Problem/Plan - 4:·  Problem: Adult hypothyroidism.   ·  Plan: - c/w synthroid.    Comfort measures only, hospice pending   
89 y/o M with pnhx of afib, DM, hypothyroid, HTN, PNA, dementia, dysphagia, presented to the ED for cough, congestion and SOB. Patient likely to be in hypoxic repsiratory failure requiring high flow. Admit for covid pneumonia and possibly aspiration pneumonia    Problem/Plan - 1:  ·  Problem: Acute respiratory failure due to COVID-19.   ·  Plan: Assessment:  - patient presented with hypoxic respiratory failure secondary to covid19 infection  - CXR shows b/l covid pneumonia  - symptomatic care     Problem/Plan - 2:  ·  Problem: Dysphagia.   ·  Plan: Assessment:  - patient with dementia has hx of dysphagia but able to tolerate nectar thick liquids as per outpatient records  - no feeding tube as per family     Problem/Plan - 3:  ·  Problem: Chronic atrial fibrillation.   ·  Plan: - hx of afib not on AC as per outpatient charts  - c/w digoxin  - tele monitoring.    Problem/Plan - 4:·  Problem: Adult hypothyroidism.   ·  Plan: - c/w synthroid.    Comfort measures only, hospice pending 
89 yo M with A Fib, DM, hypothyroid, PNA, dementia, initially with cough/sob  No fever, no leukocytosis  CXR suspicious for COVID pneumonia  HFNC  Dementia--baseline poor AMS status  Planned for comfort measures/conservative care?  Overall,  1) COVID  - HFNC  - S/p RemD, Dexa  - Supportive care  2) Hypoxia  - Role for anticoagulation/O2 supplementation per primary team  - F/U pulmonary  3) AMS  - At baseline vs worsening?  - Monitor  4) Fever/Leukocytosis  - S/p course Zosyn for possible pneumonia  - Further workup depending on GOC    GOC per primary team  Signing off. Please call if ID can be of further assistance.    Cameron Moscoso MD  Contact on TEAMS messaging from 9am - 5pm  From 5pm-9am, and on weekends call [phone] or on call fellow 
88 y/o M with pnhx of afib, DM, hypothyroid, HTN, PNA, dementia, dysphagia, presented to the ED for cough, congestion and SOB. GI consulted for dysphagia     Dysphagia   2/2 AMS worsened d/t Sepsis   aspiration precautions   recommend NGT placement if in line w/GOC when off HFNC  if in line with GOC, plan PEG when off Covid Isolation   guarded prognosis     Covid PNA   management per ID/Medicine teams appreciated   favor elevated Bilirubin in setting of Sepsis     Afib   management per cardiology     I reviewed the overnight course of events on the unit, re-confirming the patient history. I discussed the care with the patient and their family. The plan of care was discussed with the physician assistant and modifications were made to the notation where appropriate. Differential diagnosis and plan of care discussed with patient after the evaluation. Advanced care planning was discussed with patient and family.  Advanced care planning forms were reviewed and discussed.  Risks, benefits and alternatives of gastroenterologic procedures were discussed in detail and all questions were answered. 35 minutes spent on total encounter of which more than fifty percent of the encounter was spent counseling and/or coordinating care by the attending physician.   
89 y/o M with pnhx of afib, DM, hypothyroid, HTN, PNA, dementia, dysphagia, presented to the ED for cough, congestion and SOB. GI consulted for dysphagia     Dysphagia   2/2 AMS worsened d/t Sepsis   aspiration precautions   NGT placement if in line w/GOC when off HFNC  if in line with GOC, consider PEG when off Covid Isolation   poor prognosis; DNR    Covid PNA   management per ID/Medicine teams appreciated   favor elevated Bilirubin in setting of Sepsis     Afib   management per cardiology     I reviewed the overnight course of events on the unit, re-confirming the patient history. I discussed the care with the patient and their family. The plan of care was discussed with the physician assistant and modifications were made to the notation where appropriate. Differential diagnosis and plan of care discussed with patient after the evaluation. Advanced care planning was discussed with patient and family.  Advanced care planning forms were reviewed and discussed.  Risks, benefits and alternatives of gastroenterologic procedures were discussed in detail and all questions were answered. 35 minutes spent on total encounter of which more than fifty percent of the encounter was spent counseling and/or coordinating care by the attending physician.   
Attending addendum:   Agree with above  Follow up palliative care  No further inpatient cardiac workup needed at this time.       Abbi Virk MD 
 88 y/o M with pnhx of afib, DM, hypothyroid, HTN, PNA, dementia, dysphagia, presented to the ED for cough, congestion and SOB. Patient likely to be in hypoxic repsiratory failure requiring high flow. Admit for covid pneumonia and possibly aspiration pneumonia

## 2022-02-10 NOTE — PROGRESS NOTE ADULT - PROBLEM SELECTOR PROBLEM 4
Adult hypothyroidism

## 2022-02-11 RX ORDER — METOPROLOL TARTRATE 50 MG
1 TABLET ORAL
Qty: 0 | Refills: 0 | DISCHARGE

## 2022-02-11 RX ORDER — GUAIFENESIN/DEXTROMETHORPHAN 600MG-30MG
10 TABLET, EXTENDED RELEASE 12 HR ORAL
Qty: 0 | Refills: 0 | DISCHARGE
End: 2022-01-01

## 2022-02-11 RX ORDER — LATANOPROST 0.05 MG/ML
1 SOLUTION/ DROPS OPHTHALMIC; TOPICAL
Qty: 0 | Refills: 0 | DISCHARGE

## 2022-02-11 RX ORDER — SENNA PLUS 8.6 MG/1
1 TABLET ORAL
Qty: 0 | Refills: 0 | DISCHARGE

## 2022-02-11 RX ORDER — ASPIRIN/CALCIUM CARB/MAGNESIUM 324 MG
1 TABLET ORAL
Qty: 0 | Refills: 0 | DISCHARGE

## 2022-02-11 RX ORDER — DIGOXIN 250 MCG
1 TABLET ORAL
Qty: 0 | Refills: 0 | DISCHARGE

## 2022-02-11 RX ORDER — METFORMIN HYDROCHLORIDE 850 MG/1
1 TABLET ORAL
Qty: 0 | Refills: 0 | DISCHARGE

## 2022-02-11 RX ORDER — LEVOTHYROXINE SODIUM 125 MCG
1 TABLET ORAL
Qty: 0 | Refills: 0 | DISCHARGE

## 2022-03-24 NOTE — DISCHARGE NOTE ADULT - REASON FOR ADMISSION
Patient intubated with 7.0 ETT, 22 @ teeth. Positive color change with colorimetric CO2 detector and bilateral and equal breath sounds post intubation. Pt currently on CMV 20, 400, 100%, +8. RT will continue to follow.    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

## 2022-04-01 NOTE — H&P ADULT - PROBLEM SELECTOR PROBLEM 8
Thoracic conference recommendation summary (see note for detailed recommendation(s)): Recommendation is for pulm consult--pt is scheduled to see Dr August 4/4/22. No recommendation for PET at this time. Please cancel the exam if you agree w/ the team recommendations.   
Preventive measure

## 2022-05-08 NOTE — PROGRESS NOTE ADULT - SUBJECTIVE AND OBJECTIVE BOX
PT DID NOT RECIEVE 1700 GABAPENTIN DUE TO RECEIVING THE 1400 LATER DUE TO PT NOT WANTING TO 
TAKE AT THE TIME. Infectious Diseases progress note:    Subjective: Events noted, s/p s&s eval.  No new fevers.  NAD.     ROS:  CONSTITUTIONAL:  No fever, chills, rigors  CARDIOVASCULAR:  No chest pain or palpitations  RESPIRATORY:   No SOB, cough, dyspnea on exertion.  No wheezing  GASTROINTESTINAL:  No abd pain, N/V, diarrhea/constipation  EXTREMITIES:  No swelling or joint pain  GENITOURINARY:  No burning on urination, increased frequency or urgency.  No flank pain  NEUROLOGIC:  No HA, visual disturbances  SKIN: No rashes    Allergies    No Known Allergies    Intolerances        ANTIBIOTICS/RELEVANT:  antimicrobials  piperacillin/tazobactam IVPB.. 3.375 Gram(s) IV Intermittent every 8 hours    immunologic:    OTHER:  aspirin  chewable 81 milliGRAM(s) Oral daily  atorvastatin 80 milliGRAM(s) Oral at bedtime  dextrose 40% Gel 15 Gram(s) Oral once  dextrose 5% + sodium chloride 0.45%. 1000 milliLiter(s) IV Continuous <Continuous>  dextrose 5%. 1000 milliLiter(s) IV Continuous <Continuous>  dextrose 5%. 1000 milliLiter(s) IV Continuous <Continuous>  dextrose 50% Injectable 25 Gram(s) IV Push once  dextrose 50% Injectable 12.5 Gram(s) IV Push once  dextrose 50% Injectable 25 Gram(s) IV Push once  digoxin  Injectable 125 MICROGram(s) IV Push daily  glucagon  Injectable 1 milliGRAM(s) IntraMuscular once  heparin   Injectable 5000 Unit(s) SubCutaneous every 12 hours  insulin lispro (ADMELOG) corrective regimen sliding scale   SubCutaneous three times a day before meals  insulin lispro (ADMELOG) corrective regimen sliding scale   SubCutaneous at bedtime  latanoprost 0.005% Ophthalmic Solution 1 Drop(s) Both EYES at bedtime  levalbuterol Inhalation 0.63 milliGRAM(s) Inhalation every 6 hours PRN  levothyroxine Injectable 40 MICROGram(s) IV Push at bedtime  metoprolol tartrate Injectable 5 milliGRAM(s) IV Push every 6 hours  PARoxetine 20 milliGRAM(s) Oral daily  senna 2 Tablet(s) Oral at bedtime      Objective:  Vital Signs Last 24 Hrs  T(C): 37.2 (21 Oct 2021 12:00), Max: 38 (21 Oct 2021 04:05)  T(F): 98.9 (21 Oct 2021 12:00), Max: 100.4 (21 Oct 2021 04:05)  HR: 105 (21 Oct 2021 12:00) (105 - 131)  BP: 118/80 (21 Oct 2021 12:00) (106/71 - 136/78)  BP(mean): --  RR: 18 (21 Oct 2021 12:00) (18 - 19)  SpO2: 98% (21 Oct 2021 12:00) (95% - 99%)    PHYSICAL EXAM:  Constitutional:NAD  Eyes:LEATHA, EOMI  Ear/Nose/Throat: no thrush, mucositis.  Moist mucous membranes	  Neck:no JVD, no lymphadenopathy, supple  Respiratory: CTA dinesh  Cardiovascular: S1S2 RRR, no murmurs  Gastrointestinal:soft, nontender,  nondistended (+) BS  Extremities:no e/e/c  Skin:  no rashes, open wounds or ulcerations        LABS:                        13.6   10.52 )-----------( 158      ( 21 Oct 2021 07:21 )             39.7     10-21    138  |  101  |  26<H>  ----------------------------<  87  3.9   |  24  |  0.72    Ca    9.4      21 Oct 2021 07:21  Phos  1.7     10-21  Mg     1.90     10-21    TPro  6.4  /  Alb  3.7  /  TBili  2.6<H>  /  DBili  x   /  AST  16  /  ALT  11  /  AlkPhos  60  10-20                    Rapid RVP Result: Detected          MICROBIOLOGY:    Culture - Blood (10.19.21 @ 18:41)   Specimen Source: .Blood Blood-Peripheral   Culture Results:   No growth to date.     Culture - Blood (10.19.21 @ 18:41)   Specimen Source: .Blood Blood-Peripheral   Culture Results:   No growth to date.     Culture - Urine (10.19.21 @ 17:29)   Specimen Source: Clean Catch Clean Catch (Midstream)   Culture Results:   No growth       RADIOLOGY & ADDITIONAL STUDIES:

## 2022-11-04 NOTE — ED PROVIDER NOTE - HISTORY ATTESTATION, MLM
I have reviewed and confirmed nurses' notes...

## 2022-11-07 NOTE — PATIENT PROFILE ADULT. - HAS THE PATIENT HAD A SIGNIFICANT CHANGE IN FUNCTIONAL STATUS DUE TO CVA, HEAD TRAUMA, ORTHOPEDIC TRAUMA/SURGERY, OR FALL, WITH THE WEEK PRIOR TO ADMISSION
11/7/2022       RE: Sarah العلي  [address]  [place] [address]     Dear Colleague,    Thank you for referring your patient, Sarah العلي, to the [facility] [place] at [facility]. Please see a copy of my visit note below.    HISTORY OF PRESENT ILLNESS:  Sarah is back to see us again today after her in-office turbinate ablation on the left side.  She has been doing well.  Pain is well controlled.  No bleeding.    PHYSICAL EXAMINATION:  Exam shows some crusting associated with the head of the left inferior turbinate, which is removed.  Her breathing feels excellent.    PLAN:  Follow up in six weeks if she has any further concerns, otherwise as needed.  Encouraged good nasal hydration for the next couple of weeks.            Again, thank you for allowing me to participate in the care of your patient.      Sincerely,    Israel Winkler MD      
no

## 2022-11-10 NOTE — PHYSICAL THERAPY INITIAL EVALUATION ADULT - PLANNED THERAPY INTERVENTIONS, PT EVAL
balance training/bed mobility training Erivedge Counseling- I discussed with the patient the risks of Erivedge including but not limited to nausea, vomiting, diarrhea, constipation, weight loss, changes in the sense of taste, decreased appetite, muscle spasms, and hair loss.  The patient verbalized understanding of the proper use and possible adverse effects of Erivedge.  All of the patient's questions and concerns were addressed.

## 2022-11-14 NOTE — H&P ADULT - PROBLEM SELECTOR PROBLEM 5
BPH (benign prostatic hyperplasia) New  Imaging negative for acute pathology including RUQ ultrasound   Possibly secondary to PE/infarct location though patient reports this pain on and off for the past yr  GI f/u outpatient

## 2022-12-16 NOTE — DISCHARGE NOTE NURSING/CASE MANAGEMENT/SOCIAL WORK - NSDCVIVACCINE_GEN_ALL_CORE_FT
Patient requesting pain meds. Awaiting MD assessment and orders. Repositioned in bed for comfort and blankets applied. Lights dimmed. Patient verbalizes understanding of NPO status at this time. Td (adult) preservative free; 20-Aug-2017 10:30; Radha Mao (RN); [employer]; A103A; IntraMuscular; Deltoid Right.; 0.5 milliLiter(s); VIS (VIS Published: 20-Aug-2017, VIS Presented: 20-Aug-2017);

## 2023-11-03 NOTE — PATIENT PROFILE ADULT. - VISION (WITH CORRECTIVE LENSES IF THE PATIENT USUALLY WEARS THEM):
Normal vision: sees adequately in most situations; can see medication labels, newsprint
You can access the FollowMyHealth Patient Portal offered by [facility] by registering at the following website: [email or web address]. By joining [facility]’s FollowMyHealth portal, you will also be able to view your health information using other applications (apps) compatible with our system.

## 2023-11-24 NOTE — DIETITIAN INITIAL EVALUATION ADULT. - CHIEF COMPLAINT
Call your physician or seek medical care immediately if you notice any of the following symptoms of a bleed:   Red, dark, coffee or cola colored urine  Red or tar like stools  Excessive bleeding from gums or nose  Vomiting coffee colored or bright red material  Coughing up red tinged sputum  Severe or unprovoked pain (ex: severe Headache or Abdominal pain)  Sudden, spontaneous bruising for no reason  Excessive menstrual bleeding  A cut that will not stop bleeding within 10-15 mins    Avoid activities that may result in a serious fall or injury      The patient is a 88y Male complaining of difficulty swallowing.

## 2024-10-15 NOTE — H&P ADULT - REASON FOR ADMISSION
A card has been sent to the patient for PATIENT ROUNDING with [facility].   
agitation

## 2024-12-26 NOTE — RAPID RESPONSE TEAM SUMMARY - NSOTHERINTERVENTIONSRRT_GEN_ALL_CORE
2 week virtual with bernabe 3 month est care with jenny or?   Recommendations:  -Xopenex (ordered q6h PRN) instead of duonebs  -c/w home beta blocker for afib  -Trend lactate, f/u RRT labs  -Palliative consult, pt already DNR/DNI but family would like to discuss future directions   -Replete mag (2g ordered during RRT) and recheck QTc, if <500, can consider azithro for atypical coverage

## 2025-04-21 NOTE — ED ADULT NURSE NOTE - NSFALLRSKINDICATORS_ED_ALL_ED
Comments: Patient continues to show clear skin and little to no pruritis associated with atopic dermatitis on Dupixent Add High Risk Medication Management Associated Diagnosis?: No Patient Reported Weight(Optional But Include Units): 200 pounds Detail Level: Zone Length Of Therapy: 2 years no

## 2025-04-24 NOTE — ED ADULT NURSE NOTE - NURSING MUSC ROM
Addended by: DEDE ORTIZ on: 4/24/2025 10:10 AM     Modules accepted: Orders     full range of motion in all extremities

## 2025-04-29 NOTE — H&P ADULT - NSICDXPASTMEDICALHX_GEN_ALL_CORE_FT
Asymptomatic  Continue rivaroxaban  
Controlled  Continue metoprolol  
No heart failure symptoms  
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  
No stroke symptoms  Limited palpitation symptoms possibly due to A-fib/flutter  Continue metoprolol  On rivaroxaban for history of DVT.  Left atrial appendage successfully occluded  
Obtain direct LDL and LP(a)  
Obtain hemoglobin A1c  Continue empagliflozin  
PAST MEDICAL HISTORY:  Aortic stenosis     Arthritis generalized medrol pack q 6 weeks    Atrial fibrillation     BPH (benign prostatic hypertrophy)     Congenital heart defect     Dementia     Diabetes mellitus     Glaucoma     HTN - Hypertension     Hyperlipidemia     Nonrheumatic aortic valve stenosis     OAB (overactive bladder)     Spinal stenosis of lumbar region     Torn rotator cuff Right